# Patient Record
Sex: MALE | Race: WHITE | Employment: UNEMPLOYED | URBAN - METROPOLITAN AREA
[De-identification: names, ages, dates, MRNs, and addresses within clinical notes are randomized per-mention and may not be internally consistent; named-entity substitution may affect disease eponyms.]

---

## 2017-03-21 ENCOUNTER — HOSPITAL ENCOUNTER (EMERGENCY)
Age: 57
Discharge: HOME | End: 2017-03-21
Attending: EMERGENCY MEDICINE
Payer: SELF-PAY

## 2017-03-21 ENCOUNTER — APPOINTMENT (OUTPATIENT)
Dept: GENERAL RADIOLOGY | Age: 57
End: 2017-03-21
Attending: EMERGENCY MEDICINE
Payer: SELF-PAY

## 2017-03-21 VITALS
RESPIRATION RATE: 20 BRPM | HEART RATE: 96 BPM | WEIGHT: 220 LBS | HEIGHT: 69 IN | DIASTOLIC BLOOD PRESSURE: 80 MMHG | OXYGEN SATURATION: 95 % | BODY MASS INDEX: 32.58 KG/M2 | SYSTOLIC BLOOD PRESSURE: 118 MMHG | TEMPERATURE: 99.4 F

## 2017-03-21 DIAGNOSIS — B34.9 VIRAL SYNDROME: Primary | ICD-10-CM

## 2017-03-21 DIAGNOSIS — J06.9 ACUTE UPPER RESPIRATORY INFECTION: ICD-10-CM

## 2017-03-21 LAB
FLUAV AG NPH QL IA: NEGATIVE
FLUBV AG NOSE QL IA: NEGATIVE

## 2017-03-21 PROCEDURE — 71020 XR CHEST PA LAT: CPT

## 2017-03-21 PROCEDURE — 99282 EMERGENCY DEPT VISIT SF MDM: CPT

## 2017-03-21 PROCEDURE — 87804 INFLUENZA ASSAY W/OPTIC: CPT | Performed by: EMERGENCY MEDICINE

## 2017-03-21 RX ORDER — PROMETHAZINE HYDROCHLORIDE AND CODEINE PHOSPHATE 6.25; 1 MG/5ML; MG/5ML
5 SOLUTION ORAL
Qty: 120 ML | Refills: 0 | Status: SHIPPED | OUTPATIENT
Start: 2017-03-21 | End: 2021-03-05

## 2017-03-21 NOTE — DISCHARGE INSTRUCTIONS
Upper Respiratory Infection (Cold): Care Instructions  Your Care Instructions    An upper respiratory infection, or URI, is an infection of the nose, sinuses, or throat. URIs are spread by coughs, sneezes, and direct contact. The common cold is the most frequent kind of URI. The flu and sinus infections are other kinds of URIs. Almost all URIs are caused by viruses. Antibiotics won't cure them. But you can treat most infections with home care. This may include drinking lots of fluids and taking over-the-counter pain medicine. You will probably feel better in 4 to 10 days. The doctor has checked you carefully, but problems can develop later. If you notice any problems or new symptoms, get medical treatment right away. Follow-up care is a key part of your treatment and safety. Be sure to make and go to all appointments, and call your doctor if you are having problems. It's also a good idea to know your test results and keep a list of the medicines you take. How can you care for yourself at home? · To prevent dehydration, drink plenty of fluids, enough so that your urine is light yellow or clear like water. Choose water and other caffeine-free clear liquids until you feel better. If you have kidney, heart, or liver disease and have to limit fluids, talk with your doctor before you increase the amount of fluids you drink. · Take an over-the-counter pain medicine, such as acetaminophen (Tylenol), ibuprofen (Advil, Motrin), or naproxen (Aleve). Read and follow all instructions on the label. · Before you use cough and cold medicines, check the label. These medicines may not be safe for young children or for people with certain health problems. · Be careful when taking over-the-counter cold or flu medicines and Tylenol at the same time. Many of these medicines have acetaminophen, which is Tylenol. Read the labels to make sure that you are not taking more than the recommended dose.  Too much acetaminophen (Tylenol) can be harmful. · Get plenty of rest.  · Do not smoke or allow others to smoke around you. If you need help quitting, talk to your doctor about stop-smoking programs and medicines. These can increase your chances of quitting for good. When should you call for help? Call 911 anytime you think you may need emergency care. For example, call if:  · You have severe trouble breathing. Call your doctor now or seek immediate medical care if:  · You seem to be getting much sicker. · You have new or worse trouble breathing. · You have a new or higher fever. · You have a new rash. Watch closely for changes in your health, and be sure to contact your doctor if:  · You have a new symptom, such as a sore throat, an earache, or sinus pain. · You cough more deeply or more often, especially if you notice more mucus or a change in the color of your mucus. · You do not get better as expected. Where can you learn more? Go to http://florencia-bethel.info/. Enter B515 in the search box to learn more about \"Upper Respiratory Infection (Cold): Care Instructions. \"  Current as of: June 30, 2016  Content Version: 11.1  © 6623-7421 [employer]. Care instructions adapted under license by [employer] (which disclaims liability or warranty for this information). If you have questions about a medical condition or this instruction, always ask your healthcare professional. [place] [address] any warranty or liability for your use of this information. Viral Infections: Care Instructions  Your Care Instructions  You don't feel well, but it's not clear what's causing it. You may have a viral infection. Viruses cause many illnesses, such as the common cold, influenza, fever, rashes, and the diarrhea, nausea, and vomiting that are often called \"stomach flu. \" You may wonder if antibiotic medicines could make you feel better.  But antibiotics only treat infections caused by bacteria. They don't work on viruses. The good news is that viral infections usually aren't serious. Most will go away in a few days without medical treatment. In the meantime, there are a few things you can do to make yourself more comfortable. Follow-up care is a key part of your treatment and safety. Be sure to make and go to all appointments, and call your doctor if you are having problems. It's also a good idea to know your test results and keep a list of the medicines you take. How can you care for yourself at home? · Get plenty of rest if you feel tired. · Take an over-the-counter pain medicine if needed, such as acetaminophen (Tylenol), ibuprofen (Advil, Motrin), or naproxen (Aleve). Read and follow all instructions on the label. · Be careful when taking over-the-counter cold or flu medicines and Tylenol at the same time. Many of these medicines have acetaminophen, which is Tylenol. Read the labels to make sure that you are not taking more than the recommended dose. Too much acetaminophen (Tylenol) can be harmful. · Drink plenty of fluids, enough so that your urine is light yellow or clear like water. If you have kidney, heart, or liver disease and have to limit fluids, talk with your doctor before you increase the amount of fluids you drink. · Stay home from work, school, and other public places while you have a fever. When should you call for help? Call 911 anytime you think you may need emergency care. For example, call if:  · You have severe trouble breathing. · You passed out (lost consciousness). Call your doctor now or seek immediate medical care if:  · You seem to be getting much sicker. · You have a new or higher fever. · You have blood in your stools. · You have new belly pain, or your pain gets worse. · You have a new rash. Watch closely for changes in your health, and be sure to contact your doctor if:  · You start to get better and then get worse.   · You do not get better as expected. Where can you learn more? Go to http://florencia-bethel.info/. Enter C880 in the search box to learn more about \"Viral Infections: Care Instructions. \"  Current as of: May 24, 2016  Content Version: 11.1  © 1904-8332 [employer], [employer]. Care instructions adapted under license by [employer]Â® (which disclaims liability or warranty for this information). If you have questions about a medical condition or this instruction, always ask your healthcare professional. [address] any warranty or liability for your use of this information.

## 2017-03-21 NOTE — ED PROVIDER NOTES
HPI Comments: 55 y.o. male with no significant past medical history who presents to the ED with chief complaint of fatigue. Pt reports generalized fatigue onset approximately 3 days ago accompanied by a nonproductive cough onset today, generalized body aches, nausea, two episodes of vomiting yesterday, lightheadedness, decreased appetite, and diarrhea, says he has been doing \"nothing but sleeping. \" Pt denies taking any medications, but says he does not see a doctor regularly. Pt denies congestion, SOB, abdominal pain, or rash. There are no other acute medical complaints voiced at this time. Social Hx: . PCP: None    Note written by Nilam Ray, as dictated by Rodrigue Peters MD 4:06 PM     The history is provided by the patient and the spouse. History reviewed. No pertinent past medical history. Past Surgical History:   Procedure Laterality Date    HX HEENT      toncills         Family History:   Problem Relation Age of Onset    Diabetes Mother     Diabetes Father        Social History     Social History    Marital status:      Spouse name: N/A    Number of children: N/A    Years of education: N/A     Occupational History    Not on file. Social History Main Topics    Smoking status: Never Smoker    Smokeless tobacco: Not on file    Alcohol use No    Drug use: No    Sexual activity: Not on file     Other Topics Concern    Not on file     Social History Narrative         ALLERGIES: Review of patient's allergies indicates no known allergies. Review of Systems   Constitutional: Positive for appetite change (decreased) and fatigue. Negative for chills and fever. HENT: Negative for congestion, ear pain, postnasal drip, rhinorrhea and sore throat. Eyes: Negative for visual disturbance. Respiratory: Positive for cough. Negative for shortness of breath and wheezing. Cardiovascular: Negative for chest pain, palpitations and leg swelling. Gastrointestinal: Positive for diarrhea, nausea and vomiting. Negative for abdominal pain, anal bleeding and constipation. Genitourinary: Negative for dysuria and hematuria. Musculoskeletal: Positive for myalgias. Negative for arthralgias. Skin: Negative for rash. Allergic/Immunologic: Negative for immunocompromised state. Neurological: Positive for light-headedness. Negative for weakness and headaches. All other systems reviewed and are negative. Vitals:    03/21/17 1546   BP: 118/80   Pulse: 96   Resp: 20   Temp: 99.4 °F (37.4 °C)   SpO2: 95%   Weight: 99.8 kg (220 lb)   Height: 5' 9\" (1.753 m)            Physical Exam   Constitutional: He is oriented to person, place, and time. He appears well-developed and well-nourished. No distress. HENT:   Head: Normocephalic and atraumatic. Right Ear: External ear normal.   Left Ear: External ear normal.   Nose: Nose normal.   Mouth/Throat: Oropharynx is clear and moist.   Eyes: Conjunctivae and EOM are normal. Pupils are equal, round, and reactive to light. Neck: Normal range of motion. Neck supple. No JVD present. No thyromegaly present. Cardiovascular: Normal rate, regular rhythm, normal heart sounds and intact distal pulses. No murmur heard. Pulmonary/Chest: Effort normal and breath sounds normal. No respiratory distress. He has no wheezes. He has no rales. Abdominal: Soft. Bowel sounds are normal. He exhibits no distension. There is no tenderness. Musculoskeletal: Normal range of motion. He exhibits no edema. Neurological: He is alert and oriented to person, place, and time. No cranial nerve deficit. Skin: Skin is warm and dry. No rash noted. Psychiatric: He has a normal mood and affect. His behavior is normal. Thought content normal.   Nursing note and vitals reviewed.      Note written by Nilam Ram, as dictated by Gianfranco Pal MD 4:07 PM    [facility]  ED Course       Procedures    PROGRESS NOTE:  4:49 PM   Flu test negative. Chest x-ray negative. A/P: URI, viral syndrome. Will discharge home with supportive care, fever control, and prescription for promethazine with codeine elixir.

## 2021-02-13 ENCOUNTER — APPOINTMENT (OUTPATIENT)
Dept: CT IMAGING | Age: 61
DRG: 853 | End: 2021-02-13
Attending: EMERGENCY MEDICINE

## 2021-02-13 ENCOUNTER — APPOINTMENT (OUTPATIENT)
Dept: GENERAL RADIOLOGY | Age: 61
DRG: 853 | End: 2021-02-13
Attending: EMERGENCY MEDICINE

## 2021-02-13 ENCOUNTER — ANESTHESIA (OUTPATIENT)
Dept: SURGERY | Age: 61
DRG: 853 | End: 2021-02-13

## 2021-02-13 ENCOUNTER — HOSPITAL ENCOUNTER (INPATIENT)
Age: 61
LOS: 17 days | Discharge: HOME | DRG: 853 | End: 2021-03-05
Attending: EMERGENCY MEDICINE | Admitting: SURGERY

## 2021-02-13 ENCOUNTER — ANESTHESIA EVENT (OUTPATIENT)
Dept: SURGERY | Age: 61
DRG: 853 | End: 2021-02-13

## 2021-02-13 DIAGNOSIS — K35.30 ACUTE APPENDICITIS WITH LOCALIZED PERITONITIS, WITHOUT PERFORATION, ABSCESS, OR GANGRENE: Primary | ICD-10-CM

## 2021-02-13 DIAGNOSIS — K35.32 ACUTE APPENDICITIS WITH PERFORATION AND LOCALIZED PERITONITIS, UNSPECIFIED WHETHER ABSCESS PRESENT, UNSPECIFIED WHETHER GANGRENE PRESENT: ICD-10-CM

## 2021-02-13 DIAGNOSIS — I26.99 OTHER ACUTE PULMONARY EMBOLISM WITHOUT ACUTE COR PULMONALE (HCC): ICD-10-CM

## 2021-02-13 DIAGNOSIS — D72.829 LEUKOCYTOSIS, UNSPECIFIED TYPE: ICD-10-CM

## 2021-02-13 DIAGNOSIS — R55 SYNCOPE AND COLLAPSE: ICD-10-CM

## 2021-02-13 DIAGNOSIS — K35.32 APPENDICITIS WITH PERFORATION: ICD-10-CM

## 2021-02-13 DIAGNOSIS — M79.604 PAIN IN BOTH LOWER EXTREMITIES: ICD-10-CM

## 2021-02-13 DIAGNOSIS — M79.605 PAIN IN BOTH LOWER EXTREMITIES: ICD-10-CM

## 2021-02-13 PROBLEM — K37 APPENDICITIS: Status: ACTIVE | Noted: 2021-02-13

## 2021-02-13 LAB
ALBUMIN SERPL-MCNC: 3.6 G/DL (ref 3.5–5)
ALBUMIN/GLOB SERPL: 1 {RATIO} (ref 1.1–2.2)
ALP SERPL-CCNC: 70 U/L (ref 45–117)
ALT SERPL-CCNC: 32 U/L (ref 12–78)
AMYLASE SERPL-CCNC: 54 U/L (ref 25–115)
ANION GAP SERPL CALC-SCNC: 7 MMOL/L (ref 5–15)
APPEARANCE UR: CLEAR
AST SERPL-CCNC: 18 U/L (ref 15–37)
BACTERIA URNS QL MICRO: NEGATIVE /HPF
BASOPHILS # BLD: 0.1 K/UL (ref 0–0.1)
BASOPHILS NFR BLD: 0 % (ref 0–1)
BILIRUB SERPL-MCNC: 0.2 MG/DL (ref 0.2–1)
BILIRUB UR QL: NEGATIVE
BUN SERPL-MCNC: 17 MG/DL (ref 6–20)
BUN/CREAT SERPL: 13 (ref 12–20)
CALCIUM SERPL-MCNC: 8.4 MG/DL (ref 8.5–10.1)
CHLORIDE SERPL-SCNC: 104 MMOL/L (ref 97–108)
CO2 SERPL-SCNC: 28 MMOL/L (ref 21–32)
COLOR UR: NORMAL
COMMENT, HOLDF: NORMAL
COVID-19 RAPID TEST, COVR: NOT DETECTED
CREAT SERPL-MCNC: 1.35 MG/DL (ref 0.7–1.3)
DIFFERENTIAL METHOD BLD: ABNORMAL
EOSINOPHIL # BLD: 0.1 K/UL (ref 0–0.4)
EOSINOPHIL NFR BLD: 0 % (ref 0–7)
EPITH CASTS URNS QL MICRO: NORMAL /LPF
ERYTHROCYTE [DISTWIDTH] IN BLOOD BY AUTOMATED COUNT: 13.3 % (ref 11.5–14.5)
GLOBULIN SER CALC-MCNC: 3.7 G/DL (ref 2–4)
GLUCOSE SERPL-MCNC: 126 MG/DL (ref 65–100)
GLUCOSE UR STRIP.AUTO-MCNC: NEGATIVE MG/DL
HCT VFR BLD AUTO: 46.4 % (ref 36.6–50.3)
HGB BLD-MCNC: 15.1 G/DL (ref 12.1–17)
HGB UR QL STRIP: NEGATIVE
IMM GRANULOCYTES # BLD AUTO: 0.1 K/UL (ref 0–0.04)
IMM GRANULOCYTES NFR BLD AUTO: 1 % (ref 0–0.5)
KETONES UR QL STRIP.AUTO: NEGATIVE MG/DL
LEUKOCYTE ESTERASE UR QL STRIP.AUTO: NEGATIVE
LIPASE SERPL-CCNC: 79 U/L (ref 73–393)
LYMPHOCYTES # BLD: 1.8 K/UL (ref 0.8–3.5)
LYMPHOCYTES NFR BLD: 11 % (ref 12–49)
MCH RBC QN AUTO: 30.3 PG (ref 26–34)
MCHC RBC AUTO-ENTMCNC: 32.5 G/DL (ref 30–36.5)
MCV RBC AUTO: 93 FL (ref 80–99)
MONOCYTES # BLD: 0.6 K/UL (ref 0–1)
MONOCYTES NFR BLD: 3 % (ref 5–13)
NEUTS SEG # BLD: 14.4 K/UL (ref 1.8–8)
NEUTS SEG NFR BLD: 85 % (ref 32–75)
NITRITE UR QL STRIP.AUTO: NEGATIVE
NRBC # BLD: 0 K/UL (ref 0–0.01)
NRBC BLD-RTO: 0 PER 100 WBC
PH UR STRIP: 6 [PH] (ref 5–8)
PLATELET # BLD AUTO: 251 K/UL (ref 150–400)
PMV BLD AUTO: 9.7 FL (ref 8.9–12.9)
POTASSIUM SERPL-SCNC: 3.8 MMOL/L (ref 3.5–5.1)
PROT SERPL-MCNC: 7.3 G/DL (ref 6.4–8.2)
PROT UR STRIP-MCNC: NEGATIVE MG/DL
RBC # BLD AUTO: 4.99 M/UL (ref 4.1–5.7)
RBC #/AREA URNS HPF: NORMAL /HPF (ref 0–5)
SAMPLES BEING HELD,HOLD: NORMAL
SARS-COV-2, COV2: NORMAL
SODIUM SERPL-SCNC: 139 MMOL/L (ref 136–145)
SOURCE, COVRS: NORMAL
SP GR UR REFRACTOMETRY: 1.01 (ref 1–1.03)
UA: UC IF INDICATED,UAUC: NORMAL
UROBILINOGEN UR QL STRIP.AUTO: 1 EU/DL (ref 0.2–1)
WBC # BLD AUTO: 17 K/UL (ref 4.1–11.1)
WBC URNS QL MICRO: NORMAL /HPF (ref 0–4)

## 2021-02-13 PROCEDURE — 99218 HC RM OBSERVATION: CPT

## 2021-02-13 PROCEDURE — 36415 COLL VENOUS BLD VENIPUNCTURE: CPT

## 2021-02-13 PROCEDURE — 77030039147 HC PWDR HEMSTS SURGICEL JNJ -D: Performed by: SURGERY

## 2021-02-13 PROCEDURE — 96375 TX/PRO/DX INJ NEW DRUG ADDON: CPT

## 2021-02-13 PROCEDURE — 76210000006 HC OR PH I REC 0.5 TO 1 HR: Performed by: SURGERY

## 2021-02-13 PROCEDURE — 74011000258 HC RX REV CODE- 258: Performed by: EMERGENCY MEDICINE

## 2021-02-13 PROCEDURE — 74011250636 HC RX REV CODE- 250/636: Performed by: SURGERY

## 2021-02-13 PROCEDURE — 76010000149 HC OR TIME 1 TO 1.5 HR: Performed by: SURGERY

## 2021-02-13 PROCEDURE — 77030008756 HC TU IRR SUC STRY -B: Performed by: SURGERY

## 2021-02-13 PROCEDURE — 77030031139 HC SUT VCRL2 J&J -A: Performed by: SURGERY

## 2021-02-13 PROCEDURE — 88307 TISSUE EXAM BY PATHOLOGIST: CPT

## 2021-02-13 PROCEDURE — 0DBH4ZZ EXCISION OF CECUM, PERCUTANEOUS ENDOSCOPIC APPROACH: ICD-10-PCS | Performed by: SURGERY

## 2021-02-13 PROCEDURE — 77030002916 HC SUT ETHLN J&J -A: Performed by: SURGERY

## 2021-02-13 PROCEDURE — 74011250636 HC RX REV CODE- 250/636: Performed by: NURSE ANESTHETIST, CERTIFIED REGISTERED

## 2021-02-13 PROCEDURE — 74011000636 HC RX REV CODE- 636: Performed by: RADIOLOGY

## 2021-02-13 PROCEDURE — 74011250636 HC RX REV CODE- 250/636: Performed by: EMERGENCY MEDICINE

## 2021-02-13 PROCEDURE — 99285 EMERGENCY DEPT VISIT HI MDM: CPT

## 2021-02-13 PROCEDURE — 82150 ASSAY OF AMYLASE: CPT

## 2021-02-13 PROCEDURE — 2709999900 HC NON-CHARGEABLE SUPPLY: Performed by: SURGERY

## 2021-02-13 PROCEDURE — 77030012770 HC TRCR OPT FX AMR -B: Performed by: SURGERY

## 2021-02-13 PROCEDURE — 77030040506 HC DRN WND MDII -A: Performed by: SURGERY

## 2021-02-13 PROCEDURE — 77030008684 HC TU ET CUF COVD -B: Performed by: ANESTHESIOLOGY

## 2021-02-13 PROCEDURE — 80053 COMPREHEN METABOLIC PANEL: CPT

## 2021-02-13 PROCEDURE — 83690 ASSAY OF LIPASE: CPT

## 2021-02-13 PROCEDURE — 0DTJ4ZZ RESECTION OF APPENDIX, PERCUTANEOUS ENDOSCOPIC APPROACH: ICD-10-PCS | Performed by: SURGERY

## 2021-02-13 PROCEDURE — 74011250636 HC RX REV CODE- 250/636: Performed by: ANESTHESIOLOGY

## 2021-02-13 PROCEDURE — 77030034628 HC LIGASURE LAP SEAL DIV MD COVD -F: Performed by: SURGERY

## 2021-02-13 PROCEDURE — 87635 SARS-COV-2 COVID-19 AMP PRB: CPT

## 2021-02-13 PROCEDURE — 76060000033 HC ANESTHESIA 1 TO 1.5 HR: Performed by: SURGERY

## 2021-02-13 PROCEDURE — 74011250637 HC RX REV CODE- 250/637: Performed by: SURGERY

## 2021-02-13 PROCEDURE — 77030009851 HC PCH RTVR ENDOSC AMR -B: Performed by: SURGERY

## 2021-02-13 PROCEDURE — 93005 ELECTROCARDIOGRAM TRACING: CPT

## 2021-02-13 PROCEDURE — 74011000258 HC RX REV CODE- 258: Performed by: SURGERY

## 2021-02-13 PROCEDURE — 77030012405 HC DRN WND ADLR -A: Performed by: SURGERY

## 2021-02-13 PROCEDURE — 74177 CT ABD & PELVIS W/CONTRAST: CPT

## 2021-02-13 PROCEDURE — 77030026438 HC STYL ET INTUB CARD -A: Performed by: ANESTHESIOLOGY

## 2021-02-13 PROCEDURE — 77030020829: Performed by: SURGERY

## 2021-02-13 PROCEDURE — 77030002933 HC SUT MCRYL J&J -A: Performed by: SURGERY

## 2021-02-13 PROCEDURE — 71045 X-RAY EXAM CHEST 1 VIEW: CPT

## 2021-02-13 PROCEDURE — 77030027876 HC STPLR ENDOSC FLX PWR J&J -G1: Performed by: SURGERY

## 2021-02-13 PROCEDURE — 88304 TISSUE EXAM BY PATHOLOGIST: CPT

## 2021-02-13 PROCEDURE — 77030008608 HC TRCR ENDOSC SMTH AMR -B: Performed by: SURGERY

## 2021-02-13 PROCEDURE — 77030036731 HC STPLR ENDOSC J&J -F: Performed by: SURGERY

## 2021-02-13 PROCEDURE — 81001 URINALYSIS AUTO W/SCOPE: CPT

## 2021-02-13 PROCEDURE — 77030041236 HC APPL SURG ENDO JNJ -B: Performed by: SURGERY

## 2021-02-13 PROCEDURE — 96365 THER/PROPH/DIAG IV INF INIT: CPT

## 2021-02-13 PROCEDURE — 85025 COMPLETE CBC W/AUTO DIFF WBC: CPT

## 2021-02-13 PROCEDURE — 77030018836 HC SOL IRR NACL ICUM -A: Performed by: SURGERY

## 2021-02-13 PROCEDURE — 74011000250 HC RX REV CODE- 250: Performed by: NURSE ANESTHETIST, CERTIFIED REGISTERED

## 2021-02-13 RX ORDER — DIPHENHYDRAMINE HYDROCHLORIDE 50 MG/ML
12.5 INJECTION, SOLUTION INTRAMUSCULAR; INTRAVENOUS AS NEEDED
Status: DISCONTINUED | OUTPATIENT
Start: 2021-02-13 | End: 2021-02-13 | Stop reason: HOSPADM

## 2021-02-13 RX ORDER — SUCCINYLCHOLINE CHLORIDE 20 MG/ML
INJECTION INTRAMUSCULAR; INTRAVENOUS AS NEEDED
Status: DISCONTINUED | OUTPATIENT
Start: 2021-02-13 | End: 2021-02-13 | Stop reason: HOSPADM

## 2021-02-13 RX ORDER — PROPOFOL 10 MG/ML
INJECTION, EMULSION INTRAVENOUS AS NEEDED
Status: DISCONTINUED | OUTPATIENT
Start: 2021-02-13 | End: 2021-02-13 | Stop reason: HOSPADM

## 2021-02-13 RX ORDER — OXYCODONE HYDROCHLORIDE 5 MG/1
5 TABLET ORAL
Status: DISCONTINUED | OUTPATIENT
Start: 2021-02-13 | End: 2021-02-14

## 2021-02-13 RX ORDER — DIPHENHYDRAMINE HCL 25 MG
25 CAPSULE ORAL
Status: DISCONTINUED | OUTPATIENT
Start: 2021-02-13 | End: 2021-03-05 | Stop reason: HOSPADM

## 2021-02-13 RX ORDER — LEVOFLOXACIN 5 MG/ML
500 INJECTION, SOLUTION INTRAVENOUS EVERY 24 HOURS
Status: DISCONTINUED | OUTPATIENT
Start: 2021-02-13 | End: 2021-02-18

## 2021-02-13 RX ORDER — ACETAMINOPHEN 500 MG
1000 TABLET ORAL EVERY 8 HOURS
Status: DISCONTINUED | OUTPATIENT
Start: 2021-02-13 | End: 2021-03-05 | Stop reason: HOSPADM

## 2021-02-13 RX ORDER — METRONIDAZOLE 500 MG/100ML
500 INJECTION, SOLUTION INTRAVENOUS EVERY 12 HOURS
Status: DISCONTINUED | OUTPATIENT
Start: 2021-02-13 | End: 2021-02-18

## 2021-02-13 RX ORDER — ONDANSETRON 2 MG/ML
INJECTION INTRAMUSCULAR; INTRAVENOUS AS NEEDED
Status: DISCONTINUED | OUTPATIENT
Start: 2021-02-13 | End: 2021-02-13 | Stop reason: HOSPADM

## 2021-02-13 RX ORDER — ROCURONIUM BROMIDE 10 MG/ML
INJECTION, SOLUTION INTRAVENOUS AS NEEDED
Status: DISCONTINUED | OUTPATIENT
Start: 2021-02-13 | End: 2021-02-13 | Stop reason: HOSPADM

## 2021-02-13 RX ORDER — SODIUM CHLORIDE, SODIUM LACTATE, POTASSIUM CHLORIDE, CALCIUM CHLORIDE 600; 310; 30; 20 MG/100ML; MG/100ML; MG/100ML; MG/100ML
75 INJECTION, SOLUTION INTRAVENOUS CONTINUOUS
Status: DISCONTINUED | OUTPATIENT
Start: 2021-02-13 | End: 2021-02-14

## 2021-02-13 RX ORDER — SODIUM CHLORIDE, SODIUM LACTATE, POTASSIUM CHLORIDE, CALCIUM CHLORIDE 600; 310; 30; 20 MG/100ML; MG/100ML; MG/100ML; MG/100ML
125 INJECTION, SOLUTION INTRAVENOUS CONTINUOUS
Status: DISCONTINUED | OUTPATIENT
Start: 2021-02-13 | End: 2021-02-14

## 2021-02-13 RX ORDER — OXYCODONE HYDROCHLORIDE 5 MG/1
5 TABLET ORAL
Qty: 12 TAB | Refills: 0 | Status: SHIPPED | OUTPATIENT
Start: 2021-02-13 | End: 2021-02-16

## 2021-02-13 RX ORDER — KETOROLAC TROMETHAMINE 30 MG/ML
30 INJECTION, SOLUTION INTRAMUSCULAR; INTRAVENOUS
Status: DISCONTINUED | OUTPATIENT
Start: 2021-02-13 | End: 2021-02-13 | Stop reason: SDUPTHER

## 2021-02-13 RX ORDER — ONDANSETRON 2 MG/ML
8 INJECTION INTRAMUSCULAR; INTRAVENOUS
Status: COMPLETED | OUTPATIENT
Start: 2021-02-13 | End: 2021-02-13

## 2021-02-13 RX ORDER — MIDAZOLAM HYDROCHLORIDE 1 MG/ML
INJECTION, SOLUTION INTRAMUSCULAR; INTRAVENOUS AS NEEDED
Status: DISCONTINUED | OUTPATIENT
Start: 2021-02-13 | End: 2021-02-13 | Stop reason: HOSPADM

## 2021-02-13 RX ORDER — KETOROLAC TROMETHAMINE 30 MG/ML
30 INJECTION, SOLUTION INTRAMUSCULAR; INTRAVENOUS
Status: COMPLETED | OUTPATIENT
Start: 2021-02-13 | End: 2021-02-13

## 2021-02-13 RX ORDER — GLYCOPYRROLATE 0.2 MG/ML
INJECTION INTRAMUSCULAR; INTRAVENOUS AS NEEDED
Status: DISCONTINUED | OUTPATIENT
Start: 2021-02-13 | End: 2021-02-13 | Stop reason: HOSPADM

## 2021-02-13 RX ORDER — ONDANSETRON 2 MG/ML
4 INJECTION INTRAMUSCULAR; INTRAVENOUS
Status: DISCONTINUED | OUTPATIENT
Start: 2021-02-13 | End: 2021-03-05 | Stop reason: HOSPADM

## 2021-02-13 RX ORDER — LIDOCAINE HYDROCHLORIDE 20 MG/ML
INJECTION, SOLUTION EPIDURAL; INFILTRATION; INTRACAUDAL; PERINEURAL AS NEEDED
Status: DISCONTINUED | OUTPATIENT
Start: 2021-02-13 | End: 2021-02-13 | Stop reason: HOSPADM

## 2021-02-13 RX ORDER — DEXAMETHASONE SODIUM PHOSPHATE 4 MG/ML
INJECTION, SOLUTION INTRA-ARTICULAR; INTRALESIONAL; INTRAMUSCULAR; INTRAVENOUS; SOFT TISSUE AS NEEDED
Status: DISCONTINUED | OUTPATIENT
Start: 2021-02-13 | End: 2021-02-13 | Stop reason: HOSPADM

## 2021-02-13 RX ORDER — NEOSTIGMINE METHYLSULFATE 1 MG/ML
INJECTION, SOLUTION INTRAVENOUS AS NEEDED
Status: DISCONTINUED | OUTPATIENT
Start: 2021-02-13 | End: 2021-02-13 | Stop reason: HOSPADM

## 2021-02-13 RX ORDER — ONDANSETRON 4 MG/1
4 TABLET, ORALLY DISINTEGRATING ORAL
Qty: 12 TAB | Refills: 1 | Status: SHIPPED | OUTPATIENT
Start: 2021-02-13 | End: 2021-10-14

## 2021-02-13 RX ORDER — HYDROMORPHONE HYDROCHLORIDE 1 MG/ML
.25-1 INJECTION, SOLUTION INTRAMUSCULAR; INTRAVENOUS; SUBCUTANEOUS
Status: DISCONTINUED | OUTPATIENT
Start: 2021-02-13 | End: 2021-02-13 | Stop reason: HOSPADM

## 2021-02-13 RX ORDER — PHENYLEPHRINE HCL IN 0.9% NACL 0.4MG/10ML
SYRINGE (ML) INTRAVENOUS AS NEEDED
Status: DISCONTINUED | OUTPATIENT
Start: 2021-02-13 | End: 2021-02-13 | Stop reason: HOSPADM

## 2021-02-13 RX ORDER — KETOROLAC TROMETHAMINE 30 MG/ML
INJECTION, SOLUTION INTRAMUSCULAR; INTRAVENOUS AS NEEDED
Status: DISCONTINUED | OUTPATIENT
Start: 2021-02-13 | End: 2021-02-13 | Stop reason: HOSPADM

## 2021-02-13 RX ORDER — MORPHINE SULFATE 2 MG/ML
2 INJECTION, SOLUTION INTRAMUSCULAR; INTRAVENOUS
Status: DISCONTINUED | OUTPATIENT
Start: 2021-02-13 | End: 2021-02-18

## 2021-02-13 RX ORDER — HYDROMORPHONE HYDROCHLORIDE 2 MG/ML
INJECTION, SOLUTION INTRAMUSCULAR; INTRAVENOUS; SUBCUTANEOUS AS NEEDED
Status: DISCONTINUED | OUTPATIENT
Start: 2021-02-13 | End: 2021-02-13 | Stop reason: HOSPADM

## 2021-02-13 RX ADMIN — PROPOFOL 200 MG: 10 INJECTION, EMULSION INTRAVENOUS at 15:22

## 2021-02-13 RX ADMIN — PIPERACILLIN AND TAZOBACTAM 3.38 G: 3; .375 INJECTION, POWDER, LYOPHILIZED, FOR SOLUTION INTRAVENOUS at 05:06

## 2021-02-13 RX ADMIN — METRONIDAZOLE 500 MG: 500 INJECTION, SOLUTION INTRAVENOUS at 20:14

## 2021-02-13 RX ADMIN — HYDROMORPHONE HYDROCHLORIDE 2 MG: 2 INJECTION INTRAMUSCULAR; INTRAVENOUS; SUBCUTANEOUS at 15:13

## 2021-02-13 RX ADMIN — SODIUM CHLORIDE 1000 ML: 9 INJECTION, SOLUTION INTRAVENOUS at 04:21

## 2021-02-13 RX ADMIN — GLYCOPYRROLATE 0.4 MG: 0.2 INJECTION INTRAMUSCULAR; INTRAVENOUS at 16:04

## 2021-02-13 RX ADMIN — MIDAZOLAM HYDROCHLORIDE 2 MG: 2 INJECTION, SOLUTION INTRAMUSCULAR; INTRAVENOUS at 15:13

## 2021-02-13 RX ADMIN — SODIUM CHLORIDE 3.38 G: 900 INJECTION INTRAVENOUS at 16:13

## 2021-02-13 RX ADMIN — DEXAMETHASONE SODIUM PHOSPHATE 4 MG: 4 INJECTION, SOLUTION INTRAMUSCULAR; INTRAVENOUS at 15:27

## 2021-02-13 RX ADMIN — IOPAMIDOL 100 ML: 755 INJECTION, SOLUTION INTRAVENOUS at 04:05

## 2021-02-13 RX ADMIN — Medication 3 MG: at 16:04

## 2021-02-13 RX ADMIN — SODIUM CHLORIDE 1000 ML: 9 INJECTION, SOLUTION INTRAVENOUS at 02:45

## 2021-02-13 RX ADMIN — Medication 100 MCG: at 15:29

## 2021-02-13 RX ADMIN — ONDANSETRON HYDROCHLORIDE 4 MG: 2 SOLUTION INTRAMUSCULAR; INTRAVENOUS at 15:27

## 2021-02-13 RX ADMIN — SODIUM CHLORIDE, POTASSIUM CHLORIDE, SODIUM LACTATE AND CALCIUM CHLORIDE 75 ML/HR: 600; 310; 30; 20 INJECTION, SOLUTION INTRAVENOUS at 06:56

## 2021-02-13 RX ADMIN — KETOROLAC TROMETHAMINE 30 MG: 30 INJECTION INTRAMUSCULAR; INTRAVENOUS at 15:49

## 2021-02-13 RX ADMIN — LEVOFLOXACIN 500 MG: 5 INJECTION, SOLUTION INTRAVENOUS at 18:44

## 2021-02-13 RX ADMIN — ONDANSETRON 8 MG: 2 INJECTION INTRAMUSCULAR; INTRAVENOUS at 03:05

## 2021-02-13 RX ADMIN — Medication 1000 MG: at 21:36

## 2021-02-13 RX ADMIN — LIDOCAINE HYDROCHLORIDE 80 MG: 20 INJECTION, SOLUTION EPIDURAL; INFILTRATION; INTRACAUDAL; PERINEURAL at 15:22

## 2021-02-13 RX ADMIN — MORPHINE SULFATE 2 MG: 2 INJECTION, SOLUTION INTRAMUSCULAR; INTRAVENOUS at 23:03

## 2021-02-13 RX ADMIN — SODIUM CHLORIDE, POTASSIUM CHLORIDE, SODIUM LACTATE AND CALCIUM CHLORIDE 125 ML/HR: 600; 310; 30; 20 INJECTION, SOLUTION INTRAVENOUS at 15:00

## 2021-02-13 RX ADMIN — KETOROLAC TROMETHAMINE 30 MG: 30 INJECTION, SOLUTION INTRAMUSCULAR at 03:06

## 2021-02-13 RX ADMIN — ROCURONIUM BROMIDE 20 MG: 10 INJECTION INTRAVENOUS at 15:22

## 2021-02-13 RX ADMIN — SUCCINYLCHOLINE CHLORIDE 100 MG: 20 INJECTION, SOLUTION INTRAMUSCULAR; INTRAVENOUS at 15:22

## 2021-02-13 NOTE — PERIOP NOTES
TRANSFER - OUT REPORT:    Verbal report given to [employer]  being transferred to [facility](unit) for routine progression of care       Report consisted of patients Situation, Background, Assessment and   Recommendations(SBAR). Information from the following report(s) Kardex was reviewed with the receiving nurse. Lines:   Peripheral IV 02/13/21 Left Antecubital (Active)   Site Assessment Clean, dry, & intact 02/13/21 1635   Phlebitis Assessment 0 02/13/21 1635   Infiltration Assessment 0 02/13/21 1635   Dressing Status Clean, dry, & intact 02/13/21 1635   Dressing Type Transparent 02/13/21 1635   Hub Color/Line Status Patent;Pink 02/13/21 1635   Action Taken Open ports on tubing capped 02/13/21 1635   Alcohol Cap Used Yes 02/13/21 1440        Opportunity for questions and clarification was provided.       Patient transported with:   Registered Nurse

## 2021-02-13 NOTE — PROGRESS NOTES
Bedside and Verbal shift change report given to Zabrina Ashley RN (oncoming nurse) by Juanito Ngo RN (offgoing nurse). Report included the following information SBAR, Kardex, Intake/Output, MAR, Accordion and Recent Results.

## 2021-02-13 NOTE — ANESTHESIA PREPROCEDURE EVALUATION
Relevant Problems   No relevant active problems       Anesthetic History   No history of anesthetic complications            Review of Systems / Medical History  Patient summary reviewed, nursing notes reviewed and pertinent labs reviewed    Pulmonary  Within defined limits                 Neuro/Psych   Within defined limits           Cardiovascular  Within defined limits                Exercise tolerance: >4 METS  Comments:  Hx bradycardia   GI/Hepatic/Renal  Within defined limits              Endo/Other        Obesity     Other Findings              Physical Exam    Airway  Mallampati: II    Neck ROM: normal range of motion   Mouth opening: Normal     Cardiovascular  Regular rate and rhythm,  S1 and S2 normal,  no murmur, click, rub, or gallop  Rhythm: regular  Rate: normal         Dental  No notable dental hx       Pulmonary  Breath sounds clear to auscultation               Abdominal  GI exam deferred       Other Findings            Anesthetic Plan    ASA: 2  Anesthesia type: general          Induction: Intravenous  Anesthetic plan and risks discussed with: Patient

## 2021-02-13 NOTE — ED NOTES
TRANSFER - OUT REPORT:    Verbal report given to Jeffery (name) on Ignacio Cordova  being transferred to Medical/S[facility] Room 504(unit) for routine progression of care       Report consisted of patients Situation, Background, Assessment and   Recommendations(SBAR). Information from the following report(s) SBAR, Kardex, ED Summary, Intake/Output, MAR, Recent Results and Cardiac Rhythm SR was reviewed with the receiving nurse. Lines:   Peripheral IV 02/13/21 Left Antecubital (Active)   Site Assessment Clean, dry, & intact 02/13/21 0231   Phlebitis Assessment 0 02/13/21 0231   Infiltration Assessment 0 02/13/21 0231   Dressing Status Clean, dry, & intact 02/13/21 0231   Hub Color/Line Status Pink 02/13/21 0231   Action Taken Blood drawn 02/13/21 0231        Opportunity for questions and clarification was provided.       Patient transported with:   [employer]

## 2021-02-13 NOTE — BRIEF OP NOTE
Brief Postoperative Note    Patient: Jeannette Montemayor  YOB: 1960  MRN: [id number]    Date of Procedure: 2/13/2021     Pre-Op Diagnosis: appendicitis    Post-Op Diagnosis: Perforated appendicitis      Procedure(s):  Laparoscopic Partial Cecectomy    Surgeon(s):  Gita Castillo MD    Surgical Assistant: Surg Asst-1: Marina Olivera LPN    Anesthesia: General     Estimated Blood Loss (mL): Minimal    Complications: None    Specimens:   ID Type Source Tests Collected by Time Destination   1 : appendix and partial cecum Preservative Appendix  Gita Castillo MD 2/13/2021 1545 Pathology        Implants: * No implants in log *    Drains:   Eduar-Ellis Drain 02/13/21 Mid Abdomen (Active)     Findings: Perforated appendicitis, base of appendix not suitable to staple    Electronically Signed by Jo-Ann Pritchard MD on 2/13/2021 at 4:13 PM

## 2021-02-13 NOTE — ED PROVIDER NOTES
The patient is a 81-year-old male with a past medical history significant for hypertension who presents to the ED with a complaint of right flank and right lower quadrant tenderness that began approximately 2 to 3 hours ago described as dull and throbbing in nature, severity 8 out of 10, constant, without any aggravating or relieving factor and nonradiating. The patient does admit to nausea and diaphoresis. Patient denies any fever, chills, sore throat, cough or congestion, headache, neck or back pain, chest pain, shortness of breath, diarrhea, constipation, dysuria, vaginal discharge or bleeding, melena, hematochezia, hematemesis, sick contact, skin rash, recent travel, prior abdominal surgery or history of the same. No past medical history on file.     Past Surgical History:   Procedure Laterality Date    HX CHASE alves         Family History:   Problem Relation Age of Onset    Diabetes Mother     Diabetes Father        Social History     Socioeconomic History    Marital status:      Spouse name: Not on file    Number of children: Not on file    Years of education: Not on file    Highest education level: Not on file   Occupational History    Not on file   Social Needs    Financial resource strain: Not on file    Food insecurity     Worry: Not on file     Inability: Not on file    Transportation needs     Medical: Not on file     Non-medical: Not on file   Tobacco Use    Smoking status: Never Smoker   Substance and Sexual Activity    Alcohol use: No    Drug use: No    Sexual activity: Not on file   Lifestyle    Physical activity     Days per week: Not on file     Minutes per session: Not on file    Stress: Not on file   Relationships    Social connections     Talks on phone: Not on file     Gets together: Not on file     Attends Nondenominational service: Not on file     Active member of club or organization: Not on file     Attends meetings of clubs or organizations: Not on file Relationship status: Not on file    Intimate partner violence     Fear of current or ex partner: Not on file     Emotionally abused: Not on file     Physically abused: Not on file     Forced sexual activity: Not on file   Other Topics Concern    Not on file   Social History Narrative    Not on file         ALLERGIES: Patient has no known allergies. Review of Systems   All other systems reviewed and are negative. Vitals:    02/13/21 0222   BP: 106/66   Pulse: 82   Resp: 27   Temp: 98.3 °F (36.8 °C)   SpO2: 97%   Weight: 104.3 kg (230 lb)   Height: 5' 9\" (1.753 m)            Physical Exam  Vitals signs and nursing note reviewed. Exam conducted with a chaperone present. MDM  Number of Diagnoses or Management Options  Acute appendicitis with localized peritonitis, without perforation, abscess, or gangrene  Diagnosis management comments: Assessment: 52-year-old male who presents to the ED for evaluation for right lower quadrant pain. Differential diagnosis include appendicitis/diverticular disease/colitis/myofascial strain and obstipation    Plan: Lab/IV fluid/antiemetic and analgesia/CT scan of the abdomen and pelvis/p.o. challenge/serial exam/ Monitor and Reevaluate.          Amount and/or Complexity of Data Reviewed  Clinical lab tests: ordered and reviewed  Tests in the radiology section of CPT®: ordered and reviewed  Tests in the medicine section of CPT®: reviewed and ordered  Discussion of test results with the performing providers: yes  Decide to obtain previous medical records or to obtain history from someone other than the patient: yes  Obtain history from someone other than the patient: yes  Review and summarize past medical records: yes  Discuss the patient with other providers: yes  Independent visualization of images, tracings, or specimens: yes    Risk of Complications, Morbidity, and/or Mortality  Presenting problems: moderate  Diagnostic procedures: moderate  Management options: moderate  General comments: Total critical care time spent exclusive of procedures:  45 minutes    Patient Progress  Patient progress: stable         Procedures    PROGRESS NOTE:  Pt has been reexamined by Zoe Whiting MD all available results have been reviewed with pt and any available family. Pt understands sx, dx, and tx in ED. the CT scan is positive for acute appendicitis without any bowel perforation, abscess or gangrene. care plan has been outlined and questions have been answered. Pt and any available family understands and agrees to need for admission to hospital for further tx not available in ED. Pt is ready for admission. Will consult general surgery, perform preop EKG and chest x-ray  Written by Sasha Robins MD,  4:40 AM    ED EKG interpretation:  Rhythm: normal sinus rhythm; and regular . Rate (approx.): 87; Axis: left axis deviation; P wave: normal; QRS interval: normal ; ST/T wave: non-specific changes; in  Lead: Diffusely; Other findings: abnormal ekg. This EKG was interpreted by Sasha Robins MD,ED Provider. XRAY INTERPRETATION (ED MD)  Chest Xray  No acute process seen. Normal heart size. No bony abnormalities. No infiltrate. Zoe Whiting MD 4:57 AM    Perfect Serve Consult for Admission  4:58 AM    ED Room Number: WO85/36  Patient Name and age:  Sheryle Holland 62 y.o.  male  Working Diagnosis:   1. Acute appendicitis with localized peritonitis, without perforation, abscess, or gangrene        COVID-19 Suspicion:  no  Sepsis present:  yes  Reassessment needed: no  Code Status:  Full Code  Readmission: no  Isolation Requirements:  no  Recommended Level of Care:  med/surg  Department:[facility] ED - [phone]  Other: The patient is not on any anticoagulation has not had any previous surgery. He was given Zosyn and Toradol for pain control      CONSULT NOTE:  Zoe Whiting MD spoke with Dr. Petty Ye of general surgery.  Discussed patient's presentation, history, physical assessment, and available diagnostic results. She will evaluate, write orders and admit the patient to the hospital. 4:57 AM    .     . Ant Bell

## 2021-02-13 NOTE — ED NOTES
Patient arrives via EMS for complaints of RLQ abd pain and nausea x 1 hour     Patient [address] with right hearing aid in and left hearing aid at home

## 2021-02-14 LAB
ANION GAP SERPL CALC-SCNC: 6 MMOL/L (ref 5–15)
BASOPHILS # BLD: 0 K/UL (ref 0–0.1)
BASOPHILS NFR BLD: 0 % (ref 0–1)
BNP SERPL-MCNC: 62 PG/ML
BUN SERPL-MCNC: 28 MG/DL (ref 6–20)
BUN/CREAT SERPL: 18 (ref 12–20)
CALCIUM SERPL-MCNC: 7.3 MG/DL (ref 8.5–10.1)
CHLORIDE SERPL-SCNC: 108 MMOL/L (ref 97–108)
CO2 SERPL-SCNC: 21 MMOL/L (ref 21–32)
CREAT SERPL-MCNC: 1.53 MG/DL (ref 0.7–1.3)
D DIMER PPP FEU-MCNC: 0.96 MG/L FEU (ref 0–0.65)
DIFFERENTIAL METHOD BLD: ABNORMAL
EOSINOPHIL # BLD: 0 K/UL (ref 0–0.4)
EOSINOPHIL NFR BLD: 0 % (ref 0–7)
ERYTHROCYTE [DISTWIDTH] IN BLOOD BY AUTOMATED COUNT: 13.9 % (ref 11.5–14.5)
GLUCOSE SERPL-MCNC: 151 MG/DL (ref 65–100)
HCT VFR BLD AUTO: 36.8 % (ref 36.6–50.3)
HGB BLD-MCNC: 12 G/DL (ref 12.1–17)
IMM GRANULOCYTES # BLD AUTO: 0 K/UL
IMM GRANULOCYTES NFR BLD AUTO: 0 %
LYMPHOCYTES # BLD: 1.1 K/UL (ref 0.8–3.5)
LYMPHOCYTES NFR BLD: 8 % (ref 12–49)
MCH RBC QN AUTO: 30.5 PG (ref 26–34)
MCHC RBC AUTO-ENTMCNC: 32.6 G/DL (ref 30–36.5)
MCV RBC AUTO: 93.6 FL (ref 80–99)
MONOCYTES # BLD: 0.7 K/UL (ref 0–1)
MONOCYTES NFR BLD: 5 % (ref 5–13)
NEUTS BAND NFR BLD MANUAL: 12 % (ref 0–6)
NEUTS SEG # BLD: 11.6 K/UL (ref 1.8–8)
NEUTS SEG NFR BLD: 75 % (ref 32–75)
NRBC # BLD: 0 K/UL (ref 0–0.01)
NRBC BLD-RTO: 0 PER 100 WBC
PLATELET # BLD AUTO: 239 K/UL (ref 150–400)
PMV BLD AUTO: 9.7 FL (ref 8.9–12.9)
POTASSIUM SERPL-SCNC: 4.9 MMOL/L (ref 3.5–5.1)
RBC # BLD AUTO: 3.93 M/UL (ref 4.1–5.7)
RBC MORPH BLD: ABNORMAL
SODIUM SERPL-SCNC: 135 MMOL/L (ref 136–145)
TROPONIN I SERPL-MCNC: <0.05 NG/ML
WBC # BLD AUTO: 13.4 K/UL (ref 4.1–11.1)

## 2021-02-14 PROCEDURE — 85025 COMPLETE CBC W/AUTO DIFF WBC: CPT

## 2021-02-14 PROCEDURE — 84484 ASSAY OF TROPONIN QUANT: CPT

## 2021-02-14 PROCEDURE — 36415 COLL VENOUS BLD VENIPUNCTURE: CPT

## 2021-02-14 PROCEDURE — 80048 BASIC METABOLIC PNL TOTAL CA: CPT

## 2021-02-14 PROCEDURE — 96375 TX/PRO/DX INJ NEW DRUG ADDON: CPT

## 2021-02-14 PROCEDURE — 74011250636 HC RX REV CODE- 250/636: Performed by: SURGERY

## 2021-02-14 PROCEDURE — 77030027138 HC INCENT SPIROMETER -A

## 2021-02-14 PROCEDURE — 74011250637 HC RX REV CODE- 250/637: Performed by: SURGERY

## 2021-02-14 PROCEDURE — 77010033678 HC OXYGEN DAILY

## 2021-02-14 PROCEDURE — 94760 N-INVAS EAR/PLS OXIMETRY 1: CPT

## 2021-02-14 PROCEDURE — 99218 HC RM OBSERVATION: CPT

## 2021-02-14 PROCEDURE — 83880 ASSAY OF NATRIURETIC PEPTIDE: CPT

## 2021-02-14 PROCEDURE — 93005 ELECTROCARDIOGRAM TRACING: CPT

## 2021-02-14 PROCEDURE — 85379 FIBRIN DEGRADATION QUANT: CPT

## 2021-02-14 RX ORDER — SODIUM CHLORIDE 9 MG/ML
100 INJECTION, SOLUTION INTRAVENOUS CONTINUOUS
Status: DISCONTINUED | OUTPATIENT
Start: 2021-02-14 | End: 2021-02-22

## 2021-02-14 RX ORDER — IPRATROPIUM BROMIDE AND ALBUTEROL SULFATE 2.5; .5 MG/3ML; MG/3ML
3 SOLUTION RESPIRATORY (INHALATION)
Status: DISCONTINUED | OUTPATIENT
Start: 2021-02-14 | End: 2021-03-05 | Stop reason: HOSPADM

## 2021-02-14 RX ORDER — OXYCODONE HYDROCHLORIDE 5 MG/1
10 TABLET ORAL
Status: DISCONTINUED | OUTPATIENT
Start: 2021-02-14 | End: 2021-02-18

## 2021-02-14 RX ORDER — OXYCODONE HYDROCHLORIDE 5 MG/1
5 TABLET ORAL
Status: DISCONTINUED | OUTPATIENT
Start: 2021-02-14 | End: 2021-02-28 | Stop reason: SDUPTHER

## 2021-02-14 RX ADMIN — OXYCODONE 5 MG: 5 TABLET ORAL at 09:38

## 2021-02-14 RX ADMIN — METRONIDAZOLE 500 MG: 500 INJECTION, SOLUTION INTRAVENOUS at 08:05

## 2021-02-14 RX ADMIN — OXYCODONE 10 MG: 5 TABLET ORAL at 21:18

## 2021-02-14 RX ADMIN — MORPHINE SULFATE 2 MG: 2 INJECTION, SOLUTION INTRAMUSCULAR; INTRAVENOUS at 17:35

## 2021-02-14 RX ADMIN — LEVOFLOXACIN 500 MG: 5 INJECTION, SOLUTION INTRAVENOUS at 18:07

## 2021-02-14 RX ADMIN — MORPHINE SULFATE 2 MG: 2 INJECTION, SOLUTION INTRAMUSCULAR; INTRAVENOUS at 04:42

## 2021-02-14 RX ADMIN — MORPHINE SULFATE 2 MG: 2 INJECTION, SOLUTION INTRAMUSCULAR; INTRAVENOUS at 08:06

## 2021-02-14 RX ADMIN — Medication 1000 MG: at 21:18

## 2021-02-14 RX ADMIN — OXYCODONE 5 MG: 5 TABLET ORAL at 05:26

## 2021-02-14 RX ADMIN — MORPHINE SULFATE 2 MG: 2 INJECTION, SOLUTION INTRAMUSCULAR; INTRAVENOUS at 12:05

## 2021-02-14 RX ADMIN — SODIUM CHLORIDE 125 ML/HR: 9 INJECTION, SOLUTION INTRAVENOUS at 10:00

## 2021-02-14 RX ADMIN — Medication 1000 MG: at 14:32

## 2021-02-14 RX ADMIN — Medication 1000 MG: at 04:44

## 2021-02-14 RX ADMIN — MORPHINE SULFATE 2 MG: 2 INJECTION, SOLUTION INTRAMUSCULAR; INTRAVENOUS at 01:37

## 2021-02-14 RX ADMIN — METRONIDAZOLE 500 MG: 500 INJECTION, SOLUTION INTRAVENOUS at 21:18

## 2021-02-14 NOTE — PROGRESS NOTES
Bedside and Verbal shift change report given to Angela Greco (oncoming nurse) by Deloris López (offgoing nurse). Report included the following information SBAR, OR Summary, Intake/Output, MAR and Recent Results.

## 2021-02-14 NOTE — H&P
[facility] [place] [facility]  HISTORY AND PHYSICAL    Name:  Ridge Dela Cruz  MR#:  [id number]  :  1960  ACCOUNT #:  [de-identified]  ADMIT DATE:  2021        CHIEF COMPLAINT:  This is a history and physical for appendicitis. HISTORY OF PRESENT ILLNESS:  The patient is a pleasant, 64-year-old male with past medical history of hypertension and high frequency hearing loss, who presented to the Emergency Department with lower right quadrant tenesmus that started yesterday. Pain is 8/10, constant without any aggravating or relieving factor and does not radiate. The patient confirms nausea and diaphoresis. He denies shortness of breath, chills, sore throat, cough, congestion, sick contact, headache, neck, back pain, chest pain, shortness of breath, diarrhea, constipation, dysuria or bleeding, melena or hematochezia, hematemesis, sick contact, skin rash, recent travel, or any other surgeries. In the Emergency Department at [facility], white blood cell count 17, hemoglobin 16.1, neutrophil count 85. BUN and creatinine 17 and 1.35 respectively. Baseline appeared to be 0.9. CT scan of the abdomen and pelvis with IV contrast revealed acute appendicitis without gross evidence of perforation. The patient is admitted to the hospital for IV antibiotics and management. REVIEW OF SYSTEMS:  Positive for above complaints. Negative general, HEENT, respiratory, cardiovascular, genitourinary, musculoskeletal, neurologic, psychiatric, endocrine, hematology. PAST MEDICAL HISTORY:  Hypertension. PAST SURGICAL HISTORY:  None related to current evaluation. SOCIAL HISTORY:  He is , retired. FAMILY HISTORY:  Diabetes. ALLERGIES:  NO KNOWN DRUG ALLERGIES. HOME MEDICATIONS:  None. PHYSICAL EXAMINATION:  VITAL SIGNS:  Temperature 98.3, pulse of 82, blood pressure 106/66, the patient is 5 feet 9 inches tall, 230 pounds.   CONSTITUTIONAL:  He is alert, thin male in no acute distress. HEENT:  Normocephalic, atraumatic. NECK:  Supple. Trachea is midline. LUNGS:  Clear, nonlabored breathing. HEART:  Regular rate and rhythm. ABDOMEN:  Soft, non distended. There is pain in the right lower quadrant with focal rebound. MUSCULOSKELETAL:  No clubbing, cyanosis, or edema. SKIN:  Clear. No evidence of rashes or lesions. PSYCHIATRIC:  Appropriate to questioning and pleasant. NEUROLOGIC:  Grossly nonfocal.    RADIOLOGY:  See HPI. LABORATORY DATA:  See HPI. ASSESSMENT:  Acute appendicitis. PLAN:  1.  IV antibiotics. 2.  The patient will be considered for laparoscopic appendectomy. All questions were answered to his satisfaction. Was able to answer his wife's questions. Total time in consultation was 30 minutes.        Naty Antunez MD      [id number]  D:  02/13/2021 15:05  T:  02/13/2021 17:47  JOB #:  [id number]  CC:  Silvestre Miller MD

## 2021-02-14 NOTE — ANESTHESIA POSTPROCEDURE EVALUATION
Procedure(s):  APPENDECTOMY LAPAROSCOPIC. general    Anesthesia Post Evaluation      Multimodal analgesia: multimodal analgesia not used between 6 hours prior to anesthesia start to PACU discharge  Patient location during evaluation: PACU  Patient participation: complete - patient participated  Level of consciousness: awake  Pain management: adequate  Airway patency: patent  Anesthetic complications: no  Cardiovascular status: acceptable, blood pressure returned to baseline and hemodynamically stable  Respiratory status: acceptable  Hydration status: acceptable  Post anesthesia nausea and vomiting:  controlled      INITIAL Post-op Vital signs:   Vitals Value Taken Time   /61 02/13/21 1705   Temp 37.6 °C (99.7 °F) 02/13/21 1705   Pulse 112 02/13/21 1707   Resp 18 02/13/21 1707   SpO2 92 % 02/13/21 1707   Vitals shown include unvalidated device data.

## 2021-02-14 NOTE — CONSULTS
[address] Adult  Hospitalist Group    Hospitalist Consult    Primary Care Provider: None  Consult requested by: Dr. Paulina White    History:     Radha Key is a 62 y.o. male who presents with RLQ pain and found to have appendicitis. He underwent laparoscopic partial cecectomy. Overnight patient complained of shortness of breath and was placed on oxygen. He currently c/o pain and difficulty with deep breaths due to the pain. He reports pain with coughing but does not have a new or consistent cough. He had an unremarkable CXR and ddimer and BNP were in normal range. He denies any history of asthma,  COPD or heart disease. He reports mild shortness of breath currently. Review of Systems:    A comprehensive review of systems was negative except for that written in the History of Present Illness. History reviewed. No pertinent past medical history. Past Surgical History:   Procedure Laterality Date    HX HEENT      toncills     Prior to Admission medications    Medication Sig Start Date End Date Taking? Authorizing Provider   oxyCODONE IR (ROXICODONE) 5 mg immediate release tablet Take 1 Tab by mouth every four (4) hours as needed for Pain for up to 3 days. Max Daily Amount: 30 mg. Indications: pain, Acute post-operative pain 2/13/21 2/16/21 Yes Yarely Silva MD   ondansetron (ZOFRAN ODT) 4 mg disintegrating tablet Take 1 Tab by mouth every six (6) hours as needed for Nausea. Indications: prevent nausea and vomiting after surgery 2/13/21  Yes Yarely Silva MD   promethazine-codeine [facility] WITH CODEINE) 6.25-10 mg/5 mL syrup Take 5 mL by mouth every six (6) hours as needed for Cough.  Max Daily Amount: 20 mL. 3/21/17   Jane Bolaños MD     No Known Allergies   Family History   Problem Relation Age of Onset    Diabetes Mother     Diabetes Father         Social history:  Smoking history:   Social History     Tobacco Use   Smoking Status Never Smoker   Smokeless Tobacco Never Used     Alcohol history:   Social History     Substance and Sexual Activity   Alcohol Use No       Physical Exam:       Physical Exam:     General:          Alert, cooperative, no distress, appears stated age. HEENT:           Atraumatic, anicteric sclerae, pink conjunctivae                          No oral ulcers, mucosa moist, throat clear, dentition fair  Neck:               Supple, symmetrical  Lungs:             Clear to auscultation bilaterally. No Wheezing or Rhonchi. No rales. Heart:              Regular  rhythm,  No  murmur   No edema  Abdomen:        Soft, non-tender. Not distended. Bowel sounds normal  Extremities:     No cyanosis. No clubbing,                            Skin turgor normal, Capillary refill normal  Skin:                Not pale. Not Jaundiced  No rashes   Psych:             Not anxious or agitated. Neurologic:      Alert, moves all extremities, answers questions appropriately and responds to commands       Imaging and Labs:     Recent Results (from the past 24 hour(s))   CBC WITH AUTOMATED DIFF    Collection Time: 02/14/21  2:01 AM   Result Value Ref Range    WBC 13.4 (H) 4.1 - 11.1 K/uL    RBC 3.93 (L) 4.10 - 5.70 M/uL    HGB 12.0 (L) 12.1 - 17.0 g/dL    HCT 36.8 36.6 - 50.3 %    MCV 93.6 80.0 - 99.0 FL    MCH 30.5 26.0 - 34.0 PG    MCHC 32.6 30.0 - 36.5 g/dL    RDW 13.9 11.5 - 14.5 %    PLATELET 797 171 - 139 K/uL    MPV 9.7 8.9 - 12.9 FL    NRBC 0.0 0  WBC    ABSOLUTE NRBC 0.00 0.00 - 0.01 K/uL    NEUTROPHILS 75 32 - 75 %    BAND NEUTROPHILS 12 (H) 0 - 6 %    LYMPHOCYTES 8 (L) 12 - 49 %    MONOCYTES 5 5 - 13 %    EOSINOPHILS 0 0 - 7 %    BASOPHILS 0 0 - 1 %    IMMATURE GRANULOCYTES 0 %    ABS. NEUTROPHILS 11.6 (H) 1.8 - 8.0 K/UL    ABS. LYMPHOCYTES 1.1 0.8 - 3.5 K/UL    ABS. MONOCYTES 0.7 0.0 - 1.0 K/UL    ABS. EOSINOPHILS 0.0 0.0 - 0.4 K/UL    ABS. BASOPHILS 0.0 0.0 - 0.1 K/UL    ABS. IMM.  GRANS. 0.0 K/UL    DF MANUAL      RBC COMMENTS NORMOCYTIC, NORMOCHROMIC METABOLIC PANEL, BASIC    Collection Time: 02/14/21  2:01 AM   Result Value Ref Range    Sodium 135 (L) 136 - 145 mmol/L    Potassium 4.9 3.5 - 5.1 mmol/L    Chloride 108 97 - 108 mmol/L    CO2 21 21 - 32 mmol/L    Anion gap 6 5 - 15 mmol/L    Glucose 151 (H) 65 - 100 mg/dL    BUN 28 (H) 6 - 20 MG/DL    Creatinine 1.53 (H) 0.70 - 1.30 MG/DL    BUN/Creatinine ratio 18 12 - 20      GFR est AA 57 (L) >60 ml/min/1.73m2    GFR est non-AA 47 (L) >60 ml/min/1.73m2    Calcium 7.3 (L) 8.5 - 10.1 MG/DL   D DIMER    Collection Time: 02/14/21 10:53 AM   Result Value Ref Range    D-dimer 0.96 (H) 0.00 - 0.65 mg/L FEU   NT-PRO BNP    Collection Time: 02/14/21 10:53 AM   Result Value Ref Range    NT pro-BNP 62 <125 PG/ML   TROPONIN I    Collection Time: 02/14/21 10:53 AM   Result Value Ref Range    Troponin-I, Qt. <0.05 <0.05 ng/mL       No results found. Assessment and Plan:   Lola Bailey is a 62 y.o. male who presents with perforated appendicitis. We are asked to see the patient in consult to assist in managing hypoxia.       Perforated appendicitis   -management per surgery    Acute hypoxic respiratory failure  -XR, labs okay  -likely due to pain with breathing and shallow breaths  -recommend PT consult, and encourage IS  -will continue to follow      Signed By: Karen Chino MD     Date of Service:  2/14/2021

## 2021-02-14 NOTE — PROGRESS NOTES
Bedside shift change report given to [address] [place] [address] (oncoming nurse) by Sebastien Dwyer RN (offgoing nurse). Report included the following information SBAR, Kardex, MAR, Accordion and Recent Results.

## 2021-02-14 NOTE — PROGRESS NOTES
10:05 AM  CM reviewed EMR and met with pt in room to complete the initial evaluation. Observation notice provided in writing to patient and/or caregiver as well as verbal explanation of the policy. Patients who are in outpatient status also receive the Observation notice. Reason for Admission:   Appendix                   RUR Score:      NA/ Observation               Plan for utilizing home health:   Most likely will not need HH       PCP: First and Last name:  Does not have a PCP   Name of Practice:    Are you a current patient: Yes/No:    Approximate date of last visit:    Can you participate in a virtual visit with your PCP:                     Current Advanced Directive/Advance Care Plan: Not on file, spouse is emergency contact    Healthcare Decision Maker:   Click here to complete [address] including selection of the Healthcare Decision Maker Relationship (ie \"Primary\")                         Transition of Care Plan:                      1). Pt admitted for medical management  2). Outpatient follow up- needs a PCP- would benefit from a list  3). Spouse will transport at dc  4). CM will follow for dc needs    Care Management Interventions  PCP Verified by CM: Yes(Does not have a PCP)  Mode of Transport at Discharge:  Other (see comment)(Spouse will transport at dc)  Transition of Care Consult (CM Consult): Discharge Planning  Physical Therapy Consult: No  Occupational Therapy Consult: No  Current Support Network: Lives with Spouse  Discharge Location  Discharge Placement: Home with family assistance

## 2021-02-14 NOTE — PROGRESS NOTES
Patient having some R shoulder pain. Gave morphine. Did EKG     Morphine has relieved most of the pain. Will continue to monitor. ALMA ROSA drain put out 90ml sanguinous then within an hour 50ml.

## 2021-02-14 NOTE — OP NOTES
[facility]  OPERATIVE REPORT    Name:  Ridge Dela Cruz  MR#:  [id number]  :  1960  ACCOUNT #:  [de-identified]  DATE OF SERVICE:  2021    PRIMARY CARE PROVIDER:  No primary care provider. PREOPERATIVE DIAGNOSIS:  Appendicitis. POSTOPERATIVE DIAGNOSIS:  Perforated appendicitis. PROCEDURE PERFORMED:  Laparoscopic partial cecectomy. SURGEON:  David Callahan MD    ASSISTANT:  Patti Messina    ANESTHESIA:  General.    COMPLICATIONS:  None. SPECIMENS REMOVED:  Appendix and part of the cecum. IMPLANTS:  None. ESTIMATED BLOOD LOSS:  Minimal.    FINDINGS:  Perforated appendicitis with the base of the appendix unsuitable for stapling. DRAINS:  15 ALMA ROSA drain in the right lower quadrant. INDICATION:  The patient is a pleasant 64-year-old male who presented with sepsis and signs of appendicitis. CT scan did not reveal evidence of perforated appendicitis at that time. The patient was taken to the operating room for surgical management. PROCEDURE:  Consent was obtained. He was placed in the operating room table and comfortable in the supine position. SCDs were turned on and working. A Farrell catheter was not placed. The patient was intubated successfully. The patient was on therapeutic antibiotics. Preincision timeout was performed per protocol. The abdomen was entered in the supraumbilical abdomen under direct camera vision with a 5-mm tissue dissection clear trocar. Insufflation was established and maintained at 15 mmHg. On the initial inspection of the abdominal contents, there was no evidence of a traumatic injury. However, there was seropurulent drainage and fibrinous exudate above the right lower quadrant consistent with diverticular perforation. A suprapubic 5 mm port as well as a 12 mm left lower quadrant port was placed under direct camera vision. The patient was placed in Trendelenburg position with a left tilt.   The tip of the appendix was obscured by ileum rather and its partial retroperitoneal location. Using the white line of Toldt, the appendix was then taken medially to expose the base of the appendix which was quite friable and hard. An appendicolith was seen in the field consistent with likely free retroperitoneal rupture. The base of the appendix was circumferentially freed and a 45 mm blue staple load was then passed across the base of the appendix. The base of the appendix was quite friable. A stapler fire here was abandoned. I mobilized more of the cecum from its retroperitoneal location to secure more of a footprint across the cecum base for stapling. Cecum was staple transected using three fires of 45 mm blue load stapler transecting the cecum and allowing for a more appropriate staple line for the appendix. The remaining portion of the appendix mesentery was taken with a [employer] vessel sealing device. The specimen was bagged including the appendicolith. The specimen was then passed off to pathology for analysis. Sponge, instruments, and needle counts were correct. Hemostasis was satisfactory. Surgical hemostatic agent was placed in the dissection field. A 25 Latvian CWV drain was placed in the right lower quadrant with the tip extending just beyond the staple line of the cecum. Drain was placed through the suprapubic incision. 0.5% Marcaine plain and Exparel were injected into the wound. 12 mm fascial defect as closed with an 0 Vicryl transfascial stitch. The surgery was ended and pneumoperitoneum ended. The patient tolerated the procedure well. After closure of the port sites with 4-0 subcuticular stitches and surgical glue. I called the wife by phone to discuss the results.       Kristin Kowalski MD      [id number]  D:  02/13/2021 16:31  T:  02/14/2021 2:18  JOB #:  [id number]  CC:  Codie Hampton MD

## 2021-02-14 NOTE — PROGRESS NOTES
Going to [facility] to ask for a hospitalist/family practice on board. Patient is having difficulty breathing .     Paging again

## 2021-02-15 ENCOUNTER — APPOINTMENT (OUTPATIENT)
Dept: VASCULAR SURGERY | Age: 61
DRG: 853 | End: 2021-02-15
Attending: INTERNAL MEDICINE

## 2021-02-15 ENCOUNTER — APPOINTMENT (OUTPATIENT)
Dept: CT IMAGING | Age: 61
DRG: 853 | End: 2021-02-15
Attending: INTERNAL MEDICINE

## 2021-02-15 LAB
ANION GAP SERPL CALC-SCNC: 5 MMOL/L (ref 5–15)
APTT PPP: 26.5 SEC (ref 22.1–31)
ATRIAL RATE: 86 BPM
ATRIAL RATE: 87 BPM
ATRIAL RATE: 94 BPM
BNP SERPL-MCNC: 38 PG/ML
BUN SERPL-MCNC: 26 MG/DL (ref 6–20)
BUN/CREAT SERPL: 22 (ref 12–20)
CALCIUM SERPL-MCNC: 7.3 MG/DL (ref 8.5–10.1)
CALCULATED P AXIS, ECG09: 43 DEGREES
CALCULATED P AXIS, ECG09: 48 DEGREES
CALCULATED P AXIS, ECG09: 55 DEGREES
CALCULATED R AXIS, ECG10: -19 DEGREES
CALCULATED R AXIS, ECG10: -21 DEGREES
CALCULATED R AXIS, ECG10: -31 DEGREES
CALCULATED T AXIS, ECG11: -10 DEGREES
CALCULATED T AXIS, ECG11: 11 DEGREES
CALCULATED T AXIS, ECG11: 40 DEGREES
CHLORIDE SERPL-SCNC: 107 MMOL/L (ref 97–108)
CO2 SERPL-SCNC: 25 MMOL/L (ref 21–32)
CREAT SERPL-MCNC: 1.18 MG/DL (ref 0.7–1.3)
DIAGNOSIS, 93000: NORMAL
ERYTHROCYTE [DISTWIDTH] IN BLOOD BY AUTOMATED COUNT: 14 % (ref 11.5–14.5)
GLUCOSE SERPL-MCNC: 105 MG/DL (ref 65–100)
HCT VFR BLD AUTO: 26.5 % (ref 36.6–50.3)
HCT VFR BLD AUTO: 28.1 % (ref 36.6–50.3)
HCT VFR BLD AUTO: 28.7 % (ref 36.6–50.3)
HGB BLD-MCNC: 8.8 G/DL (ref 12.1–17)
HGB BLD-MCNC: 9.3 G/DL (ref 12.1–17)
HGB BLD-MCNC: 9.3 G/DL (ref 12.1–17)
IRON SATN MFR SERPL: 8 % (ref 20–50)
IRON SERPL-MCNC: 19 UG/DL (ref 35–150)
MAGNESIUM SERPL-MCNC: 1.8 MG/DL (ref 1.6–2.4)
MCH RBC QN AUTO: 30.4 PG (ref 26–34)
MCHC RBC AUTO-ENTMCNC: 32.4 G/DL (ref 30–36.5)
MCV RBC AUTO: 93.8 FL (ref 80–99)
NRBC # BLD: 0 K/UL (ref 0–0.01)
NRBC BLD-RTO: 0 PER 100 WBC
P-R INTERVAL, ECG05: 146 MS
P-R INTERVAL, ECG05: 148 MS
P-R INTERVAL, ECG05: 154 MS
PHOSPHATE SERPL-MCNC: 2.7 MG/DL (ref 2.6–4.7)
PLATELET # BLD AUTO: 174 K/UL (ref 150–400)
PMV BLD AUTO: 9.5 FL (ref 8.9–12.9)
POTASSIUM SERPL-SCNC: 3.9 MMOL/L (ref 3.5–5.1)
Q-T INTERVAL, ECG07: 328 MS
Q-T INTERVAL, ECG07: 332 MS
Q-T INTERVAL, ECG07: 334 MS
QRS DURATION, ECG06: 100 MS
QRS DURATION, ECG06: 86 MS
QRS DURATION, ECG06: 88 MS
QTC CALCULATION (BEZET), ECG08: 397 MS
QTC CALCULATION (BEZET), ECG08: 401 MS
QTC CALCULATION (BEZET), ECG08: 410 MS
RBC # BLD AUTO: 3.06 M/UL (ref 4.1–5.7)
SODIUM SERPL-SCNC: 137 MMOL/L (ref 136–145)
THERAPEUTIC RANGE,PTTT: NORMAL SECS (ref 58–77)
TIBC SERPL-MCNC: 225 UG/DL (ref 250–450)
TROPONIN I SERPL-MCNC: <0.05 NG/ML
VENTRICULAR RATE, ECG03: 86 BPM
VENTRICULAR RATE, ECG03: 87 BPM
VENTRICULAR RATE, ECG03: 94 BPM
WBC # BLD AUTO: 12.3 K/UL (ref 4.1–11.1)

## 2021-02-15 PROCEDURE — 83880 ASSAY OF NATRIURETIC PEPTIDE: CPT

## 2021-02-15 PROCEDURE — 74011250636 HC RX REV CODE- 250/636: Performed by: INTERNAL MEDICINE

## 2021-02-15 PROCEDURE — 85014 HEMATOCRIT: CPT

## 2021-02-15 PROCEDURE — 83550 IRON BINDING TEST: CPT

## 2021-02-15 PROCEDURE — 97116 GAIT TRAINING THERAPY: CPT

## 2021-02-15 PROCEDURE — 74011250637 HC RX REV CODE- 250/637: Performed by: SURGERY

## 2021-02-15 PROCEDURE — 85027 COMPLETE CBC AUTOMATED: CPT

## 2021-02-15 PROCEDURE — 83735 ASSAY OF MAGNESIUM: CPT

## 2021-02-15 PROCEDURE — 94760 N-INVAS EAR/PLS OXIMETRY 1: CPT

## 2021-02-15 PROCEDURE — 93970 EXTREMITY STUDY: CPT

## 2021-02-15 PROCEDURE — 74011000636 HC RX REV CODE- 636: Performed by: RADIOLOGY

## 2021-02-15 PROCEDURE — 86923 COMPATIBILITY TEST ELECTRIC: CPT

## 2021-02-15 PROCEDURE — 85730 THROMBOPLASTIN TIME PARTIAL: CPT

## 2021-02-15 PROCEDURE — 97161 PT EVAL LOW COMPLEX 20 MIN: CPT

## 2021-02-15 PROCEDURE — 80048 BASIC METABOLIC PNL TOTAL CA: CPT

## 2021-02-15 PROCEDURE — 96367 TX/PROPH/DG ADDL SEQ IV INF: CPT

## 2021-02-15 PROCEDURE — 74011250636 HC RX REV CODE- 250/636: Performed by: SURGERY

## 2021-02-15 PROCEDURE — 86900 BLOOD TYPING SEROLOGIC ABO: CPT

## 2021-02-15 PROCEDURE — 84484 ASSAY OF TROPONIN QUANT: CPT

## 2021-02-15 PROCEDURE — 71275 CT ANGIOGRAPHY CHEST: CPT

## 2021-02-15 PROCEDURE — 96366 THER/PROPH/DIAG IV INF ADDON: CPT

## 2021-02-15 PROCEDURE — 84100 ASSAY OF PHOSPHORUS: CPT

## 2021-02-15 PROCEDURE — 36415 COLL VENOUS BLD VENIPUNCTURE: CPT

## 2021-02-15 PROCEDURE — 96376 TX/PRO/DX INJ SAME DRUG ADON: CPT

## 2021-02-15 PROCEDURE — 99218 HC RM OBSERVATION: CPT

## 2021-02-15 RX ORDER — SODIUM CHLORIDE 9 MG/ML
250 INJECTION, SOLUTION INTRAVENOUS AS NEEDED
Status: DISPENSED | OUTPATIENT
Start: 2021-02-15 | End: 2021-02-16

## 2021-02-15 RX ORDER — ENOXAPARIN SODIUM 100 MG/ML
40 INJECTION SUBCUTANEOUS EVERY 24 HOURS
Status: DISCONTINUED | OUTPATIENT
Start: 2021-02-16 | End: 2021-02-15

## 2021-02-15 RX ORDER — HEPARIN SODIUM 5000 [USP'U]/ML
80 INJECTION, SOLUTION INTRAVENOUS; SUBCUTANEOUS ONCE
Status: COMPLETED | OUTPATIENT
Start: 2021-02-15 | End: 2021-02-15

## 2021-02-15 RX ORDER — HEPARIN SODIUM 10000 [USP'U]/100ML
14-36 INJECTION, SOLUTION INTRAVENOUS
Status: DISCONTINUED | OUTPATIENT
Start: 2021-02-15 | End: 2021-02-16

## 2021-02-15 RX ADMIN — Medication 1000 MG: at 06:22

## 2021-02-15 RX ADMIN — OXYCODONE 10 MG: 5 TABLET ORAL at 06:22

## 2021-02-15 RX ADMIN — OXYCODONE 10 MG: 5 TABLET ORAL at 02:44

## 2021-02-15 RX ADMIN — IOPAMIDOL 100 ML: 755 INJECTION, SOLUTION INTRAVENOUS at 13:50

## 2021-02-15 RX ADMIN — Medication 1000 MG: at 21:50

## 2021-02-15 RX ADMIN — Medication 1000 MG: at 14:07

## 2021-02-15 RX ADMIN — HEPARIN SODIUM 8350 UNITS: 5000 INJECTION INTRAVENOUS; SUBCUTANEOUS at 18:35

## 2021-02-15 RX ADMIN — METRONIDAZOLE 500 MG: 500 INJECTION, SOLUTION INTRAVENOUS at 21:50

## 2021-02-15 RX ADMIN — HEPARIN SODIUM 14 UNITS/KG/HR: 10000 INJECTION, SOLUTION INTRAVENOUS at 18:37

## 2021-02-15 RX ADMIN — LEVOFLOXACIN 500 MG: 5 INJECTION, SOLUTION INTRAVENOUS at 19:45

## 2021-02-15 RX ADMIN — METRONIDAZOLE 500 MG: 500 INJECTION, SOLUTION INTRAVENOUS at 08:40

## 2021-02-15 NOTE — PROGRESS NOTES
[facility]  [address]  [phone]      Medical Progress Note      NAME: Lidia Martinez   :  1960  MRM:  [id number]    Date/Time of service: 2/15/2021  12:20 PM       Subjective:     Chief Complaint:  Patient was personally seen and examined by me during this time period. Chart reviewed. Still very dyspneic with exertion. No chest pain, fevers, chills        Objective:       Vitals:       Last 24hrs VS reviewed since prior progress note. Most recent are:    Visit Vitals  /70 (BP 1 Location: Right upper arm, BP Patient Position: At rest)   Pulse 86   Temp 98 °F (36.7 °C)   Resp 18   Ht 5' 9\" (1.753 m)   Wt 104.3 kg (230 lb)   SpO2 92%   BMI 33.97 kg/m²     SpO2 Readings from Last 6 Encounters:   02/15/21 92%   17 95%   06/15/14 98%    O2 Flow Rate (L/min): 4 l/min       Intake/Output Summary (Last 24 hours) at 2/15/2021 1220  Last data filed at 2/15/2021 0843  Gross per 24 hour   Intake 2811 ml   Output 1400 ml   Net 1411 ml        Exam:     Physical Exam:    Gen:  Well-developed, well-nourished, morbidly obese, in no acute distress  HEENT:  Pink conjunctivae, PERRL, hearing intact to voice, moist mucous membranes  Neck:  Supple, without masses, thyroid non-tender  Resp:  No accessory muscle use, clear breath sounds without wheezes rales or rhonchi  Card:  No murmurs, normal S1, S2 without thrills, bruits or peripheral edema  Abd:  Soft, mild generalized pain, non-distended, normoactive bowel sounds are present.   Surgical dressing c/d/i  Musc:  No cyanosis or clubbing  Skin:  No rashes  Neuro:  Cranial nerves 3-12 are grossly intact, follows commands appropriately  Psych:  Fair insight, oriented to person, place and time, alert    Medications Reviewed: (see below)    Lab Data Reviewed: (see below)    ______________________________________________________________________    Medications:     Current Facility-Administered Medications   Medication Dose Route Frequency    0.9% sodium chloride infusion  75 mL/hr IntraVENous CONTINUOUS    oxyCODONE IR (ROXICODONE) tablet 5 mg  5 mg Oral Q4H PRN    Or    oxyCODONE IR (ROXICODONE) tablet 10 mg  10 mg Oral Q4H PRN    albuterol-ipratropium (DUO-NEB) 2.5 MG-0.5 MG/3 ML  3 mL Nebulization Q4H PRN    morphine injection 2 mg  2 mg IntraVENous Q3H PRN    ondansetron (ZOFRAN) injection 4 mg  4 mg IntraVENous Q6H PRN    levoFLOXacin (LEVAQUIN) 500 mg in D5W IVPB  500 mg IntraVENous Q24H    metroNIDAZOLE (FLAGYL) IVPB premix 500 mg  500 mg IntraVENous Q12H    acetaminophen (TYLENOL) tablet 1,000 mg  1,000 mg Oral Q8H    diphenhydrAMINE (BENADRYL) capsule 25 mg  25 mg Oral Q6H PRN          Lab Review:     Recent Labs     02/15/21  0032 02/14/21  0201 02/13/21  0234   WBC 12.3* 13.4* 17.0*   HGB 9.3* 12.0* 15.1   HCT 28.7* 36.8 46.4    239 251     Recent Labs     02/15/21  0032 02/14/21  0201 02/13/21  0234    135* 139   K 3.9 4.9 3.8    108 104   CO2 25 21 28   * 151* 126*   BUN 26* 28* 17   CREA 1.18 1.53* 1.35*   CA 7.3* 7.3* 8.4*   MG 1.8  --   --    PHOS 2.7  --   --    ALB  --   --  3.6   TBILI  --   --  0.2   ALT  --   --  32     Lab Results   Component Value Date/Time    Glucose (POC) 120 (H) 06/14/2014 09:32 AM          Assessment / Plan:     61 yo otherwise healthy, presented w/ a perforated appendicitis s/p cecectomy 02/13. Complicated by BERONICA, hypoxia. Medicine is following as a consultant    1) Hypoxia/dyspnea: CXR unremarkable. Not consistent with ACS, heart failure, or atelectasis. D-dimer slightly elevated. Will obtain a chest CT to r/o PE    2) BERONICA: likely pre-renal.  Improving with IVF. Cont to monitor     3) Perforated appendicitis: s/p cecectomy 02/13. Management per Gen surg    4) Acute blood loss anemia: due to surgery. Will check iron panel.   Cont to monitor Hgb    Total time spent with patient: 28 Minutes **I personally saw and examined the patient during this time period**                 Care Plan discussed with: Patient, nursing     Discussed:  Care Plan    Prophylaxis:  Lovenox    Disposition:  per primary team           ___________________________________________________    Attending Physician: Joss aLla MD

## 2021-02-15 NOTE — PROGRESS NOTES
Bedside and Verbal shift change report given to Jeremy Scott (oncoming nurse) by Anais Sigala (offgoing nurse). Report included the following information SBAR, Kardex and Recent Results.

## 2021-02-15 NOTE — PROGRESS NOTES
Bedside shift change report given to Dayne Spears (oncoming nurse) by Ken Hickey RN (offgoing nurse). Report included the following information SBAR, Kardex, MAR, Accordion and Recent Results.

## 2021-02-15 NOTE — CONSULTS
[facility]     Name: Maggie Phillips MRN: [id number]   : 1960 Hospital: [address]   Date: 2/15/2021        Impression Plan   1. Acute respiratory failure  2. PE  3. Hypoxia  4. LLL abnormality  5. Shortness of breath  6. Appendicitis s/p appendectomy                · Heparin gtt- PE likely secondary to acute appendicitis. Would treat with anticoagulation for 3-6 months  · Suspect that LLL infiltrate more due to atelectasis than pulm infarct or PNA  · LE dopplers  · Echo  · Can transition to eliquis tomorrow if OK by surgery  · IS  · Follow up in Pulmonary in 2-3 weeks           Radiology  ( personally reviewed) CT chest 2/15/21: multilobar PE, LLL atelectasis   ABG No results for input(s): PHI, PO2I, PCO2I in the last 72 hours. Subjective     Cc: shortness of breath    61 yo who presented on  with RLQ abdominal pain. Was found to have acute appendicitis and had appendectomy done on . Today was more short of breath and hypoxic. CT chest showed PEs. Pt denies chest pain. States his shortness of breath is only present when moving. He denies smoking/long travels/hx of cancer/family hx of PEs. He works in construction. Some LE edema since surgery. Review of Systems:  A comprehensive review of systems was negative except for that written in the HPI. History reviewed. No pertinent past medical history. Past Surgical History:   Procedure Laterality Date    HX HEENT      toncills      Prior to Admission medications    Medication Sig Start Date End Date Taking? Authorizing Provider   oxyCODONE IR (ROXICODONE) 5 mg immediate release tablet Take 1 Tab by mouth every four (4) hours as needed for Pain for up to 3 days. Max Daily Amount: 30 mg. Indications: pain, Acute post-operative pain 21 Yes Shelly Dunne MD   ondansetron (ZOFRAN ODT) 4 mg disintegrating tablet Take 1 Tab by mouth every six (6) hours as needed for Nausea.  Indications: prevent nausea and vomiting after surgery 2/13/21  Yes Reynaldo Klein MD   promethazine-codeine [facility] WITH CODEINE) 6.25-10 mg/5 mL syrup Take 5 mL by mouth every six (6) hours as needed for Cough. Max Daily Amount: 20 mL. 3/21/17   Rosendo Szymanski MD     Current Facility-Administered Medications   Medication Dose Route Frequency    heparin 25,000 units in D5W 250 ml infusion  14-36 Units/kg/hr IntraVENous TITRATE    heparin (porcine) injection 8,350 Units  80 Units/kg IntraVENous ONCE    0.9% sodium chloride infusion  75 mL/hr IntraVENous CONTINUOUS    levoFLOXacin (LEVAQUIN) 500 mg in D5W IVPB  500 mg IntraVENous Q24H    metroNIDAZOLE (FLAGYL) IVPB premix 500 mg  500 mg IntraVENous Q12H    acetaminophen (TYLENOL) tablet 1,000 mg  1,000 mg Oral Q8H     No Known Allergies   Social History     Tobacco Use    Smoking status: Never Smoker    Smokeless tobacco: Never Used   Substance Use Topics    Alcohol use: No      Family History   Problem Relation Age of Onset    Diabetes Mother     Diabetes Father           Laboratory: I have personally reviewed the critical care flowsheet and labs.      Recent Labs     02/15/21  1415 02/15/21  0032 02/14/21  0201 02/13/21  0234   WBC  --  12.3* 13.4* 17.0*   HGB 8.8* 9.3* 12.0* 15.1   HCT 26.5* 28.7* 36.8 46.4   PLT  --  174 239 251     Recent Labs     02/15/21  0032 02/14/21  0201 02/13/21  0234    135* 139   K 3.9 4.9 3.8    108 104   CO2 25 21 28   * 151* 126*   BUN 26* 28* 17   CREA 1.18 1.53* 1.35*   CA 7.3* 7.3* 8.4*   MG 1.8  --   --    PHOS 2.7  --   --    ALB  --   --  3.6   ALT  --   --  32       Objective:     Mode Rate Tidal Volume Pressure FiO2 PEEP                    Vital Signs:     TMAX(24)      Intake/Output:   Last shift:         Last 3 shifts: 02/15 0701 - 02/15 1900  In: 360 [P.O.:360]  Out: - RRIOLAST3    Intake/Output Summary (Last 24 hours) at 2/15/2021 1611  Last data filed at 2/15/2021 0843  Gross per 24 hour   Intake 2811 ml   Output 1400 ml   Net 1411 ml     EXAM:   GENERAL: well developed and in no distress, HEENT:  PERRL, EOMI, no alar flaring or epistaxis, oral mucosa moist without cyanosis, NECK:  no jugular vein distention, no retractions, no thyromegaly or masses, LUNGS: CTA,no w/r/r , HEART:  Regular rate and rhythm with no MGR;trace edema is present, ABDOMEN:  soft with no tenderness, bowel sounds present, EXTREMITIES:  warm with no cyanosis, SKIN:  no jaundice or ecchymosis and NEUROLOGIC:  alert and oriented, grossly non-focal    Mike Marinelli MD  [facility] [place]

## 2021-02-15 NOTE — PROGRESS NOTES
General Surgery Daily Progress Note    Patient: Erik Ramos MRN: [id number]  SSN: [id number]    YOB: 1960  Age: 62 y.o. Sex: male      Admit Date: 2/13/2021    POD 2 Days Post-Op    Procedure: Procedure(s):  APPENDECTOMY LAPAROSCOPIC    Subjective:   Pain in abdomen improved somewhat over last 24 hours.  over incision sites. No n/v. Tolerating clears. No flatus or BM. SOB. No chest pain, but pt states he is struggling with breathing when getting up, ambulating, moving. Current Facility-Administered Medications   Medication Dose Route Frequency    [START ON 2/16/2021] enoxaparin (LOVENOX) injection 40 mg  40 mg SubCUTAneous Q24H    0.9% sodium chloride infusion  75 mL/hr IntraVENous CONTINUOUS    oxyCODONE IR (ROXICODONE) tablet 5 mg  5 mg Oral Q4H PRN    Or    oxyCODONE IR (ROXICODONE) tablet 10 mg  10 mg Oral Q4H PRN    albuterol-ipratropium (DUO-NEB) 2.5 MG-0.5 MG/3 ML  3 mL Nebulization Q4H PRN    morphine injection 2 mg  2 mg IntraVENous Q3H PRN    ondansetron (ZOFRAN) injection 4 mg  4 mg IntraVENous Q6H PRN    levoFLOXacin (LEVAQUIN) 500 mg in D5W IVPB  500 mg IntraVENous Q24H    metroNIDAZOLE (FLAGYL) IVPB premix 500 mg  500 mg IntraVENous Q12H    acetaminophen (TYLENOL) tablet 1,000 mg  1,000 mg Oral Q8H    diphenhydrAMINE (BENADRYL) capsule 25 mg  25 mg Oral Q6H PRN        Objective:   02/15 0701 - 02/15 1900  In: 360 [P.O.:360]  Out: -   02/13 1901 - 02/15 0700  In: 4330.2 [P.O.:850; I.V.:3480.2]  Out: 2250 [Urine:1750; Drains:500]  Patient Vitals for the past 8 hrs:   BP Temp Pulse Resp SpO2   02/15/21 1141 109/70 98 °F (36.7 °C) 86 18 92 %   02/15/21 1000 112/66  (!) 104  91 %   02/15/21 0958 112/66  89  (!) 84 %   02/15/21 0828 123/62 97.9 °F (36.6 °C) 91 16 93 %       Physical Exam:  General: Alert, cooperative, NAD  Lungs:  Tachypneic, on 4L O2 at 93% with sats in upper 80s on movement  Heart:  HR upper limits of normal  Abdomen: Soft, min- tenderness as expected, distended. Incisions c/d/i.  ALMA ROSA drain with dark red appearing output  Urinary:           UOP dark yellow- light orange  Extremities: Warm, moves all, no edema  Skin:  Warm and dry, no rash    Labs:   Recent Labs     02/15/21  0032   WBC 12.3*   HGB 9.3*   HCT 28.7*        Recent Labs     02/15/21  0032 02/13/21  0234 02/13/21  0234      < > 139   K 3.9   < > 3.8      < > 104   CO2 25   < > 28   *   < > 126*   BUN 26*   < > 17   CREA 1.18   < > 1.35*   CA 7.3*   < > 8.4*   MG 1.8  --   --    PHOS 2.7  --   --    ALB  --   --  3.6   TBILI  --   --  0.2   ALT  --   --  32    < > = values in this interval not displayed. Assessment / Plan:     POD 2 Days Post-Op    Procedure: Procedure(s):  APPENDECTOMY LAPAROSCOPIC    SOB post-op  Hospitalists following- Order CT chest to r/o PE  ALMA ROSA appearing dark red output- dressing around ALMA ROSA saturated with light red output. Stat H/H  Change dressing  Clears- can advance if no concerns for bleeding  Will need to be d/c with PO antibx x 1 week  Up and OOB as tolerated  IS      Will discuss with Dr Tan Rios NP    Addendum:    1500:  CT Chest results:     Multilobar pulmonary emboli. Left lower lobe partial consolidation  Hepatic steatosis  Esophagitis    Spoke with Dr Remi Oconnor-  He is placing Heparin gtt, pulm consult, dopplers    H/H results:  8.8/26.5 - will need to trend obtain another in 4 hours  Obtain blood consent  Type/screen/sample  Allocate 2 units    Alerted Dr Tan Rios NP     Agree. Recommend immediate anticoagulation ASAP.

## 2021-02-15 NOTE — PROGRESS NOTES
Problem: Mobility Impaired (Adult and Pediatric)  Goal: *Acute Goals and Plan of Care (Insert Text)  Description: FUNCTIONAL STATUS PRIOR TO ADMISSION: Patient was independent and active without use of DME. Patient works as a     HOME SUPPORT [address]: The patient lived with his wife but did not require assist.    Physical Therapy Goals  Initiated 2/15/2021  1. Patient will move from supine to sit and sit to supine  in bed with modified independence within 7 day(s). 2.  Patient will transfer from bed to chair and chair to bed with modified independence using the least restrictive device within 7 day(s). 3.  Patient will perform sit to stand with modified independence within 7 day(s). 4.  Patient will ambulate with modified independence for 200 feet with the least restrictive device within 7 day(s). 5.  Patient will ascend/descend 3 stairs with 1 handrail(s) with modified independence within 7 day(s). Outcome: Progressing Towards Goal    PHYSICAL THERAPY EVALUATION  Patient: Sheryle Holland (65 y.o. male)  Date: 2/15/2021  Primary Diagnosis: Appendicitis [K37]  Procedure(s) (LRB):  APPENDECTOMY LAPAROSCOPIC (N/A) 2 Days Post-Op   Precautions:        ASSESSMENT  Based on the objective data described below, the patient presents with significant O2 desaturation with exertion and required increased supplemental O2 support following admission for a lap appendectomy. Patient was independent and active at baseline. He was received resting in bed on 3L O2 and SpO2 87%. Education provided regarding pursed lip breathing but challenging for patient to maintain. Gait training x120' total without DME but intermittent use of hyman railing and IV pole. Initiated gait on 3L O2 but noted tachycardia to 114 BPM and SpO2 84%. Titrated to 4L O2 and noted improved recovery and higher SpO2 levels with exertion but still below 89%.  The patient presented limited feedback regarding activity tolerance but noted increased work of breathing, patient c/o dizziness, and extended recovery so had a 2nd staff member bring a chair  for a seated rest break. BP stable both in sitting and standing but rolled the patient closer to his room to lessen ambulation required to return to room. Anticipate good progress towards goals  but activity grossly limited by hypoxia with exertion. Observed patient using the incentive spirometer correctly and encouraged use. Current Level of Function Impacting Discharge (mobility/balance): CGA-SBA for mobility      Other factors to consider for discharge: desaturation and tachycardia with exertion     Patient will benefit from skilled therapy intervention to address the above noted impairments. PLAN :  Recommendations and Planned Interventions: bed mobility training, transfer training, gait training, therapeutic exercises, and neuromuscular re-education      Frequency/Duration: Patient will be followed by physical therapy:  5 times a week to address goals. Recommendation for discharge: (in order for the patient to meet his/her long term goals)  No skilled physical therapy/ follow up rehabilitation needs identified at this time. IF patient discharges home will need the following DME: portable oxygen         SUBJECTIVE:   Patient stated I'm getting dizzy.     OBJECTIVE DATA SUMMARY:   HISTORY:    History reviewed. No pertinent past medical history.   Past Surgical History:   Procedure Laterality Date    HX HEENT      toncills       Personal factors and/or comorbidities impacting plan of care:     Home Situation  Home Environment: (P) Private residence  # Steps to Enter: (P) 3  Rails to Enter: (P) Yes  One/Two Story Residence: (P) One story  Patient Expects to be Discharged to[de-identified] (P) Private residence  Current DME Used/Available at Home: (P) Shower chair  Tub or Shower Type: (P) Shower    EXAMINATION/PRESENTATION/DECISION MAKING:   Critical Behavior: Hearing:     Skin:    Edema:   Range Of Motion:  AROM: Within functional limits                       Strength:    Strength:  Within functional limits                    Tone & Sensation:   Tone: Normal              Sensation: Intact               Coordination:  Coordination: Within functional limits  Vision:      Functional Mobility:  Bed Mobility:  Rolling: Supervision  Supine to Sit: Supervision        Transfers:  Sit to Stand: Stand-by assistance  Stand to Sit: Stand-by assistance        Bed to Chair: Contact guard assistance              Balance:   Sitting: Intact  Standing: Impaired  Standing - Static: Good  Standing - Dynamic : Fair  Ambulation/Gait Training:  Distance (ft): 120 Feet (ft)(with seated rest d/t THURSTON)  Assistive Device: Gait belt(hand rail on right)  Ambulation - Level of Assistance: Contact guard assistance     Gait Description (WDL): Exceptions to WDL  Gait Abnormalities: Decreased step clearance;Trunk sway increased        Base of Support: Widened     Speed/Ludmila: Fluctuations                        Stairs:                Documentation for home O2:     ROOM AIR    AT REST   O2 SATS  87 HR  98   ROOM AIR WITH ACTIVITY 02 SATS  84 HR  112   (4    ) LITERS OF O2 WITH ACTIVITY O2 SATS  88 HR  104   (4    )LITERS OF 02 PATIENT LEFT COMFORTABLY  SITTING/SUPINE 02 SATS  93 HR  95            Physical Therapy Evaluation Charge Determination   History Examination Presentation Decision-Making   LOW Complexity : Zero comorbidities / personal factors that will impact the outcome / POC LOW Complexity : 1-2 Standardized tests and measures addressing body structure, function, activity limitation and / or participation in recreation  LOW Complexity : Stable, uncomplicated  LOW Complexity : FOTO score of       Based on the above components, the patient evaluation is determined to be of the following complexity level: LOW     Pain Ratin/10 surgical site    Activity Tolerance:   Fair, desaturates with exertion and requires oxygen, and requires rest breaks    After treatment patient left in no apparent distress:   Sitting in chair and Call bell within reach    COMMUNICATION/EDUCATION:   The patients plan of care was discussed with: Registered nurse. Fall prevention education was provided and the patient/caregiver indicated understanding., Patient/family have participated as able in goal setting and plan of care. , and Patient/family agree to work toward stated goals and plan of care.     Thank you for this referral.  Carmelita Patel, PT, DPT   Time Calculation: 25 mins

## 2021-02-15 NOTE — PROGRESS NOTES
Doing well, some shoulder pain. Vitals appropriate. Abdomen soft. POD1 lap appy  Diet as tolerated. IV antibiotics.      Fernando Wagner MD

## 2021-02-16 ENCOUNTER — APPOINTMENT (OUTPATIENT)
Dept: NON INVASIVE DIAGNOSTICS | Age: 61
DRG: 853 | End: 2021-02-16
Attending: INTERNAL MEDICINE

## 2021-02-16 PROBLEM — K35.32 APPENDICITIS WITH PERFORATION: Status: ACTIVE | Noted: 2021-02-16

## 2021-02-16 LAB
ANION GAP SERPL CALC-SCNC: 4 MMOL/L (ref 5–15)
APTT PPP: 40.8 SEC (ref 22.1–31)
APTT PPP: 98 SEC (ref 22.1–31)
BUN SERPL-MCNC: 15 MG/DL (ref 6–20)
BUN/CREAT SERPL: 16 (ref 12–20)
CALCIUM SERPL-MCNC: 7.5 MG/DL (ref 8.5–10.1)
CHLORIDE SERPL-SCNC: 107 MMOL/L (ref 97–108)
CO2 SERPL-SCNC: 26 MMOL/L (ref 21–32)
CREAT SERPL-MCNC: 0.94 MG/DL (ref 0.7–1.3)
ECHO AO ASC DIAM: 3.3 CM
ECHO AO ROOT DIAM: 3.5 CM
ECHO AR MAX VEL PISA: 322.61 CENTIMETER/SECOND
ECHO AV PEAK GRADIENT: 13.47 MMHG
ECHO AV PEAK VELOCITY: 183.52 CM/S
ECHO AV REGURGITANT PHT: 555.16 MS
ECHO EST RA PRESSURE: 8 MMHG
ECHO IVC PROX: 2.49 CM
ECHO LA AREA 4C: 16.22 CM2
ECHO LA MAJOR AXIS: 3.47 CM
ECHO LA MINOR AXIS: 1.57 CM
ECHO LA VOL 2C: 42.05 ML (ref 18–58)
ECHO LA VOL 4C: 40.72 ML (ref 18–58)
ECHO LA VOL BP: 46.36 ML (ref 18–58)
ECHO LA VOL/BSA BIPLANE: 20.92 ML/M2 (ref 16–28)
ECHO LA VOLUME INDEX A2C: 18.98 ML/M2 (ref 16–28)
ECHO LA VOLUME INDEX A4C: 18.38 ML/M2 (ref 16–28)
ECHO LV E' LATERAL VELOCITY: 13.07 CENTIMETER/SECOND
ECHO LV E' SEPTAL VELOCITY: 9.94 CENTIMETER/SECOND
ECHO LV INTERNAL DIMENSION DIASTOLIC: 4.94 CM (ref 4.2–5.9)
ECHO LV INTERNAL DIMENSION SYSTOLIC: 3.32 CM
ECHO LV IVSD: 0.67 CM (ref 0.6–1)
ECHO LV MASS 2D: 114.3 G (ref 88–224)
ECHO LV MASS INDEX 2D: 51.6 G/M2 (ref 49–115)
ECHO LV POSTERIOR WALL DIASTOLIC: 0.75 CM (ref 0.6–1)
ECHO LVOT DIAM: 1.97 CM
ECHO MV A VELOCITY: 67.12 CENTIMETER/SECOND
ECHO MV AREA PHT: 4.06 CM2
ECHO MV E DECELERATION TIME (DT): 186.64 MS
ECHO MV E VELOCITY: 86.54 CENTIMETER/SECOND
ECHO MV PRESSURE HALF TIME (PHT): 54.12 MS
ECHO PV MAX VELOCITY: 117.01 CM/S
ECHO PV PEAK INSTANTANEOUS GRADIENT SYSTOLIC: 5.48 MMHG
ECHO RIGHT VENTRICULAR SYSTOLIC PRESSURE (RVSP): 36.1 MMHG
ECHO RV INTERNAL DIMENSION: 3.59 CM
ECHO RV TAPSE: 2.27 CM (ref 1.5–2)
ECHO TV REGURGITANT MAX VELOCITY: 265.04 CM/S
ECHO TV REGURGITANT PEAK GRADIENT: 28.1 MMHG
ERYTHROCYTE [DISTWIDTH] IN BLOOD BY AUTOMATED COUNT: 13.7 % (ref 11.5–14.5)
FERRITIN SERPL-MCNC: 295 NG/ML (ref 26–388)
FOLATE SERPL-MCNC: 10.9 NG/ML (ref 5–21)
GLUCOSE SERPL-MCNC: 97 MG/DL (ref 65–100)
HCT VFR BLD AUTO: 25.5 % (ref 36.6–50.3)
HGB BLD-MCNC: 8.5 G/DL (ref 12.1–17)
HISTORY CHECKED?,CKHIST: NORMAL
INR PPP: 1.1 (ref 0.9–1.1)
LA VOL DISK BP: 41.6 ML (ref 18–58)
MAGNESIUM SERPL-MCNC: 2.1 MG/DL (ref 1.6–2.4)
MCH RBC QN AUTO: 30.6 PG (ref 26–34)
MCHC RBC AUTO-ENTMCNC: 33.3 G/DL (ref 30–36.5)
MCV RBC AUTO: 91.7 FL (ref 80–99)
NRBC # BLD: 0 K/UL (ref 0–0.01)
NRBC BLD-RTO: 0 PER 100 WBC
PHOSPHATE SERPL-MCNC: 2.3 MG/DL (ref 2.6–4.7)
PLATELET # BLD AUTO: 174 K/UL (ref 150–400)
PMV BLD AUTO: 9.6 FL (ref 8.9–12.9)
POTASSIUM SERPL-SCNC: 3.7 MMOL/L (ref 3.5–5.1)
PROTHROMBIN TIME: 11.3 SEC (ref 9–11.1)
RBC # BLD AUTO: 2.78 M/UL (ref 4.1–5.7)
SODIUM SERPL-SCNC: 137 MMOL/L (ref 136–145)
THERAPEUTIC RANGE,PTTT: ABNORMAL SECS (ref 58–77)
THERAPEUTIC RANGE,PTTT: ABNORMAL SECS (ref 58–77)
VIT B12 SERPL-MCNC: 141 PG/ML (ref 193–986)
WBC # BLD AUTO: 11.9 K/UL (ref 4.1–11.1)

## 2021-02-16 PROCEDURE — 93306 TTE W/DOPPLER COMPLETE: CPT | Performed by: INTERNAL MEDICINE

## 2021-02-16 PROCEDURE — 65270000029 HC RM PRIVATE

## 2021-02-16 PROCEDURE — 77010033678 HC OXYGEN DAILY

## 2021-02-16 PROCEDURE — 97530 THERAPEUTIC ACTIVITIES: CPT

## 2021-02-16 PROCEDURE — 96375 TX/PRO/DX INJ NEW DRUG ADDON: CPT

## 2021-02-16 PROCEDURE — 85610 PROTHROMBIN TIME: CPT

## 2021-02-16 PROCEDURE — 82607 VITAMIN B-12: CPT

## 2021-02-16 PROCEDURE — 80048 BASIC METABOLIC PNL TOTAL CA: CPT

## 2021-02-16 PROCEDURE — 85730 THROMBOPLASTIN TIME PARTIAL: CPT

## 2021-02-16 PROCEDURE — 97116 GAIT TRAINING THERAPY: CPT

## 2021-02-16 PROCEDURE — 74011250636 HC RX REV CODE- 250/636: Performed by: SURGERY

## 2021-02-16 PROCEDURE — 84100 ASSAY OF PHOSPHORUS: CPT

## 2021-02-16 PROCEDURE — 83735 ASSAY OF MAGNESIUM: CPT

## 2021-02-16 PROCEDURE — 74011250636 HC RX REV CODE- 250/636: Performed by: INTERNAL MEDICINE

## 2021-02-16 PROCEDURE — 94760 N-INVAS EAR/PLS OXIMETRY 1: CPT

## 2021-02-16 PROCEDURE — 85027 COMPLETE CBC AUTOMATED: CPT

## 2021-02-16 PROCEDURE — 96366 THER/PROPH/DIAG IV INF ADDON: CPT

## 2021-02-16 PROCEDURE — 96376 TX/PRO/DX INJ SAME DRUG ADON: CPT

## 2021-02-16 PROCEDURE — 74011250636 HC RX REV CODE- 250/636: Performed by: NURSE PRACTITIONER

## 2021-02-16 PROCEDURE — 82746 ASSAY OF FOLIC ACID SERUM: CPT

## 2021-02-16 PROCEDURE — 82728 ASSAY OF FERRITIN: CPT

## 2021-02-16 PROCEDURE — 93306 TTE W/DOPPLER COMPLETE: CPT

## 2021-02-16 PROCEDURE — 74011250637 HC RX REV CODE- 250/637: Performed by: SURGERY

## 2021-02-16 PROCEDURE — 36415 COLL VENOUS BLD VENIPUNCTURE: CPT

## 2021-02-16 PROCEDURE — 99218 HC RM OBSERVATION: CPT

## 2021-02-16 RX ORDER — ENOXAPARIN SODIUM 150 MG/ML
105 INJECTION SUBCUTANEOUS EVERY 12 HOURS
Status: COMPLETED | OUTPATIENT
Start: 2021-02-16 | End: 2021-02-18

## 2021-02-16 RX ORDER — HEPARIN SODIUM 5000 [USP'U]/ML
80 INJECTION, SOLUTION INTRAVENOUS; SUBCUTANEOUS ONCE
Status: COMPLETED | OUTPATIENT
Start: 2021-02-16 | End: 2021-02-16

## 2021-02-16 RX ADMIN — SODIUM CHLORIDE 75 ML/HR: 9 INJECTION, SOLUTION INTRAVENOUS at 02:24

## 2021-02-16 RX ADMIN — Medication 1000 MG: at 08:42

## 2021-02-16 RX ADMIN — ENOXAPARIN SODIUM 105 MG: 120 INJECTION SUBCUTANEOUS at 17:40

## 2021-02-16 RX ADMIN — HEPARIN SODIUM 8550 UNITS: 5000 INJECTION INTRAVENOUS; SUBCUTANEOUS at 02:17

## 2021-02-16 RX ADMIN — IRON SUCROSE 100 MG: 20 INJECTION, SOLUTION INTRAVENOUS at 08:42

## 2021-02-16 RX ADMIN — Medication 1000 MG: at 13:17

## 2021-02-16 RX ADMIN — LEVOFLOXACIN 500 MG: 5 INJECTION, SOLUTION INTRAVENOUS at 18:17

## 2021-02-16 RX ADMIN — METRONIDAZOLE 500 MG: 500 INJECTION, SOLUTION INTRAVENOUS at 20:13

## 2021-02-16 RX ADMIN — Medication 1000 MG: at 22:49

## 2021-02-16 RX ADMIN — SODIUM CHLORIDE 50 ML/HR: 9 INJECTION, SOLUTION INTRAVENOUS at 17:44

## 2021-02-16 RX ADMIN — METRONIDAZOLE 500 MG: 500 INJECTION, SOLUTION INTRAVENOUS at 08:42

## 2021-02-16 RX ADMIN — HEPARIN SODIUM 18 UNITS/KG/HR: 10000 INJECTION, SOLUTION INTRAVENOUS at 09:52

## 2021-02-16 NOTE — PROGRESS NOTES
Carlos Ochoa jyoti [facility] [address]  [address]  [phone]      Medical Progress Note      NAME: Anupama Carrero   :  1960  MRM:  [id number]    Date/Time of service: 2021  11:52 AM       Subjective:     Chief Complaint:  Patient was personally seen and examined by me during this time period. Chart reviewed. Dyspnea slightly better. On heparin gtt for acute PE       Objective:       Vitals:       Last 24hrs VS reviewed since prior progress note. Most recent are:    Visit Vitals  /63 (BP 1 Location: Right upper arm, BP Patient Position: Supine)   Pulse 88   Temp 98.6 °F (37 °C)   Resp 18   Ht 5' 9\" (1.753 m)   Wt 107 kg (235 lb 14.3 oz)   SpO2 94%   BMI 34.84 kg/m²     SpO2 Readings from Last 6 Encounters:   21 94%   17 95%   06/15/14 98%    O2 Flow Rate (L/min): 4 l/min       Intake/Output Summary (Last 24 hours) at 2021 1152  Last data filed at 2021 1147  Gross per 24 hour   Intake 1648.04 ml   Output 2630 ml   Net -981.96 ml        Exam:     Physical Exam:    Gen:  Well-developed, well-nourished, morbidly obese, in no acute distress  HEENT:  Pink conjunctivae, PERRL, hearing intact to voice, moist mucous membranes  Neck:  Supple, without masses, thyroid non-tender  Resp:  No accessory muscle use, clear breath sounds without wheezes rales or rhonchi  Card:  No murmurs, normal S1, S2 without thrills, bruits or peripheral edema  Abd:  Soft, mild generalized pain, non-distended, normoactive bowel sounds are present.   Surgical dressing c/d/i  Musc:  No cyanosis or clubbing  Skin:  No rashes  Neuro:  Cranial nerves 3-12 are grossly intact, follows commands appropriately  Psych:  poor insight, oriented to person, place and time, alert    Medications Reviewed: (see below)    Lab Data Reviewed: (see below)    ______________________________________________________________________    Medications:     Current Facility-Administered Medications   Medication Dose Route Frequency    iron sucrose (VENOFER) injection 100 mg  100 mg IntraVENous DAILY    heparin 25,000 units in D5W 250 ml infusion  14-36 Units/kg/hr IntraVENous TITRATE    0.9% sodium chloride infusion  75 mL/hr IntraVENous CONTINUOUS    oxyCODONE IR (ROXICODONE) tablet 5 mg  5 mg Oral Q4H PRN    Or    oxyCODONE IR (ROXICODONE) tablet 10 mg  10 mg Oral Q4H PRN    albuterol-ipratropium (DUO-NEB) 2.5 MG-0.5 MG/3 ML  3 mL Nebulization Q4H PRN    morphine injection 2 mg  2 mg IntraVENous Q3H PRN    ondansetron (ZOFRAN) injection 4 mg  4 mg IntraVENous Q6H PRN    levoFLOXacin (LEVAQUIN) 500 mg in D5W IVPB  500 mg IntraVENous Q24H    metroNIDAZOLE (FLAGYL) IVPB premix 500 mg  500 mg IntraVENous Q12H    acetaminophen (TYLENOL) tablet 1,000 mg  1,000 mg Oral Q8H    diphenhydrAMINE (BENADRYL) capsule 25 mg  25 mg Oral Q6H PRN          Lab Review:     Recent Labs     02/16/21  0007 02/15/21  1951 02/15/21  1415 02/15/21  0032 02/14/21  0201   WBC 11.9*  --   --  12.3* 13.4*   HGB 8.5* 9.3* 8.8* 9.3* 12.0*   HCT 25.5* 28.1* 26.5* 28.7* 36.8     --   --  174 239     Recent Labs     02/16/21  0030 02/16/21  0007 02/15/21  0032 02/14/21  0201   NA  --  137 137 135*   K  --  3.7 3.9 4.9   CL  --  107 107 108   CO2  --  26 25 21   GLU  --  97 105* 151*   BUN  --  15 26* 28*   CREA  --  0.94 1.18 1.53*   CA  --  7.5* 7.3* 7.3*   MG  --  2.1 1.8  --    PHOS  --  2.3* 2.7  --    INR 1.1  --   --   --      Lab Results   Component Value Date/Time    Glucose (POC) 120 (H) 06/14/2014 09:32 AM          Assessment / Plan:     63 yo otherwise healthy, presented w/ a perforated appendicitis s/p cecectomy 02/13. Complicated by BERONICA, hypoxia, acute bilateral PEs. Medicine is following as a consultant    1) Acute bilateral PEs/Hypoxia/dyspnea: Chest CT on 02/15 confirmed bilateral PEs, likely due to prior hospitalization. LE dopplers neg. Will cont heparin gtt.   If cleared by surgery, can transition to Lovenox or Eliquis. Treat for 3-6 months. Pulm following    2) BERONICA: likely pre-renal.  Resolved with IVF    3) Perforated appendicitis: s/p cecectomy 02/13. Management per Gen surg    4) Acute blood loss anemia/Fe def anemia: due to surgery. Will start IV iron.   Cont to monitor Hgb, transfuse prn     Total time spent with patient: 36 Minutes **I personally saw and examined the patient during this time period**                 Care Plan discussed with: Patient, nursing, gen surg     Discussed:  Care Plan    Prophylaxis:  Heparin gtt    Disposition:  per primary team           ___________________________________________________    Attending Physician: Alexis Rodríguez MD

## 2021-02-16 NOTE — PROGRESS NOTES
General Surgery Daily Progress Note    Patient: Mary Gerber MRN: [id number]  SSN: [id number]    YOB: 1960  Age: 62 y.o. Sex: male      Admit Date: 2/13/2021    POD 3 Days Post-Op    Procedure: Procedure(s):  APPENDECTOMY LAPAROSCOPIC    Subjective:   Pain in abdomen improved somewhat over last 24 hours. Had large BM just prior to entering room. Pt and RN reported it was mixed- watery and soft. No blood noted in stool. No n/v. Tolerating clears. SOB still, pt denies feeling any change in breathing since yesterday. Most apparent to pt when moving. While at rest not as bothersome. Current Facility-Administered Medications   Medication Dose Route Frequency    iron sucrose (VENOFER) injection 100 mg  100 mg IntraVENous DAILY    heparin 25,000 units in D5W 250 ml infusion  14-36 Units/kg/hr IntraVENous TITRATE    0.9% sodium chloride infusion  75 mL/hr IntraVENous CONTINUOUS    oxyCODONE IR (ROXICODONE) tablet 5 mg  5 mg Oral Q4H PRN    Or    oxyCODONE IR (ROXICODONE) tablet 10 mg  10 mg Oral Q4H PRN    albuterol-ipratropium (DUO-NEB) 2.5 MG-0.5 MG/3 ML  3 mL Nebulization Q4H PRN    morphine injection 2 mg  2 mg IntraVENous Q3H PRN    ondansetron (ZOFRAN) injection 4 mg  4 mg IntraVENous Q6H PRN    levoFLOXacin (LEVAQUIN) 500 mg in D5W IVPB  500 mg IntraVENous Q24H    metroNIDAZOLE (FLAGYL) IVPB premix 500 mg  500 mg IntraVENous Q12H    acetaminophen (TYLENOL) tablet 1,000 mg  1,000 mg Oral Q8H    diphenhydrAMINE (BENADRYL) capsule 25 mg  25 mg Oral Q6H PRN        Objective:   02/16 0701 - 02/16 1900  In: -   Out: 810 [Urine:800; Drains:10]  02/14 1901 - 02/16 0700  In: 2749 [P.O.:1110;  I.V.:3349]  Out: 9080 [Urine:3350; Drains:220]  Patient Vitals for the past 8 hrs:   BP Temp Pulse Resp SpO2 Height Weight   02/16/21 1146 102/63 98.6 °F (37 °C) 88 18 94 %     02/16/21 1101 113/68   18 95 %     02/16/21 1049 111/67     5' 9\" (1.753 m) 107 kg (235 lb 14.3 oz) 02/16/21 0758 111/67 98.7 °F (37.1 °C) 90 20 94 %         Physical Exam:  General: Alert, cooperative, NAD  Lungs: RR stable Max 20, on 3L O2 at 94% with sats in upper 80s on movement  Heart:  RRR   Abdomen: Soft, min- tenderness as expected, distended. Incisions c/d/i.  ALMA ROSA drain with red appearing output  Extremities: Warm, moves all  Skin:  Abd slightly red in areas of chloraprep. No hives.  will monitor    Labs:   Recent Labs     02/16/21  0007   WBC 11.9*   HGB 8.5*   HCT 25.5*        Recent Labs     02/16/21  0007      K 3.7      CO2 26   GLU 97   BUN 15   CREA 0.94   CA 7.5*   MG 2.1   PHOS 2.3*       Assessment / Plan:     POD 3 Days Post-Op    Procedure: Procedure(s):  APPENDECTOMY LAPAROSCOPIC      Pain improving  Cont IVF   Cont IV antibx  Keep ALMA ROSA- H/H remaining stable over past 24 hours- monitor  Advance Diet to Fulls  Up OOB as tolerated  PT/OT as tolerated  IS daily    Bilat PE post-op- heparin gtt  Consider converting to lovenox (1mg/kg q 12) while inpt and eliquis in outpt setting    Will need to be d/c with PO antibx x 1 week and eliquis      Will discuss with Dr Roseline Walker, NP

## 2021-02-16 NOTE — PROGRESS NOTES
0700: Bedside and Verbal shift change report given to Joi Tellez RN (oncoming nurse) by Clora Nissen RN (offgoing nurse). Report included the following information SBAR, Kardex, Procedure Summary, Intake/Output, MAR, Accordion, Recent Results and Med Rec Status.

## 2021-02-16 NOTE — PROGRESS NOTES
[facility]     Name: Kathy Guy MRN: [id number]   : 1960 Hospital: [address]   Date: 2021        Impression Plan   1. Acute respiratory failure  2. PE  3. Hypoxia  4. LLL abnormality  5. Shortness of breath  6. Appendicitis s/p appendectomy                · Heparin gtt- PE likely secondary to acute appendicitis. Would treat with anticoagulation for 3-6 months  · supp o2 prn for goal sats>90%. Exercise oximetry closer to discharge. · Suspect that LLL infiltrate more due to atelectasis than pulm infarct or PNA  · F/u Echo  · Can transition to eliquis if OK w/ surgery  · Encouraged IS  · PT/OT  · Follow up in Pulmonary in 2-3 weeks           Radiology  ( personally reviewed) CT chest 2/15/21: multilobar PE, LLL atelectasis   ABG No results for input(s): PHI, PO2I, PCO2I in the last 72 hours. Subjective     Cc: shortness of breath    61 yo who presented on  with RLQ abdominal pain. Was found to have acute appendicitis and had appendectomy done on . Today was more short of breath and hypoxic. CT chest showed PEs. Pt denies chest pain. States his shortness of breath is only present when moving. He denies smoking/long travels/hx of cancer/family hx of PEs. He works in construction. Some LE edema since surgery. Review of Systems:  A comprehensive review of systems was negative except for that written in the HPI. History reviewed. No pertinent past medical history. Interval hx:  Feeling tired today  Very short of breath with exertion    3L O2    Past Surgical History:   Procedure Laterality Date    HX HEENT      toncills      Prior to Admission medications    Medication Sig Start Date End Date Taking? Authorizing Provider   oxyCODONE IR (ROXICODONE) 5 mg immediate release tablet Take 1 Tab by mouth every four (4) hours as needed for Pain for up to 3 days. Max Daily Amount: 30 mg.  Indications: pain, Acute post-operative pain 21 2/16/21 Yes Shelly Dunne MD   ondansetron (ZOFRAN ODT) 4 mg disintegrating tablet Take 1 Tab by mouth every six (6) hours as needed for Nausea. Indications: prevent nausea and vomiting after surgery 2/13/21  Yes Shelly Dunne MD   promethazine-codeine [facility] WITH CODEINE) 6.25-10 mg/5 mL syrup Take 5 mL by mouth every six (6) hours as needed for Cough. Max Daily Amount: 20 mL. 3/21/17   Gianna Sanchez MD     Current Facility-Administered Medications   Medication Dose Route Frequency    iron sucrose (VENOFER) injection 100 mg  100 mg IntraVENous DAILY    heparin 25,000 units in D5W 250 ml infusion  14-36 Units/kg/hr IntraVENous TITRATE    0.9% sodium chloride infusion  75 mL/hr IntraVENous CONTINUOUS    levoFLOXacin (LEVAQUIN) 500 mg in D5W IVPB  500 mg IntraVENous Q24H    metroNIDAZOLE (FLAGYL) IVPB premix 500 mg  500 mg IntraVENous Q12H    acetaminophen (TYLENOL) tablet 1,000 mg  1,000 mg Oral Q8H     No Known Allergies   Social History     Tobacco Use    Smoking status: Never Smoker    Smokeless tobacco: Never Used   Substance Use Topics    Alcohol use: No      Family History   Problem Relation Age of Onset    Diabetes Mother     Diabetes Father           Laboratory: I have personally reviewed the critical care flowsheet and labs.      Recent Labs     02/16/21  0007 02/15/21  1951 02/15/21  1415 02/15/21  0032 02/14/21  0201   WBC 11.9*  --   --  12.3* 13.4*   HGB 8.5* 9.3* 8.8* 9.3* 12.0*   HCT 25.5* 28.1* 26.5* 28.7* 36.8     --   --  174 239     Recent Labs     02/16/21  0030 02/16/21  0007 02/15/21  0032 02/14/21  0201   NA  --  137 137 135*   K  --  3.7 3.9 4.9   CL  --  107 107 108   CO2  --  26 25 21   GLU  --  97 105* 151*   BUN  --  15 26* 28*   CREA  --  0.94 1.18 1.53*   CA  --  7.5* 7.3* 7.3*   MG  --  2.1 1.8  --    PHOS  --  2.3* 2.7  --    INR 1.1  --   --   --        Objective:     Mode Rate Tidal Volume Pressure FiO2 PEEP                    Vital Signs:     TMAX(24) Intake/Output:   Last shift:         Last 3 shifts: No intake/output data recorded. [id number]    Intake/Output Summary (Last 24 hours) at 2/16/2021 0850  Last data filed at 2/16/2021 0630  Gross per 24 hour   Intake 1648.04 ml   Output 2270 ml   Net -621.96 ml     EXAM:   GENERAL: well developed and in no distress, HEENT: hearing loss  PERRL, EOMI, no alar flaring or epistaxis, oral mucosa moist without cyanosis, NECK:  no jugular vein distention, no retractions, no thyromegaly or masses, LUNGS: CTA,no w/r/r , HEART:  Regular rate and rhythm with no MGR;trace edema is present, ABDOMEN:  soft with no tenderness, bowel sounds present, EXTREMITIES:  warm with no cyanosis, SKIN:  no jaundice or ecchymosis and NEUROLOGIC:  alert and oriented, grossly non-focal    Meagher Ryan Davila [facility]

## 2021-02-16 NOTE — PROGRESS NOTES
Transition of Care Plan:   RUR-N/A                  1). PT consult--may need home health-agency list given to pt  2). Outpatient follow up- needs a PCP-has a list  3). Spouse will transport at dc  4). Will need O2 eval for home oxygen  5). CM following  JAMARCUS Otero

## 2021-02-16 NOTE — PROGRESS NOTES
Bedside and Verbal shift change report given to [facility] rn  (oncoming nurse) by Claudette Roman  (offgoing nurse). Report included the following information SBAR and Kardex.

## 2021-02-16 NOTE — PROGRESS NOTES
0140: Results completed for aPTT at this time. APTT is 40.8. Pharmacy called at this time to verify rate for bolus and infusion. RN obtains new weight, as original weight was pt stated. Pharmacy verifies that either original or new weight can be used for bolus and old rate can still be used for rate.

## 2021-02-16 NOTE — PROGRESS NOTES
Problem: Mobility Impaired (Adult and Pediatric)  Goal: *Acute Goals and Plan of Care (Insert Text)  Description: FUNCTIONAL STATUS PRIOR TO ADMISSION: Patient was independent and active without use of DME. Patient works as a     HOME SUPPORT [address]: The patient lived with his wife but did not require assist.    Physical Therapy Goals  Initiated 2/15/2021  1. Patient will move from supine to sit and sit to supine  in bed with modified independence within 7 day(s). 2.  Patient will transfer from bed to chair and chair to bed with modified independence using the least restrictive device within 7 day(s). 3.  Patient will perform sit to stand with modified independence within 7 day(s). 4.  Patient will ambulate with modified independence for 200 feet with the least restrictive device within 7 day(s). 5.  Patient will ascend/descend 3 stairs with 1 handrail(s) with modified independence within 7 day(s). Note:   PHYSICAL THERAPY TREATMENT  Patient: Kathy Guy (65 y.o. male)  Date: 2/16/2021  Diagnosis: Appendicitis [K37]  Appendicitis with perforation [K35.32] <principal problem not specified>  Procedure(s) (LRB):  APPENDECTOMY LAPAROSCOPIC (N/A) 3 Days Post-Op  Precautions:    Chart, physical therapy assessment, plan of care and goals were reviewed. ASSESSMENT  Patient continues with skilled PT services. Pt 86% on RA sitting up in bed on RA. Pt returned to 4L of O2 withSPO2 increased to 94%. Pt supine to sit with CGA. Pt sit to stand with CGA. Pt stood with CGA to min assist.Pt reported feeling to weak to ambulate. Pt took 4 steps to chair with hand held min assist of 1. Pt left sitting. Pt SPO2 was 94% on 4L sitting up in chair. PT will continue to follow.     Current Level of Function Impacting Discharge (mobility/balance): Pt weaker today but performing tranfers with CGA to min assist.             PLAN :  Patient continues to benefit from skilled intervention to address the above impairments. Continue treatment per established plan of care. to address goals.     Recommendation for discharge: (in order for the patient to meet his/her long term goals)  Physical therapy at least 2 days/week in the home AND ensure assist and/or supervision for safety    This discharge recommendation:  Has been made in collaboration with the attending provider and/or case management    IF patient discharges home will need the following DME: to be determined (TBD)       SUBJECTIVE:       OBJECTIVE DATA SUMMARY:   Critical Behavior:              Functional Mobility Training:  Bed Mobility:     Supine to Sit: Contact guard assistance              Transfers:  Sit to Stand: Contact guard assistance  Stand to Sit: Contact guard assistance        Bed to Chair: Minimum assistance;Contact guard assistance                    Balance:  Sitting: Intact  Standing: With support  Standing - Static: Fair  Ambulation/Gait Training:  Distance (ft): 2 Feet (ft)     Ambulation - Level of Assistance: Contact guard assistance;Minimal assistance        Gait Abnormalities: Decreased step clearance        Base of Support: Narrowed     Speed/Ludmila: Pace decreased (<100 feet/min)   Documentation for home O2:     ROOM AIR    AT REST   O2 SATS  86%    ROOM AIR WITH ACTIVITY 02 SATS  N/A    (4  ) LITERS OF O2 WITH ACTIVITY O2 SATS  94%    (4)LITERS OF 02 PATIENT LEFT COMFORTABLY  SITTING/SUPINE 02 SATS  94$               Activity Tolerance:   Fair    After treatment patient left in no apparent distress:   Sitting in chair    COMMUNICATION/COLLABORATION:   The patients plan of care was discussed with: Physical therapist.     Cyndia Najjar, PTA   Time Calculation: 23 mins

## 2021-02-17 LAB
ANION GAP SERPL CALC-SCNC: 4 MMOL/L (ref 5–15)
BASOPHILS # BLD: 0 K/UL (ref 0–0.1)
BASOPHILS NFR BLD: 0 % (ref 0–1)
BUN SERPL-MCNC: 16 MG/DL (ref 6–20)
BUN/CREAT SERPL: 18 (ref 12–20)
CALCIUM SERPL-MCNC: 7.8 MG/DL (ref 8.5–10.1)
CHLORIDE SERPL-SCNC: 108 MMOL/L (ref 97–108)
CO2 SERPL-SCNC: 29 MMOL/L (ref 21–32)
CREAT SERPL-MCNC: 0.88 MG/DL (ref 0.7–1.3)
DIFFERENTIAL METHOD BLD: ABNORMAL
EOSINOPHIL # BLD: 0.1 K/UL (ref 0–0.4)
EOSINOPHIL NFR BLD: 1 % (ref 0–7)
ERYTHROCYTE [DISTWIDTH] IN BLOOD BY AUTOMATED COUNT: 13.7 % (ref 11.5–14.5)
GLUCOSE SERPL-MCNC: 100 MG/DL (ref 65–100)
HCT VFR BLD AUTO: 26.8 % (ref 36.6–50.3)
HGB BLD-MCNC: 8.7 G/DL (ref 12.1–17)
IMM GRANULOCYTES # BLD AUTO: 0.1 K/UL (ref 0–0.04)
IMM GRANULOCYTES NFR BLD AUTO: 1 % (ref 0–0.5)
LYMPHOCYTES # BLD: 1.8 K/UL (ref 0.8–3.5)
LYMPHOCYTES NFR BLD: 15 % (ref 12–49)
MAGNESIUM SERPL-MCNC: 2.5 MG/DL (ref 1.6–2.4)
MCH RBC QN AUTO: 30.5 PG (ref 26–34)
MCHC RBC AUTO-ENTMCNC: 32.5 G/DL (ref 30–36.5)
MCV RBC AUTO: 94 FL (ref 80–99)
MONOCYTES # BLD: 0.9 K/UL (ref 0–1)
MONOCYTES NFR BLD: 8 % (ref 5–13)
NEUTS SEG # BLD: 8.6 K/UL (ref 1.8–8)
NEUTS SEG NFR BLD: 75 % (ref 32–75)
NRBC # BLD: 0 K/UL (ref 0–0.01)
NRBC BLD-RTO: 0 PER 100 WBC
PHOSPHATE SERPL-MCNC: 3.4 MG/DL (ref 2.6–4.7)
PLATELET # BLD AUTO: 211 K/UL (ref 150–400)
PMV BLD AUTO: 9.3 FL (ref 8.9–12.9)
POTASSIUM SERPL-SCNC: 3.7 MMOL/L (ref 3.5–5.1)
RBC # BLD AUTO: 2.85 M/UL (ref 4.1–5.7)
SODIUM SERPL-SCNC: 141 MMOL/L (ref 136–145)
WBC # BLD AUTO: 11.5 K/UL (ref 4.1–11.1)

## 2021-02-17 PROCEDURE — 80048 BASIC METABOLIC PNL TOTAL CA: CPT

## 2021-02-17 PROCEDURE — 84100 ASSAY OF PHOSPHORUS: CPT

## 2021-02-17 PROCEDURE — 74011250637 HC RX REV CODE- 250/637: Performed by: SURGERY

## 2021-02-17 PROCEDURE — 94760 N-INVAS EAR/PLS OXIMETRY 1: CPT

## 2021-02-17 PROCEDURE — 97530 THERAPEUTIC ACTIVITIES: CPT

## 2021-02-17 PROCEDURE — 36415 COLL VENOUS BLD VENIPUNCTURE: CPT

## 2021-02-17 PROCEDURE — 83735 ASSAY OF MAGNESIUM: CPT

## 2021-02-17 PROCEDURE — 74011250636 HC RX REV CODE- 250/636: Performed by: SURGERY

## 2021-02-17 PROCEDURE — 85025 COMPLETE CBC W/AUTO DIFF WBC: CPT

## 2021-02-17 PROCEDURE — 74011250636 HC RX REV CODE- 250/636: Performed by: INTERNAL MEDICINE

## 2021-02-17 PROCEDURE — 65270000029 HC RM PRIVATE

## 2021-02-17 PROCEDURE — 74011250636 HC RX REV CODE- 250/636: Performed by: NURSE PRACTITIONER

## 2021-02-17 PROCEDURE — 97116 GAIT TRAINING THERAPY: CPT

## 2021-02-17 RX ADMIN — ONDANSETRON 4 MG: 2 INJECTION INTRAMUSCULAR; INTRAVENOUS at 18:01

## 2021-02-17 RX ADMIN — Medication 1000 MG: at 06:26

## 2021-02-17 RX ADMIN — METRONIDAZOLE 500 MG: 500 INJECTION, SOLUTION INTRAVENOUS at 08:35

## 2021-02-17 RX ADMIN — Medication 1000 MG: at 14:10

## 2021-02-17 RX ADMIN — ENOXAPARIN SODIUM 105 MG: 120 INJECTION SUBCUTANEOUS at 02:16

## 2021-02-17 RX ADMIN — IRON SUCROSE 100 MG: 20 INJECTION, SOLUTION INTRAVENOUS at 08:35

## 2021-02-17 RX ADMIN — ENOXAPARIN SODIUM 105 MG: 120 INJECTION SUBCUTANEOUS at 14:11

## 2021-02-17 RX ADMIN — METRONIDAZOLE 500 MG: 500 INJECTION, SOLUTION INTRAVENOUS at 20:00

## 2021-02-17 RX ADMIN — Medication 1000 MG: at 21:23

## 2021-02-17 RX ADMIN — LEVOFLOXACIN 500 MG: 5 INJECTION, SOLUTION INTRAVENOUS at 18:22

## 2021-02-17 NOTE — PROGRESS NOTES
Bedside and Verbal shift change report given to [facility] rn  (oncoming nurse) by Magnolia Balderas  (offgoing nurse). Report included the following information SBAR and Kardex.

## 2021-02-17 NOTE — PROGRESS NOTES
[facility] [place] [facility] [address]  [address] [facility], [address] [place] [address]  [phone]      Medical Progress Note      NAME: Kathy Guy   :  1960  MRM:  [id number]    Date/Time of service: 2021  11:06 AM       Subjective:     Chief Complaint:  Patient was personally seen and examined by me during this time period. Chart reviewed. Still with dyspnea on exertion. No bleeding       Objective:       Vitals:       Last 24hrs VS reviewed since prior progress note. Most recent are:    Visit Vitals  /67 (BP 1 Location: Right upper arm, BP Patient Position: At rest)   Pulse 90   Temp 97.9 °F (36.6 °C)   Resp 16   Ht 5' 9\" (1.753 m)   Wt 107 kg (235 lb 14.3 oz)   SpO2 94%   BMI 34.84 kg/m²     SpO2 Readings from Last 6 Encounters:   21 94%   17 95%   06/15/14 98%    O2 Flow Rate (L/min): 4 l/min       Intake/Output Summary (Last 24 hours) at 2021 1107  Last data filed at 2021 0827  Gross per 24 hour   Intake 2191 ml   Output 1395 ml   Net 796 ml        Exam:     Physical Exam:    Gen:  Well-developed, well-nourished, morbidly obese, in no acute distress  HEENT:  Pink conjunctivae, PERRL, hearing intact to voice, moist mucous membranes  Neck:  Supple, without masses, thyroid non-tender  Resp:  No accessory muscle use, scant crackles at bases  Card:  No murmurs, normal S1, S2 without thrills, bruits or peripheral edema  Abd:  Soft, mild generalized pain, non-distended, normoactive bowel sounds are present.   Surgical dressing c/d/i  Musc:  No cyanosis or clubbing  Skin:  No rashes  Neuro:  Cranial nerves 3-12 are grossly intact, follows commands appropriately  Psych:  poor insight, oriented to person, place and time, alert    Medications Reviewed: (see below)    Lab Data Reviewed: (see below)    ______________________________________________________________________    Medications:     Current Facility-Administered Medications   Medication Dose Route Frequency    enoxaparin (LOVENOX) injection 105 mg  105 mg SubCUTAneous Q12H    0.9% sodium chloride infusion  50 mL/hr IntraVENous CONTINUOUS    oxyCODONE IR (ROXICODONE) tablet 5 mg  5 mg Oral Q4H PRN    Or    oxyCODONE IR (ROXICODONE) tablet 10 mg  10 mg Oral Q4H PRN    albuterol-ipratropium (DUO-NEB) 2.5 MG-0.5 MG/3 ML  3 mL Nebulization Q4H PRN    morphine injection 2 mg  2 mg IntraVENous Q3H PRN    ondansetron (ZOFRAN) injection 4 mg  4 mg IntraVENous Q6H PRN    levoFLOXacin (LEVAQUIN) 500 mg in D5W IVPB  500 mg IntraVENous Q24H    metroNIDAZOLE (FLAGYL) IVPB premix 500 mg  500 mg IntraVENous Q12H    acetaminophen (TYLENOL) tablet 1,000 mg  1,000 mg Oral Q8H    diphenhydrAMINE (BENADRYL) capsule 25 mg  25 mg Oral Q6H PRN          Lab Review:     Recent Labs     02/17/21  0600 02/16/21 0007 02/15/21  1951 02/15/21  0032 02/15/21  0032   WBC 11.5* 11.9*  --   --  12.3*   HGB 8.7* 8.5* 9.3*   < > 9.3*   HCT 26.8* 25.5* 28.1*   < > 28.7*    174  --   --  174    < > = values in this interval not displayed. Recent Labs     02/17/21  0600 02/16/21  0030 02/16/21  0007 02/15/21  0032     --  137 137   K 3.7  --  3.7 3.9     --  107 107   CO2 29  --  26 25     --  97 105*   BUN 16  --  15 26*   CREA 0.88  --  0.94 1.18   CA 7.8*  --  7.5* 7.3*   MG 2.5*  --  2.1 1.8   PHOS 3.4  --  2.3* 2.7   INR  --  1.1  --   --      Lab Results   Component Value Date/Time    Glucose (POC) 120 (H) 06/14/2014 09:32 AM          Assessment / Plan:     61 yo otherwise healthy, presented w/ a perforated appendicitis s/p cecectomy 02/13. Complicated by BERONICA, hypoxia, acute bilateral PEs. Medicine is following as a consultant    1) Acute bilateral PEs/Hypoxia/dyspnea: Chest CT on 02/15 confirmed bilateral PEs, likely due to prior hospitalization. LE dopplers neg. Cont Lovenox, transition to Eliquis on discharge. Treat for 3-6 months. Pulm was following.   Might need home O2    2) BERONICA: likely pre-renal. Resolved with IVF    3) Perforated appendicitis: s/p cecectomy 02/13. Management per Gen surg    4) Acute blood loss anemia/Fe def anemia: due to surgery. S/p IV iron.   Cont to monitor Hgb, transfuse prn     Total time spent with patient: 28 Minutes **I personally saw and examined the patient during this time period**                 Care Plan discussed with: Patient, nursing, gen surg     Discussed:  Care Plan    Prophylaxis:  Lovenox    Disposition:  per primary team           ___________________________________________________    Attending Physician: Sabina De Leon MD

## 2021-02-17 NOTE — PROGRESS NOTES
Problem: Mobility Impaired (Adult and Pediatric)  Goal: *Acute Goals and Plan of Care (Insert Text)  Description: FUNCTIONAL STATUS PRIOR TO ADMISSION: Patient was independent and active without use of DME. Patient works as a     HOME SUPPORT [address]: The patient lived with his wife but did not require assist.    Physical Therapy Goals  Initiated 2/15/2021  1. Patient will move from supine to sit and sit to supine  in bed with modified independence within 7 day(s). 2.  Patient will transfer from bed to chair and chair to bed with modified independence using the least restrictive device within 7 day(s). 3.  Patient will perform sit to stand with modified independence within 7 day(s). 4.  Patient will ambulate with modified independence for 200 feet with the least restrictive device within 7 day(s). 5.  Patient will ascend/descend 3 stairs with 1 handrail(s) with modified independence within 7 day(s). Outcome: Progressing Towards Goal   PHYSICAL THERAPY TREATMENT  Patient: Barry Perez (67 y.o. male)  Date: 2/17/2021  Diagnosis: Appendicitis [K37]  Appendicitis with perforation [K35.32] <principal problem not specified>  Procedure(s) (LRB):  APPENDECTOMY LAPAROSCOPIC (N/A) 4 Days Post-Op  Precautions:    Chart, physical therapy assessment, plan of care and goals were reviewed. ASSESSMENT  Patient continues with skilled PT services and is progressing towards goals. Communicated with nurse who was in the room passing meds cleared for therapy. Sit on the edge of bed SBA, sit to stand SBA, ambulate with occasional holding on the rail on the hallway CGA. Assisted back to bed supine reposition patient up on the bed. Educate and  Instructed patient to continue active range of motion exercise on both legs while up on chair or on bed. Communicated with nurse who agreed to monitor patient. Current Level of Function Impacting Discharge (mobility/balance):  CGA with ambulation on the hallway     Other factors to consider for discharge: fall         PLAN :  Patient continues to benefit from skilled intervention to address the above impairments. Continue treatment per established plan of care. to address goals. Recommendation for discharge: (in order for the patient to meet his/her long term goals)  Physical therapy at least 2 days/week in the home AND ensure assist and/or supervision for safety  This discharge recommendation:  Has been made in collaboration with the attending provider and/or case management    IF patient discharges home will need the following DME: patient owns DME required for discharge       SUBJECTIVE:   Patient stated [address].     OBJECTIVE DATA SUMMARY:   Critical Behavior:              Functional Mobility Training:  Bed Mobility:  Rolling: Contact guard assistance  Supine to Sit: Contact guard assistance  Sit to Supine: Contact guard assistance  Scooting: Contact guard assistance        Transfers:  Sit to Stand: Contact guard assistance  Stand to Sit: Contact guard assistance  Stand Pivot Transfers: Contact guard assistance                          Balance:  Sitting: Intact  Standing: Impaired; With support  Standing - Static: Fair  Standing - Dynamic : Fair  Ambulation/Gait Training:  Distance (ft): 80 Feet (ft)  Assistive Device: Gait belt; Other (comment)(rails on the hallway)  Ambulation - Level of Assistance: Contact guard assistance     Gait Description (WDL): Exceptions to WDL  Gait Abnormalities: Path deviations; Step to gait        Base of Support: Widened     Speed/Ludmila: Pace decreased (<100 feet/min)  Step Length: Right shortened;Left shortened             Therapeutic Exercises:    Instructed patient to continue active range of motion exercise on both legs while up on chair or on bed.     Pain Ratin/10    Activity Tolerance:   Good    After treatment patient left in no apparent distress:   Supine in bed, Heels elevated for pressure relief, and Call bell within reach    COMMUNICATION/COLLABORATION:   The patients plan of care was discussed with: Registered nurse. Corina Catherine PT,WCC.    Time Calculation: 24 mins

## 2021-02-17 NOTE — PROGRESS NOTES
[facility]     Name: Barry Perez MRN: [id number]   : 1960 Hospital: [address]   Date: 2021        Impression Plan   1. Acute respiratory failure  2. PE  3. Hypoxia  4. LLL abnormality  5. Shortness of breath  6. Appendicitis s/p appendectomy                · PE likely secondary to acute appendicitis. Would treat with anticoagulation for 3-6 months  · supp o2 prn for goal sats>90%. Exercise oximetry closer to discharge. · Suspect that LLL infiltrate more due to atelectasis than pulm infarct or PNA  · On Lovenox. Can transition to eliquis when OK w/ surgery  · Encouraged IS  · PT/OT/IS             Radiology  (personally reviewed) CT chest 2/15/21: multilobar PE, LLL atelectasis       Subjective     Cc: shortness of breath    61 yo who presented on  with RLQ abdominal pain. Was found to have acute appendicitis and had appendectomy done on . Today was more short of breath and hypoxic. CT chest showed PEs. Pt denies chest pain. States his shortness of breath is only present when moving. He denies smoking/long travels/hx of cancer/family hx of PEs. He works in construction. Some LE edema since surgery. Review of Systems:  A comprehensive review of systems was negative except for that written in the HPI. History reviewed. No pertinent past medical history. Interval hx:  Afebrile  VSS  Sats 94% on 4L  WBC 11.5  Hgb 8.7    BLE VD neg for DVT  ECHO: EF 55-60%; mild AR; mild TR    ROS: Reports doing okay today. Has THURSTON and fatigues with activity. Denies fever or chills. Denies CP. Has appropriate post op abd pain. Having BMs and tolerating diet. Reports occasional cough. Past Surgical History:   Procedure Laterality Date    HX HEENT      toncills      Prior to Admission medications    Medication Sig Start Date End Date Taking?  Authorizing Provider   oxyCODONE IR (ROXICODONE) 5 mg immediate release tablet Take 1 Tab by mouth every four (4) hours as needed for Pain for up to 3 days. Max Daily Amount: 30 mg. Indications: pain, Acute post-operative pain 2/13/21 2/16/21 Yes Lizeth Zazueta MD   ondansetron (ZOFRAN ODT) 4 mg disintegrating tablet Take 1 Tab by mouth every six (6) hours as needed for Nausea. Indications: prevent nausea and vomiting after surgery 2/13/21  Yes Lizeth Zazueta MD   promethazine-codeine [facility] WITH CODEINE) 6.25-10 mg/5 mL syrup Take 5 mL by mouth every six (6) hours as needed for Cough. Max Daily Amount: 20 mL. 3/21/17   Alexis Zabala MD     Current Facility-Administered Medications   Medication Dose Route Frequency    enoxaparin (LOVENOX) injection 105 mg  105 mg SubCUTAneous Q12H    0.9% sodium chloride infusion  50 mL/hr IntraVENous CONTINUOUS    levoFLOXacin (LEVAQUIN) 500 mg in D5W IVPB  500 mg IntraVENous Q24H    metroNIDAZOLE (FLAGYL) IVPB premix 500 mg  500 mg IntraVENous Q12H    acetaminophen (TYLENOL) tablet 1,000 mg  1,000 mg Oral Q8H     No Known Allergies   Social History     Tobacco Use    Smoking status: Never Smoker    Smokeless tobacco: Never Used   Substance Use Topics    Alcohol use: No      Family History   Problem Relation Age of Onset    Diabetes Mother     Diabetes Father           Laboratory: I have personally reviewed the flowsheet and labs. Recent Labs     02/17/21  0600 02/16/21  0007 02/15/21  1951 02/15/21  0032 02/15/21  0032   WBC 11.5* 11.9*  --   --  12.3*   HGB 8.7* 8.5* 9.3*   < > 9.3*   HCT 26.8* 25.5* 28.1*   < > 28.7*    174  --   --  174    < > = values in this interval not displayed.      Recent Labs     02/17/21  0600 02/16/21  0030 02/16/21  0007 02/15/21  0032     --  137 137   K 3.7  --  3.7 3.9     --  107 107   CO2 29  --  26 25     --  97 105*   BUN 16  --  15 26*   CREA 0.88  --  0.94 1.18   CA 7.8*  --  7.5* 7.3*   MG 2.5*  --  2.1 1.8   PHOS 3.4  --  2.3* 2.7   INR  --  1.1  --   --        Objective:     Visit Vitals  BP 106/67 (BP 1 Location: Right upper arm, BP Patient Position: At rest)   Pulse 90   Temp 97.9 °F (36.6 °C)   Resp 16   Ht 5' 9\" (1.753 m)   Wt 107 kg (235 lb 14.3 oz)   SpO2 94%   BMI 34.84 kg/m²         Intake/Output Summary (Last 24 hours) at 2/17/2021 1133  Last data filed at 2/17/2021 0827  Gross per 24 hour   Intake 2191 ml   Output 1395 ml   Net 796 ml     EXAM:   GENERAL: well developed and in no distress, HEENT: hearing loss  PERRL, EOMI, no alar flaring or epistaxis, oral mucosa moist without cyanosis, NECK:  no jugular vein distention, no retractions, no thyromegaly or masses, LUNGS: CTA,no w/r/r , HEART:  Regular rate and rhythm with no MGR;trace edema is present, ABDOMEN:  soft with no tenderness, bowel sounds present, EXTREMITIES:  warm with no cyanosis, SKIN:  no jaundice or ecchymosis and NEUROLOGIC:  alert and oriented, grossly non-focal    WALLY Lombardo  [facility]

## 2021-02-17 NOTE — PROGRESS NOTES
0700: Bedside and Verbal shift change report given to Quan Daley RN (oncoming nurse) by Tamia Callahan RN (offgoing nurse). Report included the following information SBAR, Kardex, Procedure Summary, Intake/Output, MAR, Accordion, Recent Results and Med Rec Status.

## 2021-02-17 NOTE — PROGRESS NOTES
5:00 PM  Transition of Care Plan:   RUR-N/A                  1). PT consult--may need home health-agency list given to pt-- CM spoke to pt, he feels he could do his own exercises at home- he does not have insurance and does not want to go to outpatient PT- feels he will not need it. Will need to discuss further with pt  2). Outpatient follow up- needs a PCP-has a list  3). Spouse will transport at dc  4). Weaning O2  5).  May need O2 eval for home oxygen- priced private pay home O2- $120/month through [facility].  6). CM following  Jacqueline Saba

## 2021-02-17 NOTE — PROGRESS NOTES
Physician Progress Note      Isabella Glass  CSN #:                  [id number]  :                       1960  ADMIT DATE:       2021 2:14 AM  DISCH DATE:  RESPONDING  PROVIDER #:        Cristina Jaramillo MD          QUERY TEXT:    Dear Surgical Team and/or Consulting Hospitalist Team,  Pt admitted with Acute perforated appendicitis. Pt noted to have SIRS on admission with WBC:17.0 Temperature: 101 HR: 104 RR: 27.    If possible, please document in the progress notes and discharge summary if you are evaluating and /or treating any of the following: The medical record reflects the following:    Risk Factors: 62 yr M admitted with acute perforated appendix    Clinical Indicators: Patient arrived to the ED with c/o RUQ pain with nausea. Work up in the ED revealed SIRS of WBC:17.0 Temperature: 101 HR: 104 RR: 27. CT abd/pelvis revealed Acute appendicitis. No evidence of periappendiceal abscess or rupture. Patient taken urgently to OR. Patient received a 2,000 ML NS IVF bolus on  along with Zoysn 3.375 MG IV once on  in the ED. Patient now on NS IVF at 50 ML/HR, Levaquin 500 mg IV Q 24 hrs and Flagyl 500 mg IV Q 12 hrs. Treatment: Daily CBC/BMP, CT abd/pelvis, appendectomy, frequent monitoring/vitals signs, ,000 ML NS IVF bolus on  along with Zoysn 3.375 MG IV once on  in the ED and now on NS IVF at 50 ML/HR, Levaquin 500 mg IV Q 24 hrs and Flagyl 500 mg IV Q 12 hrs. Thank you,  Jo Martin RN, [facility]  [phone]  Options provided:  -- Sepsis, present on admission  -- No Sepsis, acute perforated appendix only  -- Sepsis was ruled out  -- Other - I will add my own diagnosis  -- Disagree - Not applicable / Not valid  -- Disagree - Clinically unable to determine / Unknown  -- Refer to Clinical Documentation Reviewer    PROVIDER RESPONSE TEXT:    This patient has sepsis which was present on admission. Query created by:  Roge Hutton on 2021 2:31 PM      Electronically signed by:  Rubi Miles MD 2/17/2021 6:08 PM

## 2021-02-17 NOTE — PROGRESS NOTES
Had BM today, tolerating diet, walking. Vitals stable, no tachycardia. Abdomen soft. Minimal serosang drain output. POD 4 Appendectomy, treating for PE. Pathology c/w perforation. Continue Lovenox, will transition to oral hopefully tomorrow. Continue IV antibiotics. Diet as tolerated. Work with PT. Still dyspneic. Agree he may need home O2. Will see how he does tomorrow.      Calvin Bolaños MD

## 2021-02-17 NOTE — PROGRESS NOTES
Physician Progress Note      Avis Dobbins  CSN #:                  [id number]  :                       1960  ADMIT DATE:       2021 2:14 AM  DISCH DATE:  RESPONDING  PROVIDER #:        Ed Belle DO, MD          QUERY TEXT:    Dear Surgical Team and/or Consulting Hospitalist Team,  Pt admitted with Acute perforated appendicitis. Pt noted to have SIRS on admission with WBC:17.0 Temperature: 101 HR: 104 RR: 27.    If possible, please document in the progress notes and discharge summary if you are evaluating and /or treating any of the following: The medical record reflects the following:    Risk Factors: 62 yr M admitted with acute perforated appendix    Clinical Indicators: Patient arrived to the ED with c/o RUQ pain with nausea. Work up in the ED revealed SIRS of WBC:17.0 Temperature: 101 HR: 104 RR: 27. CT abd/pelvis revealed Acute appendicitis. No evidence of periappendiceal abscess or rupture. Patient taken urgently to OR. Patient received a 2,000 ML NS IVF bolus on  along with Zoysn 3.375 MG IV once on  in the ED. Patient now on NS IVF at 50 ML/HR, Levaquin 500 mg IV Q 24 hrs and Flagyl 500 mg IV Q 12 hrs. Treatment: Daily CBC/BMP, CT abd/pelvis, appendectomy, frequent monitoring/vitals signs, ,000 ML NS IVF bolus on  along with Zoysn 3.375 MG IV once on  in the ED and now on NS IVF at 50 ML/HR, Levaquin 500 mg IV Q 24 hrs and Flagyl 500 mg IV Q 12 hrs. Thank you,  Janes Malik RN, [facility]  [phone]  Options provided:  -- Sepsis, present on admission  -- No Sepsis, acute perforated appendix only  -- Sepsis was ruled out  -- Other - I will add my own diagnosis  -- Disagree - Not applicable / Not valid  -- Disagree - Clinically unable to determine / Unknown  -- Refer to Clinical Documentation Reviewer    PROVIDER RESPONSE TEXT:    Not primary team    Query created by:  Arron Marmolejo on 2021 10:40 AM      QUERY TEXT:    Dear Surgical Team and/or consulting Hospitalist Team,  Patient admitted with acute perforated appendicitis. It's noted documentation of Acute Hypoxic respiratory failure within progress note on 2/14 and by pulmonary on 2/15 and hypoxia within progress note on 2/15 and subsequently. If possible, please document in progress notes and discharge summary if you are evaluating and /or treating any of the following: The medical record reflects the following:    Risk Factors: 62 Yr M admitted with Acute perforated appendicitis; Acute bilateral PE on admission    Clinical Indicators: Patient arrived to the ED with c/o RUQ abdominal pain and nausea. Patient noted to have acute appendicitis and was taken directly to OR for appendectomy. Post operatively patient noted to be SOB in need of 2L O2. CTA chest was performed on 2/15 revealing multilobar pulmonary emboli. Per documented vital signs patient with RR 16-18, however, noted desaturation to 84% on 2L O2 via NC on 2/15. Patient's oxygen titrated to 3L O2 with noted 91% on 3L so titrated up to 4L O2 via NC. Dr. Denise Garcia notes on 2/14, Acute hypoxic respiratory failure likely due to pain with breathing and shallow breaths. Pulmonary progress note on 2/15 states, Acute respiratory failure, SOB and hypoxia. supp o2 prn for goal sats>90%. Exercise oximetry closer to discharge. Per progress note on 2/15 states, Acute bilateral PEs/Hypoxia/dyspnea: Chest CT on 02/15 confirmed bilateral PEs, likely due to prior hospitalization. LE dopplers neg. Will cont heparin gtt. If cleared by surgery, can transition to Lovenox or Eliquis. Treat for 3-6 months. Pulm following. Treatment: CTA Chest, frequent monitoring/vital signs and supplemental oxygen as needed.     Thank you,  Librado Lentz RN, [facility]  [phone]  Options provided:  -- Acute Hypoxic Respiratory Failure confirmed and Hypoxia ruled out  -- Hypoxia confirmed and Acute Hypoxic Respiratory Failure ruled out  -- Other - I will add my own diagnosis  -- Disagree - Not applicable / Not valid  -- Disagree - Clinically unable to determine / Unknown  -- Refer to Clinical Documentation Reviewer    PROVIDER RESPONSE TEXT:    After study, Hypoxia confirmed and Acute Hypoxic Respiratory Failure ruled out. Query created by:  Franklin Howe on 2/17/2021 10:50 AM      Electronically signed by:  Jamal Dangelo MD 2/17/2021 11:06 AM

## 2021-02-18 LAB
ERYTHROCYTE [DISTWIDTH] IN BLOOD BY AUTOMATED COUNT: 13.9 % (ref 11.5–14.5)
HCT VFR BLD AUTO: 27.2 % (ref 36.6–50.3)
HGB BLD-MCNC: 8.9 G/DL (ref 12.1–17)
MCH RBC QN AUTO: 30.4 PG (ref 26–34)
MCHC RBC AUTO-ENTMCNC: 32.7 G/DL (ref 30–36.5)
MCV RBC AUTO: 92.8 FL (ref 80–99)
NRBC # BLD: 0 K/UL (ref 0–0.01)
NRBC BLD-RTO: 0 PER 100 WBC
PLATELET # BLD AUTO: 229 K/UL (ref 150–400)
PMV BLD AUTO: 9.5 FL (ref 8.9–12.9)
RBC # BLD AUTO: 2.93 M/UL (ref 4.1–5.7)
WBC # BLD AUTO: 15.5 K/UL (ref 4.1–11.1)

## 2021-02-18 PROCEDURE — 85027 COMPLETE CBC AUTOMATED: CPT

## 2021-02-18 PROCEDURE — 65270000029 HC RM PRIVATE

## 2021-02-18 PROCEDURE — 74011250636 HC RX REV CODE- 250/636: Performed by: SURGERY

## 2021-02-18 PROCEDURE — 74011250637 HC RX REV CODE- 250/637: Performed by: SURGERY

## 2021-02-18 PROCEDURE — 74011250637 HC RX REV CODE- 250/637: Performed by: INTERNAL MEDICINE

## 2021-02-18 PROCEDURE — 94618 PULMONARY STRESS TESTING: CPT

## 2021-02-18 PROCEDURE — 74011250636 HC RX REV CODE- 250/636: Performed by: INTERNAL MEDICINE

## 2021-02-18 PROCEDURE — 36415 COLL VENOUS BLD VENIPUNCTURE: CPT

## 2021-02-18 PROCEDURE — 74011250636 HC RX REV CODE- 250/636: Performed by: NURSE PRACTITIONER

## 2021-02-18 PROCEDURE — 94760 N-INVAS EAR/PLS OXIMETRY 1: CPT

## 2021-02-18 PROCEDURE — 74011000258 HC RX REV CODE- 258: Performed by: NURSE PRACTITIONER

## 2021-02-18 RX ORDER — FAMOTIDINE 20 MG/1
20 TABLET, FILM COATED ORAL
Status: DISCONTINUED | OUTPATIENT
Start: 2021-02-18 | End: 2021-03-05 | Stop reason: HOSPADM

## 2021-02-18 RX ORDER — MAG HYDROX/ALUMINUM HYD/SIMETH 200-200-20
30 SUSPENSION, ORAL (FINAL DOSE FORM) ORAL
Status: DISCONTINUED | OUTPATIENT
Start: 2021-02-18 | End: 2021-03-05 | Stop reason: HOSPADM

## 2021-02-18 RX ORDER — AMOXICILLIN 250 MG
1 CAPSULE ORAL 2 TIMES DAILY
Status: DISCONTINUED | OUTPATIENT
Start: 2021-02-18 | End: 2021-03-05 | Stop reason: HOSPADM

## 2021-02-18 RX ORDER — ENOXAPARIN SODIUM 150 MG/ML
105 INJECTION SUBCUTANEOUS EVERY 12 HOURS
Status: DISCONTINUED | OUTPATIENT
Start: 2021-02-19 | End: 2021-02-19

## 2021-02-18 RX ORDER — FACIAL-BODY WIPES
10 EACH TOPICAL DAILY PRN
Status: DISCONTINUED | OUTPATIENT
Start: 2021-02-18 | End: 2021-03-05 | Stop reason: HOSPADM

## 2021-02-18 RX ORDER — HYDROMORPHONE HYDROCHLORIDE 1 MG/ML
0.5 INJECTION, SOLUTION INTRAMUSCULAR; INTRAVENOUS; SUBCUTANEOUS
Status: DISCONTINUED | OUTPATIENT
Start: 2021-02-18 | End: 2021-02-20

## 2021-02-18 RX ADMIN — ENOXAPARIN SODIUM 105 MG: 120 INJECTION SUBCUTANEOUS at 01:42

## 2021-02-18 RX ADMIN — PIPERACILLIN AND TAZOBACTAM 3.38 G: 3; .375 INJECTION, POWDER, LYOPHILIZED, FOR SOLUTION INTRAVENOUS at 23:21

## 2021-02-18 RX ADMIN — DOCUSATE SODIUM 50 MG AND SENNOSIDES 8.6 MG 1 TABLET: 8.6; 5 TABLET, FILM COATED ORAL at 18:06

## 2021-02-18 RX ADMIN — ALUMINUM HYDROXIDE, MAGNESIUM HYDROXIDE, AND SIMETHICONE 30 ML: 200; 200; 20 SUSPENSION ORAL at 14:11

## 2021-02-18 RX ADMIN — OXYCODONE 5 MG: 5 TABLET ORAL at 01:05

## 2021-02-18 RX ADMIN — METRONIDAZOLE 500 MG: 500 INJECTION, SOLUTION INTRAVENOUS at 08:05

## 2021-02-18 RX ADMIN — FAMOTIDINE 20 MG: 20 TABLET ORAL at 15:55

## 2021-02-18 RX ADMIN — DOCUSATE SODIUM 50 MG AND SENNOSIDES 8.6 MG 1 TABLET: 8.6; 5 TABLET, FILM COATED ORAL at 12:01

## 2021-02-18 RX ADMIN — PIPERACILLIN AND TAZOBACTAM 3.38 G: 3; .375 INJECTION, POWDER, LYOPHILIZED, FOR SOLUTION INTRAVENOUS at 14:13

## 2021-02-18 RX ADMIN — Medication 1000 MG: at 06:05

## 2021-02-18 RX ADMIN — ENOXAPARIN SODIUM 105 MG: 120 INJECTION SUBCUTANEOUS at 23:21

## 2021-02-18 RX ADMIN — Medication 1000 MG: at 23:21

## 2021-02-18 RX ADMIN — ONDANSETRON 4 MG: 2 INJECTION INTRAMUSCULAR; INTRAVENOUS at 00:44

## 2021-02-18 RX ADMIN — SODIUM CHLORIDE 50 ML/HR: 9 INJECTION, SOLUTION INTRAVENOUS at 08:05

## 2021-02-18 RX ADMIN — Medication 1000 MG: at 14:11

## 2021-02-18 NOTE — PROGRESS NOTES
[facility]     Name: Anupama Carrero MRN: [id number]   : 1960 Hospital: [address]   Date: 2021        Impression Plan   1. Acute respiratory failure  2. PE  3. Hypoxia  4. LLL abnormality  5. Shortness of breath  6. Appendicitis s/p appendectomy                · PE likely secondary to acute appendicitis. Would treat with anticoagulation for 3-6 months  · On RA and no O2 required during RT eval  · Suspect that LLL infiltrate more due to atelectasis than pulm infarct or PNA  · On Lovenox. Can transition to eliquis when OK w/ surgery  · Encouraged IS  · PT/OT/IS    Patient is stable from a pulmonary standpoint. We will sign off and be available as needed. Please call with questions. Radiology  (personally reviewed) CT chest 2/15/21: multilobar PE, LLL atelectasis       Subjective     Cc: shortness of breath    61 yo who presented on  with RLQ abdominal pain. Was found to have acute appendicitis and had appendectomy done on . Today was more short of breath and hypoxic. CT chest showed PEs. Pt denies chest pain. States his shortness of breath is only present when moving. He denies smoking/long travels/hx of cancer/family hx of PEs. He works in construction. Some LE edema since surgery. Review of Systems:  A comprehensive review of systems was negative except for that written in the HPI. History reviewed. No pertinent past medical history. Interval hx:  Afebrile  VSS  Sats 90% on 1L; RT eval with sats 94% on RA at rest and 92% with ambulation  WBC 15.5 - worse  Hgb 8.9 - stable    BLE VD neg for DVT  ECHO: EF 55-60%; mild AR; mild TR    ROS: Reports doing fine today, just tired. Denies fever or chills. Denies CP. Has appropriate post op abd pain. Having BMs and tolerating diet. Reports occasional cough.   Not significantly SOB with RT eval.    Past Surgical History:   Procedure Laterality Date    HX HEENT      joselyn Prior to Admission medications    Medication Sig Start Date End Date Taking? Authorizing Provider   ondansetron (ZOFRAN ODT) 4 mg disintegrating tablet Take 1 Tab by mouth every six (6) hours as needed for Nausea. Indications: prevent nausea and vomiting after surgery 2/13/21  Yes Fareed Gayle MD   promethazine-codeine [facility] WITH CODEINE) 6.25-10 mg/5 mL syrup Take 5 mL by mouth every six (6) hours as needed for Cough. Max Daily Amount: 20 mL. 3/21/17   Josue Knapp MD     Current Facility-Administered Medications   Medication Dose Route Frequency    senna-docusate (PERICOLACE) 8.6-50 mg per tablet 1 Tab  1 Tab Oral BID    famotidine (PEPCID) tablet 20 mg  20 mg Oral ACB&D    enoxaparin (LOVENOX) injection 105 mg  105 mg SubCUTAneous Q12H    0.9% sodium chloride infusion  50 mL/hr IntraVENous CONTINUOUS    levoFLOXacin (LEVAQUIN) 500 mg in D5W IVPB  500 mg IntraVENous Q24H    metroNIDAZOLE (FLAGYL) IVPB premix 500 mg  500 mg IntraVENous Q12H    acetaminophen (TYLENOL) tablet 1,000 mg  1,000 mg Oral Q8H     No Known Allergies   Social History     Tobacco Use    Smoking status: Never Smoker    Smokeless tobacco: Never Used   Substance Use Topics    Alcohol use: No      Family History   Problem Relation Age of Onset    Diabetes Mother     Diabetes Father           Laboratory: I have personally reviewed the flowsheet and labs.      Recent Labs     02/18/21  0148 02/17/21  0600 02/16/21  0007   WBC 15.5* 11.5* 11.9*   HGB 8.9* 8.7* 8.5*   HCT 27.2* 26.8* 25.5*    211 174     Recent Labs     02/17/21  0600 02/16/21  0030 02/16/21  0007     --  137   K 3.7  --  3.7     --  107   CO2 29  --  26     --  97   BUN 16  --  15   CREA 0.88  --  0.94   CA 7.8*  --  7.5*   MG 2.5*  --  2.1   PHOS 3.4  --  2.3*   INR  --  1.1  --        Objective:     Visit Vitals  /64 (BP 1 Location: Left upper arm, BP Patient Position: At rest)   Pulse 82   Temp 98.4 °F (36.9 °C)   Resp 18 Ht 5' 9\" (1.753 m)   Wt 107 kg (235 lb 14.3 oz)   SpO2 90%   BMI 34.84 kg/m²         Intake/Output Summary (Last 24 hours) at 2/18/2021 1127  Last data filed at 2/18/2021 1052  Gross per 24 hour   Intake 1458.33 ml   Output 650 ml   Net 808.33 ml     EXAM:   GENERAL: well developed and in no distress, HEENT: hearing loss  PERRL, EOMI, no alar flaring or epistaxis, oral mucosa moist without cyanosis, NECK:  no jugular vein distention, no retractions, no thyromegaly or masses, LUNGS: CTA,no w/r/r , HEART:  Regular rate and rhythm with no MGR;trace edema is present, ABDOMEN:  soft with no tenderness, bowel sounds present, EXTREMITIES:  warm with no cyanosis, SKIN:  no jaundice or ecchymosis and NEUROLOGIC:  alert and oriented, grossly non-focal    Sandy WALLY Robins  [facility]

## 2021-02-18 NOTE — ADVANCED PRACTICE NURSE
Discussed with Dr Devon Vasquez      Due to leukocytosis-  Change IV levo/flagyl to IV zosyn  NPO at midnight for potential CT in am (pending inc leukocytosis in am)  Held AM dose of lovenox for CT and results- will need to restart immediately      Rechrafael Cherry NP

## 2021-02-18 NOTE — PROGRESS NOTES
Some reflux, otherwise nothing new. Vitals stable, no tachycardia. Abdomen soft. Minimal serosang drain output. POD 6 Appendectomy, treating for PE. Pathology c/w perforation. Continue Lovenox, hold on transition until WBC is like in AM.  Continue IV antibiotics. Will see what WBC looks like. May get CT in AM if WBC are high. Diet as tolerated. Work with PT. Off oxygen now.      Irving Alford MD

## 2021-02-18 NOTE — PROGRESS NOTES
[facility] [address]  [address] [facility], [address] [place] [address]  [phone]      Medical Progress Note      NAME: Pola Edmonds   :  1960  MRM:  [id number]    Date/Time of service: 2021  10:57 AM       Subjective:     Chief Complaint:  Patient was personally seen and examined by me during this time period. Chart reviewed. C/o weakness, constipation. Had heart burn symptoms overnight        Objective:       Vitals:       Last 24hrs VS reviewed since prior progress note. Most recent are:    Visit Vitals  /64 (BP 1 Location: Left upper arm, BP Patient Position: At rest)   Pulse 82   Temp 98.4 °F (36.9 °C)   Resp 18   Ht 5' 9\" (1.753 m)   Wt 107 kg (235 lb 14.3 oz)   SpO2 90%   BMI 34.84 kg/m²     SpO2 Readings from Last 6 Encounters:   21 90%   17 95%   06/15/14 98%    O2 Flow Rate (L/min): 1 l/min       Intake/Output Summary (Last 24 hours) at 2021 1057  Last data filed at 2021 1052  Gross per 24 hour   Intake 1458.33 ml   Output 650 ml   Net 808.33 ml        Exam:     Physical Exam:    Gen:  Well-developed, well-nourished, morbidly obese, in no acute distress  HEENT:  Pink conjunctivae, PERRL, hearing intact to voice, moist mucous membranes  Neck:  Supple, without masses, thyroid non-tender  Resp:  No accessory muscle use, scant crackles at bases  Card:  No murmurs, normal S1, S2 without thrills, bruits or peripheral edema  Abd:  Soft, mild generalized pain, non-distended, normoactive bowel sounds are present.   Surgical dressing c/d/i  Musc:  No cyanosis or clubbing  Skin:  No rashes  Neuro:  Cranial nerves 3-12 are grossly intact, follows commands appropriately  Psych:  poor insight, oriented to person, place and time, alert    Medications Reviewed: (see below)    Lab Data Reviewed: (see below)    ______________________________________________________________________    Medications:     Current Facility-Administered Medications   Medication Dose Route Frequency    senna-docusate (PERICOLACE) 8.6-50 mg per tablet 1 Tab  1 Tab Oral BID    HYDROmorphone (DILAUDID) injection 0.5 mg  0.5 mg IntraVENous Q4H PRN    famotidine (PEPCID) tablet 20 mg  20 mg Oral ACB&D    bisacodyL (DULCOLAX) suppository 10 mg  10 mg Rectal DAILY PRN    alum-mag hydroxide-simeth (MYLANTA) oral suspension 30 mL  30 mL Oral Q4H PRN    enoxaparin (LOVENOX) injection 105 mg  105 mg SubCUTAneous Q12H    0.9% sodium chloride infusion  50 mL/hr IntraVENous CONTINUOUS    oxyCODONE IR (ROXICODONE) tablet 5 mg  5 mg Oral Q4H PRN    albuterol-ipratropium (DUO-NEB) 2.5 MG-0.5 MG/3 ML  3 mL Nebulization Q4H PRN    ondansetron (ZOFRAN) injection 4 mg  4 mg IntraVENous Q6H PRN    levoFLOXacin (LEVAQUIN) 500 mg in D5W IVPB  500 mg IntraVENous Q24H    metroNIDAZOLE (FLAGYL) IVPB premix 500 mg  500 mg IntraVENous Q12H    acetaminophen (TYLENOL) tablet 1,000 mg  1,000 mg Oral Q8H    diphenhydrAMINE (BENADRYL) capsule 25 mg  25 mg Oral Q6H PRN          Lab Review:     Recent Labs     02/18/21  0148 02/17/21  0600 02/16/21  0007   WBC 15.5* 11.5* 11.9*   HGB 8.9* 8.7* 8.5*   HCT 27.2* 26.8* 25.5*    211 174     Recent Labs     02/17/21  0600 02/16/21  0030 02/16/21  0007     --  137   K 3.7  --  3.7     --  107   CO2 29  --  26     --  97   BUN 16  --  15   CREA 0.88  --  0.94   CA 7.8*  --  7.5*   MG 2.5*  --  2.1   PHOS 3.4  --  2.3*   INR  --  1.1  --      Lab Results   Component Value Date/Time    Glucose (POC) 120 (H) 06/14/2014 09:32 AM          Assessment / Plan:     61 yo otherwise healthy, presented w/ a perforated appendicitis s/p cecectomy 02/13. Complicated by BERONICA, hypoxia, acute bilateral PEs. Medicine is following as a consultant    1) Acute bilateral PEs/Hypoxia/dyspnea: Chest CT on 02/15 confirmed bilateral PEs, likely due to prior hospitalization. LE dopplers neg.   Cont Lovenox, transition to Eliquis on discharge (Give 10mg bid x7 days, then 5mg bid).  Treat for 3-6 months. Pulm was following. No need for home O2    2) BERONICA: likely pre-renal.  Resolved with IVF    3) Perforated appendicitis: s/p cecectomy 02/13. Management per Gen surg    4) Acute blood loss anemia/Fe def anemia: due to surgery. S/p IV iron. Cont to monitor Hgb, transfuse prn     5) GERD: start famotidine, mylanta prn    6) Constipation: start senna/docusate    **Patient is medically stable. Will sign off.   Thank you for consult**    Total time spent with patient: 22 Minutes **I personally saw and examined the patient during this time period**                 Care Plan discussed with: Patient, nursing    Discussed:  Care Plan    Prophylaxis:  Lovenox    Disposition:  per primary team           ___________________________________________________    Attending Physician: Ermias Cantor MD

## 2021-02-18 NOTE — PROGRESS NOTES
Bedside shift change report given to [facility] [place] (oncoming nurse) by Berenice Moscoso RN and Blanca Sheets RN (offgoing nurse). Report included the following information SBAR, Kardex, Intake/Output and Recent Results.

## 2021-02-18 NOTE — PROGRESS NOTES
RT evaluation for home oxygen need was completed. His resting room air O2 saturation was 94% with heart rate of 77 bpm.  While ambulating on room air his lowest O2 saturation was 92%. No supplemental oxygen was indicated.

## 2021-02-18 NOTE — PROGRESS NOTES
Problem: Falls - Risk of  Goal: *Absence of Falls  Description: Document Green Salvia Fall Risk and appropriate interventions in the flowsheet.   Outcome: Progressing Towards Goal  Note: Fall Risk Interventions:            Medication Interventions: Assess postural VS orthostatic hypotension, Bed/chair exit alarm, Patient to call before getting OOB, Evaluate medications/consider consulting pharmacy, Teach patient to arise slowly, Utilize gait belt for transfers/ambulation    Elimination Interventions: Bed/chair exit alarm, Call light in reach, Patient to call for help with toileting needs, Toilet paper/wipes in reach, Toileting schedule/hourly rounds, Urinal in reach              Problem: Patient Education: Go to Patient Education Activity  Goal: Patient/Family Education  Outcome: Progressing Towards Goal     Problem: Patient Education: Go to Patient Education Activity  Goal: Patient/Family Education  Outcome: Progressing Towards Goal

## 2021-02-18 NOTE — PROGRESS NOTES
Bedside and Verbal shift change report given to KAREN Nicolas (oncoming nurse) by Dory Britt (offgoing nurse). Report included the following information SBAR, Kardex, Intake/Output, MAR, Recent Results and Med Rec Status.

## 2021-02-19 ENCOUNTER — ANESTHESIA (OUTPATIENT)
Dept: SURGERY | Age: 61
DRG: 853 | End: 2021-02-19

## 2021-02-19 ENCOUNTER — APPOINTMENT (OUTPATIENT)
Dept: CT IMAGING | Age: 61
DRG: 853 | End: 2021-02-19
Attending: SURGERY

## 2021-02-19 ENCOUNTER — ANESTHESIA EVENT (OUTPATIENT)
Dept: SURGERY | Age: 61
DRG: 853 | End: 2021-02-19

## 2021-02-19 LAB
ABO + RH BLD: NORMAL
ALBUMIN SERPL-MCNC: 2.1 G/DL (ref 3.5–5)
ALBUMIN/GLOB SERPL: 0.6 {RATIO} (ref 1.1–2.2)
ALP SERPL-CCNC: 68 U/L (ref 45–117)
ALT SERPL-CCNC: 34 U/L (ref 12–78)
ANION GAP SERPL CALC-SCNC: 4 MMOL/L (ref 5–15)
AST SERPL-CCNC: 31 U/L (ref 15–37)
BASOPHILS # BLD: 0.1 K/UL (ref 0–0.1)
BASOPHILS NFR BLD: 0 % (ref 0–1)
BILIRUB SERPL-MCNC: 0.7 MG/DL (ref 0.2–1)
BLD PROD TYP BPU: NORMAL
BLD PROD TYP BPU: NORMAL
BLOOD GROUP ANTIBODIES SERPL: NORMAL
BPU ID: NORMAL
BPU ID: NORMAL
BUN SERPL-MCNC: 20 MG/DL (ref 6–20)
BUN/CREAT SERPL: 22 (ref 12–20)
CALCIUM SERPL-MCNC: 7.7 MG/DL (ref 8.5–10.1)
CHLORIDE SERPL-SCNC: 107 MMOL/L (ref 97–108)
CO2 SERPL-SCNC: 28 MMOL/L (ref 21–32)
CREAT SERPL-MCNC: 0.92 MG/DL (ref 0.7–1.3)
CROSSMATCH RESULT,%XM: NORMAL
CROSSMATCH RESULT,%XM: NORMAL
DIFFERENTIAL METHOD BLD: ABNORMAL
EOSINOPHIL # BLD: 0.2 K/UL (ref 0–0.4)
EOSINOPHIL NFR BLD: 1 % (ref 0–7)
ERYTHROCYTE [DISTWIDTH] IN BLOOD BY AUTOMATED COUNT: 13.8 % (ref 11.5–14.5)
GLOBULIN SER CALC-MCNC: 3.5 G/DL (ref 2–4)
GLUCOSE SERPL-MCNC: 96 MG/DL (ref 65–100)
HCT VFR BLD AUTO: 27.7 % (ref 36.6–50.3)
HGB BLD-MCNC: 9 G/DL (ref 12.1–17)
IMM GRANULOCYTES # BLD AUTO: 0.3 K/UL (ref 0–0.04)
IMM GRANULOCYTES NFR BLD AUTO: 2 % (ref 0–0.5)
LYMPHOCYTES # BLD: 2.9 K/UL (ref 0.8–3.5)
LYMPHOCYTES NFR BLD: 17 % (ref 12–49)
MCH RBC QN AUTO: 30.3 PG (ref 26–34)
MCHC RBC AUTO-ENTMCNC: 32.5 G/DL (ref 30–36.5)
MCV RBC AUTO: 93.3 FL (ref 80–99)
MONOCYTES # BLD: 1.2 K/UL (ref 0–1)
MONOCYTES NFR BLD: 7 % (ref 5–13)
NEUTS SEG # BLD: 12.2 K/UL (ref 1.8–8)
NEUTS SEG NFR BLD: 73 % (ref 32–75)
NRBC # BLD: 0 K/UL (ref 0–0.01)
NRBC BLD-RTO: 0 PER 100 WBC
PLATELET # BLD AUTO: 247 K/UL (ref 150–400)
PMV BLD AUTO: 9.3 FL (ref 8.9–12.9)
POTASSIUM SERPL-SCNC: 3.5 MMOL/L (ref 3.5–5.1)
PROT SERPL-MCNC: 5.6 G/DL (ref 6.4–8.2)
RBC # BLD AUTO: 2.97 M/UL (ref 4.1–5.7)
SODIUM SERPL-SCNC: 139 MMOL/L (ref 136–145)
SPECIMEN EXP DATE BLD: NORMAL
STATUS OF UNIT,%ST: NORMAL
STATUS OF UNIT,%ST: NORMAL
UNIT DIVISION, %UDIV: 0
UNIT DIVISION, %UDIV: 0
WBC # BLD AUTO: 16.9 K/UL (ref 4.1–11.1)

## 2021-02-19 PROCEDURE — 74011000250 HC RX REV CODE- 250: Performed by: SURGERY

## 2021-02-19 PROCEDURE — 77010033678 HC OXYGEN DAILY

## 2021-02-19 PROCEDURE — 77030008684 HC TU ET CUF COVD -B: Performed by: NURSE ANESTHETIST, CERTIFIED REGISTERED

## 2021-02-19 PROCEDURE — 36415 COLL VENOUS BLD VENIPUNCTURE: CPT

## 2021-02-19 PROCEDURE — 77030012770 HC TRCR OPT FX AMR -B: Performed by: SURGERY

## 2021-02-19 PROCEDURE — 74011250636 HC RX REV CODE- 250/636: Performed by: NURSE ANESTHETIST, CERTIFIED REGISTERED

## 2021-02-19 PROCEDURE — 65270000029 HC RM PRIVATE

## 2021-02-19 PROCEDURE — 77030005513 HC CATH URETH FOL11 MDII -B: Performed by: SURGERY

## 2021-02-19 PROCEDURE — 2709999900 HC NON-CHARGEABLE SUPPLY: Performed by: SURGERY

## 2021-02-19 PROCEDURE — 80053 COMPREHEN METABOLIC PANEL: CPT

## 2021-02-19 PROCEDURE — 77030013079 HC BLNKT BAIR HGGR 3M -A: Performed by: NURSE ANESTHETIST, CERTIFIED REGISTERED

## 2021-02-19 PROCEDURE — 77030002916 HC SUT ETHLN J&J -A: Performed by: SURGERY

## 2021-02-19 PROCEDURE — 74011250636 HC RX REV CODE- 250/636: Performed by: ANESTHESIOLOGY

## 2021-02-19 PROCEDURE — 74011250636 HC RX REV CODE- 250/636: Performed by: INTERNAL MEDICINE

## 2021-02-19 PROCEDURE — 77030037032 HC INSRT SCIS CLICKLLINE DISP STOR -B: Performed by: SURGERY

## 2021-02-19 PROCEDURE — 77030008608 HC TRCR ENDOSC SMTH AMR -B: Performed by: SURGERY

## 2021-02-19 PROCEDURE — 77030018875 HC APPL CLP LIG4 J&J -B: Performed by: SURGERY

## 2021-02-19 PROCEDURE — 77030020829: Performed by: SURGERY

## 2021-02-19 PROCEDURE — C9290 INJ, BUPIVACAINE LIPOSOME: HCPCS | Performed by: SURGERY

## 2021-02-19 PROCEDURE — 77030028271 HC SRGFL HEMSTAT MTRX KT J&J -C: Performed by: SURGERY

## 2021-02-19 PROCEDURE — 94760 N-INVAS EAR/PLS OXIMETRY 1: CPT

## 2021-02-19 PROCEDURE — 74011000636 HC RX REV CODE- 636: Performed by: RADIOLOGY

## 2021-02-19 PROCEDURE — 77030020263 HC SOL INJ SOD CL0.9% LFCR 1000ML: Performed by: SURGERY

## 2021-02-19 PROCEDURE — 77030018836 HC SOL IRR NACL ICUM -A: Performed by: SURGERY

## 2021-02-19 PROCEDURE — 74011000258 HC RX REV CODE- 258: Performed by: SURGERY

## 2021-02-19 PROCEDURE — 76010000153 HC OR TIME 1.5 TO 2 HR: Performed by: SURGERY

## 2021-02-19 PROCEDURE — 76210000016 HC OR PH I REC 1 TO 1.5 HR: Performed by: SURGERY

## 2021-02-19 PROCEDURE — 97116 GAIT TRAINING THERAPY: CPT

## 2021-02-19 PROCEDURE — 77030031139 HC SUT VCRL2 J&J -A: Performed by: SURGERY

## 2021-02-19 PROCEDURE — 77030002933 HC SUT MCRYL J&J -A: Performed by: SURGERY

## 2021-02-19 PROCEDURE — 77030040504 HC DRN WND MDII -B: Performed by: SURGERY

## 2021-02-19 PROCEDURE — 74011000250 HC RX REV CODE- 250: Performed by: NURSE ANESTHETIST, CERTIFIED REGISTERED

## 2021-02-19 PROCEDURE — 74177 CT ABD & PELVIS W/CONTRAST: CPT

## 2021-02-19 PROCEDURE — 74011250636 HC RX REV CODE- 250/636: Performed by: SURGERY

## 2021-02-19 PROCEDURE — 71275 CT ANGIOGRAPHY CHEST: CPT

## 2021-02-19 PROCEDURE — 74011000258 HC RX REV CODE- 258: Performed by: NURSE PRACTITIONER

## 2021-02-19 PROCEDURE — 74011250637 HC RX REV CODE- 250/637: Performed by: SURGERY

## 2021-02-19 PROCEDURE — 74011250636 HC RX REV CODE- 250/636: Performed by: NURSE PRACTITIONER

## 2021-02-19 PROCEDURE — 77030026438 HC STYL ET INTUB CARD -A: Performed by: NURSE ANESTHETIST, CERTIFIED REGISTERED

## 2021-02-19 PROCEDURE — 97530 THERAPEUTIC ACTIVITIES: CPT

## 2021-02-19 PROCEDURE — 77030008756 HC TU IRR SUC STRY -B: Performed by: SURGERY

## 2021-02-19 PROCEDURE — 76060000034 HC ANESTHESIA 1.5 TO 2 HR: Performed by: SURGERY

## 2021-02-19 PROCEDURE — 77030040506 HC DRN WND MDII -A: Performed by: SURGERY

## 2021-02-19 PROCEDURE — 85025 COMPLETE CBC W/AUTO DIFF WBC: CPT

## 2021-02-19 PROCEDURE — 74011250637 HC RX REV CODE- 250/637: Performed by: INTERNAL MEDICINE

## 2021-02-19 RX ORDER — FENTANYL CITRATE 50 UG/ML
INJECTION, SOLUTION INTRAMUSCULAR; INTRAVENOUS AS NEEDED
Status: DISCONTINUED | OUTPATIENT
Start: 2021-02-19 | End: 2021-02-19 | Stop reason: HOSPADM

## 2021-02-19 RX ORDER — SODIUM CHLORIDE, SODIUM LACTATE, POTASSIUM CHLORIDE, CALCIUM CHLORIDE 600; 310; 30; 20 MG/100ML; MG/100ML; MG/100ML; MG/100ML
INJECTION, SOLUTION INTRAVENOUS
Status: DISCONTINUED | OUTPATIENT
Start: 2021-02-19 | End: 2021-02-19 | Stop reason: HOSPADM

## 2021-02-19 RX ORDER — FLUMAZENIL 0.1 MG/ML
0.2 INJECTION INTRAVENOUS
Status: DISCONTINUED | OUTPATIENT
Start: 2021-02-19 | End: 2021-02-19 | Stop reason: HOSPADM

## 2021-02-19 RX ORDER — PROPOFOL 10 MG/ML
INJECTION, EMULSION INTRAVENOUS AS NEEDED
Status: DISCONTINUED | OUTPATIENT
Start: 2021-02-19 | End: 2021-02-19 | Stop reason: HOSPADM

## 2021-02-19 RX ORDER — HYDROMORPHONE HYDROCHLORIDE 1 MG/ML
.25-1 INJECTION, SOLUTION INTRAMUSCULAR; INTRAVENOUS; SUBCUTANEOUS
Status: DISCONTINUED | OUTPATIENT
Start: 2021-02-19 | End: 2021-02-19 | Stop reason: HOSPADM

## 2021-02-19 RX ORDER — MIDAZOLAM HYDROCHLORIDE 1 MG/ML
INJECTION, SOLUTION INTRAMUSCULAR; INTRAVENOUS AS NEEDED
Status: DISCONTINUED | OUTPATIENT
Start: 2021-02-19 | End: 2021-02-19 | Stop reason: HOSPADM

## 2021-02-19 RX ORDER — SODIUM CHLORIDE, SODIUM LACTATE, POTASSIUM CHLORIDE, CALCIUM CHLORIDE 600; 310; 30; 20 MG/100ML; MG/100ML; MG/100ML; MG/100ML
125 INJECTION, SOLUTION INTRAVENOUS CONTINUOUS
Status: DISCONTINUED | OUTPATIENT
Start: 2021-02-19 | End: 2021-02-19 | Stop reason: HOSPADM

## 2021-02-19 RX ORDER — DIPHENHYDRAMINE HYDROCHLORIDE 50 MG/ML
12.5 INJECTION, SOLUTION INTRAMUSCULAR; INTRAVENOUS AS NEEDED
Status: DISCONTINUED | OUTPATIENT
Start: 2021-02-19 | End: 2021-02-19 | Stop reason: HOSPADM

## 2021-02-19 RX ORDER — SUCCINYLCHOLINE CHLORIDE 20 MG/ML
INJECTION INTRAMUSCULAR; INTRAVENOUS AS NEEDED
Status: DISCONTINUED | OUTPATIENT
Start: 2021-02-19 | End: 2021-02-19 | Stop reason: HOSPADM

## 2021-02-19 RX ORDER — LIDOCAINE HYDROCHLORIDE 10 MG/ML
0.1 INJECTION, SOLUTION EPIDURAL; INFILTRATION; INTRACAUDAL; PERINEURAL AS NEEDED
Status: DISCONTINUED | OUTPATIENT
Start: 2021-02-19 | End: 2021-02-19 | Stop reason: HOSPADM

## 2021-02-19 RX ORDER — NALOXONE HYDROCHLORIDE 0.4 MG/ML
0.2 INJECTION, SOLUTION INTRAMUSCULAR; INTRAVENOUS; SUBCUTANEOUS
Status: DISCONTINUED | OUTPATIENT
Start: 2021-02-19 | End: 2021-02-19 | Stop reason: HOSPADM

## 2021-02-19 RX ORDER — PHENYLEPHRINE HCL IN 0.9% NACL 0.4MG/10ML
SYRINGE (ML) INTRAVENOUS AS NEEDED
Status: DISCONTINUED | OUTPATIENT
Start: 2021-02-19 | End: 2021-02-19 | Stop reason: HOSPADM

## 2021-02-19 RX ORDER — ENOXAPARIN SODIUM 150 MG/ML
105 INJECTION SUBCUTANEOUS EVERY 12 HOURS
Status: DISCONTINUED | OUTPATIENT
Start: 2021-02-19 | End: 2021-02-20

## 2021-02-19 RX ORDER — ONDANSETRON 2 MG/ML
INJECTION INTRAMUSCULAR; INTRAVENOUS AS NEEDED
Status: DISCONTINUED | OUTPATIENT
Start: 2021-02-19 | End: 2021-02-19 | Stop reason: HOSPADM

## 2021-02-19 RX ORDER — ROCURONIUM BROMIDE 10 MG/ML
INJECTION, SOLUTION INTRAVENOUS AS NEEDED
Status: DISCONTINUED | OUTPATIENT
Start: 2021-02-19 | End: 2021-02-19 | Stop reason: HOSPADM

## 2021-02-19 RX ADMIN — Medication 80 MCG: at 18:29

## 2021-02-19 RX ADMIN — FAMOTIDINE 20 MG: 20 TABLET ORAL at 07:01

## 2021-02-19 RX ADMIN — ENOXAPARIN SODIUM 105 MG: 120 INJECTION SUBCUTANEOUS at 22:23

## 2021-02-19 RX ADMIN — MIDAZOLAM HYDROCHLORIDE 2 MG: 2 INJECTION, SOLUTION INTRAMUSCULAR; INTRAVENOUS at 17:14

## 2021-02-19 RX ADMIN — ROCURONIUM BROMIDE 10 MG: 10 INJECTION INTRAVENOUS at 18:14

## 2021-02-19 RX ADMIN — PROPOFOL 200 MG: 10 INJECTION, EMULSION INTRAVENOUS at 17:20

## 2021-02-19 RX ADMIN — IOHEXOL 50 ML: 240 INJECTION, SOLUTION INTRATHECAL; INTRAVASCULAR; INTRAVENOUS; ORAL at 10:07

## 2021-02-19 RX ADMIN — ROCURONIUM BROMIDE 10 MG: 10 INJECTION INTRAVENOUS at 17:20

## 2021-02-19 RX ADMIN — FENTANYL CITRATE 50 MCG: 0.05 INJECTION, SOLUTION INTRAMUSCULAR; INTRAVENOUS at 19:14

## 2021-02-19 RX ADMIN — ROCURONIUM BROMIDE 10 MG: 10 INJECTION INTRAVENOUS at 18:43

## 2021-02-19 RX ADMIN — HYDROMORPHONE HYDROCHLORIDE 0.5 MG: 1 INJECTION, SOLUTION INTRAMUSCULAR; INTRAVENOUS; SUBCUTANEOUS at 19:48

## 2021-02-19 RX ADMIN — PIPERACILLIN AND TAZOBACTAM 3.38 G: 3; .375 INJECTION, POWDER, LYOPHILIZED, FOR SOLUTION INTRAVENOUS at 22:11

## 2021-02-19 RX ADMIN — PIPERACILLIN AND TAZOBACTAM 3.38 G: 3; .375 INJECTION, POWDER, LYOPHILIZED, FOR SOLUTION INTRAVENOUS at 15:42

## 2021-02-19 RX ADMIN — SODIUM CHLORIDE, POTASSIUM CHLORIDE, SODIUM LACTATE AND CALCIUM CHLORIDE: 600; 310; 30; 20 INJECTION, SOLUTION INTRAVENOUS at 17:30

## 2021-02-19 RX ADMIN — IOPAMIDOL 100 ML: 755 INJECTION, SOLUTION INTRAVENOUS at 12:20

## 2021-02-19 RX ADMIN — FENTANYL CITRATE 50 MCG: 0.05 INJECTION, SOLUTION INTRAMUSCULAR; INTRAVENOUS at 17:57

## 2021-02-19 RX ADMIN — SODIUM CHLORIDE 100 ML/HR: 9 INJECTION, SOLUTION INTRAVENOUS at 09:54

## 2021-02-19 RX ADMIN — ROCURONIUM BROMIDE 40 MG: 10 INJECTION INTRAVENOUS at 17:29

## 2021-02-19 RX ADMIN — OXYCODONE 5 MG: 5 TABLET ORAL at 03:43

## 2021-02-19 RX ADMIN — SODIUM CHLORIDE, POTASSIUM CHLORIDE, SODIUM LACTATE AND CALCIUM CHLORIDE: 600; 310; 30; 20 INJECTION, SOLUTION INTRAVENOUS at 17:00

## 2021-02-19 RX ADMIN — SODIUM CHLORIDE, POTASSIUM CHLORIDE, SODIUM LACTATE AND CALCIUM CHLORIDE: 600; 310; 30; 20 INJECTION, SOLUTION INTRAVENOUS at 18:54

## 2021-02-19 RX ADMIN — FENTANYL CITRATE 100 MCG: 0.05 INJECTION, SOLUTION INTRAMUSCULAR; INTRAVENOUS at 17:20

## 2021-02-19 RX ADMIN — DOCUSATE SODIUM 50 MG AND SENNOSIDES 8.6 MG 1 TABLET: 8.6; 5 TABLET, FILM COATED ORAL at 11:49

## 2021-02-19 RX ADMIN — PIPERACILLIN AND TAZOBACTAM 3.38 G: 3; .375 INJECTION, POWDER, LYOPHILIZED, FOR SOLUTION INTRAVENOUS at 07:01

## 2021-02-19 RX ADMIN — SUCCINYLCHOLINE CHLORIDE 100 MG: 20 INJECTION, SOLUTION INTRAMUSCULAR; INTRAVENOUS at 17:20

## 2021-02-19 RX ADMIN — FENTANYL CITRATE 50 MCG: 0.05 INJECTION, SOLUTION INTRAMUSCULAR; INTRAVENOUS at 18:06

## 2021-02-19 RX ADMIN — HYDROMORPHONE HYDROCHLORIDE 0.5 MG: 1 INJECTION, SOLUTION INTRAMUSCULAR; INTRAVENOUS; SUBCUTANEOUS at 22:08

## 2021-02-19 RX ADMIN — ONDANSETRON HYDROCHLORIDE 4 MG: 2 SOLUTION INTRAMUSCULAR; INTRAVENOUS at 18:54

## 2021-02-19 RX ADMIN — SUGAMMADEX 200 MG: 100 INJECTION, SOLUTION INTRAVENOUS at 18:58

## 2021-02-19 RX ADMIN — Medication 1000 MG: at 22:07

## 2021-02-19 RX ADMIN — FENTANYL CITRATE 50 MCG: 0.05 INJECTION, SOLUTION INTRAMUSCULAR; INTRAVENOUS at 17:39

## 2021-02-19 RX ADMIN — DOCUSATE SODIUM 50 MG AND SENNOSIDES 8.6 MG 1 TABLET: 8.6; 5 TABLET, FILM COATED ORAL at 22:07

## 2021-02-19 RX ADMIN — Medication 1000 MG: at 14:00

## 2021-02-19 RX ADMIN — Medication 1000 MG: at 07:01

## 2021-02-19 NOTE — BRIEF OP NOTE
Brief Postoperative Note    Patient: Suri You  YOB: 1960  MRN: [id number]    Date of Procedure: 2/19/2021     Pre-Op Diagnosis: Intra-Abdominal Hematoma    Post-Op Diagnosis: Same as preoperative diagnosis. Procedure(s):  LAPAROSCOPY GENERAL DIAGNOSTIC with evacuation of hematoma    Surgeon(s):  Bibi Springer MD    Surgical Assistant: Surg Asst-1: Rosemarie Caldera LPN    Anesthesia: General     Estimated Blood Loss (mL): less than 50     Complications: None    Specimens: * No specimens in log *     Implants: * No implants in log *    Drains:   Rudene [place] #1 02/19/21 Anterior; Left Abdomen (Active)   Site Assessment Clean; Intact 02/19/21 1846   Dressing Status Clean; Intact 02/19/21 1846   Marshall Drain Airleak No 02/19/21 1846   Status Patent 02/19/21 1846       Marshall Drain #2 02/19/21 Anterior;Mid Abdomen (Active)   Site Assessment Clean; Intact 02/19/21 1845   Dressing Status Clean; Intact 02/19/21 1845   Status Patent 02/19/21 1845       [REMOVED] Eduar-Ellis Drain 02/13/21 Mid Abdomen (Removed)   Site Assessment Clean, dry, & intact 02/19/21 1500   Dressing Status Clean, dry, & intact 02/19/21 1500   Status Patent; Charged;Draining 02/19/21 1500   Drainage Color Sanguinous 02/19/21 1500   Output (ml) 5 ml 02/19/21 0954       Findings: 1 Liter evacuated clot  LLQ drain left at RLQ base, tip left in hematoma cavity  Suprapubic drain goes across the hematoma cavity, tip abuts liver  Electronically Signed by David Callahan MD on 2/19/2021 at 6:54 PM

## 2021-02-19 NOTE — PROGRESS NOTES
Comprehensive Nutrition Assessment    Type and Reason for Visit: Initial, RD nutrition re-screen/LOS    Nutrition Recommendations/Plan:   1. Advance diet back to GI lite diet after testing.    2. Add apple Ensure Clear BID once diet advanced to promote intake.    Nutrition Assessment:      2/19: 61 y/o male admitted with appendicitis w/ perforation. S/p appendectomy 2/13. Pt NPO today for CT. Was on GI lite diet. Says he was eating well PTA and first few days of admission (on liquids), but his appetite has been decreased the past couple days d/t some abd discomfort. No c/o N/V. He denies any recent weight changes. Says he was drinking sips of the Ensure but not much. He would like to try Ensure Clear when diet advanced. Will add and monitor intakes. Labs- mg 2.5, Ca 7.7, B12 141, Hgb 9.0. Meds- zosyn, pericolace.    Intakes:  Patient Vitals for the past 168 hrs:   % Diet Eaten   02/18/21 1237 25 %   02/18/21 0308 0 %   02/17/21 2002 0 %   02/17/21 1410 90 %   02/17/21 0827 85 %   02/15/21 0843 90 %     Weight hx:  Wt Readings from Last 10 Encounters:   02/16/21 107 kg (235 lb 14.3 oz)   03/21/17 99.8 kg (220 lb)   06/14/14 95.3 kg (210 lb)     Malnutrition Assessment:  Malnutrition Status: none identified    Estimated Daily Nutrient Needs:  Energy (kcal): 2437(1875 x 1.3 AF); Weight Used for Energy Requirements: Current  Protein (g): 107(0.8-1 g/kg); Weight Used for Protein Requirements: Current  Fluid (ml/day): 2437; Method Used for Fluid Requirements: 1 ml/kcal      Nutrition Related Findings:  last BM 2/17      Wounds:    Surgical incision       Current Nutrition Therapies:  DIET NPO  DIET NUTRITIONAL SUPPLEMENTS Dinner, Breakfast; Ensure Clear    Anthropometric Measures:  · Height:  5' 9\" (175.3 cm)  · Current Body Wt:  107 kg (235 lb 14.3 oz)   · Admission Body Wt:       · Usual Body Wt:        · Ideal Body Wt:  160 lbs:  147.4 %   · Adjusted Body Weight:   ; Weight Adjustment for: No adjustment   · Adjusted  BMI:       · BMI Category:  Obese class 1 (BMI 30.0-34. 9)       Nutrition Diagnosis:   · Inadequate oral intake related to inadequate protein-energy intake as evidenced by (pt reports poor po intake x2 days, currently NPO for CT)      Nutrition Interventions:   Food and/or Nutrient Delivery: Start oral diet, Start oral nutrition supplement  Nutrition Education and Counseling: No recommendations at this time  Coordination of Nutrition Care: Continue to monitor while inpatient    Goals:  Diet advancement with po intake >50% meals + ONS next 1-3 days       Nutrition Monitoring and Evaluation:   Behavioral-Environmental Outcomes: None identified  Food/Nutrient Intake Outcomes: Food and nutrient intake, Diet advancement/tolerance, Supplement intake  Physical Signs/Symptoms Outcomes: Biochemical data, GI status, Weight    Discharge Planning:    Continue oral nutrition supplement     Electronically signed by Deanna Kuo RDN on 2/19/2021 at 11:31 AM    Contact: [phone]

## 2021-02-19 NOTE — PROGRESS NOTES
Bedside shift change report given to Thaliargénesis Sutherland (oncoming nurse) by Ganesh Torres RN (offgoing nurse). Report included the following information SBAR, MAR and Recent Results.

## 2021-02-19 NOTE — PROGRESS NOTES
Problem: Mobility Impaired (Adult and Pediatric)  Goal: *Acute Goals and Plan of Care (Insert Text)  Description: FUNCTIONAL STATUS PRIOR TO ADMISSION: Patient was independent and active without use of DME. Patient works as a     HOME SUPPORT [address]: The patient lived with his wife but did not require assist.    Physical Therapy Goals  Initiated 2/15/2021  1. Patient will move from supine to sit and sit to supine  in bed with modified independence within 7 day(s). 2.  Patient will transfer from bed to chair and chair to bed with modified independence using the least restrictive device within 7 day(s). 3.  Patient will perform sit to stand with modified independence within 7 day(s). 4.  Patient will ambulate with modified independence for 200 feet with the least restrictive device within 7 day(s). 5.  Patient will ascend/descend 3 stairs with 1 handrail(s) with modified independence within 7 day(s). Note:   PHYSICAL THERAPY TREATMENT  Patient: Lola Bailey (65 y.o. male)  Date: 2/19/2021  Diagnosis: Appendicitis [K37]  Appendicitis with perforation [K35.32] <principal problem not specified>  Procedure(s) (LRB):  APPENDECTOMY LAPAROSCOPIC (N/A) 6 Days Post-Op  Precautions:    Chart, physical therapy assessment, plan of care and goals were reviewed. ASSESSMENT  Patient continues with skilled PT services. Pt seen this AM.Pt supine to sit with CGA to min assist.Pt ambulated 30ft pushing IV pole CGA to min assist.Pt was assisted in restroom before back to bed with CGA. Note that pts SPO2 was 93% on RA with mobility. Pt progressing slowly. Continue goals    Current Level of Function Impacting Discharge (mobility/balance): Pt is CGA to min assist for mobility. PLAN :  Patient continues to benefit from skilled intervention to address the above impairments. Continue treatment per established plan of care. to address goals.     Recommendation for discharge: (in order for the patient to meet his/her long term goals)  Physical therapy at least 2 days/week in the home     This discharge recommendation:  Has been made in collaboration with the attending provider and/or case management    IF patient discharges home will need the following DME: to be determined (TBD)       SUBJECTIVE:       OBJECTIVE DATA SUMMARY:   Critical Behavior:              Functional Mobility Training:  Bed Mobility:     Supine to Sit: Minimum assistance  Sit to Supine: Contact guard assistance           Transfers:  Sit to Stand: Contact guard assistance  Stand to Sit: Contact guard assistance                             Balance:  Sitting: Intact  Standing: With support  Standing - Static: Fair  Ambulation/Gait Training:  Distance (ft): 30 Feet (ft)  Assistive Device: Gait belt;Walker, rolling  Ambulation - Level of Assistance: Contact guard assistance;Minimal assistance        Gait Abnormalities: Decreased step clearance        Base of Support: Narrowed     Speed/Ludmila: Pace decreased (<100 feet/min)  Step Length: Right shortened;Left shortened                 Activity Tolerance:   Fair    After treatment patient left in no apparent distress:   Supine in bed    COMMUNICATION/COLLABORATION:   The patients plan of care was discussed with: Physical therapist.     Sanchez Livingston PTA   Time Calculation: 23 mins

## 2021-02-19 NOTE — PROGRESS NOTES
WBC 16.9 (from 15.5 < -- 1.5), despite escalation of antibiotics. Planned CT scan ordered. CT done today reveals interval abscess, likely sequelae of perforated appendicitis. Pulmonary embolus burden improved. Drain tube left from first surgery is not evacuating the abscess/fluid collection. I reviewed the images with on call radiologist. Appears complex and unlikely to effectively evacuate area with a percutaneous drain. All questions answered.      Maya Nyhan, MD

## 2021-02-19 NOTE — PROGRESS NOTES
Transition of Care Plan:   RUR-12%                  1). PT consult--may need home health-agency list given to pt-- CM spoke to pt, he feels he could do his own exercises at home- he does not have insurance and does not want to go to outpatient PT- feels he will not need it. Will need to discuss further with pt  2). Outpatient follow up- needs a PCP-has a list  3). Spouse will transport at dc  4). May need to go back to surgery per nursing  5).  May need O2 eval for home oxygen- priced private pay home O2- $120/month through [facility].  6). CM following  JAMARCUS Davis

## 2021-02-19 NOTE — PROGRESS NOTES
Bedside and Verbal shift change report given to Maritza Heart RN (oncoming nurse) by Jaimie Alonzo RN (offgoing nurse). Report included the following information SBAR, Kardex, Intake/Output, MAR, Accordion, Recent Results and Med Rec Status.

## 2021-02-19 NOTE — ANESTHESIA PREPROCEDURE EVALUATION
Relevant Problems   No relevant active problems       Anesthetic History   No history of anesthetic complications            Review of Systems / Medical History  Patient summary reviewed, nursing notes reviewed and pertinent labs reviewed    Pulmonary  Within defined limits                 Neuro/Psych   Within defined limits           Cardiovascular  Within defined limits                Exercise tolerance: >4 METS  Comments: Hx bradycardia   GI/Hepatic/Renal  Within defined limits              Endo/Other        Obesity     Other Findings   Comments: Ruptured appy 2/13, diagnosed with multiple small PE's after. On lovenox now but still with oxygen requirement.  COVID exposure in December but no symptoms/positive test           Physical Exam    Airway  Mallampati: II    Neck ROM: normal range of motion   Mouth opening: Normal     Cardiovascular  Regular rate and rhythm,  S1 and S2 normal,  no murmur, click, rub, or gallop  Rhythm: regular  Rate: normal         Dental  No notable dental hx       Pulmonary  Breath sounds clear to auscultation               Abdominal  GI exam deferred       Other Findings            Anesthetic Plan    ASA: 2  Anesthesia type: general          Induction: Intravenous  Anesthetic plan and risks discussed with: Patient

## 2021-02-20 LAB
ALBUMIN SERPL-MCNC: 2 G/DL (ref 3.5–5)
ALBUMIN/GLOB SERPL: 0.5 {RATIO} (ref 1.1–2.2)
ALP SERPL-CCNC: 69 U/L (ref 45–117)
ALT SERPL-CCNC: 33 U/L (ref 12–78)
ANION GAP SERPL CALC-SCNC: 7 MMOL/L (ref 5–15)
APTT PPP: 27.3 SEC (ref 22.1–31)
AST SERPL-CCNC: 26 U/L (ref 15–37)
BASOPHILS # BLD: 0 K/UL (ref 0–0.1)
BASOPHILS # BLD: 0 K/UL (ref 0–0.1)
BASOPHILS NFR BLD: 0 % (ref 0–1)
BASOPHILS NFR BLD: 0 % (ref 0–1)
BILIRUB SERPL-MCNC: 0.8 MG/DL (ref 0.2–1)
BUN SERPL-MCNC: 19 MG/DL (ref 6–20)
BUN/CREAT SERPL: 17 (ref 12–20)
CALCIUM SERPL-MCNC: 7.4 MG/DL (ref 8.5–10.1)
CHLORIDE SERPL-SCNC: 106 MMOL/L (ref 97–108)
CO2 SERPL-SCNC: 27 MMOL/L (ref 21–32)
CREAT SERPL-MCNC: 1.09 MG/DL (ref 0.7–1.3)
DIFFERENTIAL METHOD BLD: ABNORMAL
DIFFERENTIAL METHOD BLD: ABNORMAL
EOSINOPHIL # BLD: 0 K/UL (ref 0–0.4)
EOSINOPHIL # BLD: 0 K/UL (ref 0–0.4)
EOSINOPHIL NFR BLD: 0 % (ref 0–7)
EOSINOPHIL NFR BLD: 0 % (ref 0–7)
ERYTHROCYTE [DISTWIDTH] IN BLOOD BY AUTOMATED COUNT: 13.9 % (ref 11.5–14.5)
ERYTHROCYTE [DISTWIDTH] IN BLOOD BY AUTOMATED COUNT: 14.1 % (ref 11.5–14.5)
GLOBULIN SER CALC-MCNC: 3.7 G/DL (ref 2–4)
GLUCOSE SERPL-MCNC: 113 MG/DL (ref 65–100)
HCT VFR BLD AUTO: 28.8 % (ref 36.6–50.3)
HCT VFR BLD AUTO: 30.2 % (ref 36.6–50.3)
HGB BLD-MCNC: 9.2 G/DL (ref 12.1–17)
HGB BLD-MCNC: 9.8 G/DL (ref 12.1–17)
IMM GRANULOCYTES # BLD AUTO: 0 K/UL
IMM GRANULOCYTES # BLD AUTO: 0 K/UL
IMM GRANULOCYTES NFR BLD AUTO: 0 %
IMM GRANULOCYTES NFR BLD AUTO: 0 %
LYMPHOCYTES # BLD: 1.7 K/UL (ref 0.8–3.5)
LYMPHOCYTES # BLD: 2 K/UL (ref 0.8–3.5)
LYMPHOCYTES NFR BLD: 10 % (ref 12–49)
LYMPHOCYTES NFR BLD: 11 % (ref 12–49)
MCH RBC QN AUTO: 30.5 PG (ref 26–34)
MCH RBC QN AUTO: 30.5 PG (ref 26–34)
MCHC RBC AUTO-ENTMCNC: 31.9 G/DL (ref 30–36.5)
MCHC RBC AUTO-ENTMCNC: 32.5 G/DL (ref 30–36.5)
MCV RBC AUTO: 94.1 FL (ref 80–99)
MCV RBC AUTO: 95.4 FL (ref 80–99)
METAMYELOCYTES NFR BLD MANUAL: 1 %
MONOCYTES # BLD: 1 K/UL (ref 0–1)
MONOCYTES # BLD: 1.5 K/UL (ref 0–1)
MONOCYTES NFR BLD: 6 % (ref 5–13)
MONOCYTES NFR BLD: 8 % (ref 5–13)
NEUTS BAND NFR BLD MANUAL: 6 % (ref 0–6)
NEUTS SEG # BLD: 14.4 K/UL (ref 1.8–8)
NEUTS SEG # BLD: 14.9 K/UL (ref 1.8–8)
NEUTS SEG NFR BLD: 75 % (ref 32–75)
NEUTS SEG NFR BLD: 83 % (ref 32–75)
NRBC # BLD: 0 K/UL (ref 0–0.01)
NRBC # BLD: 0 K/UL (ref 0–0.01)
NRBC BLD-RTO: 0 PER 100 WBC
NRBC BLD-RTO: 0 PER 100 WBC
PLATELET # BLD AUTO: 305 K/UL (ref 150–400)
PLATELET # BLD AUTO: 323 K/UL (ref 150–400)
PMV BLD AUTO: 9.4 FL (ref 8.9–12.9)
PMV BLD AUTO: 9.4 FL (ref 8.9–12.9)
POTASSIUM SERPL-SCNC: 3.8 MMOL/L (ref 3.5–5.1)
PROT SERPL-MCNC: 5.7 G/DL (ref 6.4–8.2)
RBC # BLD AUTO: 3.02 M/UL (ref 4.1–5.7)
RBC # BLD AUTO: 3.21 M/UL (ref 4.1–5.7)
RBC MORPH BLD: ABNORMAL
RBC MORPH BLD: ABNORMAL
SODIUM SERPL-SCNC: 140 MMOL/L (ref 136–145)
THERAPEUTIC RANGE,PTTT: NORMAL SECS (ref 58–77)
WBC # BLD AUTO: 17.3 K/UL (ref 4.1–11.1)
WBC # BLD AUTO: 18.4 K/UL (ref 4.1–11.1)

## 2021-02-20 PROCEDURE — 77010033678 HC OXYGEN DAILY

## 2021-02-20 PROCEDURE — 74011250637 HC RX REV CODE- 250/637: Performed by: SURGERY

## 2021-02-20 PROCEDURE — 74011250636 HC RX REV CODE- 250/636: Performed by: SURGERY

## 2021-02-20 PROCEDURE — 85730 THROMBOPLASTIN TIME PARTIAL: CPT

## 2021-02-20 PROCEDURE — 74011000258 HC RX REV CODE- 258: Performed by: SURGERY

## 2021-02-20 PROCEDURE — 80053 COMPREHEN METABOLIC PANEL: CPT

## 2021-02-20 PROCEDURE — 94760 N-INVAS EAR/PLS OXIMETRY 1: CPT

## 2021-02-20 PROCEDURE — 85025 COMPLETE CBC W/AUTO DIFF WBC: CPT

## 2021-02-20 PROCEDURE — 65270000029 HC RM PRIVATE

## 2021-02-20 PROCEDURE — 0W9H40Z DRAINAGE OF RETROPERITONEUM WITH DRAINAGE DEVICE, PERCUTANEOUS ENDOSCOPIC APPROACH: ICD-10-PCS | Performed by: SURGERY

## 2021-02-20 PROCEDURE — 36415 COLL VENOUS BLD VENIPUNCTURE: CPT

## 2021-02-20 PROCEDURE — 74011250636 HC RX REV CODE- 250/636: Performed by: INTERNAL MEDICINE

## 2021-02-20 RX ORDER — OXYCODONE HYDROCHLORIDE 5 MG/1
15 TABLET ORAL
Status: DISCONTINUED | OUTPATIENT
Start: 2021-02-20 | End: 2021-03-05 | Stop reason: HOSPADM

## 2021-02-20 RX ORDER — HYDROMORPHONE HYDROCHLORIDE 1 MG/ML
1 INJECTION, SOLUTION INTRAMUSCULAR; INTRAVENOUS; SUBCUTANEOUS
Status: DISCONTINUED | OUTPATIENT
Start: 2021-02-20 | End: 2021-03-05 | Stop reason: HOSPADM

## 2021-02-20 RX ORDER — OXYCODONE HYDROCHLORIDE 5 MG/1
10 TABLET ORAL
Status: DISCONTINUED | OUTPATIENT
Start: 2021-02-20 | End: 2021-03-05 | Stop reason: HOSPADM

## 2021-02-20 RX ORDER — HEPARIN SODIUM 10000 [USP'U]/100ML
14-36 INJECTION, SOLUTION INTRAVENOUS
Status: DISCONTINUED | OUTPATIENT
Start: 2021-02-20 | End: 2021-02-21

## 2021-02-20 RX ADMIN — OXYCODONE 5 MG: 5 TABLET ORAL at 11:11

## 2021-02-20 RX ADMIN — SODIUM CHLORIDE 100 ML/HR: 9 INJECTION, SOLUTION INTRAVENOUS at 11:11

## 2021-02-20 RX ADMIN — PIPERACILLIN AND TAZOBACTAM 3.38 G: 3; .375 INJECTION, POWDER, LYOPHILIZED, FOR SOLUTION INTRAVENOUS at 10:02

## 2021-02-20 RX ADMIN — FAMOTIDINE 20 MG: 20 TABLET ORAL at 06:38

## 2021-02-20 RX ADMIN — OXYCODONE 5 MG: 5 TABLET ORAL at 06:38

## 2021-02-20 RX ADMIN — OXYCODONE 5 MG: 5 TABLET ORAL at 16:08

## 2021-02-20 RX ADMIN — FAMOTIDINE 20 MG: 20 TABLET ORAL at 18:17

## 2021-02-20 RX ADMIN — DOCUSATE SODIUM 50 MG AND SENNOSIDES 8.6 MG 1 TABLET: 8.6; 5 TABLET, FILM COATED ORAL at 18:17

## 2021-02-20 RX ADMIN — HYDROMORPHONE HYDROCHLORIDE 0.5 MG: 1 INJECTION, SOLUTION INTRAMUSCULAR; INTRAVENOUS; SUBCUTANEOUS at 02:57

## 2021-02-20 RX ADMIN — DOCUSATE SODIUM 50 MG AND SENNOSIDES 8.6 MG 1 TABLET: 8.6; 5 TABLET, FILM COATED ORAL at 10:02

## 2021-02-20 RX ADMIN — Medication 1000 MG: at 20:03

## 2021-02-20 RX ADMIN — Medication 1000 MG: at 14:51

## 2021-02-20 RX ADMIN — Medication 1000 MG: at 06:38

## 2021-02-20 RX ADMIN — OXYCODONE 5 MG: 5 TABLET ORAL at 00:59

## 2021-02-20 RX ADMIN — HYDROMORPHONE HYDROCHLORIDE 1 MG: 1 INJECTION, SOLUTION INTRAMUSCULAR; INTRAVENOUS; SUBCUTANEOUS at 20:03

## 2021-02-20 RX ADMIN — HEPARIN SODIUM 15 UNITS/KG/HR: 10000 INJECTION, SOLUTION INTRAVENOUS at 19:17

## 2021-02-20 RX ADMIN — PIPERACILLIN AND TAZOBACTAM 3.38 G: 3; .375 INJECTION, POWDER, LYOPHILIZED, FOR SOLUTION INTRAVENOUS at 17:12

## 2021-02-20 RX ADMIN — SODIUM CHLORIDE 100 ML/HR: 9 INJECTION, SOLUTION INTRAVENOUS at 22:29

## 2021-02-20 NOTE — PROGRESS NOTES
PHYSICAL THERAPY:Pt had a surgical procedure yesterday evening and will need a re-assessment by PT. PT will continue to follow.

## 2021-02-20 NOTE — PROGRESS NOTES
General Surgery Daily Progress Note    Patient: Radha Key MRN: [id number]  SSN: [id number]    YOB: 1960  Age: 62 y.o. Sex: male      Admit Date: 2/13/2021    POD 1 Day Post-Op    Procedure: Procedure(s):  LAPAROSCOPY GENERAL DIAGNOSTIC with evacuation of hematoma    Subjective: Shakira sips of water, not much appetite  Denies flatus  Pain controlled    Current Facility-Administered Medications   Medication Dose Route Frequency    HYDROmorphone (PF) (DILAUDID) injection 1 mg  1 mg IntraVENous Q4H PRN    oxyCODONE IR (ROXICODONE) tablet 10 mg  10 mg Oral Q4H PRN    Or    oxyCODONE IR (ROXICODONE) tablet 15 mg  15 mg Oral Q4H PRN    senna-docusate (PERICOLACE) 8.6-50 mg per tablet 1 Tab  1 Tab Oral BID    famotidine (PEPCID) tablet 20 mg  20 mg Oral ACB&D    bisacodyL (DULCOLAX) suppository 10 mg  10 mg Rectal DAILY PRN    alum-mag hydroxide-simeth (MYLANTA) oral suspension 30 mL  30 mL Oral Q4H PRN    piperacillin-tazobactam (ZOSYN) 3.375 g in 0.9% sodium chloride (MBP/ADV) 100 mL MBP  3.375 g IntraVENous Q8H    0.9% sodium chloride infusion  100 mL/hr IntraVENous CONTINUOUS    oxyCODONE IR (ROXICODONE) tablet 5 mg  5 mg Oral Q4H PRN    albuterol-ipratropium (DUO-NEB) 2.5 MG-0.5 MG/3 ML  3 mL Nebulization Q4H PRN    ondansetron (ZOFRAN) injection 4 mg  4 mg IntraVENous Q6H PRN    acetaminophen (TYLENOL) tablet 1,000 mg  1,000 mg Oral Q8H    diphenhydrAMINE (BENADRYL) capsule 25 mg  25 mg Oral Q6H PRN        Objective:   No intake/output data recorded. 02/18 1901 - 02/20 0700  In: 2926.7 [P.O.:115; I.V.:2811.7]  Out: 2568 [XRHKU:9455; Drains:430]  Patient Vitals for the past 8 hrs:   BP Temp Pulse Resp SpO2   02/20/21 1149 98/64 99 °F (37.2 °C) 91 20 95 %   02/20/21 0812 111/75 97.9 °F (36.6 °C) 89 18 96 %       Physical Exam:  General: Alert, cooperative, NAD  Lungs: Unlabored  Abdomen: Soft, appropriately tender RLQ, distended. Incisions C/D/I.   ALMA ROSA with serosanguinous fluid  Extremities: Warm, moves all, no edema. VENODYNES  WERE  NOT ON PATIENT.  (HE HAS  PE!!!)  Skin:  Warm and dry, no rash    Labs:   Recent Labs     02/20/21  0248   WBC 18.4*   HGB 9.8*   HCT 30.2*        Recent Labs     02/20/21  0248      K 3.8      CO2 27   *   BUN 19   CREA 1.09   CA 7.4*   ALB 2.0*   TBILI 0.8   ALT 33       Assessment / Plan:     POD 1 Day Post-Op    Procedure: Procedure(s):  LAPAROSCOPY GENERAL DIAGNOSTIC with evacuation of hematoma  Continue current diet  Explained he is risk for an ileus  I placed venodynes on the patient as he did not have them on. Wife expressed concern over the fact that nursing had not come in to put them on.   He has PEs bilaterally  WIll get CBC soon, ordered for pm  If hgb stable then heparin gtt to start at [address], MD

## 2021-02-20 NOTE — PERIOP NOTES
Patient voided 450 mls of dorita colored urine, receiving nurse informed. No need for catheterization at this time.

## 2021-02-20 NOTE — PERIOP NOTES
TRANSFER - OUT REPORT:    Verbal report given to RN Lupillo Barnard on Lyly Soto  being transferred to [address] [place] [address] for routine post - op       Report consisted of patients Situation, Background, Assessment and   Recommendations(SBAR). Information from the following report(s) SBAR, OR Summary, Intake/Output, MAR and Cardiac Rhythm nsr was reviewed with the receiving nurse. Lines:   Peripheral IV 02/13/21 Left Antecubital (Active)   Site Assessment Clean, dry, & intact 02/19/21 1926   Phlebitis Assessment 0 02/19/21 1926   Infiltration Assessment 0 02/19/21 1926   Dressing Status Clean, dry, & intact 02/19/21 1926   Dressing Type Tape;Transparent 02/19/21 1926   Hub Color/Line Status Pink; Infusing 02/19/21 1926   Action Taken Open ports on tubing capped 02/19/21 1500   Alcohol Cap Used Yes 02/19/21 1500       Peripheral IV Right Forearm (Active)   Site Assessment Clean, dry, & intact 02/19/21 1500   Phlebitis Assessment 0 02/19/21 1500   Infiltration Assessment 0 02/19/21 1500   Dressing Status Clean, dry, & intact 02/19/21 1500   Dressing Type Tape;Transparent 02/19/21 1500   Hub Color/Line Status Pink; Infusing 02/19/21 1500   Action Taken Open ports on tubing capped 02/19/21 1500   Alcohol Cap Used Yes 02/19/21 1500       Peripheral IV Right Hand (Active)   Site Assessment Clean, dry, & intact 02/19/21 1926   Phlebitis Assessment 0 02/19/21 1926   Infiltration Assessment 0 02/19/21 1926   Dressing Status Clean, dry, & intact; Occlusive 02/19/21 1926   Dressing Type Tape;Transparent 02/19/21 1926   Hub Color/Line Status Pink;Capped 02/19/21 1926        Opportunity for questions and clarification was provided.       Patient transported with:   O2 @ 2 liters  Registered Nurse

## 2021-02-20 NOTE — PERIOP NOTES
PATIENT EXPRESSED DESIRE TO VOID, OFFERED URINAL X2 BUT WITHOUT SUCCESS , ON PALPATIONN OF LOWER ABDOMEN, SAME IS TENSED AND TENDER, URO SCAN DONE  MLS OF URINE IDENTIFIED, WILL WITH HOLD CATHETERRIZATION NOW AND WILL PROCEED LATER UNLESS PATIENT VOIDS OR REPEAT UROSCAN IS GREATER THAN 400MLS, DR. AG HAS BEEN  INFORMED, RECEIVING NURSE KAREN Lu informed.

## 2021-02-20 NOTE — ANESTHESIA POSTPROCEDURE EVALUATION
Procedure(s):  LAPAROSCOPY GENERAL DIAGNOSTIC with evacuation of hematoma. general    Anesthesia Post Evaluation      Multimodal analgesia: multimodal analgesia not used between 6 hours prior to anesthesia start to PACU discharge  Patient location during evaluation: PACU  Patient participation: complete - patient participated  Level of consciousness: sleepy but conscious and responsive to verbal stimuli  Pain score: 0  Pain management: adequate  Airway patency: patent  Anesthetic complications: no  Cardiovascular status: hemodynamically stable and acceptable  Respiratory status: acceptable  Hydration status: acceptable  Comments: Patient seen and evaluated; no complaints from patient. Post anesthesia nausea and vomiting:  none      INITIAL Post-op Vital signs:   Vitals Value Taken Time   /64 02/19/21 2010   Temp 36.9 °C (98.4 °F) 02/19/21 1926   Pulse 100 02/19/21 2012   Resp 24 02/19/21 2012   SpO2 99 % 02/19/21 2012   Vitals shown include unvalidated device data.

## 2021-02-20 NOTE — OP NOTES
[facility]  OPERATIVE REPORT    Name:  Taya Dodge  MR#:  [id number]  :  1960  ACCOUNT #:  [de-identified]  DATE OF SERVICE:  2021    PRIMARY CARE PROVIDER:  None. Surgery for drainage of intra-abdominal hematoma. PREOPERATIVE DIAGNOSIS:  Intra-abdominal hematoma. POSTOPERATIVE DIAGNOSIS:  Intra-abdominal hematoma. PROCEDURE PERFORMED:  Diagnostic laparoscopy with washout and evacuation of hematoma. SURGEON:  Abiel Jauregui MD    ASSISTANT:  Lillian Chappell. ANESTHESIA:  General.    COMPLICATIONS:  None. SPECIMENS REMOVED:  None. IMPLANTS:  None. ESTIMATED BLOOD LOSS:  Minimal.    FINDINGS:  1 L of clot was evacuated from just behind the staple line of the cecum. Left lower quadrant drain was left at the base of the cecum with the tip left in the hematoma cavity. The suprapubic drain was exchanged for a tunnel going across the hematoma cavity with the tip adjacent to right lobe of  liver. INDICATION:  The patient is a pleasant 58-year-old male who presented to [facility] with sepsis and perforated appendicitis. The patient had partial cecectomy performed because of the perforation at the base of the appendix. Within 48 hours of the surgery, he was tachypneic and tachycardic. He was found to have multiple blood clots and pulmonary infarct which was highly unusual.  Given the burden of clot, the risk of postop hematoma was outweighed by the need to institute therapeutic anticoagulation. He had a three gram hemoglobin drop initially, which stabilized. Over the last three days, his white blood cell count was slowly increasing ranging approximately 16.9 today. Knowing that he had a drop of his hemoglobin after surgery with the start of the anticoagulation, suspicion was for a hematoma, which was found on repeat CT scan today.   The CT was reviewed with the interventional radiologist.  I agree that this appeared that the fluid is too complicated for percutaneous drainage to be adequate. The patient is scheduled for return to the operating room for operative evacuation. PROCEDURE:  Consent was obtained. He was taken to the operating room and placed in the supine position. SCDS were turned on and working. He did not require a Farrell catheter. The abdomen was prepped in the usual sterile fashion. Preprocedure time-out was performed per protocol. The patient was on therapeutic antibiotics. The supraumbilical incision was opened and a tissue dissection trocar was placed through the abdomen under direct visualization. We entered into the cavity and immediately saw dark blood. Insufflation was established and maintained at 15 mmHg. Left lower quadrant and suprapubic ports were placed. The patient was placed in Trendelenburg with a left tilt. We were able to evacuate a liter of organized clot just at the base of the cecum. He also had watery clot above the liver. I did not see stool or bile. The suprapubic drain was exchanged for a 19-Ukrainian fluted drain which was then placed traversing the hematoma cavity and exiting at the side edge of the liver. The left lower quadrant port site was released. We placed a loop 19-Ukrainian drain to further evacuate the base of the cecum. A transfascial stitch was placed across the abdominal wall to narrow the left lower quadrant 12 mm site. The drains were secured to the skin with 2-0 nylon stitches. Skin of the suprapubic drain site was closed with 4-0 interrupted Monocryl stitch. Exparel expanded with 0.5% Marcaine plain was injected to the abdominal wall at the port sites. He was extubated successfully. Surgicel was placed at the base of the cecum where the rind of the clot was most prominent. He tolerated the procedure well.         MD CASPER [id number]  D:  02/19/2021 19:21  T:  02/20/2021 3:57  JOB #:  [id number]

## 2021-02-21 LAB
ALBUMIN SERPL-MCNC: 1.9 G/DL (ref 3.5–5)
ALBUMIN/GLOB SERPL: 0.5 {RATIO} (ref 1.1–2.2)
ALP SERPL-CCNC: 76 U/L (ref 45–117)
ALT SERPL-CCNC: 25 U/L (ref 12–78)
ANION GAP SERPL CALC-SCNC: 6 MMOL/L (ref 5–15)
APTT PPP: 35.6 SEC (ref 22.1–31)
APTT PPP: 43.9 SEC (ref 22.1–31)
AST SERPL-CCNC: 21 U/L (ref 15–37)
BASOPHILS # BLD: 0 K/UL (ref 0–0.1)
BASOPHILS # BLD: 0 K/UL (ref 0–0.1)
BASOPHILS # BLD: 0.4 K/UL (ref 0–0.1)
BASOPHILS NFR BLD: 0 % (ref 0–1)
BASOPHILS NFR BLD: 0 % (ref 0–1)
BASOPHILS NFR BLD: 2 % (ref 0–1)
BILIRUB SERPL-MCNC: 0.6 MG/DL (ref 0.2–1)
BUN SERPL-MCNC: 16 MG/DL (ref 6–20)
BUN/CREAT SERPL: 18 (ref 12–20)
CALCIUM SERPL-MCNC: 7.5 MG/DL (ref 8.5–10.1)
CHLORIDE SERPL-SCNC: 105 MMOL/L (ref 97–108)
CO2 SERPL-SCNC: 25 MMOL/L (ref 21–32)
CREAT SERPL-MCNC: 0.87 MG/DL (ref 0.7–1.3)
DIFFERENTIAL METHOD BLD: ABNORMAL
EOSINOPHIL # BLD: 0 K/UL (ref 0–0.4)
EOSINOPHIL # BLD: 0 K/UL (ref 0–0.4)
EOSINOPHIL # BLD: 0.2 K/UL (ref 0–0.4)
EOSINOPHIL NFR BLD: 0 % (ref 0–7)
EOSINOPHIL NFR BLD: 0 % (ref 0–7)
EOSINOPHIL NFR BLD: 1 % (ref 0–7)
ERYTHROCYTE [DISTWIDTH] IN BLOOD BY AUTOMATED COUNT: 13.9 % (ref 11.5–14.5)
ERYTHROCYTE [DISTWIDTH] IN BLOOD BY AUTOMATED COUNT: 14 % (ref 11.5–14.5)
ERYTHROCYTE [DISTWIDTH] IN BLOOD BY AUTOMATED COUNT: 14 % (ref 11.5–14.5)
GLOBULIN SER CALC-MCNC: 3.7 G/DL (ref 2–4)
GLUCOSE SERPL-MCNC: 102 MG/DL (ref 65–100)
HCT VFR BLD AUTO: 25.6 % (ref 36.6–50.3)
HCT VFR BLD AUTO: 26.7 % (ref 36.6–50.3)
HCT VFR BLD AUTO: 27.1 % (ref 36.6–50.3)
HGB BLD-MCNC: 8.2 G/DL (ref 12.1–17)
HGB BLD-MCNC: 8.5 G/DL (ref 12.1–17)
HGB BLD-MCNC: 8.6 G/DL (ref 12.1–17)
IMM GRANULOCYTES # BLD AUTO: 0 K/UL
IMM GRANULOCYTES NFR BLD AUTO: 0 %
LYMPHOCYTES # BLD: 1.7 K/UL (ref 0.8–3.5)
LYMPHOCYTES # BLD: 2.2 K/UL (ref 0.8–3.5)
LYMPHOCYTES # BLD: 2.4 K/UL (ref 0.8–3.5)
LYMPHOCYTES NFR BLD: 11 % (ref 12–49)
LYMPHOCYTES NFR BLD: 13 % (ref 12–49)
LYMPHOCYTES NFR BLD: 9 % (ref 12–49)
MCH RBC QN AUTO: 30.2 PG (ref 26–34)
MCH RBC QN AUTO: 30.3 PG (ref 26–34)
MCH RBC QN AUTO: 30.4 PG (ref 26–34)
MCHC RBC AUTO-ENTMCNC: 31.7 G/DL (ref 30–36.5)
MCHC RBC AUTO-ENTMCNC: 31.8 G/DL (ref 30–36.5)
MCHC RBC AUTO-ENTMCNC: 32 G/DL (ref 30–36.5)
MCV RBC AUTO: 94.8 FL (ref 80–99)
MCV RBC AUTO: 95 FL (ref 80–99)
MCV RBC AUTO: 95.4 FL (ref 80–99)
METAMYELOCYTES NFR BLD MANUAL: 1 %
METAMYELOCYTES NFR BLD MANUAL: 2 %
METAMYELOCYTES NFR BLD MANUAL: 3 %
MONOCYTES # BLD: 0.6 K/UL (ref 0–1)
MONOCYTES # BLD: 0.7 K/UL (ref 0–1)
MONOCYTES # BLD: 0.8 K/UL (ref 0–1)
MONOCYTES NFR BLD: 3 % (ref 5–13)
MONOCYTES NFR BLD: 4 % (ref 5–13)
MONOCYTES NFR BLD: 4 % (ref 5–13)
MYELOCYTES NFR BLD MANUAL: 1 %
NEUTS BAND NFR BLD MANUAL: 1 % (ref 0–6)
NEUTS BAND NFR BLD MANUAL: 3 % (ref 0–6)
NEUTS BAND NFR BLD MANUAL: 4 % (ref 0–6)
NEUTS SEG # BLD: 14.5 K/UL (ref 1.8–8)
NEUTS SEG # BLD: 15.7 K/UL (ref 1.8–8)
NEUTS SEG # BLD: 16.3 K/UL (ref 1.8–8)
NEUTS SEG NFR BLD: 78 % (ref 32–75)
NEUTS SEG NFR BLD: 80 % (ref 32–75)
NEUTS SEG NFR BLD: 80 % (ref 32–75)
NRBC # BLD: 0 K/UL (ref 0–0.01)
NRBC BLD-RTO: 0 PER 100 WBC
PLATELET # BLD AUTO: 308 K/UL (ref 150–400)
PLATELET # BLD AUTO: 316 K/UL (ref 150–400)
PLATELET # BLD AUTO: 335 K/UL (ref 150–400)
PMV BLD AUTO: 9.4 FL (ref 8.9–12.9)
PMV BLD AUTO: 9.5 FL (ref 8.9–12.9)
PMV BLD AUTO: 9.9 FL (ref 8.9–12.9)
POTASSIUM SERPL-SCNC: 3.5 MMOL/L (ref 3.5–5.1)
PROT SERPL-MCNC: 5.6 G/DL (ref 6.4–8.2)
RBC # BLD AUTO: 2.7 M/UL (ref 4.1–5.7)
RBC # BLD AUTO: 2.81 M/UL (ref 4.1–5.7)
RBC # BLD AUTO: 2.84 M/UL (ref 4.1–5.7)
RBC MORPH BLD: ABNORMAL
SODIUM SERPL-SCNC: 136 MMOL/L (ref 136–145)
THERAPEUTIC RANGE,PTTT: ABNORMAL SECS (ref 58–77)
THERAPEUTIC RANGE,PTTT: ABNORMAL SECS (ref 58–77)
WBC # BLD AUTO: 18.4 K/UL (ref 4.1–11.1)
WBC # BLD AUTO: 18.7 K/UL (ref 4.1–11.1)
WBC # BLD AUTO: 19.6 K/UL (ref 4.1–11.1)

## 2021-02-21 PROCEDURE — 94760 N-INVAS EAR/PLS OXIMETRY 1: CPT

## 2021-02-21 PROCEDURE — 74011000258 HC RX REV CODE- 258: Performed by: SURGERY

## 2021-02-21 PROCEDURE — 74011250636 HC RX REV CODE- 250/636: Performed by: SURGERY

## 2021-02-21 PROCEDURE — 36415 COLL VENOUS BLD VENIPUNCTURE: CPT

## 2021-02-21 PROCEDURE — 80053 COMPREHEN METABOLIC PANEL: CPT

## 2021-02-21 PROCEDURE — 85025 COMPLETE CBC W/AUTO DIFF WBC: CPT

## 2021-02-21 PROCEDURE — 65270000029 HC RM PRIVATE

## 2021-02-21 PROCEDURE — 74011250637 HC RX REV CODE- 250/637: Performed by: SURGERY

## 2021-02-21 PROCEDURE — 97116 GAIT TRAINING THERAPY: CPT | Performed by: PHYSICAL THERAPIST

## 2021-02-21 PROCEDURE — 85730 THROMBOPLASTIN TIME PARTIAL: CPT

## 2021-02-21 PROCEDURE — 77010033678 HC OXYGEN DAILY

## 2021-02-21 PROCEDURE — 97164 PT RE-EVAL EST PLAN CARE: CPT | Performed by: PHYSICAL THERAPIST

## 2021-02-21 RX ORDER — ENOXAPARIN SODIUM 150 MG/ML
105 INJECTION SUBCUTANEOUS EVERY 12 HOURS
Status: DISCONTINUED | OUTPATIENT
Start: 2021-02-22 | End: 2021-02-25

## 2021-02-21 RX ORDER — HEPARIN SODIUM 5000 [USP'U]/ML
80 INJECTION, SOLUTION INTRAVENOUS; SUBCUTANEOUS ONCE
Status: COMPLETED | OUTPATIENT
Start: 2021-02-21 | End: 2021-02-21

## 2021-02-21 RX ORDER — HEPARIN SODIUM 5000 [USP'U]/ML
40 INJECTION, SOLUTION INTRAVENOUS; SUBCUTANEOUS ONCE
Status: DISCONTINUED | OUTPATIENT
Start: 2021-02-21 | End: 2021-02-21

## 2021-02-21 RX ORDER — ENOXAPARIN SODIUM 150 MG/ML
105 INJECTION SUBCUTANEOUS ONCE
Status: COMPLETED | OUTPATIENT
Start: 2021-02-21 | End: 2021-02-21

## 2021-02-21 RX ADMIN — OXYCODONE 5 MG: 5 TABLET ORAL at 05:55

## 2021-02-21 RX ADMIN — PIPERACILLIN AND TAZOBACTAM 3.38 G: 3; .375 INJECTION, POWDER, LYOPHILIZED, FOR SOLUTION INTRAVENOUS at 08:46

## 2021-02-21 RX ADMIN — OXYCODONE 10 MG: 5 TABLET ORAL at 11:03

## 2021-02-21 RX ADMIN — DOCUSATE SODIUM 50 MG AND SENNOSIDES 8.6 MG 1 TABLET: 8.6; 5 TABLET, FILM COATED ORAL at 08:46

## 2021-02-21 RX ADMIN — Medication 1000 MG: at 21:44

## 2021-02-21 RX ADMIN — SODIUM CHLORIDE 100 ML/HR: 9 INJECTION, SOLUTION INTRAVENOUS at 19:10

## 2021-02-21 RX ADMIN — DOCUSATE SODIUM 50 MG AND SENNOSIDES 8.6 MG 1 TABLET: 8.6; 5 TABLET, FILM COATED ORAL at 18:02

## 2021-02-21 RX ADMIN — OXYCODONE 10 MG: 5 TABLET ORAL at 21:44

## 2021-02-21 RX ADMIN — FAMOTIDINE 20 MG: 20 TABLET ORAL at 16:55

## 2021-02-21 RX ADMIN — HEPARIN SODIUM 8550 UNITS: 5000 INJECTION INTRAVENOUS; SUBCUTANEOUS at 07:18

## 2021-02-21 RX ADMIN — Medication 1000 MG: at 13:29

## 2021-02-21 RX ADMIN — ENOXAPARIN SODIUM 105 MG: 120 INJECTION SUBCUTANEOUS at 16:56

## 2021-02-21 RX ADMIN — OXYCODONE 10 MG: 5 TABLET ORAL at 16:55

## 2021-02-21 RX ADMIN — OXYCODONE 5 MG: 5 TABLET ORAL at 02:08

## 2021-02-21 RX ADMIN — SODIUM CHLORIDE 100 ML/HR: 9 INJECTION, SOLUTION INTRAVENOUS at 08:46

## 2021-02-21 RX ADMIN — HEPARIN SODIUM 19 UNITS/KG/HR: 10000 INJECTION, SOLUTION INTRAVENOUS at 11:03

## 2021-02-21 RX ADMIN — FAMOTIDINE 20 MG: 20 TABLET ORAL at 05:56

## 2021-02-21 RX ADMIN — PIPERACILLIN AND TAZOBACTAM 3.38 G: 3; .375 INJECTION, POWDER, LYOPHILIZED, FOR SOLUTION INTRAVENOUS at 00:07

## 2021-02-21 RX ADMIN — PIPERACILLIN AND TAZOBACTAM 3.38 G: 3; .375 INJECTION, POWDER, LYOPHILIZED, FOR SOLUTION INTRAVENOUS at 16:55

## 2021-02-21 RX ADMIN — Medication 1000 MG: at 05:54

## 2021-02-21 NOTE — PROGRESS NOTES
Notified MD that patient is complaining of \"rash\" that is \"bleeding\" on his back. Appears red and raised. Applied pressure and bleeding stopped. Awaiting orders.

## 2021-02-21 NOTE — PROGRESS NOTES
General Surgery Daily Progress Note    Patient: Ignacio Cordova MRN: [id number]  SSN: [id number]    YOB: 1960  Age: 62 y.o. Sex: male      Admit Date: 2/13/2021    POD 2 Days Post-Op    Procedure: Procedure(s):  LAPAROSCOPY GENERAL DIAGNOSTIC with evacuation of hematoma    Subjective:   Patient reports some abdominal soarness  Still sipping on water  Denies N/V  Venodynes not on his legs. He reports he got hot and took them off    Current Facility-Administered Medications   Medication Dose Route Frequency    HYDROmorphone (PF) (DILAUDID) injection 1 mg  1 mg IntraVENous Q4H PRN    oxyCODONE IR (ROXICODONE) tablet 10 mg  10 mg Oral Q4H PRN    Or    oxyCODONE IR (ROXICODONE) tablet 15 mg  15 mg Oral Q4H PRN    heparin 25,000 units in D5W 250 ml infusion  14-36 Units/kg/hr IntraVENous TITRATE    senna-docusate (PERICOLACE) 8.6-50 mg per tablet 1 Tab  1 Tab Oral BID    famotidine (PEPCID) tablet 20 mg  20 mg Oral ACB&D    bisacodyL (DULCOLAX) suppository 10 mg  10 mg Rectal DAILY PRN    alum-mag hydroxide-simeth (MYLANTA) oral suspension 30 mL  30 mL Oral Q4H PRN    piperacillin-tazobactam (ZOSYN) 3.375 g in 0.9% sodium chloride (MBP/ADV) 100 mL MBP  3.375 g IntraVENous Q8H    0.9% sodium chloride infusion  100 mL/hr IntraVENous CONTINUOUS    oxyCODONE IR (ROXICODONE) tablet 5 mg  5 mg Oral Q4H PRN    albuterol-ipratropium (DUO-NEB) 2.5 MG-0.5 MG/3 ML  3 mL Nebulization Q4H PRN    ondansetron (ZOFRAN) injection 4 mg  4 mg IntraVENous Q6H PRN    acetaminophen (TYLENOL) tablet 1,000 mg  1,000 mg Oral Q8H    diphenhydrAMINE (BENADRYL) capsule 25 mg  25 mg Oral Q6H PRN        Objective:   No intake/output data recorded.   02/19 1901 - 02/21 0700  In: 2690.3 [P.O.:680; I.V.:2010.3]  Out: 3050 [Urine:1150; Drains:650]  Patient Vitals for the past 8 hrs:   BP Temp Pulse Resp SpO2   02/21/21 0914 110/68 98.1 °F (36.7 °C) 91 18 95 %   02/21/21 0310 107/65 97.9 °F (36.6 °C) 89 21 90 % Physical Exam:  General: Alert, cooperative, NAD  Lungs: Unlabored  Abdomen: Soft, appropriate tenderness, tympanitic and distended. Incisions C/D/I. ALMA ROSA look more serous than yesterday but still serosanginous  Extremities: Warm, moves all, some edema  Skin:  Warm and dry, no rash    Labs:   Recent Labs     02/21/21  0444   WBC 18.7*   HGB 8.2*   HCT 25.6*        Recent Labs     02/21/21  0141      K 3.5      CO2 25   *   BUN 16   CREA 0.87   CA 7.5*   ALB 1.9*   TBILI 0.6   ALT 25       Assessment / Plan:     POD 2 Days Post-Op    Procedure: Procedure(s):  LAPAROSCOPY GENERAL DIAGNOSTIC with evacuation of hematoma  Hgb 8.2 from 8.6 from 9.2  Heart rate unchanged, JPs more serous than yesterday, so do not think he is bleeding  I had ordered a cbc to be done at 10am to check Hgb  Not done yet at this time  Spoke to nurse about drawing CBC  Patient reports that he ha sonly gotten up to go to bathroom. Otherwise has been on the bed and not even to the chair  PT note states that he needs new PT assessment because of surgical procedure. No PT assessment has been done. So a patient with PEs was found to be in bed all day yesterday with no venodynes on again  I explained to patient the importance of mobilizing and keeping the venodynes on while in bed  I spoke to nurse and explained that he needs to be mobilized.   There is no surgical contraindication for mobilizing this patient  WIll follow up Winnie Tuttle MD

## 2021-02-21 NOTE — PROGRESS NOTES
Problem: Falls - Risk of  Goal: *Absence of Falls  Description: Document Anaya Wang Fall Risk and appropriate interventions in the flowsheet.   Outcome: Progressing Towards Goal  Note: Fall Risk Interventions:            Medication Interventions: Bed/chair exit alarm, Patient to call before getting OOB    Elimination Interventions: Bed/chair exit alarm, Call light in reach, Patient to call for help with toileting needs

## 2021-02-21 NOTE — PROGRESS NOTES
Problem: Mobility Impaired (Adult and Pediatric)  Goal: *Acute Goals and Plan of Care (Insert Text)  Description: FUNCTIONAL STATUS PRIOR TO ADMISSION: Patient was independent and active without use of DME. Patient works as a     HOME SUPPORT [address]: The patient lived with his wife but did not require assist.    Physical Therapy Goals  Initiated 2/15/2021  1. Patient will move from supine to sit and sit to supine  in bed with modified independence within 7 day(s). 2.  Patient will transfer from bed to chair and chair to bed with modified independence using the least restrictive device within 7 day(s). 3.  Patient will perform sit to stand with modified independence within 7 day(s). 4.  Patient will ambulate with modified independence for 200 feet with the least restrictive device within 7 day(s). 5.  Patient will ascend/descend 3 stairs with 1 handrail(s) with modified independence within 7 day(s). Outcome: Progressing Towards Goal    Physical Therapy Goals  Revised 2/21/2021  1. Patient will move from supine to sit and sit to supine  in bed with modified independence within 7 day(s). 2.  Patient will transfer from bed to chair and chair to bed with modified independence using the least restrictive device within 7 day(s). 3.  Patient will perform sit to stand with modified independence within 7 day(s). 4.  Patient will ambulate with modified independence for 200 feet with the least restrictive device within 7 day(s). 5.  Patient will ascend/descend 3 stairs with one handrail(s) with modified independence within 7 day(s).      PHYSICAL THERAPY REEVALUATION  Patient: [facility] (65 y.o. male)  Date: 2/21/2021  Primary Diagnosis: Appendicitis [K37]  Appendicitis with perforation [K35.32]  Procedure(s) (LRB):  LAPAROSCOPY GENERAL DIAGNOSTIC with evacuation of hematoma (N/A) 2 Days Post-Op   Precautions:          ASSESSMENT  Based on the objective data described below, the patient presents with moderate pain with associated drowsiness due to pain medication administration ~ 1.5 hr prior to session. Pt requesting additional medication, but confirmed not indicated at this time. Also reviewed the depressive state of pulmonary system, and drowsiness in the setting of need for increased mobility at this time. Pt understood the effects of immobility but reminded by wife, and PT staff. He agreed to proceed, denoting that increased activity will assist with movement of air thus reduced bloating/bowel movement when indicated. Pt insisted upon [address] elevated to 90 degrees to simulate home bed, which he was able to complete bed mobility mod I using bed rails. Required constant cues for proper breathing technique, which facilitated improvement in SPO2 with expansion and upright posturing. SPO2 89%-97% peak during walking. HR [id number] bpm. Required frequent standing rest pd ( 4) during short distance ambulation. He was able to reset postural alignment, slow breathing with return to mid 94-95% consistently. Required min A x 1 using RW completing total distance of 50 feet. Positioned patient to comfort in recliner, wife assisted with set up of lunch meal. Encouraged increased OOB activity, recommendations for OOB for all meals. Continuing to make gradual progress toward goals. Current Level of Function Impacting Discharge (mobility/balance): increased ambulation distance to 50 ft ( as compared to 30 ft prior to Lap Exploratory for hematoma evacuation ( POD#2)    Functional Outcome Measure: The patient scored 24/28 on the Tinetti Test outcome measure which is indicative of mod fall risk. Other factors to consider for discharge: slow mobilization, at risk for clots, encourage sequential compressions when in bed, OOB movement frequency. Watch Hgb trends and associated THURSTON with dynamic activities.      Patient will benefit from skilled therapy intervention to address the above noted impairments. PLAN :  Recommendations and Planned Interventions: bed mobility training, transfer training, gait training, therapeutic exercises, patient and family training/education, and therapeutic activities      Frequency/Duration: Patient will be followed by physical therapy:  5 times a week to address goals. Recommendation for discharge: (in order for the patient to meet his/her long term goals)  Physical therapy at least 2 days/week in the home     This discharge recommendation:  Has been made in collaboration with the attending provider and/or case management    Equipment recommendations for successful discharge (if) home: TBD         SUBJECTIVE:   Patient stated Mary Lou Robbins can I have pain medicine? Leonor Ellington    OBJECTIVE DATA SUMMARY:   HISTORY:    History reviewed. No pertinent past medical history. Past Surgical History:   Procedure Laterality Date    [facility] course since last seen and reason for reevaluation: POD # 8 appendectomy, POD # 2 Exploratory Lap for evac of hematoma. Personal factors and/or comorbidities impacting plan of care: THURSTON, generalized poor tolerance for activity, watch Hgb and activity tolerance. Home Situation  Home Environment: (P) Private residence  # Steps to Enter: (P) 3  Rails to Enter: (P) Yes  One/Two Story Residence: (P) One story  Patient Expects to be Discharged to[de-identified] (P) Private residence  Current DME Used/Available at Home: (P) Shower chair  Tub or Shower Type: (P) Shower    EXAMINATION/PRESENTATION/DECISION MAKING:   Critical Behavior:  Neurologic State: Alert, Drowsy  Orientation Level: Oriented X4  Cognition: Follows commands     Hearing: Auditory  Auditory Impairment: Hard of hearing, bilateral  Hearing Aids/Status: Bilateral  Skin:  intact  Edema: abdominal bloating  Range Of Motion:   Generally Functionally within limits. Strength:       Generally Functionally within limits.     Functional Mobility:  Bed Mobility:  Rolling: Modified independent; Additional time(Patient insisted on HOB elevated 90 degrees ( has bed at home))  Supine to Sit: Other (comment); Additional time;Contact guard assistance;Minimum assistance(Patient insisted on HOB elevated 90 degrees ( has bed at home))     Scooting: Modified independent;Supervision; Additional time  Transfers:  Sit to Stand: Minimum assistance;Assist x1;Additional time  Stand to Sit: Contact guard assistance;Assist x1  Bed to Chair: Contact guard assistance  Balance:   Sitting: Intact  Standing: Intact; With support(impulsive to stand)  Standing - Static: Good  Standing - Dynamic : Fair  Ambulation/Gait Training:  Distance (ft): 50 Feet (ft)  Assistive Device: Walker, rolling;Gait belt  Ambulation - Level of Assistance: Minimal assistance; Additional time; Other (comment)(frequent rest pds due to dyspnea, and postural alignment)  Gait Abnormalities: Antalgic; Step to gait  Base of Support: Narrowed  Speed/Ludmila: Pace decreased (<100 feet/min)  Step Length: Left shortened;Right shortened      Stairs:   TBD             Functional Measure:  Tinetti test:    Sitting Balance: 1  Arises: 2  Attempts to Rise: 2  Immediate Standing Balance: 2  Standing Balance: 2  Nudged: 2  Eyes Closed: 1  Turn 360 Degrees - Continuous/Discontinuous: 0  Turn 360 Degrees - Steady/Unsteady: 0  Sitting Down: 1  Balance Score: 13 Balance total score  Indication of Gait: 1  R Step Length/Height: 1  L Step Length/Height: 1  R Foot Clearance: 1  L Foot Clearance: 1  Step Symmetry: 1  Step Continuity: 1  Path: 1  Trunk: 2  Walking Time: 1  Gait Score: 11 Gait total score  Total Score: 24/28 Overall total score         Tinetti Tool Score Risk of Falls  <19 = High Fall Risk  19-24 = Moderate Fall Risk  25-28 = Low Fall Risk  Tinetti ME. Performance-Oriented Assessment of Mobility Problems in Elderly Patients. Rios 67; [id number].  (Scoring Description: PT Bulletin Feb. 10, 1993)    Older adults: Kassi Griffin et al, 2009; n = [address] [employer] elderly evaluated with ABC, ALICIA, ADL, and IADL)  · Mean ALICIA score for males aged 69-68 years = 26.21(3.40)  · Mean ALICIA score for females age 69-68 years = 25.16(4.30)  · Mean ALICIA score for males over 80 years = 23.29(6.02)  · Mean ALICIA score for females over 80 years = 17.20(8.32)          Pain Rating:  3-4/10 at rest, 5-6/10 with ambulation    Activity Tolerance:   Fair, SpO2 stable on RA, requires frequent rest breaks, and observed SOB with activity    After treatment patient left in no apparent distress:   Sitting in chair, Call bell within reach, and Caregiver / family present    COMMUNICATION/EDUCATION:   The patients plan of care was discussed with: Registered nurse. Fall prevention education was provided and the patient/caregiver indicated understanding., Patient/family have participated as able in goal setting and plan of care. , and Patient/family agree to work toward stated goals and plan of care.     Thank you for this referral.  Amanda Calvin, PT   Time Calculation: 35 mins

## 2021-02-21 NOTE — PROGRESS NOTES
4660-contacted MD to report HgB trending down at 8.6. HgB was 9.8 yesterday 2/20. Holding to give bolus of heparin and increase dosage of heparin gtt. Stat orders were given to obtain CBC by Dr. Sierra Rocha  0600-repeat CBC results back. Hgb 8.2 Dr. Sierra Rocha ordered a repeat CBC in 4 hours and to continue heparin gtt.

## 2021-02-21 NOTE — PROGRESS NOTES
Bedside and Verbal shift change report given to Ganesh Torres RN (oncoming nurse) by Samaria Jauregui RN (offgoing nurse). Report included the following information SBAR, Kardex, Intake/Output, MAR, Accordion and Recent Results.

## 2021-02-22 LAB
ALBUMIN SERPL-MCNC: 1.8 G/DL (ref 3.5–5)
ALBUMIN/GLOB SERPL: 0.5 {RATIO} (ref 1.1–2.2)
ALP SERPL-CCNC: 80 U/L (ref 45–117)
ALT SERPL-CCNC: 22 U/L (ref 12–78)
ANION GAP SERPL CALC-SCNC: 3 MMOL/L (ref 5–15)
AST SERPL-CCNC: 19 U/L (ref 15–37)
BASOPHILS # BLD: 0 K/UL (ref 0–0.1)
BASOPHILS NFR BLD: 0 % (ref 0–1)
BILIRUB SERPL-MCNC: 0.5 MG/DL (ref 0.2–1)
BUN SERPL-MCNC: 14 MG/DL (ref 6–20)
BUN/CREAT SERPL: 18 (ref 12–20)
CALCIUM SERPL-MCNC: 7.5 MG/DL (ref 8.5–10.1)
CHLORIDE SERPL-SCNC: 104 MMOL/L (ref 97–108)
CO2 SERPL-SCNC: 29 MMOL/L (ref 21–32)
CREAT SERPL-MCNC: 0.8 MG/DL (ref 0.7–1.3)
DIFFERENTIAL METHOD BLD: ABNORMAL
EOSINOPHIL # BLD: 0.2 K/UL (ref 0–0.4)
EOSINOPHIL NFR BLD: 1 % (ref 0–7)
ERYTHROCYTE [DISTWIDTH] IN BLOOD BY AUTOMATED COUNT: 13.9 % (ref 11.5–14.5)
GLOBULIN SER CALC-MCNC: 3.9 G/DL (ref 2–4)
GLUCOSE SERPL-MCNC: 117 MG/DL (ref 65–100)
HCT VFR BLD AUTO: 25.8 % (ref 36.6–50.3)
HGB BLD-MCNC: 8.3 G/DL (ref 12.1–17)
IMM GRANULOCYTES # BLD AUTO: 0.5 K/UL (ref 0–0.04)
IMM GRANULOCYTES NFR BLD AUTO: 3 % (ref 0–0.5)
LYMPHOCYTES # BLD: 2 K/UL (ref 0.8–3.5)
LYMPHOCYTES NFR BLD: 12 % (ref 12–49)
MCH RBC QN AUTO: 30.3 PG (ref 26–34)
MCHC RBC AUTO-ENTMCNC: 32.2 G/DL (ref 30–36.5)
MCV RBC AUTO: 94.2 FL (ref 80–99)
MONOCYTES # BLD: 0.8 K/UL (ref 0–1)
MONOCYTES NFR BLD: 5 % (ref 5–13)
NEUTS SEG # BLD: 13.4 K/UL (ref 1.8–8)
NEUTS SEG NFR BLD: 79 % (ref 32–75)
NRBC # BLD: 0 K/UL (ref 0–0.01)
NRBC BLD-RTO: 0 PER 100 WBC
PLATELET # BLD AUTO: 327 K/UL (ref 150–400)
PMV BLD AUTO: 9.4 FL (ref 8.9–12.9)
POTASSIUM SERPL-SCNC: 3.3 MMOL/L (ref 3.5–5.1)
PROT SERPL-MCNC: 5.7 G/DL (ref 6.4–8.2)
RBC # BLD AUTO: 2.74 M/UL (ref 4.1–5.7)
RBC MORPH BLD: ABNORMAL
SODIUM SERPL-SCNC: 136 MMOL/L (ref 136–145)
WBC # BLD AUTO: 16.9 K/UL (ref 4.1–11.1)

## 2021-02-22 PROCEDURE — 97530 THERAPEUTIC ACTIVITIES: CPT

## 2021-02-22 PROCEDURE — 74011250636 HC RX REV CODE- 250/636: Performed by: SURGERY

## 2021-02-22 PROCEDURE — 36415 COLL VENOUS BLD VENIPUNCTURE: CPT

## 2021-02-22 PROCEDURE — 74011250637 HC RX REV CODE- 250/637: Performed by: NURSE PRACTITIONER

## 2021-02-22 PROCEDURE — 94760 N-INVAS EAR/PLS OXIMETRY 1: CPT

## 2021-02-22 PROCEDURE — 97116 GAIT TRAINING THERAPY: CPT

## 2021-02-22 PROCEDURE — 74011250637 HC RX REV CODE- 250/637: Performed by: SURGERY

## 2021-02-22 PROCEDURE — 85025 COMPLETE CBC W/AUTO DIFF WBC: CPT

## 2021-02-22 PROCEDURE — 80053 COMPREHEN METABOLIC PANEL: CPT

## 2021-02-22 PROCEDURE — 65270000029 HC RM PRIVATE

## 2021-02-22 PROCEDURE — 77010033678 HC OXYGEN DAILY

## 2021-02-22 PROCEDURE — 74011000258 HC RX REV CODE- 258: Performed by: SURGERY

## 2021-02-22 RX ORDER — POTASSIUM CHLORIDE 750 MG/1
40 TABLET, FILM COATED, EXTENDED RELEASE ORAL
Status: COMPLETED | OUTPATIENT
Start: 2021-02-22 | End: 2021-02-22

## 2021-02-22 RX ADMIN — DOCUSATE SODIUM 50 MG AND SENNOSIDES 8.6 MG 1 TABLET: 8.6; 5 TABLET, FILM COATED ORAL at 09:09

## 2021-02-22 RX ADMIN — PIPERACILLIN AND TAZOBACTAM 3.38 G: 3; .375 INJECTION, POWDER, LYOPHILIZED, FOR SOLUTION INTRAVENOUS at 01:51

## 2021-02-22 RX ADMIN — Medication 1000 MG: at 13:58

## 2021-02-22 RX ADMIN — OXYCODONE 10 MG: 5 TABLET ORAL at 11:49

## 2021-02-22 RX ADMIN — DOCUSATE SODIUM 50 MG AND SENNOSIDES 8.6 MG 1 TABLET: 8.6; 5 TABLET, FILM COATED ORAL at 18:06

## 2021-02-22 RX ADMIN — POTASSIUM CHLORIDE 40 MEQ: 750 TABLET, FILM COATED, EXTENDED RELEASE ORAL at 13:58

## 2021-02-22 RX ADMIN — Medication 1000 MG: at 22:00

## 2021-02-22 RX ADMIN — OXYCODONE 10 MG: 5 TABLET ORAL at 02:38

## 2021-02-22 RX ADMIN — OXYCODONE 10 MG: 5 TABLET ORAL at 16:26

## 2021-02-22 RX ADMIN — ENOXAPARIN SODIUM 105 MG: 120 INJECTION SUBCUTANEOUS at 18:08

## 2021-02-22 RX ADMIN — PIPERACILLIN AND TAZOBACTAM 3.38 G: 3; .375 INJECTION, POWDER, LYOPHILIZED, FOR SOLUTION INTRAVENOUS at 16:26

## 2021-02-22 RX ADMIN — PIPERACILLIN AND TAZOBACTAM 3.38 G: 3; .375 INJECTION, POWDER, LYOPHILIZED, FOR SOLUTION INTRAVENOUS at 09:09

## 2021-02-22 RX ADMIN — ENOXAPARIN SODIUM 105 MG: 120 INJECTION SUBCUTANEOUS at 05:14

## 2021-02-22 RX ADMIN — Medication 1000 MG: at 05:14

## 2021-02-22 RX ADMIN — OXYCODONE 10 MG: 5 TABLET ORAL at 06:40

## 2021-02-22 RX ADMIN — SODIUM CHLORIDE 100 ML/HR: 9 INJECTION, SOLUTION INTRAVENOUS at 05:14

## 2021-02-22 RX ADMIN — FAMOTIDINE 20 MG: 20 TABLET ORAL at 16:26

## 2021-02-22 RX ADMIN — FAMOTIDINE 20 MG: 20 TABLET ORAL at 06:40

## 2021-02-22 NOTE — PROGRESS NOTES
General Surgery Daily Progress Note    Patient: Sebastian Welch MRN: [id number]  SSN: [id number]    YOB: 1960  Age: 62 y.o.   Sex: male      Admit Date: 2/13/2021    POD 3 Days Post-Op    Procedure: Procedure(s):  LAPAROSCOPY GENERAL DIAGNOSTIC with evacuation of hematoma    Subjective:   Feeling better today than yesterday  Ab less tender  Still sipping on water and tolerating lite diet  Denies N/V  Attempting to get up and OOB  Pt reports scrotal edema  Wife states weeping wounds on back    Current Facility-Administered Medications   Medication Dose Route Frequency    enoxaparin (LOVENOX) injection 105 mg  105 mg SubCUTAneous Q12H    HYDROmorphone (PF) (DILAUDID) injection 1 mg  1 mg IntraVENous Q4H PRN    oxyCODONE IR (ROXICODONE) tablet 10 mg  10 mg Oral Q4H PRN    Or    oxyCODONE IR (ROXICODONE) tablet 15 mg  15 mg Oral Q4H PRN    senna-docusate (PERICOLACE) 8.6-50 mg per tablet 1 Tab  1 Tab Oral BID    famotidine (PEPCID) tablet 20 mg  20 mg Oral ACB&D    bisacodyL (DULCOLAX) suppository 10 mg  10 mg Rectal DAILY PRN    alum-mag hydroxide-simeth (MYLANTA) oral suspension 30 mL  30 mL Oral Q4H PRN    piperacillin-tazobactam (ZOSYN) 3.375 g in 0.9% sodium chloride (MBP/ADV) 100 mL MBP  3.375 g IntraVENous Q8H    oxyCODONE IR (ROXICODONE) tablet 5 mg  5 mg Oral Q4H PRN    albuterol-ipratropium (DUO-NEB) 2.5 MG-0.5 MG/3 ML  3 mL Nebulization Q4H PRN    ondansetron (ZOFRAN) injection 4 mg  4 mg IntraVENous Q6H PRN    acetaminophen (TYLENOL) tablet 1,000 mg  1,000 mg Oral Q8H    diphenhydrAMINE (BENADRYL) capsule 25 mg  25 mg Oral Q6H PRN        Objective:   02/22 0701 - 02/22 1900  In: 1653.3 [P.O.:480; I.V.:1173.3]  Out: -   02/20 1901 - 02/22 0700  In: 3820.5 [P.O.:680; I.V.:3140.5]  Out: 1330 [Urine:1150; Drains:180]  Patient Vitals for the past 8 hrs:   BP Temp Pulse Resp SpO2   02/22/21 0802 96/65 98.3 °F (36.8 °C) 82 18 93 %   02/22/21 0248 120/72 97.7 °F (36.5 °C) 91 18 97 % Physical Exam:  General: Alert, cooperative, NAD  Lungs: Unlabored  Abdomen: Soft, appropriate tenderness, cont distended. Incisions C/D/I. Left ALMA ROSA serosang with darker appearance. RJP serosang with lighter appearance than left.    Extremities: Warm, moves all, some edema  Urinary:          Scrotal edema  Skin:  Weeping wounds on back    Labs:   Recent Labs     02/21/21  1100   WBC 19.6*   HGB 8.5*   HCT 26.7*        Recent Labs     02/21/21  0141      K 3.5      CO2 25   *   BUN 16   CREA 0.87   CA 7.5*   ALB 1.9*   TBILI 0.6   ALT 25       Assessment / Plan:     POD 2 Days Post-Op    Procedure: Procedure(s):  LAPAROSCOPY GENERAL DIAGNOSTIC with evacuation of hematoma  Awaiting labs- monitor hgb  Spoke to Nurse about obtaining labs to eval  VSS remain stable  Cont current diet  DC IVF  Keep lovenox- will need to transition to oral anticoag at or before d/c  Cont Aggressive PT/OT- will most likley need home PT/OT      Pt seen and discussed with Dr Amari Hanson, NP    Agree

## 2021-02-22 NOTE — PROGRESS NOTES
Comprehensive Nutrition Assessment    Type and Reason for Visit: Reassess    Nutrition Recommendations/Plan:   1. Continue regular diet. 2. Add fredy Ensure Pudding BID and Gelatein 1x/day to promote adequate intake. D/c apple Ensure Clear BID per pt preference. Nutrition Assessment:      2/22: F/u. Pt s/p evacuation of hematoma 2/19. Reports appetite is improving. Recorded intake of 85% breakfast this AM. Says he ate the eggs, potatoes, and oranges. Reports he is eating at least 1/3 of all meals. No c/o N/V. Doesn't like Ensure clear very much, but says he likes pudding and jello. Will try Ensure Pudding and Gelatein. Labs- Hgb 8.5, Ca 7.5. Meds- zosyn, pericolace. 2/19: 63 y/o male admitted with appendicitis w/ perforation. S/p appendectomy 2/13. Pt NPO today for CT. Was on GI lite diet. Says he was eating well PTA and first few days of admission (on liquids), but his appetite has been decreased the past couple days d/t some abd discomfort. No c/o N/V. He denies any recent weight changes. Says he was drinking sips of the Ensure but not much. He would like to try Ensure Clear when diet advanced. Will add and monitor intakes. Labs- mg 2.5, Ca 7.7, B12 141, Hgb 9.0. Meds- zosyn, pericolace. Intakes:  Patient Vitals for the past 168 hrs:   % Diet Eaten   02/22/21 0807 85 %   02/18/21 1237 25 %   02/18/21 0308 0 %   02/17/21 2002 0 %   02/17/21 1410 90 %   02/17/21 0827 85 %     Weight hx: Wt Readings from Last 10 Encounters:   02/16/21 107 kg (235 lb 14.3 oz)   03/21/17 99.8 kg (220 lb)   06/14/14 95.3 kg (210 lb)     Malnutrition Assessment:  Malnutrition Status: none identified    Estimated Daily Nutrient Needs:  Energy (kcal): 0757(3455 x 1.3 AF); Weight Used for Energy Requirements: Current  Protein (g): 107(0.8-1 g/kg);  Weight Used for Protein Requirements: Current  Fluid (ml/day): 2437; Method Used for Fluid Requirements: 1 ml/kcal      Nutrition Related Findings:  last BM 2/21      Wounds: Surgical incision       Current Nutrition Therapies:  DIET REGULAR  DIET NUTRITIONAL SUPPLEMENTS Dinner, Lunch; Pudding, Fortified  DIET NUTRITIONAL SUPPLEMENTS Breakfast; Gelatein 20    Anthropometric Measures:  · Height:  5' 9\" (175.3 cm)  · Current Body Wt:  107 kg (235 lb 14.3 oz)   · Admission Body Wt:       · Usual Body Wt:        · Ideal Body Wt:  160 lbs:  147.4 %   · Adjusted Body Weight:   ; Weight Adjustment for: No adjustment   · Adjusted BMI:       · BMI Category:  Obese class 1 (BMI 30.0-34. 9)       Nutrition Diagnosis:   · Inadequate oral intake related to inadequate protein-energy intake as evidenced by (pt reports poor po intake x2 days, currently NPO for CT)    2/22: Nutrition dx improving.     Nutrition Interventions:   Food and/or Nutrient Delivery: Continue current diet, Modify oral nutrition supplement  Nutrition Education and Counseling: No recommendations at this time  Coordination of Nutrition Care: Continue to monitor while inpatient    Goals:  PO intake >50% meals next 1-3 days       Nutrition Monitoring and Evaluation:   Behavioral-Environmental Outcomes: None identified  Food/Nutrient Intake Outcomes: Food and nutrient intake, Diet advancement/tolerance, Supplement intake  Physical Signs/Symptoms Outcomes: Biochemical data, GI status, Weight    Discharge Planning:    Continue oral nutrition supplement     Electronically signed by Brandon Nieto RDN on 2/22/2021     Contact: [phone]

## 2021-02-22 NOTE — PROGRESS NOTES
Bedside and Verbal shift change report given to Hernandez Phoenix RN (oncoming nurse) by Kalee Hernandez RN (offgoing nurse). Report included the following information SBAR, Kardex, Intake/Output, MAR and Recent Results.

## 2021-02-22 NOTE — PROGRESS NOTES
Problem: Mobility Impaired (Adult and Pediatric)  Goal: *Acute Goals and Plan of Care (Insert Text)  Description: FUNCTIONAL STATUS PRIOR TO ADMISSION: Patient was independent and active without use of DME. Patient works as a     HOME SUPPORT [address]: The patient lived with his wife but did not require assist.    Physical Therapy Goals  Initiated 2/15/2021  1. Patient will move from supine to sit and sit to supine  in bed with modified independence within 7 day(s). 2.  Patient will transfer from bed to chair and chair to bed with modified independence using the least restrictive device within 7 day(s). 3.  Patient will perform sit to stand with modified independence within 7 day(s). 4.  Patient will ambulate with modified independence for 200 feet with the least restrictive device within 7 day(s). 5.  Patient will ascend/descend 3 stairs with 1 handrail(s) with modified independence within 7 day(s). Note:   PHYSICAL THERAPY TREATMENT  Patient: Kathy Guy (65 y.o. male)  Date: 2/22/2021  Diagnosis: Appendicitis [K37]  Appendicitis with perforation [K35.32] <principal problem not specified>  Procedure(s) (LRB):  LAPAROSCOPY GENERAL DIAGNOSTIC with evacuation of hematoma (N/A) 3 Days Post-Op  Precautions:    Chart, physical therapy assessment, plan of care and goals were reviewed. ASSESSMENT  Patient continues with skilled PT services. Pt reported being out of bed this AM.Pt reports being sleepy but agreed to mobilize out of bed to chair. Pt supine to sit with CGA to min assist.Pt to stand with CGA. Pt took 4 steps to chair with RW CGA to min assist.Pt performed LE AROM exercise with cues. Pt progressing slowly. Continue goals. Current Level of Function Impacting Discharge (mobility/balance): Pt is min assist for mobility. PLAN :  Patient continues to benefit from skilled intervention to address the above impairments.   Continue treatment per established plan of care. to address goals.     Recommendation for discharge: (in order for the patient to meet his/her long term goals)  Therapy up to 5 days/week in SNF setting or intensive home health therapy program    This discharge recommendation:  Has been made in collaboration with the attending provider and/or case management    IF patient discharges home will need the following DME: rolling walker       SUBJECTIVE:       OBJECTIVE DATA SUMMARY:   Critical Behavior:  Neurologic State: Alert  Orientation Level: Oriented X4  Cognition: Follows commands     Functional Mobility Training:  Bed Mobility:     Supine to Sit: CGA to Minimum assistance              Transfers:  Sit to Stand: Contact guard assistance  Stand to Sit: Contact guard assistance;Minimum assistance                             Balance:  Sitting: Intact  Standing: With support  Standing - Static: Fair  Ambulation/Gait Training:  Distance (ft): 3 Feet (ft)  Assistive Device: Gait belt;Walker, rolling  Ambulation - Level of Assistance: Contact guard assistance;Minimal assistance        Gait Abnormalities: Decreased step clearance        Base of Support: Narrowed     Speed/Ludmila: Pace decreased (<100 feet/min)  Step Length: Right shortened;Left shortened               Activity Tolerance:   Fair    After treatment patient left in no apparent distress:   Sitting in chair    COMMUNICATION/COLLABORATION:   The patients plan of care was discussed with: Physical therapist.     Chanda Vyas PTA   Time Calculation: 23 mins

## 2021-02-22 NOTE — PROGRESS NOTES
Bedside shift change report given to Ervin Bernal (oncoming nurse) by Lambert Rea RN (offgoing nurse). Report included the following information SBAR, Kardex, Intake/Output and MAR.

## 2021-02-23 LAB
ALBUMIN SERPL-MCNC: 1.7 G/DL (ref 3.5–5)
ALBUMIN/GLOB SERPL: 0.4 {RATIO} (ref 1.1–2.2)
ALP SERPL-CCNC: 93 U/L (ref 45–117)
ALT SERPL-CCNC: 26 U/L (ref 12–78)
ANION GAP SERPL CALC-SCNC: 4 MMOL/L (ref 5–15)
AST SERPL-CCNC: 21 U/L (ref 15–37)
BASOPHILS # BLD: 0 K/UL (ref 0–0.1)
BASOPHILS NFR BLD: 0 % (ref 0–1)
BILIRUB SERPL-MCNC: 0.5 MG/DL (ref 0.2–1)
BUN SERPL-MCNC: 13 MG/DL (ref 6–20)
BUN/CREAT SERPL: 16 (ref 12–20)
CALCIUM SERPL-MCNC: 7.4 MG/DL (ref 8.5–10.1)
CHLORIDE SERPL-SCNC: 105 MMOL/L (ref 97–108)
CO2 SERPL-SCNC: 28 MMOL/L (ref 21–32)
CREAT SERPL-MCNC: 0.79 MG/DL (ref 0.7–1.3)
DIFFERENTIAL METHOD BLD: ABNORMAL
EOSINOPHIL # BLD: 0.2 K/UL (ref 0–0.4)
EOSINOPHIL NFR BLD: 1 % (ref 0–7)
ERYTHROCYTE [DISTWIDTH] IN BLOOD BY AUTOMATED COUNT: 13.8 % (ref 11.5–14.5)
GLOBULIN SER CALC-MCNC: 3.8 G/DL (ref 2–4)
GLUCOSE SERPL-MCNC: 109 MG/DL (ref 65–100)
HCT VFR BLD AUTO: 25.7 % (ref 36.6–50.3)
HGB BLD-MCNC: 8.2 G/DL (ref 12.1–17)
IMM GRANULOCYTES # BLD AUTO: 0 K/UL
IMM GRANULOCYTES NFR BLD AUTO: 0 %
LYMPHOCYTES # BLD: 2.3 K/UL (ref 0.8–3.5)
LYMPHOCYTES NFR BLD: 13 % (ref 12–49)
MCH RBC QN AUTO: 30.1 PG (ref 26–34)
MCHC RBC AUTO-ENTMCNC: 31.9 G/DL (ref 30–36.5)
MCV RBC AUTO: 94.5 FL (ref 80–99)
MONOCYTES # BLD: 0.5 K/UL (ref 0–1)
MONOCYTES NFR BLD: 3 % (ref 5–13)
MYELOCYTES NFR BLD MANUAL: 1 %
NEUTS SEG # BLD: 14.3 K/UL (ref 1.8–8)
NEUTS SEG NFR BLD: 82 % (ref 32–75)
NRBC # BLD: 0 K/UL (ref 0–0.01)
NRBC BLD-RTO: 0 PER 100 WBC
PLATELET # BLD AUTO: 388 K/UL (ref 150–400)
PMV BLD AUTO: 9.4 FL (ref 8.9–12.9)
POTASSIUM SERPL-SCNC: 3.4 MMOL/L (ref 3.5–5.1)
PROT SERPL-MCNC: 5.5 G/DL (ref 6.4–8.2)
RBC # BLD AUTO: 2.72 M/UL (ref 4.1–5.7)
RBC MORPH BLD: ABNORMAL
SODIUM SERPL-SCNC: 137 MMOL/L (ref 136–145)
WBC # BLD AUTO: 17.4 K/UL (ref 4.1–11.1)

## 2021-02-23 PROCEDURE — 36415 COLL VENOUS BLD VENIPUNCTURE: CPT

## 2021-02-23 PROCEDURE — 77010033678 HC OXYGEN DAILY

## 2021-02-23 PROCEDURE — 80053 COMPREHEN METABOLIC PANEL: CPT

## 2021-02-23 PROCEDURE — 74011250637 HC RX REV CODE- 250/637: Performed by: SURGERY

## 2021-02-23 PROCEDURE — 74011250636 HC RX REV CODE- 250/636: Performed by: SURGERY

## 2021-02-23 PROCEDURE — 74011000258 HC RX REV CODE- 258: Performed by: SURGERY

## 2021-02-23 PROCEDURE — 65270000029 HC RM PRIVATE

## 2021-02-23 PROCEDURE — 94760 N-INVAS EAR/PLS OXIMETRY 1: CPT

## 2021-02-23 PROCEDURE — 97116 GAIT TRAINING THERAPY: CPT

## 2021-02-23 PROCEDURE — 74011250637 HC RX REV CODE- 250/637: Performed by: NURSE PRACTITIONER

## 2021-02-23 PROCEDURE — 97530 THERAPEUTIC ACTIVITIES: CPT

## 2021-02-23 PROCEDURE — 85025 COMPLETE CBC W/AUTO DIFF WBC: CPT

## 2021-02-23 RX ORDER — POTASSIUM CHLORIDE 750 MG/1
20 TABLET, FILM COATED, EXTENDED RELEASE ORAL 2 TIMES DAILY
Status: DISCONTINUED | OUTPATIENT
Start: 2021-02-23 | End: 2021-03-05 | Stop reason: HOSPADM

## 2021-02-23 RX ADMIN — OXYCODONE 10 MG: 5 TABLET ORAL at 01:57

## 2021-02-23 RX ADMIN — Medication 1000 MG: at 21:03

## 2021-02-23 RX ADMIN — Medication 1000 MG: at 06:33

## 2021-02-23 RX ADMIN — OXYCODONE 10 MG: 5 TABLET ORAL at 09:12

## 2021-02-23 RX ADMIN — FAMOTIDINE 20 MG: 20 TABLET ORAL at 17:32

## 2021-02-23 RX ADMIN — OXYCODONE 10 MG: 5 TABLET ORAL at 15:25

## 2021-02-23 RX ADMIN — DOCUSATE SODIUM 50 MG AND SENNOSIDES 8.6 MG 1 TABLET: 8.6; 5 TABLET, FILM COATED ORAL at 17:31

## 2021-02-23 RX ADMIN — FAMOTIDINE 20 MG: 20 TABLET ORAL at 06:33

## 2021-02-23 RX ADMIN — PIPERACILLIN AND TAZOBACTAM 3.38 G: 3; .375 INJECTION, POWDER, LYOPHILIZED, FOR SOLUTION INTRAVENOUS at 09:17

## 2021-02-23 RX ADMIN — PIPERACILLIN AND TAZOBACTAM 3.38 G: 3; .375 INJECTION, POWDER, LYOPHILIZED, FOR SOLUTION INTRAVENOUS at 17:31

## 2021-02-23 RX ADMIN — PIPERACILLIN AND TAZOBACTAM 3.38 G: 3; .375 INJECTION, POWDER, LYOPHILIZED, FOR SOLUTION INTRAVENOUS at 01:57

## 2021-02-23 RX ADMIN — DOCUSATE SODIUM 50 MG AND SENNOSIDES 8.6 MG 1 TABLET: 8.6; 5 TABLET, FILM COATED ORAL at 09:14

## 2021-02-23 RX ADMIN — POTASSIUM CHLORIDE 20 MEQ: 750 TABLET, FILM COATED, EXTENDED RELEASE ORAL at 17:32

## 2021-02-23 RX ADMIN — ENOXAPARIN SODIUM 105 MG: 120 INJECTION SUBCUTANEOUS at 06:42

## 2021-02-23 RX ADMIN — ENOXAPARIN SODIUM 105 MG: 120 INJECTION SUBCUTANEOUS at 17:36

## 2021-02-23 RX ADMIN — POTASSIUM CHLORIDE 20 MEQ: 750 TABLET, FILM COATED, EXTENDED RELEASE ORAL at 09:15

## 2021-02-23 RX ADMIN — Medication 1000 MG: at 15:25

## 2021-02-23 NOTE — WOUND CARE
Wound Consult:  New Patient Visit. Chart reviewed. Consulted for rash on back. In with nursing student to assess. Patient is resting on a [employer] bed with accumax mattress. Patient is alert and O x 4; requires assist of one to sit/stand to chair. Assessment: 
Back - dry, scattered rough rash entire back; no active bleeding, no wounds. Small pink resurfaced areas noted; appears most as contact rash or heat rash. Possibly with anticoagulation, superficial abraded areas bled easily. Sacrum/buttocks no redness. Skin Care Recommendations: 
Assist patient to off load pressure in bed; can use moisturizer to back. Plan: 
Please re-consult is any issues arise for WOC. Andreas Fish RN,CWCN [facility], Wound / Ostomy Department Wound Healing Office [phone]

## 2021-02-23 NOTE — PROGRESS NOTES
Problem: Mobility Impaired (Adult and Pediatric)  Goal: *Acute Goals and Plan of Care (Insert Text)  Description: FUNCTIONAL STATUS PRIOR TO ADMISSION: Patient was independent and active without use of DME. Patient works as a     HOME SUPPORT [address]: The patient lived with his wife but did not require assist.    Physical Therapy Goals  Initiated 2/15/2021  1. Patient will move from supine to sit and sit to supine  in bed with modified independence within 7 day(s). 2.  Patient will transfer from bed to chair and chair to bed with modified independence using the least restrictive device within 7 day(s). 3.  Patient will perform sit to stand with modified independence within 7 day(s). 4.  Patient will ambulate with modified independence for 200 feet with the least restrictive device within 7 day(s). 5.  Patient will ascend/descend 3 stairs with 1 handrail(s) with modified independence within 7 day(s). Note:   PHYSICAL THERAPY TREATMENT  Patient: William Tidwell (65 y.o. male)  Date: 2/23/2021  Diagnosis: Appendicitis [K37]  Appendicitis with perforation [K35.32] <principal problem not specified>  Procedure(s) (LRB):  LAPAROSCOPY GENERAL DIAGNOSTIC with evacuation of hematoma (N/A) 4 Days Post-Op  Precautions:    Chart, physical therapy assessment, plan of care and goals were reviewed. ASSESSMENT  Patient continues with skilled PT services. Pt comes to stand with CGA. Pt ambulated 40ft with RW CGA. Pts SPO2 at 93% on RA with mobility. Pt left sitting. Pt progressing with mobility. Continue goals. Current Level of Function Impacting Discharge (mobility/balance): Pt is CGA sit to stand and ambulating on level surface. PLAN :  Patient continues to benefit from skilled intervention to address the above impairments. Continue treatment per established plan of care. to address goals.     Recommendation for discharge: (in order for the patient to meet his/her long term goals)  Physical therapy at least 2 days/week in the home vs  SNF    This discharge recommendation:  Has been made in collaboration with the attending provider and/or case management    IF patient discharges home will need the following DME: rolling walker       SUBJECTIVE:       OBJECTIVE DATA SUMMARY:   Critical Behavior:  Neurologic State: Alert  Orientation Level: Oriented X4  Cognition: Follows commands     Functional Mobility Training:    Transfers:  Sit to Stand: Contact guard assistance  Stand to Sit: Contact guard assistance                             Balance:  Sitting: Intact  Standing: With support  Standing - Static: Fair;Good  Ambulation/Gait Training:  Distance (ft): 40 Feet (ft)     Ambulation - Level of Assistance: Contact guard assistance        Gait Abnormalities: Decreased step clearance        Base of Support: Narrowed     Speed/Ludmila: Pace decreased (<100 feet/min)  Step Length: Right shortened;Left shortened                  Activity Tolerance:   Fair    After treatment patient left in no apparent distress:   Sitting in chair    COMMUNICATION/COLLABORATION:   The patients plan of care was discussed with: Physical therapist.     Jia Daniel PTA   Time Calculation: 23 mins

## 2021-02-23 NOTE — PROGRESS NOTES
General Surgery Daily Progress Note    Patient: Sheryle Holland MRN: [id number]  SSN: [id number]    YOB: 1960  Age: 62 y.o. Sex: male      Admit Date: 2/13/2021    POD 4 Days Post-Op    Procedure: Procedure(s):  LAPAROSCOPY GENERAL DIAGNOSTIC with evacuation of hematoma    Subjective:   Feeling well  Some sharp RUQ/under R ribcage pain reported  No abd pain, n/v reported  +flatus, -BM  Tolerating current diet  Scrotal edema reported and makes it difficult to urinate- hesitency, but still productive stream  Still SOB with ambulating, but off O2 since this am while in chair/bed    Current Facility-Administered Medications   Medication Dose Route Frequency    potassium chloride SR (KLOR-CON 10) tablet 20 mEq  20 mEq Oral BID    enoxaparin (LOVENOX) injection 105 mg  105 mg SubCUTAneous Q12H    HYDROmorphone (PF) (DILAUDID) injection 1 mg  1 mg IntraVENous Q4H PRN    oxyCODONE IR (ROXICODONE) tablet 10 mg  10 mg Oral Q4H PRN    Or    oxyCODONE IR (ROXICODONE) tablet 15 mg  15 mg Oral Q4H PRN    senna-docusate (PERICOLACE) 8.6-50 mg per tablet 1 Tab  1 Tab Oral BID    famotidine (PEPCID) tablet 20 mg  20 mg Oral ACB&D    bisacodyL (DULCOLAX) suppository 10 mg  10 mg Rectal DAILY PRN    alum-mag hydroxide-simeth (MYLANTA) oral suspension 30 mL  30 mL Oral Q4H PRN    piperacillin-tazobactam (ZOSYN) 3.375 g in 0.9% sodium chloride (MBP/ADV) 100 mL MBP  3.375 g IntraVENous Q8H    oxyCODONE IR (ROXICODONE) tablet 5 mg  5 mg Oral Q4H PRN    albuterol-ipratropium (DUO-NEB) 2.5 MG-0.5 MG/3 ML  3 mL Nebulization Q4H PRN    ondansetron (ZOFRAN) injection 4 mg  4 mg IntraVENous Q6H PRN    acetaminophen (TYLENOL) tablet 1,000 mg  1,000 mg Oral Q8H    diphenhydrAMINE (BENADRYL) capsule 25 mg  25 mg Oral Q6H PRN        Objective:   02/23 0701 - 02/23 1900  In: 400 [I.V.:400]  Out: 350 [Urine:350]  02/21 1901 - 02/23 0700  In: 2603.3 [P.O.:730;  I.V.:1873.3]  Out: 1630 [NPFEH:8156; Drains:90]  Patient Vitals for the past 8 hrs:   BP Temp Pulse Resp SpO2   02/23/21 0800 105/63 98.2 °F (36.8 °C) 88 18 98 %       Physical Exam:  General: Alert, cooperative, NAD  Lungs: CTA bilat  Abdomen: Soft, distended, tender RUQ, incision sites c/d but tender with raised bullas from fluid retention and adhesive irritation. Incisions C/D/I. Bilat JPs serosang output- RJP lighter in color than LJP  Extremities: Warm, moves all, lower extrem pitting edema 2+  Urinary:          Scrotal edema  Skin:  Weeping wounds on back, abdomen near incision sites    Labs:   Recent Labs     02/23/21  0204   WBC 17.4*   HGB 8.2*   HCT 25.7*        Recent Labs     02/23/21  0204      K 3.4*      CO2 28   *   BUN 13   CREA 0.79   CA 7.4*   ALB 1.7*   TBILI 0.5   ALT 26       Assessment / Plan:     POD 4 Days Post-Op    Procedure: Procedure(s):  LAPAROSCOPY GENERAL DIAGNOSTIC with evacuation of hematoma  Labs- cont to monitor        -hgb stable        - leukocytosis   Add Oral K 20meq BID daily for continued hypokalemia  VSS, but BP on lower end- if cont edema and third spacing consider diuretic tomorrow  Cont current diet  Keep lovenox- will need to transition to oral anticoag at or before d/c  Cont Aggressive PT/OT- will most likley need home PT/OT vs SNF  Will need rolling walker for D/C      Pt seen and discussed with Dr Estephania Adorno, NP    Still has some pain, but controlled with meds. Third spacinng, bulla around waist.  Vitals appropriate. Hb stable. Activity as tolerated. Will see what weight is/UOP tomorrow and decide on diuresis.

## 2021-02-23 NOTE — PROGRESS NOTES
Bedside and Verbal shift change report given to [address] KAREN Parada (oncoming nurse) by Rylie Grider RN (offgoing nurse). Report included the following information SBAR, Kardex, Intake/Output, MAR and Recent Results.

## 2021-02-24 ENCOUNTER — APPOINTMENT (OUTPATIENT)
Dept: CT IMAGING | Age: 61
DRG: 853 | End: 2021-02-24
Attending: INTERNAL MEDICINE

## 2021-02-24 ENCOUNTER — APPOINTMENT (OUTPATIENT)
Dept: GENERAL RADIOLOGY | Age: 61
DRG: 853 | End: 2021-02-24
Attending: NURSE PRACTITIONER

## 2021-02-24 LAB
ALBUMIN SERPL-MCNC: 1.7 G/DL (ref 3.5–5)
ALBUMIN/GLOB SERPL: 0.4 {RATIO} (ref 1.1–2.2)
ALP SERPL-CCNC: 98 U/L (ref 45–117)
ALT SERPL-CCNC: 30 U/L (ref 12–78)
ANION GAP SERPL CALC-SCNC: 4 MMOL/L (ref 5–15)
APPEARANCE UR: CLEAR
AST SERPL-CCNC: 25 U/L (ref 15–37)
BACTERIA URNS QL MICRO: NEGATIVE /HPF
BASOPHILS # BLD: 0 K/UL (ref 0–0.1)
BASOPHILS NFR BLD: 0 % (ref 0–1)
BILIRUB SERPL-MCNC: 0.5 MG/DL (ref 0.2–1)
BILIRUB UR QL CFM: NEGATIVE
BNP SERPL-MCNC: 349 PG/ML
BUN SERPL-MCNC: 14 MG/DL (ref 6–20)
BUN/CREAT SERPL: 18 (ref 12–20)
CALCIUM SERPL-MCNC: 7.4 MG/DL (ref 8.5–10.1)
CHLORIDE SERPL-SCNC: 105 MMOL/L (ref 97–108)
CO2 SERPL-SCNC: 28 MMOL/L (ref 21–32)
COLOR UR: ABNORMAL
CREAT SERPL-MCNC: 0.79 MG/DL (ref 0.7–1.3)
DIFFERENTIAL METHOD BLD: ABNORMAL
EOSINOPHIL # BLD: 0.4 K/UL (ref 0–0.4)
EOSINOPHIL NFR BLD: 2 % (ref 0–7)
EPITH CASTS URNS QL MICRO: ABNORMAL /LPF
ERYTHROCYTE [DISTWIDTH] IN BLOOD BY AUTOMATED COUNT: 13.8 % (ref 11.5–14.5)
GLOBULIN SER CALC-MCNC: 3.8 G/DL (ref 2–4)
GLUCOSE SERPL-MCNC: 100 MG/DL (ref 65–100)
GLUCOSE UR STRIP.AUTO-MCNC: NEGATIVE MG/DL
HCT VFR BLD AUTO: 24.6 % (ref 36.6–50.3)
HGB BLD-MCNC: 7.8 G/DL (ref 12.1–17)
HGB UR QL STRIP: NEGATIVE
HYALINE CASTS URNS QL MICRO: ABNORMAL /LPF (ref 0–5)
IMM GRANULOCYTES # BLD AUTO: 0 K/UL
IMM GRANULOCYTES NFR BLD AUTO: 0 %
KETONES UR QL STRIP.AUTO: ABNORMAL MG/DL
LEUKOCYTE ESTERASE UR QL STRIP.AUTO: NEGATIVE
LYMPHOCYTES # BLD: 2.6 K/UL (ref 0.8–3.5)
LYMPHOCYTES NFR BLD: 14 % (ref 12–49)
MCH RBC QN AUTO: 29.9 PG (ref 26–34)
MCHC RBC AUTO-ENTMCNC: 31.7 G/DL (ref 30–36.5)
MCV RBC AUTO: 94.3 FL (ref 80–99)
METAMYELOCYTES NFR BLD MANUAL: 1 %
MONOCYTES # BLD: 1.1 K/UL (ref 0–1)
MONOCYTES NFR BLD: 6 % (ref 5–13)
NEUTS SEG # BLD: 14.4 K/UL (ref 1.8–8)
NEUTS SEG NFR BLD: 77 % (ref 32–75)
NITRITE UR QL STRIP.AUTO: NEGATIVE
NRBC # BLD: 0 K/UL (ref 0–0.01)
NRBC BLD-RTO: 0 PER 100 WBC
PH UR STRIP: 6 [PH] (ref 5–8)
PLATELET # BLD AUTO: 454 K/UL (ref 150–400)
PMV BLD AUTO: 9.4 FL (ref 8.9–12.9)
POTASSIUM SERPL-SCNC: 3.7 MMOL/L (ref 3.5–5.1)
PROCALCITONIN SERPL-MCNC: 0.62 NG/ML
PROT SERPL-MCNC: 5.5 G/DL (ref 6.4–8.2)
PROT UR STRIP-MCNC: 30 MG/DL
RBC # BLD AUTO: 2.61 M/UL (ref 4.1–5.7)
RBC #/AREA URNS HPF: ABNORMAL /HPF (ref 0–5)
RBC MORPH BLD: ABNORMAL
SODIUM SERPL-SCNC: 137 MMOL/L (ref 136–145)
SP GR UR REFRACTOMETRY: 1.03 (ref 1–1.03)
UROBILINOGEN UR QL STRIP.AUTO: 1 EU/DL (ref 0.2–1)
WBC # BLD AUTO: 18.7 K/UL (ref 4.1–11.1)
WBC MORPH BLD: ABNORMAL
WBC URNS QL MICRO: ABNORMAL /HPF (ref 0–4)

## 2021-02-24 PROCEDURE — 74011250636 HC RX REV CODE- 250/636: Performed by: SURGERY

## 2021-02-24 PROCEDURE — 83880 ASSAY OF NATRIURETIC PEPTIDE: CPT

## 2021-02-24 PROCEDURE — 71250 CT THORAX DX C-: CPT

## 2021-02-24 PROCEDURE — 65270000029 HC RM PRIVATE

## 2021-02-24 PROCEDURE — 87205 SMEAR GRAM STAIN: CPT

## 2021-02-24 PROCEDURE — 74011250637 HC RX REV CODE- 250/637: Performed by: NURSE PRACTITIONER

## 2021-02-24 PROCEDURE — 87186 SC STD MICRODIL/AGAR DIL: CPT

## 2021-02-24 PROCEDURE — 94760 N-INVAS EAR/PLS OXIMETRY 1: CPT

## 2021-02-24 PROCEDURE — 85025 COMPLETE CBC W/AUTO DIFF WBC: CPT

## 2021-02-24 PROCEDURE — 87040 BLOOD CULTURE FOR BACTERIA: CPT

## 2021-02-24 PROCEDURE — 36415 COLL VENOUS BLD VENIPUNCTURE: CPT

## 2021-02-24 PROCEDURE — 74011000258 HC RX REV CODE- 258: Performed by: SURGERY

## 2021-02-24 PROCEDURE — 80053 COMPREHEN METABOLIC PANEL: CPT

## 2021-02-24 PROCEDURE — 84145 PROCALCITONIN (PCT): CPT

## 2021-02-24 PROCEDURE — 81001 URINALYSIS AUTO W/SCOPE: CPT

## 2021-02-24 PROCEDURE — 74011250636 HC RX REV CODE- 250/636: Performed by: INTERNAL MEDICINE

## 2021-02-24 PROCEDURE — 74011250637 HC RX REV CODE- 250/637: Performed by: SURGERY

## 2021-02-24 PROCEDURE — 87077 CULTURE AEROBIC IDENTIFY: CPT

## 2021-02-24 PROCEDURE — P9047 ALBUMIN (HUMAN), 25%, 50ML: HCPCS | Performed by: INTERNAL MEDICINE

## 2021-02-24 PROCEDURE — 87086 URINE CULTURE/COLONY COUNT: CPT

## 2021-02-24 PROCEDURE — 97110 THERAPEUTIC EXERCISES: CPT

## 2021-02-24 PROCEDURE — 71045 X-RAY EXAM CHEST 1 VIEW: CPT

## 2021-02-24 RX ORDER — FUROSEMIDE 10 MG/ML
20 INJECTION INTRAMUSCULAR; INTRAVENOUS 2 TIMES DAILY
Status: DISCONTINUED | OUTPATIENT
Start: 2021-02-24 | End: 2021-02-28

## 2021-02-24 RX ORDER — ALBUMIN HUMAN 250 G/1000ML
25 SOLUTION INTRAVENOUS EVERY 6 HOURS
Status: DISCONTINUED | OUTPATIENT
Start: 2021-02-24 | End: 2021-02-25

## 2021-02-24 RX ADMIN — FAMOTIDINE 20 MG: 20 TABLET ORAL at 06:30

## 2021-02-24 RX ADMIN — OXYCODONE 5 MG: 5 TABLET ORAL at 11:47

## 2021-02-24 RX ADMIN — ONDANSETRON 4 MG: 2 INJECTION INTRAMUSCULAR; INTRAVENOUS at 20:28

## 2021-02-24 RX ADMIN — ENOXAPARIN SODIUM 105 MG: 120 INJECTION SUBCUTANEOUS at 18:00

## 2021-02-24 RX ADMIN — OXYCODONE 10 MG: 5 TABLET ORAL at 01:54

## 2021-02-24 RX ADMIN — Medication 1000 MG: at 14:00

## 2021-02-24 RX ADMIN — PIPERACILLIN AND TAZOBACTAM 3.38 G: 3; .375 INJECTION, POWDER, LYOPHILIZED, FOR SOLUTION INTRAVENOUS at 01:38

## 2021-02-24 RX ADMIN — PIPERACILLIN AND TAZOBACTAM 3.38 G: 3; .375 INJECTION, POWDER, LYOPHILIZED, FOR SOLUTION INTRAVENOUS at 09:46

## 2021-02-24 RX ADMIN — Medication 1000 MG: at 06:30

## 2021-02-24 RX ADMIN — FUROSEMIDE 20 MG: 10 INJECTION, SOLUTION INTRAMUSCULAR; INTRAVENOUS at 18:06

## 2021-02-24 RX ADMIN — OXYCODONE 10 MG: 5 TABLET ORAL at 06:30

## 2021-02-24 RX ADMIN — POTASSIUM CHLORIDE 20 MEQ: 750 TABLET, FILM COATED, EXTENDED RELEASE ORAL at 09:57

## 2021-02-24 RX ADMIN — FAMOTIDINE 20 MG: 20 TABLET ORAL at 16:30

## 2021-02-24 RX ADMIN — POTASSIUM CHLORIDE 20 MEQ: 750 TABLET, FILM COATED, EXTENDED RELEASE ORAL at 18:06

## 2021-02-24 RX ADMIN — DOCUSATE SODIUM 50 MG AND SENNOSIDES 8.6 MG 1 TABLET: 8.6; 5 TABLET, FILM COATED ORAL at 18:06

## 2021-02-24 RX ADMIN — ENOXAPARIN SODIUM 105 MG: 120 INJECTION SUBCUTANEOUS at 06:32

## 2021-02-24 RX ADMIN — PIPERACILLIN AND TAZOBACTAM 3.38 G: 3; .375 INJECTION, POWDER, LYOPHILIZED, FOR SOLUTION INTRAVENOUS at 17:00

## 2021-02-24 RX ADMIN — DOCUSATE SODIUM 50 MG AND SENNOSIDES 8.6 MG 1 TABLET: 8.6; 5 TABLET, FILM COATED ORAL at 09:56

## 2021-02-24 RX ADMIN — ALBUMIN (HUMAN) 25 G: 0.25 INJECTION, SOLUTION INTRAVENOUS at 22:20

## 2021-02-24 RX ADMIN — Medication 1000 MG: at 21:59

## 2021-02-24 RX ADMIN — OXYCODONE 5 MG: 5 TABLET ORAL at 17:32

## 2021-02-24 RX ADMIN — OXYCODONE 5 MG: 5 TABLET ORAL at 21:59

## 2021-02-24 NOTE — CONSULTS
Consult History and Physical Exam    NAME:  Simi Palomo   :   1960   MRN:  [id number]     PCP:  None   Referring: Mercedes Lock MD     Date/Time:  2021         Subjective:   REASON FOR CONSULT: Dyspnea, anasarca    CHIEF COMPLAINT: \"I'm just so swollen\"      HISTORY OF PRESENT ILLNESS:     Mr. Zhane Matthews is a 62 y.o. presented w/ a perforated appendicitis s/p cecectomy . Complicated by BERONICA, hypoxia, acute bilateral PEs. Medicine reconsulted for persistent dyspnea, anasarca and medical management. Per pt, does feel SOB particularly with exertion. Having a difficult time urinating and ambulating 2/2 marked scrotal and LE edema. Denies CP. Has been on Lovenox for PE.     PMH   Pulmonary embolism   Appendicitis     Past Surgical History:   Procedure Laterality Date    HX HEENT      toncills       Social History     Tobacco Use    Smoking status: Never Smoker    Smokeless tobacco: Never Used   Substance Use Topics    Alcohol use: No        Family History   Problem Relation Age of Onset    Diabetes Mother     Diabetes Father         No Known Allergies     Prior to Admission medications    Medication Sig Start Date End Date Taking? Authorizing Provider   ondansetron (ZOFRAN ODT) 4 mg disintegrating tablet Take 1 Tab by mouth every six (6) hours as needed for Nausea. Indications: prevent nausea and vomiting after surgery 21  Yes Mercedes Lock MD   [facility] WITH [facility]INE) 6.25-10 mg/5 mL syrup Take 5 mL by mouth every six (6) hours as needed for Cough. Max Daily Amount: 20 mL. 3/21/17   Adri Milan MD         Review of Systems:  (bold if positive, if negative)    Gen:  Eyes:  ENT:  CVS:  Pulm:  GI:    :    MS:  Skin:  Psych:  Endo:    Hem:  Renal:    Neuro:      Ab pain, constipation, dyspnea, edema        Objective:      VITALS:    Vital signs reviewed; most recent are:    Visit Vitals  BP 98/60 (BP 1 Location: Left arm, BP Patient Position: At rest) Pulse 92   Temp 98.2 °F (36.8 °C)   Resp 18   Ht 5' 9\" (1.753 m)   Wt 116.6 kg (257 lb)   SpO2 96%   BMI 37.95 kg/m²     SpO2 Readings from Last 6 Encounters:   02/24/21 96%   03/21/17 95%   06/15/14 98%    O2 Flow Rate (L/min): 2 l/min       Intake/Output Summary (Last 24 hours) at 2/24/2021 1824  Last data filed at 2/24/2021 1559  Gross per 24 hour   Intake 540 ml   Output 380 ml   Net 160 ml            Exam:     Physical Exam:    Gen:  Well-developed, well-nourished, in no acute distress  HEENT:  Pink conjunctivae, PERRL, hearing intact to voice, moist mucous membranes  Neck:  Supple, without masses, thyroid non-tender  Resp: Bibasilar crackles   Card:  No murmurs, normal S1, S2 without thrills, bruits. Diffusely anasarcic. Scrotal edema and 3+ bilateral pitting edema of LE's noted   Abd: tense, firm, distended. Minimally tender. Normoactive BS. ALMA ROSA drain in place   Lymph:  No cervical adenopathy  Musc:  No cyanosis or clubbing  Skin:  No rashes or ulcers, skin turgor is good  Neuro:  Cranial nerves 3-12 are grossly intact,  strength is 5/5 bilaterally, dorsi / plantarflexion strength is 5/5 bilaterally, follows commands appropriately  Psych:  Alert with good insight. Oriented to person, place, and time       Labs:    Recent Labs     02/24/21  0627   WBC 18.7*   HGB 7.8*   HCT 24.6*   *     Recent Labs     02/24/21  0627      K 3.7      CO2 28      BUN 14   CREA 0.79   CA 7.4*   ALB 1.7*   ALT 30     No components found for: GLPOC    No results for input(s): INR, INREXT in the last 72 hours. Assessment/Plan:     61 yo otherwise healthy, presented w/ a perforated appendicitis s/p cecectomy 02/13. Complicated by BERONICA, hypoxia, acute bilateral PEs. Medicine is following as a consultant    1) Anasarca - likely 2/2 hypoalbuminemia from poor nutritional status + IVF's/IV antibiotics over the course of his hospitalization. Recent TTE with preserved EF.  With marginal BP's, diuresis will gently diurese  -start albumin   -IV lasix   -strict I's and O's   -daily weights   -encourage optimization of nutritional status   -continue Lovenox for PE as likely some of his dyspnea is from this   -will need eval for hypoxia prior to d/c      2) Bilateral PEs: Chest CT on 02/15 confirmed bilateral PEs, likely due to prior hospitalization. LE dopplers neg. Cont Lovenox, transition to Eliquis on discharge. Provoked therefore would anticoagulate for 3-6 months   -as above on Lovenox   -eval for hypoxia prior to d/c      3) Perforated appendicitis: s/p cecectomy 02/13. Management per Gen surg     4) Acute blood loss anemia/Fe def anemia: due to surgery. S/p IV iron.      5) GERD: [id number]     6) Constipation: check CT ab/pelvis to ensure no acute process; discussed with surgery     7) Leukocytosis: on zosyn; UA not c/w infection. CT as follows:   \"1. Small right pleural effusion. 2. Right greater than left posterior bibasilar consolidation versus atelectasis,  with air bronchograms. 3. Mild ascites. \"   -check procalcitonin  -trend WBC count.  No fevers but if WBC worsens may need to broaden antibiotic coverage considering hospitalization  -start incentive spirometry   -mobilize     Total time spent with patient: [address] [place] discussed with: Patient, Family, Nursing Staff, Consultant/Specialist and >50% of time spent in counseling and coordination of care    Discussed:  Code Status, Care Plan and D/C Planning    Prophylaxis:  Lovenox           ___________________________________________________    Attending Physician: Neida Singh MD

## 2021-02-24 NOTE — PROGRESS NOTES
General Surgery Daily Progress Note    Patient: Larry Ritter MRN: [id number]  SSN: [id number]    YOB: 1960  Age: 62 y.o.   Sex: male      Admit Date: 2/13/2021    POD 5 Days Post-Op    Procedure: Procedure(s):  LAPAROSCOPY GENERAL DIAGNOSTIC with evacuation of hematoma    Subjective:   Feeling \"weird\" today  Continued sharp pains in RQ of ab both upper and lower, some left lower ab/pelvic pain  Today with complaints of back pain most lower in band-like distribution    No n/v, no flatus in approx 24 hours, no BMs  Tolerating current diet    Scrotal edema reported and makes it difficult to urinate- hesitency, but still productive stream  Still SOB while moving especially, no chest pain or palpitations reported    Current Facility-Administered Medications   Medication Dose Route Frequency    potassium chloride SR (KLOR-CON 10) tablet 20 mEq  20 mEq Oral BID    enoxaparin (LOVENOX) injection 105 mg  105 mg SubCUTAneous Q12H    HYDROmorphone (PF) (DILAUDID) injection 1 mg  1 mg IntraVENous Q4H PRN    oxyCODONE IR (ROXICODONE) tablet 10 mg  10 mg Oral Q4H PRN    Or    oxyCODONE IR (ROXICODONE) tablet 15 mg  15 mg Oral Q4H PRN    senna-docusate (PERICOLACE) 8.6-50 mg per tablet 1 Tab  1 Tab Oral BID    famotidine (PEPCID) tablet 20 mg  20 mg Oral ACB&D    bisacodyL (DULCOLAX) suppository 10 mg  10 mg Rectal DAILY PRN    alum-mag hydroxide-simeth (MYLANTA) oral suspension 30 mL  30 mL Oral Q4H PRN    piperacillin-tazobactam (ZOSYN) 3.375 g in 0.9% sodium chloride (MBP/ADV) 100 mL MBP  3.375 g IntraVENous Q8H    oxyCODONE IR (ROXICODONE) tablet 5 mg  5 mg Oral Q4H PRN    albuterol-ipratropium (DUO-NEB) 2.5 MG-0.5 MG/3 ML  3 mL Nebulization Q4H PRN    ondansetron (ZOFRAN) injection 4 mg  4 mg IntraVENous Q6H PRN    acetaminophen (TYLENOL) tablet 1,000 mg  1,000 mg Oral Q8H    diphenhydrAMINE (BENADRYL) capsule 25 mg  25 mg Oral Q6H PRN        Objective:   02/24 0701 - 02/24 1900  In: 300 [I.V.:300]  Out: 300 [Urine:300]  02/22 1901 - 02/24 0700  In: 900 [P.O.:400; I.V.:500]  Out: 840 [Urine:750; Drains:90]  Patient Vitals for the past 8 hrs:   BP Temp Pulse Resp SpO2   02/24/21 0705 (!) 100/58 97.9 °F (36.6 °C) 78 18 94 %   02/24/21 0417 102/66 98.9 °F (37.2 °C) 86 18 96 %       Physical Exam:  General: Alert, cooperative, NAD  Lungs: CTA bilat, inc work of breathing while on back or moving  Abdomen: Distended, TTP mostly RLQ but some in RUQ, NO TTP in left abd quads, bilat ALMA ROSA drains sutured in place with serosang output unchanged appearance from yesterday,   incision and drain sites with serous drainage around tubes, erythema immediately surrounding incisions and tube sites,  raised bullas from fluid retention and adhesive irritation over ant lower abd. Extremities: Warm, moves all, lower extrem pitting edema 2-3+  Urinary:           Mod Scrotal edema  Skin:  Weeping incision sites with serous fluid, bullas on ant lower abd, left lower hip/pelvic groin fold with petechiae and purpura noted, red non-confluent min raised follicular appearing rash on back- no drainage noted    Labs:   Recent Labs     02/24/21  0627   WBC 18.7*   HGB 7.8*   HCT 24.6*   *     Recent Labs     02/24/21  0627      K 3.7      CO2 28      BUN 14   CREA 0.79   CA 7.4*   ALB 1.7*   TBILI 0.5   ALT 30       Assessment / Plan:     POD 4 Days Post-Op    Procedure: Procedure(s):  LAPAROSCOPY GENERAL DIAGNOSTIC with evacuation of hematoma  Labs- cont to monitor        -hgb slight drop        -inc WBC today        - K stable on BID oral repletion- watch carefully   Consult Dr Valeri Bland ID for inc leukocytosis  Portable CXR  Urinalysis/Cx  Blood cultures x 2  ALMA ROSA drain-body fluid cultures x2    Consider diuretics today- inc third spacing with bulla and extrem edema  PO pain meds- consider relistor for bowel function  On lovenox for known PE, pulm infarcts- will need to transition to eliquis at or before DC      Cont Aggressive PT/OT- will most likley need home PT/OT vs SNF  Will need rolling walker and most likely O2 for D/C      Pt seen and discussed with Dr Rosas Gaytan, NP     Agree. Will ask Hospitalist and ID to weigh in.  Clinically stable.

## 2021-02-24 NOTE — PROGRESS NOTES
Problem: Mobility Impaired (Adult and Pediatric)  Goal: *Acute Goals and Plan of Care (Insert Text)  Description: FUNCTIONAL STATUS PRIOR TO ADMISSION: Patient was independent and active without use of DME. Patient works as a     HOME SUPPORT [address]: The patient lived with his wife but did not require assist.    Physical Therapy Goals  Initiated 2/15/2021  1. Patient will move from supine to sit and sit to supine  in bed with modified independence within 7 day(s). 2.  Patient will transfer from bed to chair and chair to bed with modified independence using the least restrictive device within 7 day(s). 3.  Patient will perform sit to stand with modified independence within 7 day(s). 4.  Patient will ambulate with modified independence for 200 feet with the least restrictive device within 7 day(s). 5.  Patient will ascend/descend 3 stairs with 1 handrail(s) with modified independence within 7 day(s). Note:   PHYSICAL THERAPY TREATMENT  Patient: Camila Contreras (65 y.o. male)  Date: 2/24/2021  Diagnosis: Appendicitis [K37]  Appendicitis with perforation [K35.32] <principal problem not specified>  Procedure(s) (LRB):  LAPAROSCOPY GENERAL DIAGNOSTIC with evacuation of hematoma (N/A) 5 Days Post-Op  Precautions:    Chart, physical therapy assessment, plan of care and goals were reviewed. ASSESSMENT  Patient continues with skilled PT services. Pt not up to PT earlier today and requested PT return later. Pt back to bed on second visit  and now not up to getting up and ambulating with PT. Pt agreed to and performed LE heel slides ankle pumps and hip abd/add with min assist and cues. Pt tolerated treatment fairly well. PT will follow in AM.       PLAN :  Patient continues to benefit from skilled intervention to address the above impairments. Continue treatment per established plan of care. to address goals.     Recommendation for discharge: (in order for the patient to meet his/her long term goals)  Physical therapy at least 2 days/week in the home     This discharge recommendation:  Has been made in collaboration with the attending provider and/or case management    IF patient discharges home will need the following DME: rolling walker       SUBJECTIVE:       OBJECTIVE DATA SUMMARY:   Critical Behavior:  Neurologic State: Alert  Orientation Level: Oriented X4  Cognition: Appropriate decision making, Appropriate safety awareness, Appropriate for age attention/concentration, Follows commands                Therapeutic Exercises:   LE heel slides ankle pumps and hip abd/add with cues.     Activity Tolerance:   Fair    After treatment patient left in no apparent distress:   Supine in bed    COMMUNICATION/COLLABORATION:   The patients plan of care was discussed with: Physical therapist.     Alexi Stevenson PTA   Time Calculation: 12 mins

## 2021-02-24 NOTE — PROGRESS NOTES
Bedside and Verbal shift change report given to Susi Alejandre RN (oncoming nurse) by Enriqueta Aleman RN (offgoing nurse). Report included the following information SBAR, Kardex, Intake/Output, MAR and Recent Results.

## 2021-02-24 NOTE — PROGRESS NOTES
Problem: Falls - Risk of  Goal: *Absence of Falls  Description: Document Jared Canales Fall Risk and appropriate interventions in the flowsheet. Outcome: Progressing Towards Goal  Note: Fall Risk Interventions:            Medication Interventions: Teach patient to arise slowly, Patient to call before getting OOB, Evaluate medications/consider consulting pharmacy    Elimination Interventions:  Toileting schedule/hourly rounds, Urinal in reach, Call light in reach, Bed/chair exit alarm    History of Falls Interventions: Consult care management for discharge planning, Vital signs minimum Q4HRs X 24 hrs (comment for end date)         Problem: Patient Education: Go to Patient Education Activity  Goal: Patient/Family Education  Outcome: Progressing Towards Goal     Problem: Patient Education: Go to Patient Education Activity  Goal: Patient/Family Education  Outcome: Progressing Towards Goal     Problem: Patient Education: Go to Patient Education Activity  Goal: Patient/Family Education  Outcome: Progressing Towards Goal

## 2021-02-24 NOTE — PROGRESS NOTES
Bedside and Verbal shift change report given to Monica [address]ca 57. (oncoming nurse) by Gary Bourgeois RN (offgoing nurse). Report included the following information SBAR, Kardex, ED Summary, OR Summary, Procedure Summary, Intake/Output, MAR, Recent Results, Alarm Parameters  and Quality Measures.

## 2021-02-25 ENCOUNTER — HOSPITAL ENCOUNTER (OUTPATIENT)
Dept: CT IMAGING | Age: 61
Discharge: HOME | DRG: 853 | End: 2021-02-25
Attending: INTERNAL MEDICINE | Admitting: SURGERY

## 2021-02-25 LAB
ALBUMIN SERPL-MCNC: 2.4 G/DL (ref 3.5–5)
ALBUMIN/GLOB SERPL: 0.6 {RATIO} (ref 1.1–2.2)
ALP SERPL-CCNC: 103 U/L (ref 45–117)
ALT SERPL-CCNC: 37 U/L (ref 12–78)
ANION GAP SERPL CALC-SCNC: 6 MMOL/L (ref 5–15)
AST SERPL-CCNC: 28 U/L (ref 15–37)
BACTERIA SPEC CULT: NORMAL
BASOPHILS # BLD: 0 K/UL (ref 0–0.1)
BASOPHILS NFR BLD: 0 % (ref 0–1)
BILIRUB SERPL-MCNC: 0.6 MG/DL (ref 0.2–1)
BUN SERPL-MCNC: 14 MG/DL (ref 6–20)
BUN/CREAT SERPL: 17 (ref 12–20)
CALCIUM SERPL-MCNC: 7.6 MG/DL (ref 8.5–10.1)
CHLORIDE SERPL-SCNC: 104 MMOL/L (ref 97–108)
CO2 SERPL-SCNC: 29 MMOL/L (ref 21–32)
COMMENT, HOLDF: NORMAL
CREAT SERPL-MCNC: 0.84 MG/DL (ref 0.7–1.3)
DIFFERENTIAL METHOD BLD: ABNORMAL
EOSINOPHIL # BLD: 0.1 K/UL (ref 0–0.4)
EOSINOPHIL NFR BLD: 1 % (ref 0–7)
ERYTHROCYTE [DISTWIDTH] IN BLOOD BY AUTOMATED COUNT: 13.9 % (ref 11.5–14.5)
GLOBULIN SER CALC-MCNC: 3.8 G/DL (ref 2–4)
GLUCOSE SERPL-MCNC: 101 MG/DL (ref 65–100)
HCT VFR BLD AUTO: 23.1 % (ref 36.6–50.3)
HGB BLD-MCNC: 7.4 G/DL (ref 12.1–17)
IMM GRANULOCYTES # BLD AUTO: 0.2 K/UL (ref 0–0.04)
IMM GRANULOCYTES NFR BLD AUTO: 1 % (ref 0–0.5)
LYMPHOCYTES # BLD: 1.6 K/UL (ref 0.8–3.5)
LYMPHOCYTES NFR BLD: 10 % (ref 12–49)
MAGNESIUM SERPL-MCNC: 2.4 MG/DL (ref 1.6–2.4)
MCH RBC QN AUTO: 30 PG (ref 26–34)
MCHC RBC AUTO-ENTMCNC: 32 G/DL (ref 30–36.5)
MCV RBC AUTO: 93.5 FL (ref 80–99)
MONOCYTES # BLD: 0.9 K/UL (ref 0–1)
MONOCYTES NFR BLD: 6 % (ref 5–13)
NEUTS SEG # BLD: 13 K/UL (ref 1.8–8)
NEUTS SEG NFR BLD: 82 % (ref 32–75)
NRBC # BLD: 0 K/UL (ref 0–0.01)
NRBC BLD-RTO: 0 PER 100 WBC
PHOSPHATE SERPL-MCNC: 3.2 MG/DL (ref 2.6–4.7)
PLATELET # BLD AUTO: 475 K/UL (ref 150–400)
PMV BLD AUTO: 9.9 FL (ref 8.9–12.9)
POTASSIUM SERPL-SCNC: 3.5 MMOL/L (ref 3.5–5.1)
PROT SERPL-MCNC: 6.2 G/DL (ref 6.4–8.2)
RBC # BLD AUTO: 2.47 M/UL (ref 4.1–5.7)
SAMPLES BEING HELD,HOLD: NORMAL
SERVICE CMNT-IMP: NORMAL
SODIUM SERPL-SCNC: 139 MMOL/L (ref 136–145)
WBC # BLD AUTO: 15.8 K/UL (ref 4.1–11.1)

## 2021-02-25 PROCEDURE — 99223 1ST HOSP IP/OBS HIGH 75: CPT | Performed by: INTERNAL MEDICINE

## 2021-02-25 PROCEDURE — 74011250636 HC RX REV CODE- 250/636: Performed by: INTERNAL MEDICINE

## 2021-02-25 PROCEDURE — 74011250636 HC RX REV CODE- 250/636: Performed by: SURGERY

## 2021-02-25 PROCEDURE — 74011000258 HC RX REV CODE- 258: Performed by: SURGERY

## 2021-02-25 PROCEDURE — 85025 COMPLETE CBC W/AUTO DIFF WBC: CPT

## 2021-02-25 PROCEDURE — 74011000636 HC RX REV CODE- 636: Performed by: RADIOLOGY

## 2021-02-25 PROCEDURE — 97116 GAIT TRAINING THERAPY: CPT

## 2021-02-25 PROCEDURE — 74011000250 HC RX REV CODE- 250: Performed by: INTERNAL MEDICINE

## 2021-02-25 PROCEDURE — 74011250636 HC RX REV CODE- 250/636: Performed by: NURSE PRACTITIONER

## 2021-02-25 PROCEDURE — 94760 N-INVAS EAR/PLS OXIMETRY 1: CPT

## 2021-02-25 PROCEDURE — 97530 THERAPEUTIC ACTIVITIES: CPT

## 2021-02-25 PROCEDURE — 36415 COLL VENOUS BLD VENIPUNCTURE: CPT

## 2021-02-25 PROCEDURE — 65270000029 HC RM PRIVATE

## 2021-02-25 PROCEDURE — 74177 CT ABD & PELVIS W/CONTRAST: CPT

## 2021-02-25 PROCEDURE — 74011250637 HC RX REV CODE- 250/637: Performed by: NURSE PRACTITIONER

## 2021-02-25 PROCEDURE — 84100 ASSAY OF PHOSPHORUS: CPT

## 2021-02-25 PROCEDURE — P9047 ALBUMIN (HUMAN), 25%, 50ML: HCPCS | Performed by: INTERNAL MEDICINE

## 2021-02-25 PROCEDURE — 74011250637 HC RX REV CODE- 250/637: Performed by: SURGERY

## 2021-02-25 PROCEDURE — 83735 ASSAY OF MAGNESIUM: CPT

## 2021-02-25 PROCEDURE — 80053 COMPREHEN METABOLIC PANEL: CPT

## 2021-02-25 RX ORDER — ALBUMIN HUMAN 250 G/1000ML
25 SOLUTION INTRAVENOUS EVERY 6 HOURS
Status: COMPLETED | OUTPATIENT
Start: 2021-02-25 | End: 2021-02-26

## 2021-02-25 RX ORDER — METRONIDAZOLE 500 MG/100ML
500 INJECTION, SOLUTION INTRAVENOUS EVERY 12 HOURS
Status: DISCONTINUED | OUTPATIENT
Start: 2021-02-25 | End: 2021-03-05 | Stop reason: HOSPADM

## 2021-02-25 RX ORDER — ENOXAPARIN SODIUM 150 MG/ML
105 INJECTION SUBCUTANEOUS EVERY 12 HOURS
Status: COMPLETED | OUTPATIENT
Start: 2021-02-25 | End: 2021-02-25

## 2021-02-25 RX ORDER — ENOXAPARIN SODIUM 150 MG/ML
105 INJECTION SUBCUTANEOUS EVERY 12 HOURS
Status: DISCONTINUED | OUTPATIENT
Start: 2021-02-26 | End: 2021-02-28

## 2021-02-25 RX ADMIN — DOCUSATE SODIUM 50 MG AND SENNOSIDES 8.6 MG 1 TABLET: 8.6; 5 TABLET, FILM COATED ORAL at 17:28

## 2021-02-25 RX ADMIN — IOHEXOL 50 ML: 240 INJECTION, SOLUTION INTRATHECAL; INTRAVASCULAR; INTRAVENOUS; ORAL at 08:26

## 2021-02-25 RX ADMIN — CEFEPIME HYDROCHLORIDE 2 G: 2 INJECTION, POWDER, FOR SOLUTION INTRAVENOUS at 22:00

## 2021-02-25 RX ADMIN — FUROSEMIDE 20 MG: 10 INJECTION, SOLUTION INTRAMUSCULAR; INTRAVENOUS at 17:28

## 2021-02-25 RX ADMIN — POTASSIUM CHLORIDE 20 MEQ: 750 TABLET, FILM COATED, EXTENDED RELEASE ORAL at 08:27

## 2021-02-25 RX ADMIN — FUROSEMIDE 20 MG: 10 INJECTION, SOLUTION INTRAMUSCULAR; INTRAVENOUS at 08:27

## 2021-02-25 RX ADMIN — CEFEPIME HYDROCHLORIDE 2 G: 2 INJECTION, POWDER, FOR SOLUTION INTRAVENOUS at 14:56

## 2021-02-25 RX ADMIN — FAMOTIDINE 20 MG: 20 TABLET ORAL at 06:42

## 2021-02-25 RX ADMIN — METRONIDAZOLE 500 MG: 500 INJECTION, SOLUTION INTRAVENOUS at 14:56

## 2021-02-25 RX ADMIN — ONDANSETRON 4 MG: 2 INJECTION INTRAMUSCULAR; INTRAVENOUS at 05:43

## 2021-02-25 RX ADMIN — OXYCODONE 10 MG: 5 TABLET ORAL at 17:36

## 2021-02-25 RX ADMIN — OXYCODONE 15 MG: 5 TABLET ORAL at 23:52

## 2021-02-25 RX ADMIN — ENOXAPARIN SODIUM 105 MG: 120 INJECTION SUBCUTANEOUS at 05:44

## 2021-02-25 RX ADMIN — DOCUSATE SODIUM 50 MG AND SENNOSIDES 8.6 MG 1 TABLET: 8.6; 5 TABLET, FILM COATED ORAL at 08:27

## 2021-02-25 RX ADMIN — PIPERACILLIN AND TAZOBACTAM 3.38 G: 3; .375 INJECTION, POWDER, LYOPHILIZED, FOR SOLUTION INTRAVENOUS at 00:40

## 2021-02-25 RX ADMIN — IOPAMIDOL 100 ML: 755 INJECTION, SOLUTION INTRAVENOUS at 10:28

## 2021-02-25 RX ADMIN — ALBUMIN (HUMAN) 25 G: 0.25 INJECTION, SOLUTION INTRAVENOUS at 14:46

## 2021-02-25 RX ADMIN — POTASSIUM CHLORIDE 20 MEQ: 750 TABLET, FILM COATED, EXTENDED RELEASE ORAL at 17:28

## 2021-02-25 RX ADMIN — ALBUMIN (HUMAN) 25 G: 0.25 INJECTION, SOLUTION INTRAVENOUS at 05:44

## 2021-02-25 RX ADMIN — Medication 1000 MG: at 22:00

## 2021-02-25 RX ADMIN — FAMOTIDINE 20 MG: 20 TABLET ORAL at 17:28

## 2021-02-25 RX ADMIN — Medication 1000 MG: at 14:46

## 2021-02-25 RX ADMIN — OXYCODONE 10 MG: 5 TABLET ORAL at 08:27

## 2021-02-25 RX ADMIN — PIPERACILLIN AND TAZOBACTAM 3.38 G: 3; .375 INJECTION, POWDER, LYOPHILIZED, FOR SOLUTION INTRAVENOUS at 08:28

## 2021-02-25 RX ADMIN — Medication 1000 MG: at 05:04

## 2021-02-25 RX ADMIN — ENOXAPARIN SODIUM 105 MG: 120 INJECTION SUBCUTANEOUS at 17:37

## 2021-02-25 RX ADMIN — ALBUMIN (HUMAN) 25 G: 0.25 INJECTION, SOLUTION INTRAVENOUS at 20:57

## 2021-02-25 NOTE — PROGRESS NOTES
CM met with patient and wife for d/c planning. Patient is uninsured and does not qualify for Medicaid. CM provided patient with a [employer] care card application and a PCP list.  Patient has a rolling walker. [facility]; CM contacted [facility], and they have a financial assistance program.  Konrad Tejeda LCSW      Transition of Care Plan - RUR 13%:        1. CM is following for d/c needs  2. PT following  3. ID following  4. Complete care card application  5.  Establish with PCP - patient resides nearest to [address] [place] [address]

## 2021-02-25 NOTE — PROGRESS NOTES
Bedside and Verbal shift change report given to Alli Underwood RN (oncoming nurse) by Anuradha Ivey RN (offgoing nurse). Report included the following information SBAR, Kardex, Intake/Output, MAR and Recent Results.

## 2021-02-25 NOTE — PROGRESS NOTES
[facility]                                                                                          Hospitalist Progress Note  Jodi Seo MD        Date of Service:  2021  NAME:  Sebastián Hickey  :  1960  MRN:  [id number]      Admission Summary:    59 yo otherwise healthy, presented w/ a perforated appendicitis s/p cecectomy .  Complicated by BERONICA, hypoxia, acute bilateral PEs.  Medicine is following as a consultant  Interval history / Subjective:     No complaints today, was SOB this am, but feels much better now     Assessment & Plan:     Anasarca - likely 2/2 hypoalbuminemia from poor nutritional status + IVF's/IV antibiotics over the course of his hospitalization. Recent TTE with preserved EF. With marginal BP's, diuresis will gently diurese  -cont albumin again today, nutrition consult   -IV lasix   -strict I's and O's   -daily weights   -encourage optimization of nutritional status   -continue Lovenox for PE as likely some of his dyspnea is from this   -will need eval for hypoxia prior to d/c      Bilateral PEs: Chest CT on 02/15 confirmed bilateral PEs, likely due to prior hospitalization.  LE dopplers neg.  Cont Lovenox, transition to Eliquis on discharge. Provoked therefore would anticoagulate for 3-6 months   -as above on Lovenox   -eval for hypoxia prior to d/c      Perforated appendicitis: s/p cecectomy .  Management per Gen surg     Acute blood loss anemia/Fe def anemia: due to surgery.  S/p IV iron.      GERD: [id number]     Constipation: surgery following. Repeat CT  did not show any obstruction     Leukocytosis: on zosyn; UA not c/w infection. CT as follows:   \"1. Small right pleural effusion.  2. Right greater than left posterior bibasilar consolidation versus atelectasis,  with air bronchograms.  3. Mild ascites.\"   -procalcitonin mildly elevated  -antibiotics changed per ID  -start incentive spirometry   -mobilize       Code  status: full  DVT prophylaxis: [facility] Problems  Date Reviewed: 6/14/2014          Codes Class Noted POA    Appendicitis with perforation ICD-10-CM: K35.32  ICD-9-CM: 540.0  2/16/2021 Unknown        Appendicitis ICD-10-CM: K37  ICD-9-CM: 010  2/13/2021 Unknown                Review of Systems:   A comprehensive review of systems was negative except for that written in the HPI. Vital Signs:    Last 24hrs VS reviewed since prior progress note. Most recent are:  Visit Vitals  /61 (BP 1 Location: Right arm, BP Patient Position: At rest)   Pulse 85   Temp 99.3 °F (37.4 °C)   Resp 18   Ht 5' 9\" (1.753 m)   Wt 116.6 kg (257 lb)   SpO2 91%   BMI 37.95 kg/m²         Intake/Output Summary (Last 24 hours) at 2/25/2021 1421  Last data filed at 2/25/2021 0800  Gross per 24 hour   Intake 120 ml   Output 769 ml   Net -649 ml        Physical Examination:      Gen:  Well-developed, well-nourished, in no acute distress  HEENT:  Pink conjunctivae, PERRL, hearing intact to voice, moist mucous membranes  Neck:  Supple, without masses, thyroid non-tender  Resp: Bibasilar crackles   Card:  No murmurs, normal S1, S2 without thrills, bruits. Diffusely anasarcic. Scrotal edema and 3+ bilateral pitting edema of LE's noted   Abd: tense, firm, distended. Minimally tender. Normoactive BS. ALMA ROSA drain in place   Lymph:  No cervical adenopathy  Musc:  No cyanosis or clubbing  Skin:  No rashes or ulcers, skin turgor is good  Neuro:  Cranial nerves 3-12 are grossly intact,  strength is 5/5 bilaterally, dorsi / plantarflexion strength is 5/5 bilaterally, follows commands appropriately  Psych:  Alert with good insight.   Oriented to person, place, and time       Data Review:    Review and/or order of clinical lab test      Labs:     Recent Labs     02/25/21 0722 02/24/21 0627   WBC 15.8* 18.7*   HGB 7.4* 7.8*   HCT 23.1* 24.6*   * 454*     Recent Labs     02/25/21 0722 02/24/21 0627 02/23/21  0204    137 137 K 3.5 3.7 3.4*    105 105   CO2 29 28 28   BUN 14 14 13   CREA 0.84 0.79 0.79   * 100 109*   CA 7.6* 7.4* 7.4*   MG 2.4  --   --    PHOS 3.2  --   --      Recent Labs     02/25/21  0722 02/24/21  0627 02/23/21  0204   ALT 37 30 26    98 93   TBILI 0.6 0.5 0.5   TP 6.2* 5.5* 5.5*   ALB 2.4* 1.7* 1.7*   GLOB 3.8 3.8 3.8     No results for input(s): INR, PTP, APTT, INREXT in the last 72 hours. No results for input(s): FE, TIBC, PSAT, FERR in the last 72 hours. Lab Results   Component Value Date/Time    Folate 10.9 02/16/2021 12:07 AM      No results for input(s): PH, PCO2, PO2 in the last 72 hours. No results for input(s): CPK, CKNDX, TROIQ in the last 72 hours.     No lab exists for component: CPKMB  No results found for: CHOL, CHOLX, CHLST, CHOLV, HDL, HDLP, LDL, LDLC, DLDLP, TGLX, TRIGL, TRIGP, CHHD, CHHDX  Lab Results   Component Value Date/Time    Glucose (POC) 120 (H) 06/14/2014 09:32 AM     Lab Results   Component Value Date/Time    Color DARK YELLOW 02/24/2021 01:04 PM    Appearance CLEAR 02/24/2021 01:04 PM    Specific gravity 1.029 02/24/2021 01:04 PM    Specific gravity 1.010 02/13/2021 06:33 AM    pH (UA) 6.0 02/24/2021 01:04 PM    Protein 30 (A) 02/24/2021 01:04 PM    Glucose Negative 02/24/2021 01:04 PM    Ketone TRACE (A) 02/24/2021 01:04 PM    Bilirubin Negative 02/13/2021 06:33 AM    Urobilinogen 1.0 02/24/2021 01:04 PM    Nitrites Negative 02/24/2021 01:04 PM    Leukocyte Esterase Negative 02/24/2021 01:04 PM    Epithelial cells FEW 02/24/2021 01:04 PM    Bacteria Negative 02/24/2021 01:04 PM    WBC 0-4 02/24/2021 01:04 PM    RBC 0-5 02/24/2021 01:04 PM         Medications Reviewed:     Current Facility-Administered Medications   Medication Dose Route Frequency    albumin human 25% (BUMINATE) solution 25 g  25 g IntraVENous Q6H    enoxaparin (LOVENOX) injection 105 mg  105 mg SubCUTAneous Q12H    [START ON 2/26/2021] enoxaparin (LOVENOX) injection 105 mg  105 mg SubCUTAneous Q12H    cefepime (MAXIPIME) 2 g in sterile water (preservative free) 10 mL IV syringe  2 g IntraVENous Q8H    metroNIDAZOLE (FLAGYL) IVPB premix 500 mg  500 mg IntraVENous Q12H    furosemide (LASIX) injection 20 mg  20 mg IntraVENous BID    potassium chloride SR (KLOR-CON 10) tablet 20 mEq  20 mEq Oral BID    HYDROmorphone (PF) (DILAUDID) injection 1 mg  1 mg IntraVENous Q4H PRN    oxyCODONE IR (ROXICODONE) tablet 10 mg  10 mg Oral Q4H PRN    Or    oxyCODONE IR (ROXICODONE) tablet 15 mg  15 mg Oral Q4H PRN    senna-docusate (PERICOLACE) 8.6-50 mg per tablet 1 Tab  1 Tab Oral BID    famotidine (PEPCID) tablet 20 mg  20 mg Oral ACB&D    bisacodyL (DULCOLAX) suppository 10 mg  10 mg Rectal DAILY PRN    alum-mag hydroxide-simeth (MYLANTA) oral suspension 30 mL  30 mL Oral Q4H PRN    oxyCODONE IR (ROXICODONE) tablet 5 mg  5 mg Oral Q4H PRN    albuterol-ipratropium (DUO-NEB) 2.5 MG-0.5 MG/3 ML  3 mL Nebulization Q4H PRN    ondansetron (ZOFRAN) injection 4 mg  4 mg IntraVENous Q6H PRN    acetaminophen (TYLENOL) tablet 1,000 mg  1,000 mg Oral Q8H    diphenhydrAMINE (BENADRYL) capsule 25 mg  25 mg Oral Q6H PRN     ______________________________________________________________________  EXPECTED LENGTH OF STAY: 9d 19h  ACTUAL LENGTH OF STAY:          Kori Deshpande MD

## 2021-02-25 NOTE — CONSULTS
Infectious Disease Consult    Impression/Plan   · Leukocytosis. Multifactorial.  Remains afebrile. Borderline procalcitonin. Suspect reactive/intra-abdominal infection/+/- HCAP. Consolidations have progressed despite 8 days of piperacillintazobactam.  Gram-negative rods growing from abdominal drains. (unclear significance as this may represent drain colonization)  Blood cultures NGSF. UA bland. Check MRSA screen. Change antibiotics to cefepime/metronidazole. Check CRP. Follow WBC trend  · Perforated appendicitis. Status post cecectomy 2/13/2021  · Abnormal chest CT with hypoxia. No cough or pleuritic chest pain. CT from 2/24 reveals posterior bibasilar consolidation versus atelectasis with air bronchograms. Likely combination of PE and volume overload. HCAP in differential.  Check MRSA screen and adjust antibiotics as above        Anti-infectives:   1. Piperacillintazobactam 2/18 - present    Subjective:   Date of Consultation:  February 25, 2021  Date of Admission: 2/13/2021   Referring Physician:     Patient is a 62 y.o. male with a past medical history significant for pulmonary embolism and appendicitis who is being seen for leukocytosis. The patient was admitted to [facility] on 2/13/2021 with perforated appendicitis. He is status post laparoscopic partial cecectomy 7/88/9331 complicated by intra-abdominal hematoma likely due to anticoagulation for acute PE. Patient was taken back to the OR for washout of hematoma on 2/19/2021. Hospital course complicated by bilateral pulmonary emboli, volume overload with scrotal and lower extremity edema and BERONICA.   Patient is currently on piperacillintazobactam.  The infectious disease service has been asked to assist with antibiotic management    Patient Active Problem List   Diagnosis Code    Syncope and collapse R55    Bradycardia R00.1    Nausea and vomiting R11.2    Leg pain M79.606    Hyperglycemia R73.9    Appendicitis K37    Appendicitis with perforation K35.32     History reviewed. No pertinent past medical history. Family History   Problem Relation Age of Onset    Diabetes Mother     Diabetes Father       Social History     Tobacco Use    Smoking status: Never Smoker    Smokeless tobacco: Never Used   Substance Use Topics    Alcohol use: No     Past Surgical History:   Procedure Laterality Date    HX HEENT      toncills      Prior to Admission medications    Medication Sig Start Date End Date Taking? Authorizing Provider   ondansetron (ZOFRAN ODT) 4 mg disintegrating tablet Take 1 Tab by mouth every six (6) hours as needed for Nausea. Indications: prevent nausea and vomiting after surgery 21  Yes Mireya Devlin MD   [facility] WITH CODEINE) 6.25-10 mg/5 mL syrup Take 5 mL by mouth every six (6) hours as needed for Cough. Max Daily Amount: 20 mL. 3/21/17   Si Sandifer, MD     No Known Allergies     Review of Systems:  A comprehensive review of systems was negative except for that written in the History of Present Illness. Objective:   Blood pressure (!) 102/58, pulse 76, temperature 97.9 °F (36.6 °C), resp. rate 18, height 5' 9\" (1.753 m), weight 257 lb (116.6 kg), SpO2 94 %. Temp (24hrs), Av.6 °F (37 °C), Min:97.9 °F (36.6 °C), Max:99.6 °F (37.6 °C)       Exam:    General:  Alert, cooperative, well noursished, well developed, appears stated age   Eyes:  Sclera anicteric. Mouth/Throat: Mucous membranes moist   Neck: Supple   Lungs:    No distress. Decreased breath sounds at bases   CV:  Regular rate and rhythm   Abdomen:    Soft, mildly distended and tender to palpation.   Bilateral ALMA ROSA drains with serosanguineous drainage   Extremities: No edema   Skin:  No rash   Lymph nodes:    Musculoskeletal:  Moves all   Lines/Devices:  Intact, no erythema, drainage or tenderness   Psych: Alert and oriented, normal mood affect given the setting       Data Review:   Recent Results (from the past 24 hour(s))   URINALYSIS W/MICROSCOPIC    Collection Time: 02/24/21  1:04 PM   Result Value Ref Range    Color DARK YELLOW      Appearance CLEAR CLEAR      Specific gravity 1.029 1.003 - 1.030      pH (UA) 6.0 5.0 - 8.0      Protein 30 (A) NEG mg/dL    Glucose Negative NEG mg/dL    Ketone TRACE (A) NEG mg/dL    Blood Negative NEG      Urobilinogen 1.0 0.2 - 1.0 EU/dL    Nitrites Negative NEG      Leukocyte Esterase Negative NEG      WBC 0-4 0 - 4 /hpf    RBC 0-5 0 - 5 /hpf    Epithelial cells FEW FEW /lpf    Bacteria Negative NEG /hpf    Hyaline cast 0-2 0 - 5 /lpf   BILIRUBIN, CONFIRM    Collection Time: 02/24/21  1:04 PM   Result Value Ref Range    Bilirubin UA, confirm Negative NEG     PROCALCITONIN    Collection Time: 02/24/21  7:50 PM   Result Value Ref Range    Procalcitonin 0.30 ng/mL   METABOLIC PANEL, COMPREHENSIVE    Collection Time: 02/25/21  7:22 AM   Result Value Ref Range    Sodium 139 136 - 145 mmol/L    Potassium 3.5 3.5 - 5.1 mmol/L    Chloride 104 97 - 108 mmol/L    CO2 29 21 - 32 mmol/L    Anion gap 6 5 - 15 mmol/L    Glucose 101 (H) 65 - 100 mg/dL    BUN 14 6 - 20 MG/DL    Creatinine 0.84 0.70 - 1.30 MG/DL    BUN/Creatinine ratio 17 12 - 20      GFR est AA >60 >60 ml/min/1.73m2    GFR est non-AA >60 >60 ml/min/1.73m2    Calcium 7.6 (L) 8.5 - 10.1 MG/DL    Bilirubin, total 0.6 0.2 - 1.0 MG/DL    ALT (SGPT) 37 12 - 78 U/L    AST (SGOT) 28 15 - 37 U/L    Alk.  phosphatase 103 45 - 117 U/L    Protein, total 6.2 (L) 6.4 - 8.2 g/dL    Albumin 2.4 (L) 3.5 - 5.0 g/dL    Globulin 3.8 2.0 - 4.0 g/dL    A-G Ratio 0.6 (L) 1.1 - 2.2     CBC WITH AUTOMATED DIFF    Collection Time: 02/25/21  7:22 AM   Result Value Ref Range    WBC 15.8 (H) 4.1 - 11.1 K/uL    RBC 2.47 (L) 4.10 - 5.70 M/uL    HGB 7.4 (L) 12.1 - 17.0 g/dL    HCT 23.1 (L) 36.6 - 50.3 %    MCV 93.5 80.0 - 99.0 FL    MCH 30.0 26.0 - 34.0 PG    MCHC 32.0 30.0 - 36.5 g/dL    RDW 13.9 11.5 - 14.5 %    PLATELET 429 (H) 163 - 400 K/uL    MPV 9.9 8.9 - 12.9 FL NRBC 0.0 0  WBC    ABSOLUTE NRBC 0.00 0.00 - 0.01 K/uL    NEUTROPHILS 82 (H) 32 - 75 %    LYMPHOCYTES 10 (L) 12 - 49 %    MONOCYTES 6 5 - 13 %    EOSINOPHILS 1 0 - 7 %    BASOPHILS 0 0 - 1 %    IMMATURE GRANULOCYTES 1 (H) 0.0 - 0.5 %    ABS. NEUTROPHILS 13.0 (H) 1.8 - 8.0 K/UL    ABS. LYMPHOCYTES 1.6 0.8 - 3.5 K/UL    ABS. MONOCYTES 0.9 0.0 - 1.0 K/UL    ABS. EOSINOPHILS 0.1 0.0 - 0.4 K/UL    ABS. BASOPHILS 0.0 0.0 - 0.1 K/UL    ABS. IMM. GRANS. 0.2 (H) 0.00 - 0.04 K/UL    DF AUTOMATED     MAGNESIUM    Collection Time: 02/25/21  7:22 AM   Result Value Ref Range    Magnesium 2.4 1.6 - 2.4 mg/dL   PHOSPHORUS    Collection Time: 02/25/21  7:22 AM   Result Value Ref Range    Phosphorus 3.2 2.6 - 4.7 MG/DL   SAMPLES BEING HELD    Collection Time: 02/25/21  7:22 AM   Result Value Ref Range    SAMPLES BEING HELD 1SST     COMMENT        Add-on orders for these samples will be processed based on acceptable specimen integrity and analyte stability, which may vary by analyte.         Microbiology:      Studies:      Signed By: Susi Gonzalez DO     February 25, 2021

## 2021-02-25 NOTE — PROGRESS NOTES
Problem: Mobility Impaired (Adult and Pediatric)  Goal: *Acute Goals and Plan of Care (Insert Text)  Description: FUNCTIONAL STATUS PRIOR TO ADMISSION: Patient was independent and active without use of DME. Patient works as a     HOME SUPPORT [address]: The patient lived with his wife but did not require assist.    Physical Therapy Goals  Initiated 2/15/2021  1. Patient will move from supine to sit and sit to supine  in bed with modified independence within 7 day(s). 2.  Patient will transfer from bed to chair and chair to bed with modified independence using the least restrictive device within 7 day(s). 3.  Patient will perform sit to stand with modified independence within 7 day(s). 4.  Patient will ambulate with modified independence for 200 feet with the least restrictive device within 7 day(s). 5.  Patient will ascend/descend 3 stairs with 1 handrail(s) with modified independence within 7 day(s). Note:   PHYSICAL THERAPY TREATMENT  Patient: Diallo Gracia (65 y.o. male)  Date: 2/25/2021  Diagnosis: Appendicitis [K37]  Appendicitis with perforation [K35.32] <principal problem not specified>  Procedure(s) (LRB):  LAPAROSCOPY GENERAL DIAGNOSTIC with evacuation of hematoma (N/A) 6 Days Post-Op  Precautions:    Chart, physical therapy assessment, plan of care and goals were reviewed. ASSESSMENT  Patient continues with skilled PT services. Pt with mild SOB on arrival of PT. Pts SPO2 was 97% on O2 supine in bed. Transport arrived to take pt for testing. Pt moves slowly supine to sit with CGA. Pt sit to stand with CGA. Pt took 4 steps to stretcher with RW  CGA. Pt sit to supine CGA to min assist.Pt was then taken for test.Pt has been  weaker the past 2 days but is still at CGA to min assist level for mobility. PT will continue to follow. Current Level of Function Impacting Discharge (mobility/balance): CGA to min assist for mobility.              PLAN :  Patient continues to benefit from skilled intervention to address the above impairments. Continue treatment per established plan of care. to address goals. Recommendation for discharge: (in order for the patient to meet his/her long term goals)  Physical therapy at least 2 days/week in the home AND ensure assist and/or supervision for safety     This discharge recommendation:  Has been made in collaboration with the attending provider and/or case management    IF patient discharges home will need the following DME: rolling walker       SUBJECTIVE:       OBJECTIVE DATA SUMMARY:   Critical Behavior:  Neurologic State: Alert  Orientation Level: Oriented X4  Cognition: Follows commands     Functional Mobility Training:  Bed Mobility:     Supine to Sit: Minimum assistance  Sit to Supine: Minimum assistance; Moderate assistance           Transfers:  Sit to Stand: Contact guard assistance  Stand to Sit: Contact guard assistance        Bed to Chair: Contact guard assistance                    Balance:  Sitting: Intact  Standing: Without support  Standing - Static: Good;Fair  Ambulation/Gait Training:  Distance (ft): 3 Feet (ft)  Assistive Device: Gait belt  Ambulation - Level of Assistance: Minimal assistance;Contact guard assistance        Gait Abnormalities: Decreased step clearance        Base of Support: Widened     Speed/Ludmila: Pace decreased (<100 feet/min)  Step Length: Right shortened;Left shortened                  Activity Tolerance:   Fair    After treatment patient left in no apparent distress:   Supine in bed    COMMUNICATION/COLLABORATION:   The patients plan of care was discussed with: Physical therapist.     Dominique Capone PTA   Time Calculation: 23 mins

## 2021-02-26 ENCOUNTER — APPOINTMENT (OUTPATIENT)
Dept: CT IMAGING | Age: 61
DRG: 853 | End: 2021-02-26
Attending: SURGERY

## 2021-02-26 LAB
ALBUMIN SERPL-MCNC: 2.6 G/DL (ref 3.5–5)
ALBUMIN/GLOB SERPL: 0.7 {RATIO} (ref 1.1–2.2)
ALP SERPL-CCNC: 109 U/L (ref 45–117)
ALT SERPL-CCNC: 48 U/L (ref 12–78)
ANION GAP SERPL CALC-SCNC: 5 MMOL/L (ref 5–15)
AST SERPL-CCNC: 35 U/L (ref 15–37)
BASOPHILS # BLD: 0 K/UL (ref 0–0.1)
BASOPHILS NFR BLD: 0 % (ref 0–1)
BILIRUB SERPL-MCNC: 0.7 MG/DL (ref 0.2–1)
BUN SERPL-MCNC: 14 MG/DL (ref 6–20)
BUN/CREAT SERPL: 16 (ref 12–20)
CALCIUM SERPL-MCNC: 7.7 MG/DL (ref 8.5–10.1)
CHLORIDE SERPL-SCNC: 103 MMOL/L (ref 97–108)
CO2 SERPL-SCNC: 29 MMOL/L (ref 21–32)
CREAT SERPL-MCNC: 0.87 MG/DL (ref 0.7–1.3)
DIFFERENTIAL METHOD BLD: ABNORMAL
EOSINOPHIL # BLD: 0.1 K/UL (ref 0–0.4)
EOSINOPHIL NFR BLD: 1 % (ref 0–7)
ERYTHROCYTE [DISTWIDTH] IN BLOOD BY AUTOMATED COUNT: 14 % (ref 11.5–14.5)
GLOBULIN SER CALC-MCNC: 3.9 G/DL (ref 2–4)
GLUCOSE SERPL-MCNC: 99 MG/DL (ref 65–100)
HCT VFR BLD AUTO: 22.5 % (ref 36.6–50.3)
HGB BLD-MCNC: 7.2 G/DL (ref 12.1–17)
IMM GRANULOCYTES # BLD AUTO: 0.2 K/UL (ref 0–0.04)
IMM GRANULOCYTES NFR BLD AUTO: 1 % (ref 0–0.5)
LYMPHOCYTES # BLD: 2 K/UL (ref 0.8–3.5)
LYMPHOCYTES NFR BLD: 12 % (ref 12–49)
MCH RBC QN AUTO: 29.8 PG (ref 26–34)
MCHC RBC AUTO-ENTMCNC: 32 G/DL (ref 30–36.5)
MCV RBC AUTO: 93 FL (ref 80–99)
MONOCYTES # BLD: 1 K/UL (ref 0–1)
MONOCYTES NFR BLD: 6 % (ref 5–13)
NEUTS SEG # BLD: 13.1 K/UL (ref 1.8–8)
NEUTS SEG NFR BLD: 80 % (ref 32–75)
NRBC # BLD: 0 K/UL (ref 0–0.01)
NRBC BLD-RTO: 0 PER 100 WBC
PLATELET # BLD AUTO: 513 K/UL (ref 150–400)
PMV BLD AUTO: 9.7 FL (ref 8.9–12.9)
POTASSIUM SERPL-SCNC: 3.6 MMOL/L (ref 3.5–5.1)
PROT SERPL-MCNC: 6.5 G/DL (ref 6.4–8.2)
RBC # BLD AUTO: 2.42 M/UL (ref 4.1–5.7)
SODIUM SERPL-SCNC: 137 MMOL/L (ref 136–145)
WBC # BLD AUTO: 16.4 K/UL (ref 4.1–11.1)

## 2021-02-26 PROCEDURE — 74011000250 HC RX REV CODE- 250: Performed by: INTERNAL MEDICINE

## 2021-02-26 PROCEDURE — 74011250636 HC RX REV CODE- 250/636: Performed by: RADIOLOGY

## 2021-02-26 PROCEDURE — 85025 COMPLETE CBC W/AUTO DIFF WBC: CPT

## 2021-02-26 PROCEDURE — 36415 COLL VENOUS BLD VENIPUNCTURE: CPT

## 2021-02-26 PROCEDURE — 99232 SBSQ HOSP IP/OBS MODERATE 35: CPT | Performed by: INTERNAL MEDICINE

## 2021-02-26 PROCEDURE — 87077 CULTURE AEROBIC IDENTIFY: CPT

## 2021-02-26 PROCEDURE — 77030014115

## 2021-02-26 PROCEDURE — C1729 CATH, DRAINAGE: HCPCS

## 2021-02-26 PROCEDURE — 99152 MOD SED SAME PHYS/QHP 5/>YRS: CPT

## 2021-02-26 PROCEDURE — 65270000029 HC RM PRIVATE

## 2021-02-26 PROCEDURE — 87186 SC STD MICRODIL/AGAR DIL: CPT

## 2021-02-26 PROCEDURE — 77030003526 HC NDL BLNT PERC MDT -B

## 2021-02-26 PROCEDURE — 74011250637 HC RX REV CODE- 250/637: Performed by: SURGERY

## 2021-02-26 PROCEDURE — 87205 SMEAR GRAM STAIN: CPT

## 2021-02-26 PROCEDURE — 80053 COMPREHEN METABOLIC PANEL: CPT

## 2021-02-26 PROCEDURE — 77030003462 HC NDL BIOP BRST MDT -B

## 2021-02-26 PROCEDURE — 87075 CULTR BACTERIA EXCEPT BLOOD: CPT

## 2021-02-26 PROCEDURE — 77030002996 HC SUT SLK J&J -A

## 2021-02-26 PROCEDURE — 49405 IMAGE CATH FLUID COLXN VISC: CPT

## 2021-02-26 PROCEDURE — 77030003666 HC NDL SPINAL BD -A

## 2021-02-26 PROCEDURE — 2709999900 HC NON-CHARGEABLE SUPPLY

## 2021-02-26 PROCEDURE — 99153 MOD SED SAME PHYS/QHP EA: CPT

## 2021-02-26 PROCEDURE — 74011250636 HC RX REV CODE- 250/636: Performed by: INTERNAL MEDICINE

## 2021-02-26 PROCEDURE — 94760 N-INVAS EAR/PLS OXIMETRY 1: CPT

## 2021-02-26 PROCEDURE — 97530 THERAPEUTIC ACTIVITIES: CPT

## 2021-02-26 PROCEDURE — C1894 INTRO/SHEATH, NON-LASER: HCPCS

## 2021-02-26 PROCEDURE — 74011250637 HC RX REV CODE- 250/637: Performed by: NURSE PRACTITIONER

## 2021-02-26 PROCEDURE — 74011250636 HC RX REV CODE- 250/636: Performed by: SURGERY

## 2021-02-26 RX ORDER — FENTANYL CITRATE 50 UG/ML
100 INJECTION, SOLUTION INTRAMUSCULAR; INTRAVENOUS
Status: DISCONTINUED | OUTPATIENT
Start: 2021-02-26 | End: 2021-02-26

## 2021-02-26 RX ORDER — MIDAZOLAM HYDROCHLORIDE 1 MG/ML
5 INJECTION INTRAMUSCULAR; INTRAVENOUS
Status: DISCONTINUED | OUTPATIENT
Start: 2021-02-26 | End: 2021-02-26

## 2021-02-26 RX ADMIN — OXYCODONE 5 MG: 5 TABLET ORAL at 22:17

## 2021-02-26 RX ADMIN — CEFEPIME HYDROCHLORIDE 2 G: 2 INJECTION, POWDER, FOR SOLUTION INTRAVENOUS at 22:10

## 2021-02-26 RX ADMIN — FAMOTIDINE 20 MG: 20 TABLET ORAL at 17:40

## 2021-02-26 RX ADMIN — CEFEPIME HYDROCHLORIDE 2 G: 2 INJECTION, POWDER, FOR SOLUTION INTRAVENOUS at 05:47

## 2021-02-26 RX ADMIN — MIDAZOLAM HYDROCHLORIDE 1 MG: 1 INJECTION, SOLUTION INTRAMUSCULAR; INTRAVENOUS at 13:09

## 2021-02-26 RX ADMIN — DOCUSATE SODIUM 50 MG AND SENNOSIDES 8.6 MG 1 TABLET: 8.6; 5 TABLET, FILM COATED ORAL at 22:16

## 2021-02-26 RX ADMIN — CEFEPIME HYDROCHLORIDE 2 G: 2 INJECTION, POWDER, FOR SOLUTION INTRAVENOUS at 15:13

## 2021-02-26 RX ADMIN — Medication 1000 MG: at 22:10

## 2021-02-26 RX ADMIN — ONDANSETRON 4 MG: 2 INJECTION INTRAMUSCULAR; INTRAVENOUS at 22:17

## 2021-02-26 RX ADMIN — POTASSIUM CHLORIDE 20 MEQ: 750 TABLET, FILM COATED, EXTENDED RELEASE ORAL at 15:18

## 2021-02-26 RX ADMIN — FENTANYL CITRATE 25 MCG: 50 INJECTION, SOLUTION INTRAMUSCULAR; INTRAVENOUS at 13:05

## 2021-02-26 RX ADMIN — METRONIDAZOLE 500 MG: 500 INJECTION, SOLUTION INTRAVENOUS at 02:38

## 2021-02-26 RX ADMIN — DOCUSATE SODIUM 50 MG AND SENNOSIDES 8.6 MG 1 TABLET: 8.6; 5 TABLET, FILM COATED ORAL at 15:18

## 2021-02-26 RX ADMIN — Medication 1000 MG: at 15:12

## 2021-02-26 RX ADMIN — HYDROMORPHONE HYDROCHLORIDE 1 MG: 1 INJECTION, SOLUTION INTRAMUSCULAR; INTRAVENOUS; SUBCUTANEOUS at 10:56

## 2021-02-26 RX ADMIN — MIDAZOLAM HYDROCHLORIDE 1 MG: 1 INJECTION, SOLUTION INTRAMUSCULAR; INTRAVENOUS at 13:05

## 2021-02-26 RX ADMIN — FENTANYL CITRATE 25 MCG: 50 INJECTION, SOLUTION INTRAMUSCULAR; INTRAVENOUS at 13:09

## 2021-02-26 RX ADMIN — POTASSIUM CHLORIDE 20 MEQ: 750 TABLET, FILM COATED, EXTENDED RELEASE ORAL at 22:16

## 2021-02-26 RX ADMIN — FUROSEMIDE 20 MG: 10 INJECTION, SOLUTION INTRAMUSCULAR; INTRAVENOUS at 09:46

## 2021-02-26 RX ADMIN — FUROSEMIDE 20 MG: 10 INJECTION, SOLUTION INTRAMUSCULAR; INTRAVENOUS at 17:39

## 2021-02-26 RX ADMIN — METRONIDAZOLE 500 MG: 500 INJECTION, SOLUTION INTRAVENOUS at 15:13

## 2021-02-26 NOTE — PROGRESS NOTES
Pt received to [facility] on stretcher, states discomfort in back and belly. Confirmed NPO/NKA for CT aspiration/drain placement. New IV initiated in L forearm. Vitals stable, LS diminished bilaterally, HS S1, S2. Dr Janina Mc in for consent. To CT, timeout at 1310, start time 1311. Stop time 70 [phone], pt tolerated well. Total of 2 mg versed and 50 mcg fentanyl given for procedure. 2 cc sanguinous drainage was aspirated from R side abdomen, no drain was placed. Sent to lab for analysis. Bandaid applied and returned to [facility] in stable condition. Tolerating water.   Report called to Pito Salvador

## 2021-02-26 NOTE — PROGRESS NOTES
Problem: Mobility Impaired (Adult and Pediatric)  Goal: *Acute Goals and Plan of Care (Insert Text)  Description: FUNCTIONAL STATUS PRIOR TO ADMISSION: Patient was independent and active without use of DME. Patient works as a     HOME SUPPORT [address]: The patient lived with his wife but did not require assist.    Physical Therapy Goals  Initiated 2/15/2021  1. Patient will move from supine to sit and sit to supine  in bed with modified independence within 7 day(s). 2.  Patient will transfer from bed to chair and chair to bed with modified independence using the least restrictive device within 7 day(s). 3.  Patient will perform sit to stand with modified independence within 7 day(s). 4.  Patient will ambulate with modified independence for 200 feet with the least restrictive device within 7 day(s). 5.  Patient will ascend/descend 3 stairs with 1 handrail(s) with modified independence within 7 day(s). Note:   PHYSICAL THERAPY TREATMENT  Patient: Lola Bailey (65 y.o. male)  Date: 2/26/2021  Diagnosis: Appendicitis [K37]  Appendicitis with perforation [K35.32] <principal problem not specified>  Procedure(s) (LRB):  LAPAROSCOPY GENERAL DIAGNOSTIC with evacuation of hematoma (N/A) 7 Days Post-Op  Precautions:    Chart, physical therapy assessment, plan of care and goals were reviewed. ASSESSMENT  Patient continues with skilled PT services. Pt had just returned from testing. Pt supine to sit with CGA to min assist from stretcher. Pt CGA sit to stand. Pt ambulated 6ft to chair with RW CGA. Pts SPO2 was 93% on RA with mobility. Pt left with nurse attending. PT will continue to follow. Current Level of Function Impacting Discharge (mobility/balance): Pt CGA to min assist for mobility. PLAN :  Patient continues to benefit from skilled intervention to address the above impairments. Continue treatment per established plan of care.   to address goals.     Recommendation for discharge: (in order for the patient to meet his/her long term goals)  Physical therapy at least 2 days/week in the home AND ensure assist and/or supervision    This discharge recommendation:  Has been made in collaboration with the attending provider and/or case management    IF patient discharges home will need the following DME: rolling walker       SUBJECTIVE:       OBJECTIVE DATA SUMMARY:   Critical Behavior:  Neurologic State: Alert  Orientation Level: Oriented X4  Cognition: Appropriate for age attention/concentration, Follows commands     Functional Mobility Training:  Bed Mobility:     Supine to Sit: Contact guard assistance;Minimum assistance              Transfers:  Sit to Stand: Contact guard assistance  Stand to Sit: Contact guard assistance                             Balance:  Standing: With support  Standing - Static: Fair  Ambulation/Gait Training:  Distance (ft): 6 Feet (ft)  Assistive Device: Gait belt;Walker, rolling  Ambulation - Level of Assistance: Contact guard assistance        Gait Abnormalities: Decreased step clearance        Base of Support: Narrowed     Speed/Ludmila: Pace decreased (<100 feet/min)  Step Length: Right shortened;Left shortened                   Activity Tolerance:   Fair    After treatment patient left in no apparent distress:   Sitting in chair    COMMUNICATION/COLLABORATION:   The patients plan of care was discussed with: Physical therapist.     Fernando Valdez PTA   Time Calculation: 23 mins

## 2021-02-26 NOTE — PROGRESS NOTES
[facility] Progress Note           Ayesha Terrell DO    [phone] Office  [phone]  Fax    2021      Assessment & Plan:   · Perforated appendicitis. Status post cecectomy 2021. Intra-abdominal collections noted on CT scan . Bilateral drains cultures from  growing gram-negative rods. Antibiotics changed to cefepime and metronidazole. IR consulted for CT guided drainage today. · Leukocytosis. Multifactorial.   Remains afebrile. Suspect reactive/intra-abdominal infection/+/- HCAP. Continue antibiotics as above   · Abnormal chest CT with hypoxia. No cough or pleuritic chest pain. CT from  reveals posterior bibasilar consolidation versus atelectasis with air bronchograms. Likely combination of PE and volume overload. HCAP in differential.  Check MRSA screen. Continue antibiotics as above          Subjective:     Hot flashes overnight. Abdomen uncomfortable    Objective:     Vitals:   Visit Vitals  BP (!) 100/53 (BP 1 Location: Left upper arm, BP Patient Position: At rest)   Pulse 78   Temp 98.7 °F (37.1 °C)   Resp 18   Ht 5' 9\" (1.753 m)   Wt 253 lb 4.9 oz (114.9 kg)   SpO2 96%   BMI 37.41 kg/m²        Tmax:  Temp (24hrs), Av.9 °F (37.2 °C), Min:98 °F (36.7 °C), Max:99.4 °F (37.4 °C)      Exam:   Patient is intubated:  no    Physical Examination:   General:  Alert, cooperative, no distress   Head:  Normocephalic, atraumatic. Eyes:  Conjunctivae clear   Neck: Supple       Lungs:   No distress. Chest wall:     Heart:     Abdomen:    Distended. Mildly TTP. Bilateral ALMA ROSA drains with serosanguineous drainage   Extremities: Moves all. Skin:  No rash   Neurologic: CNII-XII intact. Normal strength     Labs:        No lab exists for component: ITNL   No results for input(s): CPK, CKMB, TROIQ in the last 72 hours.   Recent Labs     21  0142 21  0722 21  0627    139 137   K 3.6 3.5 3.7    104 105   CO2 29 29 28 BUN 14 14 14   CREA 0.87 0.84 0.79   GLU 99 101* 100   PHOS  --  3.2  --    MG  --  2.4  --    ALB 2.6* 2.4* 1.7*   WBC 16.4* 15.8* 18.7*   HGB 7.2* 7.4* 7.8*   HCT 22.5* 23.1* 24.6*   * 475* 454*     No results for input(s): INR, PTP, APTT, INREXT in the last 72 hours.   Needs: urine analysis, urine sodium, protein and creatinine  No results found for: SHARITA, CREAU      Cultures:     No results found for: SDES  Lab Results   Component Value Date/Time    Culture result: No growth (<1,000 CFU/ML) 02/24/2021 01:04 PM    Culture result: NO GROWTH 2 DAYS 02/24/2021 10:21 AM    Culture result: NO GROWTH 2 DAYS 02/24/2021 10:21 AM    Culture result: LIGHT GRAM NEGATIVE RODS (A) 02/24/2021 10:21 AM    Culture result: HEAVY GRAM NEGATIVE RODS (A) 02/24/2021 10:21 AM       Radiology:     Medications       Current Facility-Administered Medications   Medication Dose Route Frequency Last Admin    [Held by provider] enoxaparin (LOVENOX) injection 105 mg  105 mg SubCUTAneous Q12H      cefepime (MAXIPIME) 2 g in sterile water (preservative free) 10 mL IV syringe  2 g IntraVENous Q8H 2 g at 02/26/21 0547    metroNIDAZOLE (FLAGYL) IVPB premix 500 mg  500 mg IntraVENous Q12H 500 mg at 02/26/21 0238    furosemide (LASIX) injection 20 mg  20 mg IntraVENous BID 20 mg at 02/26/21 0946    potassium chloride SR (KLOR-CON 10) tablet 20 mEq  20 mEq Oral BID 20 mEq at 02/25/21 1728    HYDROmorphone (PF) (DILAUDID) injection 1 mg  1 mg IntraVENous Q4H PRN 1 mg at 02/20/21 2003    oxyCODONE IR (ROXICODONE) tablet 10 mg  10 mg Oral Q4H PRN 10 mg at 02/25/21 1736    Or    oxyCODONE IR (ROXICODONE) tablet 15 mg  15 mg Oral Q4H PRN 15 mg at 02/25/21 2352    senna-docusate (PERICOLACE) 8.6-50 mg per tablet 1 Tab  1 Tab Oral BID 1 Tab at 02/25/21 1728    famotidine (PEPCID) tablet 20 mg  20 mg Oral ACB&D Stopped at 02/26/21 0730    bisacodyL (DULCOLAX) suppository 10 mg  10 mg Rectal DAILY PRN      alum-mag hydroxide-simeth (MYLANTA) oral suspension 30 mL  30 mL Oral Q4H PRN 30 mL at 02/18/21 1411    oxyCODONE IR (ROXICODONE) tablet 5 mg  5 mg Oral Q4H PRN 5 mg at 02/24/21 2159    albuterol-ipratropium (DUO-NEB) 2.5 MG-0.5 MG/3 ML  3 mL Nebulization Q4H PRN      ondansetron (ZOFRAN) injection 4 mg  4 mg IntraVENous Q6H PRN 4 mg at 02/25/21 0543    acetaminophen (TYLENOL) tablet 1,000 mg  1,000 mg Oral Q8H Stopped at 02/26/21 0600    diphenhydrAMINE (BENADRYL) capsule 25 mg  25 mg Oral Q6H PRN             Case discussed with:  Wife at [address], DO

## 2021-02-26 NOTE — PROGRESS NOTES
12:54 PM   02/26/21         Transition of Care Plan - RUR 14%:        1. PT following patient has RW for discharge   2. ID following  3. Complete care card application  4. Establish with PCP - patient resides nearest to [address]  5.  CM will continue to follow for discharge needs    Angie Rose RN CCM

## 2021-02-26 NOTE — PROGRESS NOTES
Discussed case with Dr. Nnamdi Varghese. He will attempt aspiration/drain placement. Will also take a look at the scrotum as well radiologically with repeat imaging. Derek Walker MD    Addendum    Medial/mesentery fluid collection drained. Dr. Nnamdi Varghese reported old thin blood, similar findings to hematoma evacuation 2/19/2021. Sent for micro. Since the other lateral right sided collection had no air, was smaller and appeared uncomplicated, was not drained. Prior hematoma collection behind staple line appears evacuated after hematoma evacuation. Scrotum was swollen and looks inflamed on CT during aspiration, but clinically on physical exam consistent with edema. Nonpainful, just heavy according to patient. Sleeping and very drowsy on rounds this afternoon. Awaiting cultures. Can restart therapeutic lovenox tomorrow. No change in care at this juncture. Will put in order to pull OR drains tomorrow.      Derek Walker MD

## 2021-02-26 NOTE — PROGRESS NOTES
[address] Adult  Hospitalist Group                                                                                          Hospitalist Progress Note  Nasim Amaya MD        Date of Service:  2021  NAME:  Kemar Griffiths  :  1960  MRN:  [id number]      Admission Summary:    61 yo otherwise healthy, presented w/ a perforated appendicitis s/p cecectomy .  Complicated by BERONICA, hypoxia, acute bilateral PEs.  Medicine is following as a consultant  Interval history / Subjective:     No complaints today, was SOB this am, but feels much better now     Assessment & Plan:     Anasarca - likely 2/2 hypoalbuminemia from poor nutritional status + IVF's/IV antibiotics over the course of his hospitalization. Recent TTE with preserved EF. With marginal BP's, diuresis will gently diurese  -cont albumin again today, nutrition consult   -IV lasix   -strict I's and O's   -daily weights   -encourage optimization of nutritional status   -continue Lovenox for PE as likely some of his dyspnea is from this   -will need eval for hypoxia prior to d/c      Bilateral PEs: Chest CT on 02/15 confirmed bilateral PEs, likely due to prior hospitalization.  LE dopplers neg.  Cont Lovenox, transition to Eliquis on discharge. Provoked therefore would anticoagulate for 3-6 months   -as above on Lovenox   -eval for hypoxia prior to d/c      Perforated appendicitis: s/p cecectomy .  Management per Gen surg. Noted inta-abdominal fluid collections on Ct, IR consulted for CT guided drainage.      Acute blood loss anemia/Fe def anemia: due to surgery.  S/p IV iron.      GERD: [id number]     Constipation: surgery following. Repeat CT  did not show any obstruction     Leukocytosis: on zosyn; UA not c/w infection. CT as follows:   \"1. Small right pleural effusion. 2. Right greater than left posterior bibasilar consolidation versus atelectasis,  with air bronchograms. 3. Mild ascites. \"   -procalcitonin mildly elevated  -antibiotics changed per ID  -start incentive spirometry   -mobilize       Code status: full  DVT prophylaxis: [facility]  Date Reviewed: 6/14/2014          Codes Class Noted POA    Appendicitis with perforation ICD-10-CM: K35.32  ICD-9-CM: 540.0  2/16/2021 Unknown        Appendicitis ICD-10-CM: K37  ICD-9-CM: 008  2/13/2021 Unknown                Review of Systems:   A comprehensive review of systems was negative except for that written in the HPI. Vital Signs:    Last 24hrs VS reviewed since prior progress note. Most recent are:  Visit Vitals  BP (!) 99/55 (BP 1 Location: Right arm, BP Patient Position: At rest)   Pulse 88   Temp 98.7 °F (37.1 °C)   Resp 22   Ht 5' 9\" (1.753 m)   Wt 114.9 kg (253 lb 4.9 oz)   SpO2 97%   BMI 37.41 kg/m²         Intake/Output Summary (Last 24 hours) at 2/26/2021 1345  Last data filed at 2/26/2021 0641  Gross per 24 hour   Intake 2060 ml   Output 1220 ml   Net 840 ml        Physical Examination:      Gen:  Well-developed, well-nourished, in no acute distress  HEENT:  Pink conjunctivae, PERRL, hearing intact to voice, moist mucous membranes  Neck:  Supple, without masses, thyroid non-tender  Resp: Bibasilar crackles   Card:  No murmurs, normal S1, S2 without thrills, bruits. Diffusely anasarcic. Scrotal edema and 3+ bilateral pitting edema of LE's noted   Abd: tense, firm, distended. Minimally tender. Normoactive BS. ALMA ROSA drain in place   Lymph:  No cervical adenopathy  Musc:  No cyanosis or clubbing  Skin:  No rashes or ulcers, skin turgor is good  Neuro:  Cranial nerves 3-12 are grossly intact,  strength is 5/5 bilaterally, dorsi / plantarflexion strength is 5/5 bilaterally, follows commands appropriately  Psych:  Alert with good insight.   Oriented to person, place, and time       Data Review:    Review and/or order of clinical lab test      Labs:     Recent Labs     02/26/21  0142 02/25/21  0722   WBC 16.4* 15.8*   HGB 7.2* 7.4*   HCT 22.5* 23.1*   * 475*     Recent Labs     02/26/21  0142 02/25/21 0722 02/24/21 0627    139 137   K 3.6 3.5 3.7    104 105   CO2 29 29 28   BUN 14 14 14   CREA 0.87 0.84 0.79   GLU 99 101* 100   CA 7.7* 7.6* 7.4*   MG  --  2.4  --    PHOS  --  3.2  --      Recent Labs     02/26/21  0142 02/25/21 0722 02/24/21 0627   ALT 48 37 30    103 98   TBILI 0.7 0.6 0.5   TP 6.5 6.2* 5.5*   ALB 2.6* 2.4* 1.7*   GLOB 3.9 3.8 3.8     No results for input(s): INR, PTP, APTT, INREXT, INREXT in the last 72 hours. No results for input(s): FE, TIBC, PSAT, FERR in the last 72 hours. Lab Results   Component Value Date/Time    Folate 10.9 02/16/2021 12:07 AM      No results for input(s): PH, PCO2, PO2 in the last 72 hours. No results for input(s): CPK, CKNDX, TROIQ in the last 72 hours.     No lab exists for component: CPKMB  No results found for: CHOL, CHOLX, CHLST, CHOLV, HDL, HDLP, LDL, LDLC, DLDLP, TGLX, TRIGL, TRIGP, CHHD, CHHDX  Lab Results   Component Value Date/Time    Glucose (POC) 120 (H) 06/14/2014 09:32 AM     Lab Results   Component Value Date/Time    Color DARK YELLOW 02/24/2021 01:04 PM    Appearance CLEAR 02/24/2021 01:04 PM    Specific gravity 1.029 02/24/2021 01:04 PM    Specific gravity 1.010 02/13/2021 06:33 AM    pH (UA) 6.0 02/24/2021 01:04 PM    Protein 30 (A) 02/24/2021 01:04 PM    Glucose Negative 02/24/2021 01:04 PM    Ketone TRACE (A) 02/24/2021 01:04 PM    Bilirubin Negative 02/13/2021 06:33 AM    Urobilinogen 1.0 02/24/2021 01:04 PM    Nitrites Negative 02/24/2021 01:04 PM    Leukocyte Esterase Negative 02/24/2021 01:04 PM    Epithelial cells FEW 02/24/2021 01:04 PM    Bacteria Negative 02/24/2021 01:04 PM    WBC 0-4 02/24/2021 01:04 PM    RBC 0-5 02/24/2021 01:04 PM         Medications Reviewed:     Current Facility-Administered Medications   Medication Dose Route Frequency    [Held by provider] enoxaparin (LOVENOX) injection 105 mg  105 mg SubCUTAneous Q12H    cefepime (MAXIPIME) 2 g in sterile water (preservative free) 10 mL IV syringe  2 g IntraVENous Q8H    metroNIDAZOLE (FLAGYL) IVPB premix 500 mg  500 mg IntraVENous Q12H    furosemide (LASIX) injection 20 mg  20 mg IntraVENous BID    potassium chloride SR (KLOR-CON 10) tablet 20 mEq  20 mEq Oral BID    HYDROmorphone (PF) (DILAUDID) injection 1 mg  1 mg IntraVENous Q4H PRN    oxyCODONE IR (ROXICODONE) tablet 10 mg  10 mg Oral Q4H PRN    Or    oxyCODONE IR (ROXICODONE) tablet 15 mg  15 mg Oral Q4H PRN    senna-docusate (PERICOLACE) 8.6-50 mg per tablet 1 Tab  1 Tab Oral BID    famotidine (PEPCID) tablet 20 mg  20 mg Oral ACB&D    bisacodyL (DULCOLAX) suppository 10 mg  10 mg Rectal DAILY PRN    alum-mag hydroxide-simeth (MYLANTA) oral suspension 30 mL  30 mL Oral Q4H PRN    oxyCODONE IR (ROXICODONE) tablet 5 mg  5 mg Oral Q4H PRN    albuterol-ipratropium (DUO-NEB) 2.5 MG-0.5 MG/3 ML  3 mL Nebulization Q4H PRN    ondansetron (ZOFRAN) injection 4 mg  4 mg IntraVENous Q6H PRN    acetaminophen (TYLENOL) tablet 1,000 mg  1,000 mg Oral Q8H    diphenhydrAMINE (BENADRYL) capsule 25 mg  25 mg Oral Q6H PRN     ______________________________________________________________________  EXPECTED LENGTH OF STAY: 9d 19h  ACTUAL LENGTH OF STAY:          Yasemin Park MD

## 2021-02-27 ENCOUNTER — APPOINTMENT (OUTPATIENT)
Dept: ULTRASOUND IMAGING | Age: 61
DRG: 853 | End: 2021-02-27
Attending: SURGERY

## 2021-02-27 PROBLEM — K21.9 GERD (GASTROESOPHAGEAL REFLUX DISEASE): Status: ACTIVE | Noted: 2021-02-27

## 2021-02-27 PROBLEM — I26.99 ACUTE PULMONARY EMBOLISM (HCC): Status: ACTIVE | Noted: 2021-02-27

## 2021-02-27 PROBLEM — K59.00 CONSTIPATION: Status: ACTIVE | Noted: 2021-02-27

## 2021-02-27 LAB
ALBUMIN SERPL-MCNC: 2.3 G/DL (ref 3.5–5)
ALBUMIN/GLOB SERPL: 0.6 {RATIO} (ref 1.1–2.2)
ALP SERPL-CCNC: 98 U/L (ref 45–117)
ALT SERPL-CCNC: 36 U/L (ref 12–78)
ANION GAP SERPL CALC-SCNC: 4 MMOL/L (ref 5–15)
AST SERPL-CCNC: 22 U/L (ref 15–37)
BACTERIA SPEC CULT: ABNORMAL
BASOPHILS # BLD: 0 K/UL (ref 0–0.1)
BASOPHILS NFR BLD: 0 % (ref 0–1)
BILIRUB SERPL-MCNC: 0.6 MG/DL (ref 0.2–1)
BUN SERPL-MCNC: 16 MG/DL (ref 6–20)
BUN/CREAT SERPL: 19 (ref 12–20)
CALCIUM SERPL-MCNC: 7.8 MG/DL (ref 8.5–10.1)
CHLORIDE SERPL-SCNC: 104 MMOL/L (ref 97–108)
CO2 SERPL-SCNC: 29 MMOL/L (ref 21–32)
CREAT SERPL-MCNC: 0.85 MG/DL (ref 0.7–1.3)
DIFFERENTIAL METHOD BLD: ABNORMAL
EOSINOPHIL # BLD: 0.1 K/UL (ref 0–0.4)
EOSINOPHIL NFR BLD: 1 % (ref 0–7)
ERYTHROCYTE [DISTWIDTH] IN BLOOD BY AUTOMATED COUNT: 14 % (ref 11.5–14.5)
GLOBULIN SER CALC-MCNC: 4 G/DL (ref 2–4)
GLUCOSE SERPL-MCNC: 97 MG/DL (ref 65–100)
GRAM STN SPEC: ABNORMAL
HCT VFR BLD AUTO: 22.9 % (ref 36.6–50.3)
HGB BLD-MCNC: 7.3 G/DL (ref 12.1–17)
IMM GRANULOCYTES # BLD AUTO: 0.3 K/UL (ref 0–0.04)
IMM GRANULOCYTES NFR BLD AUTO: 2 % (ref 0–0.5)
LYMPHOCYTES # BLD: 1.4 K/UL (ref 0.8–3.5)
LYMPHOCYTES NFR BLD: 10 % (ref 12–49)
MCH RBC QN AUTO: 29.7 PG (ref 26–34)
MCHC RBC AUTO-ENTMCNC: 31.9 G/DL (ref 30–36.5)
MCV RBC AUTO: 93.1 FL (ref 80–99)
MONOCYTES # BLD: 1 K/UL (ref 0–1)
MONOCYTES NFR BLD: 7 % (ref 5–13)
NEUTS SEG # BLD: 11.9 K/UL (ref 1.8–8)
NEUTS SEG NFR BLD: 80 % (ref 32–75)
NRBC # BLD: 0 K/UL (ref 0–0.01)
NRBC BLD-RTO: 0 PER 100 WBC
PLATELET # BLD AUTO: 515 K/UL (ref 150–400)
PMV BLD AUTO: 9.3 FL (ref 8.9–12.9)
POTASSIUM SERPL-SCNC: 3.8 MMOL/L (ref 3.5–5.1)
PROT SERPL-MCNC: 6.3 G/DL (ref 6.4–8.2)
RBC # BLD AUTO: 2.46 M/UL (ref 4.1–5.7)
SERVICE CMNT-IMP: ABNORMAL
SERVICE CMNT-IMP: ABNORMAL
SODIUM SERPL-SCNC: 137 MMOL/L (ref 136–145)
WBC # BLD AUTO: 14.8 K/UL (ref 4.1–11.1)

## 2021-02-27 PROCEDURE — 74011250637 HC RX REV CODE- 250/637: Performed by: NURSE PRACTITIONER

## 2021-02-27 PROCEDURE — 85025 COMPLETE CBC W/AUTO DIFF WBC: CPT

## 2021-02-27 PROCEDURE — 74011250636 HC RX REV CODE- 250/636: Performed by: NURSE PRACTITIONER

## 2021-02-27 PROCEDURE — 74011250636 HC RX REV CODE- 250/636: Performed by: INTERNAL MEDICINE

## 2021-02-27 PROCEDURE — 74011250637 HC RX REV CODE- 250/637: Performed by: SURGERY

## 2021-02-27 PROCEDURE — 76870 US EXAM SCROTUM: CPT

## 2021-02-27 PROCEDURE — 36415 COLL VENOUS BLD VENIPUNCTURE: CPT

## 2021-02-27 PROCEDURE — 77010033678 HC OXYGEN DAILY

## 2021-02-27 PROCEDURE — 80053 COMPREHEN METABOLIC PANEL: CPT

## 2021-02-27 PROCEDURE — 74011000250 HC RX REV CODE- 250: Performed by: INTERNAL MEDICINE

## 2021-02-27 PROCEDURE — 65270000029 HC RM PRIVATE

## 2021-02-27 PROCEDURE — 94760 N-INVAS EAR/PLS OXIMETRY 1: CPT

## 2021-02-27 RX ADMIN — ENOXAPARIN SODIUM 105 MG: 120 INJECTION SUBCUTANEOUS at 18:43

## 2021-02-27 RX ADMIN — FAMOTIDINE 20 MG: 20 TABLET ORAL at 16:00

## 2021-02-27 RX ADMIN — METRONIDAZOLE 500 MG: 500 INJECTION, SOLUTION INTRAVENOUS at 13:36

## 2021-02-27 RX ADMIN — Medication 1000 MG: at 22:10

## 2021-02-27 RX ADMIN — OXYCODONE 15 MG: 5 TABLET ORAL at 08:53

## 2021-02-27 RX ADMIN — CEFEPIME HYDROCHLORIDE 2 G: 2 INJECTION, POWDER, FOR SOLUTION INTRAVENOUS at 05:03

## 2021-02-27 RX ADMIN — FAMOTIDINE 20 MG: 20 TABLET ORAL at 07:59

## 2021-02-27 RX ADMIN — Medication 1000 MG: at 13:36

## 2021-02-27 RX ADMIN — DOCUSATE SODIUM 50 MG AND SENNOSIDES 8.6 MG 1 TABLET: 8.6; 5 TABLET, FILM COATED ORAL at 18:03

## 2021-02-27 RX ADMIN — FUROSEMIDE 20 MG: 10 INJECTION, SOLUTION INTRAMUSCULAR; INTRAVENOUS at 18:08

## 2021-02-27 RX ADMIN — CEFEPIME HYDROCHLORIDE 2 G: 2 INJECTION, POWDER, FOR SOLUTION INTRAVENOUS at 22:09

## 2021-02-27 RX ADMIN — Medication 1000 MG: at 05:03

## 2021-02-27 RX ADMIN — POTASSIUM CHLORIDE 20 MEQ: 750 TABLET, FILM COATED, EXTENDED RELEASE ORAL at 18:03

## 2021-02-27 RX ADMIN — ENOXAPARIN SODIUM 105 MG: 120 INJECTION SUBCUTANEOUS at 05:06

## 2021-02-27 RX ADMIN — CEFEPIME HYDROCHLORIDE 2 G: 2 INJECTION, POWDER, FOR SOLUTION INTRAVENOUS at 13:36

## 2021-02-27 RX ADMIN — METRONIDAZOLE 500 MG: 500 INJECTION, SOLUTION INTRAVENOUS at 03:54

## 2021-02-27 RX ADMIN — POTASSIUM CHLORIDE 20 MEQ: 750 TABLET, FILM COATED, EXTENDED RELEASE ORAL at 08:54

## 2021-02-27 RX ADMIN — FUROSEMIDE 20 MG: 10 INJECTION, SOLUTION INTRAMUSCULAR; INTRAVENOUS at 08:54

## 2021-02-27 RX ADMIN — DOCUSATE SODIUM 50 MG AND SENNOSIDES 8.6 MG 1 TABLET: 8.6; 5 TABLET, FILM COATED ORAL at 08:53

## 2021-02-27 RX ADMIN — OXYCODONE 15 MG: 5 TABLET ORAL at 22:09

## 2021-02-27 NOTE — PROGRESS NOTES
SURGERY PROGRESS NOTE      Admit Date: 2021    POD 8 Days Post-Op    Procedure: Procedure(s):  LAPAROSCOPY GENERAL DIAGNOSTIC with evacuation of hematoma      Subjective:   Feels better today  Little appetite      Objective:     Visit Vitals  BP (!) 100/59 (BP 1 Location: Left upper arm, BP Patient Position: At rest)   Pulse 83   Temp 97.8 °F (36.6 °C)   Resp 18   Ht 5' 9\" (1.753 m)   Wt 115.6 kg (254 lb 13.6 oz)   SpO2 93%   BMI 37.64 kg/m²        Temp (24hrs), Av.5 °F (36.9 °C), Min:97.8 °F (36.6 °C), Max:99.6 °F (37.6 °C)      Physical Exam:     Abdomen:  Soft. Non-tender, non-distended. Incision C/D/I.         Lab Results   Component Value Date/Time    WBC 14.8 (H) 2021 03:38 AM    HGB 7.3 (L) 2021 03:38 AM    HCT 22.9 (L) 2021 03:38 AM    PLATELET 383 (H)  03:38 AM    MCV 93.1 2021 03:38 AM     Lab Results   Component Value Date/Time    GFR est non-AA >60 2021 03:38 AM    GFR est AA >60 2021 03:38 AM    Creatinine 0.85 2021 03:38 AM    BUN 16 2021 03:38 AM    Sodium 137 2021 03:38 AM    Potassium 3.8 2021 03:38 AM    Chloride 104 2021 03:38 AM    CO2 29 2021 03:38 AM    Magnesium 2.4 2021 07:22 AM    Phosphorus 3.2 2021 07:22 AM       Assessment:     Active Problems:    Appendicitis (2021)      Appendicitis with perforation (2021)      GERD (gastroesophageal reflux disease) (2021)      Constipation (2021)      Acute pulmonary embolism ([address] (2021)        Plan/Recommendations/Medical Decision Making:   Drain removed by nurse today per patient  Continue diet as tolerates  OOB as able  Scrotal US pending  Continue IV abx

## 2021-02-27 NOTE — PROGRESS NOTES
HECTOR MACDONALD [facility]  [address] [place] [address]  [phone]      Medical Progress Note      NAME:         Sebastián Hickey   :        1960  MRM:        [id number]    Date of service: 2021      Subjective: Patient has been seen and examined as a follow up for multiple medical issues. Chart, labs, diagnostics reviewed. Patient feels well other than scrotal soreness and swelling. No fever or chills. No nausea or vomiting    Objective:    Vital Signs:    Visit Vitals  BP (!) 100/59 (BP 1 Location: Left upper arm, BP Patient Position: At rest)   Pulse 83   Temp 97.8 °F (36.6 °C)   Resp 18   Ht 5' 9\" (1.753 m)   Wt 115.6 kg (254 lb 13.6 oz)   SpO2 93%   BMI 37.64 kg/m²          Intake/Output Summary (Last 24 hours) at 2021 1245  Last data filed at 2021 1000  Gross per 24 hour   Intake 340 ml   Output 1525 ml   Net -1185 ml        Physical Examination:    General:   Weak looking although not in any acute distress   Eyes:   pink conjunctivae, PERRLA with no discharge.  ENT:   no ottorrhea or rhinorrhea with dry mucous membranes  Neck: no masses, thyroid non-tender and trachea central.  Pulm:  no accessory muscle use, clear breath sounds without crackles or wheezes  Card:  no JVD or murmurs, has regular and normal S1, S2 without thrills, bruits or peripheral edema  Abd:  Soft, non-tender, non-distended, normoactive bowel sounds. Scrotal edema   Musc:  No cyanosis, clubbing, atrophy or deformities.  Skin:  No rashes, bruising or ulcers.   Neuro: Awake and alert. Generally a non focal exam. Follows commands appropriately  Psych:  Has a good insight and is oriented x 3    Current Facility-Administered Medications   Medication Dose Route Frequency   • enoxaparin (LOVENOX) injection 105 mg  105 mg SubCUTAneous Q12H   • cefepime (MAXIPIME) 2 g in sterile water (preservative free) 10 mL IV syringe  2 g  IntraVENous Q8H    metroNIDAZOLE (FLAGYL) IVPB premix 500 mg  500 mg IntraVENous Q12H    furosemide (LASIX) injection 20 mg  20 mg IntraVENous BID    potassium chloride SR (KLOR-CON 10) tablet 20 mEq  20 mEq Oral BID    HYDROmorphone (PF) (DILAUDID) injection 1 mg  1 mg IntraVENous Q4H PRN    oxyCODONE IR (ROXICODONE) tablet 10 mg  10 mg Oral Q4H PRN    Or    oxyCODONE IR (ROXICODONE) tablet 15 mg  15 mg Oral Q4H PRN    senna-docusate (PERICOLACE) 8.6-50 mg per tablet 1 Tab  1 Tab Oral BID    famotidine (PEPCID) tablet 20 mg  20 mg Oral ACB&D    bisacodyL (DULCOLAX) suppository 10 mg  10 mg Rectal DAILY PRN    alum-mag hydroxide-simeth (MYLANTA) oral suspension 30 mL  30 mL Oral Q4H PRN    oxyCODONE IR (ROXICODONE) tablet 5 mg  5 mg Oral Q4H PRN    albuterol-ipratropium (DUO-NEB) 2.5 MG-0.5 MG/3 ML  3 mL Nebulization Q4H PRN    ondansetron (ZOFRAN) injection 4 mg  4 mg IntraVENous Q6H PRN    acetaminophen (TYLENOL) tablet 1,000 mg  1,000 mg Oral Q8H    diphenhydrAMINE (BENADRYL) capsule 25 mg  25 mg Oral Q6H PRN        Laboratory data and review:    Recent Labs     02/27/21  0338 02/26/21  0142 02/25/21  0722   WBC 14.8* 16.4* 15.8*   HGB 7.3* 7.2* 7.4*   HCT 22.9* 22.5* 23.1*   * 513* 475*     Recent Labs     02/27/21  0338 02/26/21  0142 02/25/21  0722    137 139   K 3.8 3.6 3.5    103 104   CO2 29 29 29   GLU 97 99 101*   BUN 16 14 14   CREA 0.85 0.87 0.84   CA 7.8* 7.7* 7.6*   MG  --   --  2.4   PHOS  --   --  3.2   ALB 2.3* 2.6* 2.4*   ALT 36 48 37     No components found for: [place]    Diagnostics: Imaging studies have been reviewed    Assessment and Plan:    Acute pulmonary embolism ([address] (2/27/2021) POA: likely provoked by current illness. Chest CT on 02/15 confirmed bilateral PEs.  LE dopplers neg.  Continue enoxaparin with plan for a DOAC for at least 3 months at discharge     Anasarca / Scrotal edema POA: likely multifactorial from hypoalbuminemia from poor nutritional status + IVF's/IV antibiotics over the course of his hospitalization. Recent TTE with preserved EF. Continue IV furosemide, ambulation as tolerated. Diet as tolerated     Acute blood loss anemia POA: also iron deficient. He is sp IV iron.      GERD: Continue Famotidine     Constipation: surgery following. Repeat CT 2/24 did not show any obstruction     Perforated appendicitis POA: s/p cecectomy 02/13. Surgery is attending and post operative care as per them.      Total time spent for the patient's care: [address] discussed with: Patient, Care Manager and Nursing Staff    Discussed:  Care Plan and D/C Planning    Prophylaxis:  Lovenox    Anticipated Disposition:   PT, OT, RN           ___________________________________________________    Attending Physician:   Patricia Grande MD

## 2021-02-27 NOTE — PROGRESS NOTES
Bedside and Verbal shift change report given to  [address], RN (oncoming nurse) by   Sydni Pagan (offgoing nurse). Report included the following information SBAR, Kardex, Intake/Output, MAR, Accordion, Recent Results and Med Rec Status.

## 2021-02-28 LAB
ALBUMIN SERPL-MCNC: 2.3 G/DL (ref 3.5–5)
ALBUMIN/GLOB SERPL: 0.7 {RATIO} (ref 1.1–2.2)
ALP SERPL-CCNC: 102 U/L (ref 45–117)
ALT SERPL-CCNC: 29 U/L (ref 12–78)
ANION GAP SERPL CALC-SCNC: 7 MMOL/L (ref 5–15)
AST SERPL-CCNC: 27 U/L (ref 15–37)
BACTERIA SPEC CULT: ABNORMAL
BACTERIA SPEC CULT: NORMAL
BASOPHILS # BLD: 0 K/UL (ref 0–0.1)
BASOPHILS NFR BLD: 0 % (ref 0–1)
BILIRUB SERPL-MCNC: 0.4 MG/DL (ref 0.2–1)
BUN SERPL-MCNC: 19 MG/DL (ref 6–20)
BUN/CREAT SERPL: 23 (ref 12–20)
CALCIUM SERPL-MCNC: 8.1 MG/DL (ref 8.5–10.1)
CHLORIDE SERPL-SCNC: 92 MMOL/L (ref 97–108)
CO2 SERPL-SCNC: 25 MMOL/L (ref 21–32)
CREAT SERPL-MCNC: 0.81 MG/DL (ref 0.7–1.3)
DIFFERENTIAL METHOD BLD: ABNORMAL
EOSINOPHIL # BLD: 0.1 K/UL (ref 0–0.4)
EOSINOPHIL NFR BLD: 1 % (ref 0–7)
ERYTHROCYTE [DISTWIDTH] IN BLOOD BY AUTOMATED COUNT: 14.3 % (ref 11.5–14.5)
GLOBULIN SER CALC-MCNC: 3.5 G/DL (ref 2–4)
GLUCOSE SERPL-MCNC: 98 MG/DL (ref 65–100)
GRAM STN SPEC: ABNORMAL
GRAM STN SPEC: ABNORMAL
HCT VFR BLD AUTO: 22 % (ref 36.6–50.3)
HGB BLD-MCNC: 7 G/DL (ref 12.1–17)
IMM GRANULOCYTES # BLD AUTO: 0.2 K/UL (ref 0–0.04)
IMM GRANULOCYTES NFR BLD AUTO: 1 % (ref 0–0.5)
LYMPHOCYTES # BLD: 2.2 K/UL (ref 0.8–3.5)
LYMPHOCYTES NFR BLD: 13 % (ref 12–49)
MCH RBC QN AUTO: 30 PG (ref 26–34)
MCHC RBC AUTO-ENTMCNC: 31.8 G/DL (ref 30–36.5)
MCV RBC AUTO: 94.4 FL (ref 80–99)
MONOCYTES # BLD: 1.3 K/UL (ref 0–1)
MONOCYTES NFR BLD: 8 % (ref 5–13)
NEUTS SEG # BLD: 12.7 K/UL (ref 1.8–8)
NEUTS SEG NFR BLD: 77 % (ref 32–75)
NRBC # BLD: 0 K/UL (ref 0–0.01)
NRBC BLD-RTO: 0 PER 100 WBC
PLATELET # BLD AUTO: 517 K/UL (ref 150–400)
PMV BLD AUTO: 9.9 FL (ref 8.9–12.9)
POTASSIUM SERPL-SCNC: 4 MMOL/L (ref 3.5–5.1)
PROT SERPL-MCNC: 5.8 G/DL (ref 6.4–8.2)
RBC # BLD AUTO: 2.33 M/UL (ref 4.1–5.7)
SERVICE CMNT-IMP: ABNORMAL
SERVICE CMNT-IMP: NORMAL
SODIUM SERPL-SCNC: 124 MMOL/L (ref 136–145)
WBC # BLD AUTO: 16.6 K/UL (ref 4.1–11.1)

## 2021-02-28 PROCEDURE — 74011250636 HC RX REV CODE- 250/636: Performed by: NURSE PRACTITIONER

## 2021-02-28 PROCEDURE — 74011250637 HC RX REV CODE- 250/637: Performed by: SURGERY

## 2021-02-28 PROCEDURE — 80053 COMPREHEN METABOLIC PANEL: CPT

## 2021-02-28 PROCEDURE — 36415 COLL VENOUS BLD VENIPUNCTURE: CPT

## 2021-02-28 PROCEDURE — 74011250636 HC RX REV CODE- 250/636: Performed by: INTERNAL MEDICINE

## 2021-02-28 PROCEDURE — 97116 GAIT TRAINING THERAPY: CPT

## 2021-02-28 PROCEDURE — 85025 COMPLETE CBC W/AUTO DIFF WBC: CPT

## 2021-02-28 PROCEDURE — 74011250637 HC RX REV CODE- 250/637: Performed by: NURSE PRACTITIONER

## 2021-02-28 PROCEDURE — 97530 THERAPEUTIC ACTIVITIES: CPT

## 2021-02-28 PROCEDURE — 65270000029 HC RM PRIVATE

## 2021-02-28 PROCEDURE — 74011250636 HC RX REV CODE- 250/636: Performed by: SURGERY

## 2021-02-28 PROCEDURE — 74011000250 HC RX REV CODE- 250: Performed by: INTERNAL MEDICINE

## 2021-02-28 PROCEDURE — 77010033678 HC OXYGEN DAILY

## 2021-02-28 PROCEDURE — 94760 N-INVAS EAR/PLS OXIMETRY 1: CPT

## 2021-02-28 RX ORDER — ENOXAPARIN SODIUM 150 MG/ML
120 INJECTION SUBCUTANEOUS EVERY 12 HOURS
Status: DISCONTINUED | OUTPATIENT
Start: 2021-02-28 | End: 2021-03-01

## 2021-02-28 RX ORDER — FUROSEMIDE 10 MG/ML
20 INJECTION INTRAMUSCULAR; INTRAVENOUS DAILY
Status: DISCONTINUED | OUTPATIENT
Start: 2021-02-28 | End: 2021-03-05 | Stop reason: HOSPADM

## 2021-02-28 RX ADMIN — Medication 1000 MG: at 13:31

## 2021-02-28 RX ADMIN — CEFEPIME HYDROCHLORIDE 2 G: 2 INJECTION, POWDER, FOR SOLUTION INTRAVENOUS at 13:32

## 2021-02-28 RX ADMIN — CEFEPIME HYDROCHLORIDE 2 G: 2 INJECTION, POWDER, FOR SOLUTION INTRAVENOUS at 21:59

## 2021-02-28 RX ADMIN — METRONIDAZOLE 500 MG: 500 INJECTION, SOLUTION INTRAVENOUS at 13:31

## 2021-02-28 RX ADMIN — POTASSIUM CHLORIDE 20 MEQ: 750 TABLET, FILM COATED, EXTENDED RELEASE ORAL at 17:08

## 2021-02-28 RX ADMIN — POTASSIUM CHLORIDE 20 MEQ: 750 TABLET, FILM COATED, EXTENDED RELEASE ORAL at 08:29

## 2021-02-28 RX ADMIN — ENOXAPARIN SODIUM 105 MG: 120 INJECTION SUBCUTANEOUS at 06:01

## 2021-02-28 RX ADMIN — FAMOTIDINE 20 MG: 20 TABLET ORAL at 17:08

## 2021-02-28 RX ADMIN — METRONIDAZOLE 500 MG: 500 INJECTION, SOLUTION INTRAVENOUS at 02:37

## 2021-02-28 RX ADMIN — FAMOTIDINE 20 MG: 20 TABLET ORAL at 08:29

## 2021-02-28 RX ADMIN — Medication 1000 MG: at 05:59

## 2021-02-28 RX ADMIN — ONDANSETRON 4 MG: 2 INJECTION INTRAMUSCULAR; INTRAVENOUS at 21:54

## 2021-02-28 RX ADMIN — ENOXAPARIN SODIUM 120 MG: 120 INJECTION SUBCUTANEOUS at 18:14

## 2021-02-28 RX ADMIN — DOCUSATE SODIUM 50 MG AND SENNOSIDES 8.6 MG 1 TABLET: 8.6; 5 TABLET, FILM COATED ORAL at 08:29

## 2021-02-28 RX ADMIN — CEFEPIME HYDROCHLORIDE 2 G: 2 INJECTION, POWDER, FOR SOLUTION INTRAVENOUS at 05:58

## 2021-02-28 RX ADMIN — FUROSEMIDE 20 MG: 10 INJECTION, SOLUTION INTRAMUSCULAR; INTRAVENOUS at 08:28

## 2021-02-28 RX ADMIN — Medication 1000 MG: at 21:55

## 2021-02-28 RX ADMIN — DOCUSATE SODIUM 50 MG AND SENNOSIDES 8.6 MG 1 TABLET: 8.6; 5 TABLET, FILM COATED ORAL at 17:08

## 2021-02-28 NOTE — PROGRESS NOTES
Bedside and Verbal shift change report given to   Lary JONES (oncoming nurse) by   Christie Navarrete RN (offgoing nurse). Report included the following information SBAR, Kardex, Intake/Output, MAR, Accordion, Recent Results and Med Rec Status.

## 2021-02-28 NOTE — PROGRESS NOTES
Problem: Mobility Impaired (Adult and Pediatric)  Goal: *Acute Goals and Plan of Care (Insert Text)  Description: FUNCTIONAL STATUS PRIOR TO ADMISSION: Patient was independent and active without use of DME. Patient works as a     HOME [address] [employer] [address]: The patient lived with his wife but did not require assist.    Physical Therapy Goals  Initiated 2/15/2021, re-assessed 2/28/2021 and all remain appropriate  1. Patient will move from supine to sit and sit to supine  in bed with modified independence within 7 day(s). 2.  Patient will transfer from bed to chair and chair to bed with modified independence using the least restrictive device within 7 day(s). 3.  Patient will perform sit to stand with modified independence within 7 day(s). 4.  Patient will ambulate with modified independence for 200 feet with the least restrictive device within 7 day(s). 5.  Patient will ascend/descend 3 stairs with 1 handrail(s) with modified independence within 7 day(s). Outcome: Progressing Towards Goal   PHYSICAL THERAPY TREATMENT: WEEKLY REASSESSMENT  Patient: Sánchez Blanca (65 y.o. male)  Date: 2/28/2021  Primary Diagnosis: Appendicitis [K37]  Appendicitis with perforation [K35.32]  Procedure(s) (LRB):  LAPAROSCOPY GENERAL DIAGNOSTIC with evacuation of hematoma (N/A) 9 Days Post-Op   Precautions:          ASSESSMENT  Patient continues with skilled PT services and is progressing towards goals. Pt requires largely SBA-CGA for all mobility. Mostly limited by decreased endurance this day though stable vitals on RA(SpO2 96%). Maintains intact balance with RW. Ambulated 60ft into hallway with slowed gait speed and decreased step clearance. Recommend pt up to chair for all meals and ambulating with staff throughout the day.      Patient's progression toward goals since last assessment: slow progress, off oxygen    Current Level of Function Impacting Discharge (mobility/balance): CGA mobility    Functional Outcome Measure: The patient scored 21/28 on the Tinetti outcome measure which is indicative of moderate fall risk. Other factors to consider for discharge: good pain control, INDEP PTA         PLAN :  Goals have been updated based on progression since last assessment. Patient continues to benefit from skilled intervention to address the above impairments. Recommendations and Planned Interventions: bed mobility training, transfer training, gait training, therapeutic exercises, neuromuscular re-education, patient and family training/education, and therapeutic activities      Frequency/Duration: Patient will be followed by physical therapy:  5 times a week to address goals. Recommendation for discharge: (in order for the patient to meet his/her long term goals)  Physical therapy at least 2 days/week in the home     This discharge recommendation:  Has been made in collaboration with the attending provider and/or case management    IF patient discharges home will need the following DME: rolling walker         SUBJECTIVE:   Patient stated This is the first time in the hallway and it feels pretty good.     OBJECTIVE DATA SUMMARY:   HISTORY:    History reviewed. No pertinent past medical history. Past Surgical History:   Procedure Laterality Date    HX HEENT      toncills       Personal factors and/or comorbidities impacting plan of care: prolonged hospital admission    Home Situation  Home Environment: (P) Private residence  # Steps to Enter: (P) 3  Rails to Enter: (P) Yes  One/Two Story Residence: (P) One story  Patient Expects to be Discharged to[de-identified] (P) Private residence  Current DME Used/Available at Home: (P) Shower chair  Tub or Shower Type: (P) Shower    EXAMINATION/PRESENTATION/DECISION MAKING:   Critical Behavior:  Neurologic State: Alert  Orientation Level: Oriented X4  Cognition: Follows commands     Hearing:   Auditory  Auditory Impairment: Hard of hearing, bilateral  Hearing Aids/Status: Bilateral  Skin:    Edema:   Range Of Motion:  AROM: Within functional limits           PROM: Within functional limits           Strength:    Strength: Generally decreased, functional                    Tone & Sensation:   Tone: Normal                              Coordination:  Coordination: Within functional limits  Vision:      Functional Mobility:  Bed Mobility:  Rolling: Stand-by assistance  Supine to Sit: Stand-by assistance  Sit to Supine: Supervision     Transfers:  Sit to Stand: Stand-by assistance  Stand to Sit: Contact guard assistance                       Balance:   Sitting: Intact  Standing: Impaired  Standing - Static: Good;Constant support  Standing - Dynamic : Fair;Constant support  Ambulation/Gait Training:  Distance (ft): 60 Feet (ft)  Assistive Device: Gait belt;Walker, rolling  Ambulation - Level of Assistance: Contact guard assistance        Gait Abnormalities: Decreased step clearance        Base of Support: Widened     Speed/Ludmila: Pace decreased (<100 feet/min); Slow  Step Length: Left shortened;Right shortened                     Stairs: Therapeutic Exercises:       Functional Measure:  Tinetti test:    Sitting Balance: 1  Arises: 2  Attempts to Rise: 2  Immediate Standing Balance: 2  Standing Balance: 1  Nudged: 1  Eyes Closed: 1  Turn 360 Degrees - Continuous/Discontinuous: 1  Turn 360 Degrees - Steady/Unsteady: 1  Sitting Down: 1  Balance Score: 13 Balance total score  Indication of Gait: 1  R Step Length/Height: 1  L Step Length/Height: 1  R Foot Clearance: 1  L Foot Clearance: 1  Step Symmetry: 1  Step Continuity: 1  Path: 1  Trunk: 0  Walking Time: 0  Gait Score: 8 Gait total score  Total Score: 21/28 Overall total score         Tinetti Tool Score Risk of Falls  <19 = High Fall Risk  19-24 = Moderate Fall Risk  25-28 = Low Fall Risk  Tinetti ME. Performance-Oriented Assessment of Mobility Problems in Elderly Patients. [address]; [id number].  (Scoring Description: PT Bulletin Feb. 10, 1993)    Older adults: Kay Rinaldi et al, 2009; n = 1000 [facility] elderly evaluated with ABC, ALICIA, ADL, and IADL)  · Mean ALICIA score for males aged 69-68 years = 26.21(3.40)  · Mean ALICIA score for females age 69-68 years = 25.16(4.30)  · Mean ALICIA score for males over 80 years = 23.29(6.02)  · Mean ALICIA score for females over 80 years = 17.20(8.32)           Pain Rating:  none    Activity Tolerance:   Good and requires rest breaks    After treatment patient left in no apparent distress:   Supine in bed and Call bell within reach    COMMUNICATION/EDUCATION:   The patients plan of care was discussed with: Registered nurse. Fall prevention education was provided and the patient/caregiver indicated understanding., Patient/family have participated as able in goal setting and plan of care. , and Patient/family agree to work toward stated goals and plan of care.     Thank you for this referral.  Sid Palacios, PT   Time Calculation: 24 mins

## 2021-02-28 NOTE — PROGRESS NOTES
Carlos [facility]cho[facility]  [address] [facility], [address] [place] [address]  [phone]      Medical Progress Note      NAME:         Sebastian Welch   :        1960  MRM:        [id number]    Date of service: 2021      Subjective: Patient has been seen and examined as a follow up for multiple medical issues. Chart, labs, diagnostics reviewed. Patient feels weak. Still with aching scrotal soreness and swelling. No fever or chills. No nausea or vomiting    Objective:    Vital Signs:    Visit Vitals  BP (!) 97/58 (BP 1 Location: Left upper arm, BP Patient Position: Sitting)   Pulse 82   Temp 98.4 °F (36.9 °C)   Resp 16   Ht 5' 9\" (1.753 m)   Wt 115.6 kg (254 lb 13.6 oz)   SpO2 97%   BMI 37.64 kg/m²          Intake/Output Summary (Last 24 hours) at 2021 0802  Last data filed at 2021 2354  Gross per 24 hour   Intake 480 ml   Output 1512 ml   Net -1032 ml        Physical Examination:    General:   Weak looking although not in any acute distress   Eyes:   pink conjunctivae, PERRLA with no discharge. ENT:   no ottorrhea or rhinorrhea with dry mucous membranes  Neck: no masses, thyroid non-tender and trachea central.  Pulm: clear breath sounds without crackles or wheezes  Card:  has regular and normal S1, S2 without thrills, bruits or peripheral edema  Abd:  Soft, non-tender, non-distended, normoactive bowel sounds. Scrotal edema   Musc:  No cyanosis, clubbing, atrophy or deformities. Skin:  No rashes, bruising or ulcers. Neuro: Awake and alert.  Generally a non focal exam.   Psych:  Has a good insight and is oriented x 3    Current Facility-Administered Medications   Medication Dose Route Frequency    furosemide (LASIX) injection 20 mg  20 mg IntraVENous DAILY    enoxaparin (LOVENOX) injection 120 mg  120 mg SubCUTAneous Q12H    cefepime (MAXIPIME) 2 g in sterile water (preservative free) 10 mL IV syringe  2 g IntraVENous Q8H    metroNIDAZOLE (FLAGYL) IVPB premix 500 mg  500 mg IntraVENous Q12H    potassium chloride SR (KLOR-CON 10) tablet 20 mEq  20 mEq Oral BID    HYDROmorphone (PF) (DILAUDID) injection 1 mg  1 mg IntraVENous Q4H PRN    oxyCODONE IR (ROXICODONE) tablet 10 mg  10 mg Oral Q4H PRN    Or    oxyCODONE IR (ROXICODONE) tablet 15 mg  15 mg Oral Q4H PRN    senna-docusate (PERICOLACE) 8.6-50 mg per tablet 1 Tab  1 Tab Oral BID    famotidine (PEPCID) tablet 20 mg  20 mg Oral ACB&D    bisacodyL (DULCOLAX) suppository 10 mg  10 mg Rectal DAILY PRN    alum-mag hydroxide-simeth (MYLANTA) oral suspension 30 mL  30 mL Oral Q4H PRN    albuterol-ipratropium (DUO-NEB) 2.5 MG-0.5 MG/3 ML  3 mL Nebulization Q4H PRN    ondansetron (ZOFRAN) injection 4 mg  4 mg IntraVENous Q6H PRN    acetaminophen (TYLENOL) tablet 1,000 mg  1,000 mg Oral Q8H    diphenhydrAMINE (BENADRYL) capsule 25 mg  25 mg Oral Q6H PRN        Laboratory data and review:    Recent Labs     02/28/21  0254 02/27/21  0338 02/26/21  0142   WBC 16.6* 14.8* 16.4*   HGB 7.0* 7.3* 7.2*   HCT 22.0* 22.9* 22.5*   * 515* 513*     Recent Labs     02/28/21  0254 02/27/21  0338 02/26/21  0142   * 137 137   K 4.0 3.8 3.6   CL 92* 104 103   CO2 25 29 29   GLU 98 97 99   BUN 19 16 14   CREA 0.81 0.85 0.87   CA 8.1* 7.8* 7.7*   ALB 2.3* 2.3* 2.6*   ALT 29 36 48     No components found for: [address] [facility]    Diagnostics: Imaging studies have been reviewed    Assessment and Plan:    Acute pulmonary embolism ([facility] [address] (2/27/2021) POA: likely provoked by current illness. Chest CT on 02/15 confirmed bilateral PEs. LE dopplers neg.  Continue enoxaparin with plan for a DOAC for at least 3 months at discharge. Monitor     Anasarca / Scrotal edema POA: likely multifactorial from hypoalbuminemia, IVF's vs cellulitis vs abscess as noted on the scrotal ultrasound. Recent TTE with preserved EF. Decrease IV furosemide given hyponatremia, ambulation as tolerated.  Diet as tolerated. Surgery folowing    Hyponatremia: likely from diuresis. Decrease Furosemide dose and hold if hypotensive. Monitor Na    Acute blood loss anemia POA: also iron deficient. He is sp IV iron.      GERD: Continue Famotidine     Constipation: surgery following. Repeat CT 2/24 did not show any obstruction     Perforated appendicitis POA: s/p cecectomy 02/13. Surgery is attending and post operative care as per them.      Total time spent for the patient's care: [address] [place] [address] discussed with: Patient, Care Manager and Nursing Staff    Discussed:  Care Plan and D/C Planning    Prophylaxis:  Lovenox    Anticipated Disposition:   PT, OT, RN           ___________________________________________________    Attending Physician:   Kosta Olivares MD

## 2021-03-01 ENCOUNTER — APPOINTMENT (OUTPATIENT)
Dept: CT IMAGING | Age: 61
DRG: 853 | End: 2021-03-01
Attending: UROLOGY

## 2021-03-01 LAB
ALBUMIN SERPL-MCNC: 2.2 G/DL (ref 3.5–5)
ALBUMIN/GLOB SERPL: 0.5 {RATIO} (ref 1.1–2.2)
ALP SERPL-CCNC: 86 U/L (ref 45–117)
ALT SERPL-CCNC: 24 U/L (ref 12–78)
ANION GAP SERPL CALC-SCNC: 7 MMOL/L (ref 5–15)
AST SERPL-CCNC: 11 U/L (ref 15–37)
BASOPHILS # BLD: 0 K/UL (ref 0–0.1)
BASOPHILS NFR BLD: 0 % (ref 0–1)
BILIRUB SERPL-MCNC: 0.5 MG/DL (ref 0.2–1)
BUN SERPL-MCNC: 15 MG/DL (ref 6–20)
BUN/CREAT SERPL: 20 (ref 12–20)
CALCIUM SERPL-MCNC: 7.8 MG/DL (ref 8.5–10.1)
CHLORIDE SERPL-SCNC: 105 MMOL/L (ref 97–108)
CO2 SERPL-SCNC: 26 MMOL/L (ref 21–32)
COMMENT, HOLDF: NORMAL
CREAT SERPL-MCNC: 0.76 MG/DL (ref 0.7–1.3)
DIFFERENTIAL METHOD BLD: ABNORMAL
EOSINOPHIL # BLD: 0.2 K/UL (ref 0–0.4)
EOSINOPHIL NFR BLD: 1 % (ref 0–7)
ERYTHROCYTE [DISTWIDTH] IN BLOOD BY AUTOMATED COUNT: 14 % (ref 11.5–14.5)
GLOBULIN SER CALC-MCNC: 4.4 G/DL (ref 2–4)
GLUCOSE SERPL-MCNC: 105 MG/DL (ref 65–100)
HCT VFR BLD AUTO: 22.2 % (ref 36.6–50.3)
HGB BLD-MCNC: 7.2 G/DL (ref 12.1–17)
IMM GRANULOCYTES # BLD AUTO: 0.2 K/UL (ref 0–0.04)
IMM GRANULOCYTES NFR BLD AUTO: 2 % (ref 0–0.5)
LYMPHOCYTES # BLD: 1.7 K/UL (ref 0.8–3.5)
LYMPHOCYTES NFR BLD: 11 % (ref 12–49)
MCH RBC QN AUTO: 29.8 PG (ref 26–34)
MCHC RBC AUTO-ENTMCNC: 32.4 G/DL (ref 30–36.5)
MCV RBC AUTO: 91.7 FL (ref 80–99)
MONOCYTES # BLD: 1.1 K/UL (ref 0–1)
MONOCYTES NFR BLD: 7 % (ref 5–13)
NEUTS SEG # BLD: 11.9 K/UL (ref 1.8–8)
NEUTS SEG NFR BLD: 79 % (ref 32–75)
NRBC # BLD: 0 K/UL (ref 0–0.01)
NRBC BLD-RTO: 0 PER 100 WBC
PLATELET # BLD AUTO: 551 K/UL (ref 150–400)
PMV BLD AUTO: 9.4 FL (ref 8.9–12.9)
POTASSIUM SERPL-SCNC: 3.8 MMOL/L (ref 3.5–5.1)
PROT SERPL-MCNC: 6.6 G/DL (ref 6.4–8.2)
RBC # BLD AUTO: 2.42 M/UL (ref 4.1–5.7)
SAMPLES BEING HELD,HOLD: NORMAL
SODIUM SERPL-SCNC: 138 MMOL/L (ref 136–145)
WBC # BLD AUTO: 15 K/UL (ref 4.1–11.1)

## 2021-03-01 PROCEDURE — 36415 COLL VENOUS BLD VENIPUNCTURE: CPT

## 2021-03-01 PROCEDURE — 77010033678 HC OXYGEN DAILY

## 2021-03-01 PROCEDURE — 65270000029 HC RM PRIVATE

## 2021-03-01 PROCEDURE — 74011250636 HC RX REV CODE- 250/636: Performed by: INTERNAL MEDICINE

## 2021-03-01 PROCEDURE — 74177 CT ABD & PELVIS W/CONTRAST: CPT

## 2021-03-01 PROCEDURE — 94760 N-INVAS EAR/PLS OXIMETRY 1: CPT

## 2021-03-01 PROCEDURE — 74011250637 HC RX REV CODE- 250/637: Performed by: NURSE PRACTITIONER

## 2021-03-01 PROCEDURE — 74011250636 HC RX REV CODE- 250/636: Performed by: NURSE PRACTITIONER

## 2021-03-01 PROCEDURE — 74011250636 HC RX REV CODE- 250/636: Performed by: SURGERY

## 2021-03-01 PROCEDURE — 74011000250 HC RX REV CODE- 250: Performed by: INTERNAL MEDICINE

## 2021-03-01 PROCEDURE — 74011000636 HC RX REV CODE- 636: Performed by: RADIOLOGY

## 2021-03-01 PROCEDURE — 74011250637 HC RX REV CODE- 250/637: Performed by: SURGERY

## 2021-03-01 PROCEDURE — 99232 SBSQ HOSP IP/OBS MODERATE 35: CPT | Performed by: INTERNAL MEDICINE

## 2021-03-01 PROCEDURE — 85025 COMPLETE CBC W/AUTO DIFF WBC: CPT

## 2021-03-01 PROCEDURE — 80053 COMPREHEN METABOLIC PANEL: CPT

## 2021-03-01 RX ORDER — ENOXAPARIN SODIUM 150 MG/ML
105 INJECTION SUBCUTANEOUS EVERY 12 HOURS
Status: DISCONTINUED | OUTPATIENT
Start: 2021-03-01 | End: 2021-03-05

## 2021-03-01 RX ADMIN — POTASSIUM CHLORIDE 20 MEQ: 750 TABLET, FILM COATED, EXTENDED RELEASE ORAL at 08:05

## 2021-03-01 RX ADMIN — FAMOTIDINE 20 MG: 20 TABLET ORAL at 06:20

## 2021-03-01 RX ADMIN — CEFEPIME HYDROCHLORIDE 2 G: 2 INJECTION, POWDER, FOR SOLUTION INTRAVENOUS at 22:12

## 2021-03-01 RX ADMIN — Medication 1000 MG: at 06:20

## 2021-03-01 RX ADMIN — DOCUSATE SODIUM 50 MG AND SENNOSIDES 8.6 MG 1 TABLET: 8.6; 5 TABLET, FILM COATED ORAL at 17:48

## 2021-03-01 RX ADMIN — POTASSIUM CHLORIDE 20 MEQ: 750 TABLET, FILM COATED, EXTENDED RELEASE ORAL at 17:48

## 2021-03-01 RX ADMIN — Medication 1000 MG: at 22:12

## 2021-03-01 RX ADMIN — CEFEPIME HYDROCHLORIDE 2 G: 2 INJECTION, POWDER, FOR SOLUTION INTRAVENOUS at 14:05

## 2021-03-01 RX ADMIN — METRONIDAZOLE 500 MG: 500 INJECTION, SOLUTION INTRAVENOUS at 14:05

## 2021-03-01 RX ADMIN — ENOXAPARIN SODIUM 105 MG: 150 INJECTION SUBCUTANEOUS at 18:10

## 2021-03-01 RX ADMIN — METRONIDAZOLE 500 MG: 500 INJECTION, SOLUTION INTRAVENOUS at 02:13

## 2021-03-01 RX ADMIN — CEFEPIME HYDROCHLORIDE 2 G: 2 INJECTION, POWDER, FOR SOLUTION INTRAVENOUS at 06:21

## 2021-03-01 RX ADMIN — FAMOTIDINE 20 MG: 20 TABLET ORAL at 17:48

## 2021-03-01 RX ADMIN — DOCUSATE SODIUM 50 MG AND SENNOSIDES 8.6 MG 1 TABLET: 8.6; 5 TABLET, FILM COATED ORAL at 08:05

## 2021-03-01 RX ADMIN — FUROSEMIDE 20 MG: 10 INJECTION, SOLUTION INTRAMUSCULAR; INTRAVENOUS at 08:06

## 2021-03-01 RX ADMIN — ENOXAPARIN SODIUM 120 MG: 120 INJECTION SUBCUTANEOUS at 06:24

## 2021-03-01 RX ADMIN — Medication 1000 MG: at 14:04

## 2021-03-01 RX ADMIN — IOPAMIDOL 100 ML: 755 INJECTION, SOLUTION INTRAVENOUS at 13:35

## 2021-03-01 NOTE — PROGRESS NOTES
SURGERY PROGRESS NOTE      Admit Date: 2021    POD 10 Days Post-Op    Procedure: Procedure(s):  LAPAROSCOPY GENERAL DIAGNOSTIC with evacuation of hematoma      Subjective:   Resting comfortably  Appetite getting better  Still complaints of lower extrem swelling and scrotal pain and swelling  Dec abd pain      Objective:     Visit Vitals  BP (!) 97/57 (BP 1 Location: Left upper arm, BP Patient Position: At rest)   Pulse 84   Temp 98.3 °F (36.8 °C)   Resp 18   Ht 5' 9\" (1.753 m)   Wt 105 kg (231 lb 6.4 oz)   SpO2 96%   BMI 34.17 kg/m²        Temp (24hrs), Av.4 °F (36.9 °C), Min:97.8 °F (36.6 °C), Max:98.9 °F (37.2 °C)      Physical Exam:     Abdomen:  Soft. Non-tender, non-distended. Incision C/D/I. Extrem:  bilat pitting edema  Urinary:  Swollen scrotum        Lab Results   Component Value Date/Time    WBC 15.0 (H) 2021 07:51 AM    HGB 7.2 (L) 2021 07:51 AM    HCT 22.2 (L) 2021 07:51 AM    PLATELET 078 (H)  07:51 AM    MCV 91.7 2021 07:51 AM       Assessment:     Active Problems:    Appendicitis (2021)      Appendicitis with perforation (2021)      GERD (gastroesophageal reflux disease) (2021)      Constipation (2021)      Acute pulmonary embolism ([address] (2021)        Plan/Recommendations/Medical Decision Making:   Diet as tolerates  IV abx per ID  Will consult urology to help eval/asses recent scrotal ultrasound  OOB ambulate- aggressive PT/OT      Pt seen and discussed with Dr Maria A Fragoso, JOSETTE    Agree. Don't think there is abscess in scrotum. Denies pain, but is heavy when he walks. Size likely related to overall edema in abdominal wall and lower extremities. Still would like to make sure that that Urology agrees, and as Infectious Disease service recommends.

## 2021-03-01 NOTE — PROGRESS NOTES
2225: Pt stats that he does not \"feel happy\". He states that \"food doesn't taste good. It will for one bite and then it's not good anymore\". Psych consult may need to be put in on patient.

## 2021-03-01 NOTE — PROGRESS NOTES
SURGERY PROGRESS NOTE      Admit Date: 2021    POD 9 Days Post-Op    Procedure: Procedure(s):  LAPAROSCOPY GENERAL DIAGNOSTIC with evacuation of hematoma      Subjective:   Resting comfortably      Objective:     Visit Vitals  /64 (BP 1 Location: Left upper arm, BP Patient Position: At rest)   Pulse 89   Temp 98 °F (36.7 °C)   Resp 16   Ht 5' 9\" (1.753 m)   Wt 105 kg (231 lb 6.4 oz)   SpO2 94%   BMI 34.17 kg/m²        Temp (24hrs), Av.5 °F (36.9 °C), Min:97.8 °F (36.6 °C), Max:100 °F (37.8 °C)      Physical Exam:     Abdomen:  Soft. Non-tender, non-distended. Incision C/D/I.         Lab Results   Component Value Date/Time    WBC 16.6 (H) 2021 02:54 AM    HGB 7.0 (L) 2021 02:54 AM    HCT 22.0 (L) 2021 02:54 AM    PLATELET 978 (H) 18/15/3570 02:54 AM    MCV 94.4 2021 02:54 AM       Assessment:     Active Problems:    Appendicitis (2021)      Appendicitis with perforation (2021)      GERD (gastroesophageal reflux disease) (2021)      Constipation (2021)      Acute pulmonary embolism ([address] (2021)        Plan/Recommendations/Medical Decision Making:   Diet as tolerates  IV abx per ID  OOB ambulate

## 2021-03-01 NOTE — PROGRESS NOTES
0700: Bedside and Verbal shift change report given to Manohar Nuñez. 74 (oncoming nurse) by Patrizia Yoo RN (offgoing nurse). Report included the following information SBAR, Kardex, Intake/Output, MAR, Accordion, Recent Results and Med Rec Status.

## 2021-03-01 NOTE — PROGRESS NOTES
Bedside and Verbal shift change report given to [address] [place] [address],[address]  by Luis Abbott RN. Report included the following information SBAR, Kardex, MAR, Accordion and Recent Results.

## 2021-03-01 NOTE — CONSULTS
New Urology Consult Note    Patient: Melissa Campos MRN: [id number]  SSN: [id number]    YOB: 1960  Age: 62 y.o. Sex: male            Assessment:     Melissa Campos is a 62 y.o. male with who presented to the ED on 2/13/2021 with perforated appendicitis s/p cecectomy 2/13/2021. CT scan 2/25/2021 with intra-abdominal collections and s/p CT guided aspiration 2/26/2021, with findings of hematoma. Also with leokocytosis, scrotal edema and scrotal US on 2/27/2021 concerning for left scrotal abscess. Recommendations:     1. No abscess noted on CT. 2. Scrotum elevated. 3. Left inguinal fat/fluid hernia with management per surgery. Thank you for this consult. Please contact [facility] with any further questions/concerns. Abena Leslie NP [phone]    History of Present Illness:     Reason for Consult:  Scrotal abscess    Melissa Campos is seen in consultation for reasons noted above at the request of Brenden Villarreal MD.    This is a 62 y.o. male with history as noted above. Has had scrotal     CT scan abd/pelivs done today. IMPRESSION     Interval removal of right lateral drain with decrease in size of subhepatic  abscess  Interval stable trilobar abscess in mid abdomen  Large bilateral fluid collections in the scrotum without a definable abscess  Left inguinal canal loculated fluid collection. Subjective     Past Medical History  History reviewed. No pertinent past medical history.     Past Surgical History:   Past Surgical History:   Procedure Laterality Date    HX HEENT      toncills       Medication:  Current Facility-Administered Medications   Medication Dose Route Frequency Provider Last Rate Last Admin    enoxaparin (LOVENOX) injection 105 mg  105 mg SubCUTAneous Q12H Brenden Villarreal MD        furosemide (LASIX) injection 20 mg  20 mg IntraVENous DAILY Alyssa Quan MD   20 mg at 03/01/21 0806    cefepime (MAXIPIME) 2 g in sterile water (preservative free) 10 mL IV syringe  2 g IntraVENous Q8H Ferol Sane, DO   2 g at 03/01/21 1405    metroNIDAZOLE (FLAGYL) IVPB premix 500 mg  500 mg IntraVENous Q12H Ferol Sane,  mL/hr at 03/01/21 1405 500 mg at 03/01/21 1405    potassium chloride SR (KLOR-CON 10) tablet 20 mEq  20 mEq Oral BID Ulis Common H, NP   20 mEq at 03/01/21 0805    HYDROmorphone (PF) (DILAUDID) injection 1 mg  1 mg IntraVENous Q4H PRN Chris Guo MD   1 mg at 02/26/21 1056    oxyCODONE IR (ROXICODONE) tablet 10 mg  10 mg Oral Q4H PRN Chris Guo MD   10 mg at 02/25/21 1736    Or    oxyCODONE IR (ROXICODONE) tablet 15 mg  15 mg Oral Q4H PRN Chris Guo MD   15 mg at 02/27/21 2209    senna-docusate (Claudette Minium) 8.6-50 mg per tablet 1 Tab  1 Tab Oral BID Chris Guo MD   1 Tab at 03/01/21 0805    famotidine (PEPCID) tablet 20 mg  20 mg Oral ACB&D Chris Guo MD   20 mg at 03/01/21 0268    bisacodyL (DULCOLAX) suppository 10 mg  10 mg Rectal DAILY PRN Chris Guo MD        alum-mag hydroxide-simeth (MYLANTA) oral suspension 30 mL  30 mL Oral Q4H PRN Chris Guo MD   30 mL at 02/18/21 1411    albuterol-ipratropium (DUO-NEB) 2.5 MG-0.5 MG/3 ML  3 mL Nebulization Q4H PRN Chris Guo MD        ondansetron [facility]) injection 4 mg  4 mg IntraVENous Q6H PRN Chris Guo MD   4 mg at 02/28/21 2154    acetaminophen (TYLENOL) tablet 1,000 mg  1,000 mg Oral Q8H Chris Guo MD   1,000 mg at 03/01/21 1404    diphenhydrAMINE (BENADRYL) capsule 25 mg  25 mg Oral Q6H PRN Chris Guo MD           Allergies:  No Known Allergies    Social History:  Social History     Tobacco Use    Smoking status: Never Smoker    Smokeless tobacco: Never Used   Substance Use Topics    Alcohol use: No    Drug use: No       Family History  Family History   Problem Relation Age of Onset    Diabetes Mother     Diabetes Father        Review of Systems  ROS from attending provider note from 3/1/2021 reviewed and changes (other than per HPI) include : none.     Objective:     Vital signs in last 24 hours:  Visit Vitals  BP (!) 94/59 (BP 1 Location: Right upper arm, BP Patient Position: At rest)   Pulse 88   Temp 98.9 °F (37.2 °C)   Resp 18   Ht 5' 9\" (1.753 m)   Wt 105 kg (231 lb 6.4 oz)   SpO2 91%   BMI 34.17 kg/m²       Intake/Output last 3 shifts:  Date 02/28/21 0700 - 03/01/21 0659 03/01/21 0700 - 03/02/21 0659   Shift 5410-6057 3622-5877 24 Hour Total 5781-4364 0950-1572 24 Hour Total   INTAKE   P.O. 460 500 960 240  240     P.O. 460 500 960 240  240   I.V.(mL/kg/hr) 130(0.1)  130(0.1)        I.V. 130  130      Blood 0  0        Autotransfused 0  0      Other 0  0        Other 0  0      Shift Total(mL/kg) 590(5.6) 500(4.8) 1090(10.4) 240(2.3)  240(2.3)   OUTPUT   Urine(mL/kg/hr) 400(0.3) 500(0.4) 900(0.4) 300  300     Urine Voided 400 500 900 300  300     Urine Occurrence(s) 1 x 1 x 2 x 2 x  2 x   Stool           Stool Occurrence(s) 1 x 1 x 2 x 1 x  1 x   Shift Total(mL/kg) 400(3.8) 500(4.8) 900(8.6) 300(2.9)  300(2.9)    0 190 -60  -60   Weight (kg) 105 105 105 105 105 105         Physical Exam  General: NAD, A&O,   Head: Atraumatic  ENT: normal ears  Neck: no tracheal deviation  Cardiac: not tachycardic   Pulmonary: Normal work of breathing   Abdomen: rounded, dressing in place for recent sx  :generalized scrotal edema, no evidence of cellulitis or abscess   Extremities: moves all  Gait: not observed  Neuro: Appropriate, no focal neurological deficits  Mood/Affect: normal    Lab/Imaging Review:       Most Recent Labs:  Lab Results   Component Value Date/Time    WBC 15.0 (H) 03/01/2021 07:51 AM    HGB 7.2 (L) 03/01/2021 07:51 AM    HCT 22.2 (L) 03/01/2021 07:51 AM    PLATELET 551 (H) 03/01/2021 07:51 AM    MCV 91.7 03/01/2021 07:51 AM        Lab Results   Component Value Date/Time    Sodium 138 03/01/2021 07:51 AM    Potassium 3.8 03/01/2021 07:51 AM    Chloride 105  03/01/2021 07:51 AM    CO2 26 03/01/2021 07:51 AM    Anion gap 7 03/01/2021 07:51 AM    Glucose 105 (H) 03/01/2021 07:51 AM    BUN 15 03/01/2021 07:51 AM    Creatinine 0.76 03/01/2021 07:51 AM    BUN/Creatinine ratio 20 03/01/2021 07:51 AM    GFR est AA >60 03/01/2021 07:51 AM    GFR est non-AA >60 03/01/2021 07:51 AM    Calcium 7.8 (L) 03/01/2021 07:51 AM    Bilirubin, total 0.5 03/01/2021 07:51 AM    Alk. phosphatase 86 03/01/2021 07:51 AM    Protein, total 6.6 03/01/2021 07:51 AM    Albumin 2.2 (L) 03/01/2021 07:51 AM    Globulin 4.4 (H) 03/01/2021 07:51 AM    A-G Ratio 0.5 (L) 03/01/2021 07:51 AM    ALT (SGPT) 24 03/01/2021 07:51 AM        No results found for: PSA, PSA2, PSAR1, Kasia Isha, PSAR3, [id number], [id number], 56327, PSAEXT     COAGS:  No results found for: APTT, PTP, INR, INREXT    Lab Results   Component Value Date/Time    Hemoglobin A1c 6.1 06/15/2014 04:20 AM        Lab Results   Component Value Date/Time    Troponin-I, Qt. <0.05 02/15/2021 12:32 AM          Urine/Blood Cultures:  Results     Procedure Component Value Units Date/Time    CULTURE, MRSA [[id number]]     Order Status: Sent Specimen: Nasal from [address], ANAEROBIC [[id number]] Collected: 02/26/21 1300    Order Status: Canceled Specimen: Abscess     CULTURE, BODY FLUID W Venda Beams [[id number]] Collected: 02/26/21 1300    Order Status: Canceled Specimen: Body Fluid from Abscess     CULTURE, MRSA [[id number]] Collected: 02/26/21 1255    Order Status: Canceled Specimen: Nasal from Nares     CULTURE, ANAEROBIC [[id number]] Collected: 02/26/21 1255    Order Status: Completed Specimen: Abscess Updated: 02/28/21 1327     Special Requests: NO SPECIAL REQUESTS        Culture result: NO ANAEROBES ISOLATED       CULTURE, BODY FLUID Oscar Childers STAIN [[id number]]  (Abnormal)  (Susceptibility) Collected: 02/26/21 1252    Order Status: Completed Specimen:  Body Fluid from Abscess Updated: 02/28/21 1327     Special Requests: NO SPECIAL REQUESTS GRAM STAIN 1+ WBCS SEEN               OCCASIONAL GRAM NEGATIVE RODS           Culture result: LIGHT ESCHERICHIA COLI       Susceptibility      Escherichia coli     ARIANNA     Amikacin ($) Susceptible     Ampicillin ($) Resistant     Ampicillin/sulbactam ($) Resistant     Cefazolin ($) Resistant     Cefepime ($$) Susceptible     Cefoxitin Susceptible     Ceftazidime ($) Susceptible     Ceftriaxone ($) Susceptible     Ciprofloxacin ($) Resistant     Gentamicin ($) Susceptible     Levofloxacin ($) Resistant     Meropenem ($$) Susceptible     Piperacillin/Tazobac ($) Resistant     Tobramycin ($) Susceptible     Trimeth/Sulfa Susceptible                    CULTURE, MRSA [[id number]]     Order Status: Canceled Specimen: Nasal from Nares     CULTURE, URINE [[id number]] Collected: 02/24/21 1304    Order Status: Completed Specimen: Urine from Clean catch Updated: 02/25/21 1253     Special Requests: NO SPECIAL REQUESTS        Culture result: No growth (<1,000 CFU/ML)       CULTURE, BLOOD [[id number]] Collected: 02/24/21 1021    Order Status: Completed Specimen: Blood Updated: 03/01/21 0033     Special Requests: NO SPECIAL REQUESTS        Culture result: NO GROWTH 5 DAYS       CULTURE, BLOOD [[id number]] Collected: 02/24/21 1021    Order Status: Completed Specimen: Blood Updated: 03/01/21 0033     Special Requests: NO SPECIAL REQUESTS        Culture result: NO GROWTH 5 DAYS       CULTURE, BODY FLUID Jennye Coombe STAIN [[id number]]  (Abnormal) Collected: 02/24/21 1021    Order Status: Completed Specimen: Body Fluid from Abdominal Fluid Updated: 02/27/21 1440     Special Requests: NO SPECIAL REQUESTS        GRAM STAIN FEW WBCS SEEN        Culture result: MODERATE ESCHERICHIA COLI         PLEASE REFER TO CULTURE [id number] COLLECTED 2/24/21 FOR SENSITIVITIES    CULTURE, BODY FLUID Jennye Coombe STAIN [[id number]]  (Abnormal)  (Susceptibility) Collected: 02/24/21 1021    Order Status: Completed Specimen:  Body Fluid from Abdominal Fluid Updated: 02/27/21 1438     Special Requests: NO SPECIAL REQUESTS        GRAM STAIN 1+ WBCS SEEN         NO ORGANISMS SEEN        Culture result: HEAVY ESCHERICHIA COLI       Susceptibility      Escherichia coli     ARIANNA     Amikacin ($) Susceptible     Ampicillin ($) Resistant     Ampicillin/sulbactam ($) Resistant     Cefazolin ($) Resistant     Cefepime ($$) Susceptible     Cefoxitin Susceptible     Ceftazidime ($) Susceptible     Ceftriaxone ($) Susceptible     Ciprofloxacin ($) Resistant     Gentamicin ($) Susceptible     Levofloxacin ($) Resistant     Meropenem ($$) Susceptible     Piperacillin/Tazobac ($) Resistant     Tobramycin ($) Susceptible     Trimeth/Sulfa Susceptible                             IMAGING:  Ct Abd Pelv W Cont    Result Date: 3/1/2021  EXAM: CT ABD PELV W CONT INDICATION: scrotal fluid collection, evaluate abscess COMPARISON: 2/25/2021 CONTRAST: 100 mL of Isovue-370. TECHNIQUE: Following the uneventful intravenous administration of contrast, thin axial images were obtained through the abdomen and pelvis. Coronal and sagittal reconstructions were generated. Oral contrast was not administered. CT dose reduction was achieved through use of a standardized protocol tailored for this examination and automatic exposure control for dose modulation. FINDINGS: LOWER THORAX: Tiny bilateral pleural effusions LIVER: No mass. Diffuse hypodensity of the liver. BILIARY TREE: Gallbladder is within normal limits. CBD is not dilated. SPLEEN: within normal limits. PANCREAS: No mass or ductal dilatation. ADRENALS: Unremarkable. KIDNEYS: No mass, calculus, or hydronephrosis. STOMACH: Unremarkable. SMALL BOWEL: No dilatation or wall thickening. COLON: No dilatation or wall thickening. APPENDIX: Surgically absent PERITONEUM: Right lateral drain has been removed and there is a persistent 6.6 x 2.3 x 2.5 cm subcapsular collection (axial image 55 and coronal image 54).  There is a second right mid abdomen collection with 3 arms that measures 7.1 cm anterior to posterior, centimeters right to left, and 2.6 cm anterior posterior (series 2, images 66 and 60 and coronal images 33 and 41). In the prior study, this measured 7.5 cm anterior to posterior, 5.9 cm right to left and 2.5 cm anterior to posterior. RETROPERITONEUM: No lymphadenopathy or aortic aneurysm. REPRODUCTIVE ORGANS: Small prostate URINARY BLADDER: No mass or calculus. BONES: No destructive bone lesion. ABDOMINAL WALL: No mass or hernia. ADDITIONAL COMMENTS: Marked edema of the scrotum bilaterally. No discernible abscess or gas gangrene (coronal image 43 and axial image 122). Additional oval fluid collection in the left inguinal canal that measures 5.7 x 2.8 x 2.8 cm (coronal image 153)     Interval removal of right lateral drain with decrease in size of subhepatic abscess Interval stable trilobar abscess in mid abdomen Large bilateral fluid collections in the scrotum without a definable abscess Left inguinal canal loculated fluid collection.           Signed By: Jose Martin Jefferson NP  - March 1, 2021

## 2021-03-01 NOTE — PROGRESS NOTES
Physical Therapy Note    Patient is SAVANA at this time. Will return for PT tx as able.     Thank you,  Zenaida DE DIOST

## 2021-03-01 NOTE — PROGRESS NOTES
Comprehensive Nutrition Assessment    Type and Reason for Visit: Reassess    Nutrition Recommendations/Plan:   1. Continue regular diet. 2. Add fredy Magic Cup BID to promote adequate intake. 3. Add fruit as snacks BID. Nutrition Assessment:      3/1: F/u. Spoke with pt's wife in room. Per wife, pt ate 0% breakfast tray this AM, but did eat 3/4 of a donut that she brought in. Continues with poor po intake. Recorded intake of 25% breakfast this AM and 25-75% meals yesterday. Wife says pt doesn't like Gelatein, only the regular jello. Will d/c. Likes ice cream, will try [employer]. Encouraged wife to bring in any foods pt may like and order his meals. Continue to monitor intakes. Labs- Hgb 7.2, Ca 7.8. Meds- cefepime, flagyl, lasix, Zofran, KCl, pericolace. 2/25: F/u. Pt NPO and OOR at time of visit for testing. Spoke with wife in room. Per wife, pt's poor po intake continues. Says he'll eat a couple bites of meat and dessert and usually all of the fruit at meals. Says he hasn't been eating the pudding- will d/c Ensure Pudding. Says he likes the red jello- will continue Gelatein and increase to BID. Labs- Hgb 7.4, Ca 7.6. Meds- lasix, Zofran, zosyn, KCl, pericolace. 2/22: F/u. Pt s/p evacuation of hematoma 2/19. Reports appetite is improving. Recorded intake of 85% breakfast this AM. Says he ate the eggs, potatoes, and oranges. Reports he is eating at least 1/3 of all meals. No c/o N/V. Doesn't like Ensure clear very much, but says he likes pudding and jello. Will try Ensure Pudding and Gelatein. Labs- Hgb 8.5, Ca 7.5. Meds- zosyn, pericolace. 2/19: 63 y/o male admitted with appendicitis w/ perforation. S/p appendectomy 2/13. Pt NPO today for CT. Was on GI lite diet. Says he was eating well PTA and first few days of admission (on liquids), but his appetite has been decreased the past couple days d/t some abd discomfort. No c/o N/V. He denies any recent weight changes.  Says he was drinking sips of the Ensure but not much. He would like to try Ensure Clear when diet advanced. Will add and monitor intakes. Labs- mg 2.5, Ca 7.7, B12 141, Hgb 9.0. Meds- zosyn, pericolace. Intakes:  Patient Vitals for the past 168 hrs:   % Diet Eaten   03/01/21 0810 25 %   02/28/21 1455 25 %   02/28/21 0854 75 %   02/26/21 1841 10 %   02/26/21 1130 0 %   02/26/21 0830 0 %   02/25/21 1745 10 %   02/25/21 1456 20 %   02/25/21 0800 25 %   02/24/21 1402 35 %   02/23/21 1807 15 %   02/23/21 1742 20 %   02/22/21 1411 75 %     Weight hx: Wt Readings from Last 10 Encounters:   02/28/21 105 kg (231 lb 6.4 oz)   03/21/17 99.8 kg (220 lb)   06/14/14 95.3 kg (210 lb)     Malnutrition Assessment:  Malnutrition Status: none identified    Estimated Daily Nutrient Needs:  Energy (kcal): 6414(1776 x 1.3 AF); Weight Used for Energy Requirements: Current  Protein (g): 107(0.8-1 g/kg); Weight Used for Protein Requirements: Current  Fluid (ml/day): 2437; Method Used for Fluid Requirements: 1 ml/kcal      Nutrition Related Findings:  last BM 2/28; 2+ generalized and LE edema      Wounds:    Surgical incision       Current Nutrition Therapies:  DIET REGULAR  DIET SNACKS AM Snack, PM Snack; fruit  DIET NUTRITIONAL SUPPLEMENTS Lunch, Dinner; Magic Cups    Anthropometric Measures:  · Height:  5' 9\" (175.3 cm)  · Current Body Wt:  105 kg (231 lb 7.7 oz)   · Admission Body Wt:       · Usual Body Wt:        · Ideal Body Wt:  160 lbs:  147.4 %   · Adjusted Body Weight:   ; Weight Adjustment for: No adjustment   · Adjusted BMI:       · BMI Category:  Obese class 1 (BMI 30.0-34. 9)       Nutrition Diagnosis:   · Inadequate oral intake related to inadequate protein-energy intake as evidenced by (pt reports poor po intake x2 days, currently NPO for CT)    2/22: Nutrition dx improving.  2/25: Nutrition dx continues. 3/1: Nutrition dx continues.     Nutrition Interventions:   Food and/or Nutrient Delivery: Continue current diet, Modify oral nutrition supplement  Nutrition Education and Counseling: No recommendations at this time  Coordination of Nutrition Care: Continue to monitor while inpatient    Goals:  PO intake >50% meals + ONS next 2-4 days       Nutrition Monitoring and Evaluation:   Behavioral-Environmental Outcomes: None identified  Food/Nutrient Intake Outcomes: Food and nutrient intake, Diet advancement/tolerance, Supplement intake  Physical Signs/Symptoms Outcomes: Biochemical data, GI status, Weight    Discharge Planning:    Continue oral nutrition supplement     Electronically signed by Junior Mercado RDN on 3/1/2021     Contact: [phone]

## 2021-03-01 NOTE — PROGRESS NOTES
[facility] [place] [facility]  [address] [facility], [address] [place] [address]  [phone]      Medical Progress Note      NAME:         Shaquille Henao   :        1960  MRM:        [id number]    Date of service: 3/1/2021      Subjective: Patient has been seen and examined as a follow up for multiple medical issues. Chart, labs, diagnostics reviewed. Patient says he feels weak but not in any acute distress. Minimal aching scrotal soreness and swelling. No fever or chills. No nausea or vomiting    Objective:    Vital Signs:    Visit Vitals  BP (!) 102/55 (BP 1 Location: Left upper arm, BP Patient Position: At rest)   Pulse 85   Temp 98.5 °F (36.9 °C)   Resp 18   Ht 5' 9\" (1.753 m)   Wt 105 kg (231 lb 6.4 oz)   SpO2 93%   BMI 34.17 kg/m²          Intake/Output Summary (Last 24 hours) at 3/1/2021 0949  Last data filed at 3/1/2021 0851  Gross per 24 hour   Intake 840 ml   Output 1200 ml   Net -360 ml        Physical Examination:    General:   Weak looking although not in any acute distress   Eyes:   pink conjunctivae, PERRLA with no discharge. ENT:   no ottorrhea or rhinorrhea with dry mucous membranes  Pulm: clear breath sounds without crackles or wheezes  Card:  has regular and normal S1, S2 without thrills, bruits or peripheral edema  Abd:  Soft, non-tender, non-distended, normoactive bowel sounds. Scrotal edema   Musc:  No cyanosis, clubbing, atrophy or deformities. Neuro: Awake and alert.  Generally a non focal exam.   Psych:  Has a good insight and is oriented x 3    Current Facility-Administered Medications   Medication Dose Route Frequency    enoxaparin (LOVENOX) injection 105 mg  105 mg SubCUTAneous Q12H    furosemide (LASIX) injection 20 mg  20 mg IntraVENous DAILY    cefepime (MAXIPIME) 2 g in sterile water (preservative free) 10 mL IV syringe  2 g IntraVENous Q8H    metroNIDAZOLE (FLAGYL) IVPB premix 500 mg  500 mg IntraVENous Q12H    potassium chloride SR (KLOR-CON 10) tablet 20 mEq  20 mEq Oral BID    HYDROmorphone (PF) (DILAUDID) injection 1 mg  1 mg IntraVENous Q4H PRN    oxyCODONE IR (ROXICODONE) tablet 10 mg  10 mg Oral Q4H PRN    Or    oxyCODONE IR (ROXICODONE) tablet 15 mg  15 mg Oral Q4H PRN    senna-docusate (PERICOLACE) 8.6-50 mg per tablet 1 Tab  1 Tab Oral BID    famotidine (PEPCID) tablet 20 mg  20 mg Oral ACB&D    bisacodyL (DULCOLAX) suppository 10 mg  10 mg Rectal DAILY PRN    alum-mag hydroxide-simeth (MYLANTA) oral suspension 30 mL  30 mL Oral Q4H PRN    albuterol-ipratropium (DUO-NEB) 2.5 MG-0.5 MG/3 ML  3 mL Nebulization Q4H PRN    ondansetron (ZOFRAN) injection 4 mg  4 mg IntraVENous Q6H PRN    acetaminophen (TYLENOL) tablet 1,000 mg  1,000 mg Oral Q8H    diphenhydrAMINE (BENADRYL) capsule 25 mg  25 mg Oral Q6H PRN        Laboratory data and review:    Recent Labs     03/01/21  0751 02/28/21  0254 02/27/21  0338   WBC 15.0* 16.6* 14.8*   HGB 7.2* 7.0* 7.3*   HCT 22.2* 22.0* 22.9*   * 517* 515*     Recent Labs     03/01/21  0751 02/28/21  0254 02/27/21  0338    124* 137   K 3.8 4.0 3.8    92* 104   CO2 26 25 29   * 98 97   BUN 15 19 16   CREA 0.76 0.81 0.85   CA 7.8* 8.1* 7.8*   ALB 2.2* 2.3* 2.3*   ALT 24 29 36     No components found for: [place]    Diagnostics: Imaging studies have been reviewed    Assessment and Plan:    Acute pulmonary embolism ([facility] [address] (2/27/2021) POA: likely provoked by current illness. Chest CT on 02/15 confirmed bilateral PEs. LE dopplers neg.  Continue enoxaparin with plan for a DOAC for at least 3 months at discharge. Continue to monitor      Anasarca / Scrotal edema POA: likely multifactorial from hypoalbuminemia, IVF's vs cellulitis vs abscess as noted on the scrotal ultrasound. Recent TTE with preserved EF. Continue furosemide, ambulation as tolerated. Diet as tolerated. Surgery folowing    Hyponatremia: likely from diuresis. Na better today. No further work up necessary     Acute blood loss anemia POA: also iron deficient. He is sp IV iron.      GERD: Continue Famotidine     Constipation: surgery following. Repeat CT 2/24 did not show any obstruction     Perforated appendicitis POA: s/p cecectomy 02/13. Surgery is attending and post operative care as per them.      Total time spent for the patient's care: [address] discussed with: Patient, Care Manager and Nursing Staff    Discussed:  Care Plan and D/C Planning    Prophylaxis:  Lovenox    Anticipated Disposition:   PT, OT, RN           ___________________________________________________    Attending Physician:   Sandi Trevino MD

## 2021-03-01 NOTE — PROGRESS NOTES
[facility] Progress Note           Jeremy Avila DO    [phone] Office  [phone]  Fax    3/1/2021      Assessment & Plan:   · Perforated appendicitis. Status post cecectomy 2021. Intra-abdominal collections noted on CT scan . Status post CT-guided aspiration  which revealed hematoma. Cultures growing Escherichia coli which also grew from previous drain cultures. This organism is resistant to piperacillintazobactam.  Continue cefepime and metronidazole. Anticipate PICC line and IV antibiotics at discharge  · Scrotal edema. Collection noted on ultrasound. Consider urology evaluation to rule out abscess  · Leukocytosis. Multifactorial.   Remains afebrile. Suspect reactive/intra-abdominal infection/+/- HCAP. Continue antibiotics as above   · Abnormal chest CT with hypoxia. No cough or pleuritic chest pain. CT from  reveals posterior bibasilar consolidation versus atelectasis with air bronchograms. Likely combination of PE and volume overload. HCAP in differential.  Check MRSA screen. Continue antibiotics as above. Now on room air          Subjective:       Still weak. Scrotal edema persists    Objective:     Vitals:   Visit Vitals  BP (!) 97/57 (BP 1 Location: Left upper arm, BP Patient Position: At rest)   Pulse 84   Temp 98.3 °F (36.8 °C)   Resp 18   Ht 5' 9\" (1.753 m)   Wt 231 lb 6.4 oz (105 kg)   SpO2 96%   BMI 34.17 kg/m²        Tmax:  Temp (24hrs), Av.4 °F (36.9 °C), Min:97.8 °F (36.6 °C), Max:98.9 °F (37.2 °C)      Exam:   Patient is intubated:  no    Physical Examination:   General:  Alert, cooperative, no distress   Head:  Normocephalic, atraumatic. Eyes:  Conjunctivae clear   Neck: Supple       Lungs:   No distress. Chest wall:     Heart:     Abdomen:    Distended. Mildly TTP. Drains removed. Significant scrotal edema   Extremities: Moves all. Skin:  No rash   Neurologic: CNII-XII intact.  Normal strength     Labs: No lab exists for component: ITNL   No results for input(s): CPK, CKMB, TROIQ in the last 72 hours. Recent Labs     03/01/21  0751 02/28/21  0254 02/27/21  0338    124* 137   K 3.8 4.0 3.8    92* 104   CO2 26 25 29   BUN 15 19 16   CREA 0.76 0.81 0.85   * 98 97   ALB 2.2* 2.3* 2.3*   WBC 15.0* 16.6* 14.8*   HGB 7.2* 7.0* 7.3*   HCT 22.2* 22.0* 22.9*   * 517* 515*     No results for input(s): INR, PTP, APTT, INREXT, INREXT in the last 72 hours.   Needs: urine analysis, urine sodium, protein and creatinine  No results found for: SHARITA, CREAU      Cultures:     No results found for: SDES  Lab Results   Component Value Date/Time    Culture result: NO ANAEROBES ISOLATED 02/26/2021 12:55 PM    Culture result: LIGHT ESCHERICHIA COLI (A) 02/26/2021 12:55 PM    Culture result: No growth (<1,000 CFU/ML) 02/24/2021 01:04 PM       Radiology:     Medications       Current Facility-Administered Medications   Medication Dose Route Frequency Last Admin    enoxaparin (LOVENOX) injection 105 mg  105 mg SubCUTAneous Q12H      furosemide (LASIX) injection 20 mg  20 mg IntraVENous DAILY 20 mg at 03/01/21 0806    cefepime (MAXIPIME) 2 g in sterile water (preservative free) 10 mL IV syringe  2 g IntraVENous Q8H 2 g at 03/01/21 6592    metroNIDAZOLE (FLAGYL) IVPB premix 500 mg  500 mg IntraVENous Q12H 500 mg at 03/01/21 3583    potassium chloride SR (KLOR-CON 10) tablet 20 mEq  20 mEq Oral BID 20 mEq at 03/01/21 0805    HYDROmorphone (PF) (DILAUDID) injection 1 mg  1 mg IntraVENous Q4H PRN 1 mg at 02/26/21 1056    oxyCODONE IR (ROXICODONE) tablet 10 mg  10 mg Oral Q4H PRN 10 mg at 02/25/21 1736    Or    oxyCODONE IR (ROXICODONE) tablet 15 mg  15 mg Oral Q4H PRN 15 mg at 02/27/21 2209    senna-docusate (PERICOLACE) 8.6-50 mg per tablet 1 Tab  1 Tab Oral BID 1 Tab at 03/01/21 0805    famotidine (PEPCID) tablet 20 mg  20 mg Oral ACB&D 20 mg at 03/01/21 1893    bisacodyL (DULCOLAX) suppository 10 mg  10 mg Rectal DAILY PRN      alum-mag hydroxide-simeth (MYLANTA) oral suspension 30 mL  30 mL Oral Q4H PRN 30 mL at 02/18/21 1411    albuterol-ipratropium (DUO-NEB) 2.5 MG-0.5 MG/3 ML  3 mL Nebulization Q4H PRN      ondansetron (ZOFRAN) injection 4 mg  4 mg IntraVENous Q6H PRN 4 mg at 02/28/21 2154    acetaminophen (TYLENOL) tablet 1,000 mg  1,000 mg Oral Q8H 1,000 mg at 03/01/21 0620    diphenhydrAMINE (BENADRYL) capsule 25 mg  25 mg Oral Q6H PRN             Case discussed with: Wife at bedside.   [address], [place]

## 2021-03-01 NOTE — CONSULTS
Us reviewed. Looks like simple fluid. Nothing corresponds on exam. Just edema. Maybe thick cord. No erythema. Will get ct to see if communicating fluid to abd. maybe present on previous ct.  Will look for change

## 2021-03-02 LAB
ALBUMIN SERPL-MCNC: 2.3 G/DL (ref 3.5–5)
ALBUMIN/GLOB SERPL: 0.6 {RATIO} (ref 1.1–2.2)
ALP SERPL-CCNC: 86 U/L (ref 45–117)
ALT SERPL-CCNC: 20 U/L (ref 12–78)
ANION GAP SERPL CALC-SCNC: 5 MMOL/L (ref 5–15)
AST SERPL-CCNC: 16 U/L (ref 15–37)
BACTERIA SPEC CULT: NORMAL
BACTERIA SPEC CULT: NORMAL
BILIRUB SERPL-MCNC: 0.5 MG/DL (ref 0.2–1)
BUN SERPL-MCNC: 14 MG/DL (ref 6–20)
BUN/CREAT SERPL: 18 (ref 12–20)
CALCIUM SERPL-MCNC: 7.9 MG/DL (ref 8.5–10.1)
CHLORIDE SERPL-SCNC: 105 MMOL/L (ref 97–108)
CO2 SERPL-SCNC: 28 MMOL/L (ref 21–32)
CREAT SERPL-MCNC: 0.79 MG/DL (ref 0.7–1.3)
ERYTHROCYTE [DISTWIDTH] IN BLOOD BY AUTOMATED COUNT: 14.3 % (ref 11.5–14.5)
GLOBULIN SER CALC-MCNC: 3.9 G/DL (ref 2–4)
GLUCOSE SERPL-MCNC: 96 MG/DL (ref 65–100)
HCT VFR BLD AUTO: 24.9 % (ref 36.6–50.3)
HGB BLD-MCNC: 7.7 G/DL (ref 12.1–17)
MCH RBC QN AUTO: 29.2 PG (ref 26–34)
MCHC RBC AUTO-ENTMCNC: 30.9 G/DL (ref 30–36.5)
MCV RBC AUTO: 94.3 FL (ref 80–99)
NRBC # BLD: 0 K/UL (ref 0–0.01)
NRBC BLD-RTO: 0 PER 100 WBC
PLATELET # BLD AUTO: 386 K/UL (ref 150–400)
PMV BLD AUTO: 10.3 FL (ref 8.9–12.9)
POTASSIUM SERPL-SCNC: 4.3 MMOL/L (ref 3.5–5.1)
PROT SERPL-MCNC: 6.2 G/DL (ref 6.4–8.2)
RBC # BLD AUTO: 2.64 M/UL (ref 4.1–5.7)
SERVICE CMNT-IMP: NORMAL
SERVICE CMNT-IMP: NORMAL
SODIUM SERPL-SCNC: 138 MMOL/L (ref 136–145)
WBC # BLD AUTO: 15.5 K/UL (ref 4.1–11.1)

## 2021-03-02 PROCEDURE — 74011000250 HC RX REV CODE- 250: Performed by: INTERNAL MEDICINE

## 2021-03-02 PROCEDURE — 74011250637 HC RX REV CODE- 250/637: Performed by: SURGERY

## 2021-03-02 PROCEDURE — 36415 COLL VENOUS BLD VENIPUNCTURE: CPT

## 2021-03-02 PROCEDURE — 85027 COMPLETE CBC AUTOMATED: CPT

## 2021-03-02 PROCEDURE — 80053 COMPREHEN METABOLIC PANEL: CPT

## 2021-03-02 PROCEDURE — 65270000029 HC RM PRIVATE

## 2021-03-02 PROCEDURE — 74011250636 HC RX REV CODE- 250/636: Performed by: INTERNAL MEDICINE

## 2021-03-02 PROCEDURE — 94760 N-INVAS EAR/PLS OXIMETRY 1: CPT

## 2021-03-02 PROCEDURE — 74011250636 HC RX REV CODE- 250/636: Performed by: SURGERY

## 2021-03-02 PROCEDURE — 74011250637 HC RX REV CODE- 250/637: Performed by: NURSE PRACTITIONER

## 2021-03-02 RX ADMIN — POTASSIUM CHLORIDE 20 MEQ: 750 TABLET, FILM COATED, EXTENDED RELEASE ORAL at 08:28

## 2021-03-02 RX ADMIN — CEFEPIME HYDROCHLORIDE 2 G: 2 INJECTION, POWDER, FOR SOLUTION INTRAVENOUS at 06:15

## 2021-03-02 RX ADMIN — METRONIDAZOLE 500 MG: 500 INJECTION, SOLUTION INTRAVENOUS at 02:11

## 2021-03-02 RX ADMIN — Medication 1000 MG: at 06:15

## 2021-03-02 RX ADMIN — METRONIDAZOLE 500 MG: 500 INJECTION, SOLUTION INTRAVENOUS at 13:49

## 2021-03-02 RX ADMIN — POTASSIUM CHLORIDE 20 MEQ: 750 TABLET, FILM COATED, EXTENDED RELEASE ORAL at 17:42

## 2021-03-02 RX ADMIN — Medication 1000 MG: at 22:51

## 2021-03-02 RX ADMIN — ENOXAPARIN SODIUM 105 MG: 150 INJECTION SUBCUTANEOUS at 06:17

## 2021-03-02 RX ADMIN — DOCUSATE SODIUM 50 MG AND SENNOSIDES 8.6 MG 1 TABLET: 8.6; 5 TABLET, FILM COATED ORAL at 08:28

## 2021-03-02 RX ADMIN — DOCUSATE SODIUM 50 MG AND SENNOSIDES 8.6 MG 1 TABLET: 8.6; 5 TABLET, FILM COATED ORAL at 17:41

## 2021-03-02 RX ADMIN — FAMOTIDINE 20 MG: 20 TABLET ORAL at 06:15

## 2021-03-02 RX ADMIN — CEFEPIME HYDROCHLORIDE 2 G: 2 INJECTION, POWDER, FOR SOLUTION INTRAVENOUS at 13:47

## 2021-03-02 RX ADMIN — FAMOTIDINE 20 MG: 20 TABLET ORAL at 17:42

## 2021-03-02 RX ADMIN — ENOXAPARIN SODIUM 105 MG: 150 INJECTION SUBCUTANEOUS at 17:48

## 2021-03-02 RX ADMIN — FUROSEMIDE 20 MG: 10 INJECTION, SOLUTION INTRAMUSCULAR; INTRAVENOUS at 08:28

## 2021-03-02 RX ADMIN — Medication 1000 MG: at 13:47

## 2021-03-02 RX ADMIN — CEFEPIME HYDROCHLORIDE 2 G: 2 INJECTION, POWDER, FOR SOLUTION INTRAVENOUS at 22:51

## 2021-03-02 NOTE — PROGRESS NOTES
Bedside shift change report given to [address] [place] [address],[address] (oncoming nurse) by Yonatan Jewell and Kia Norman (offgoing nurse). Report included the following information SBAR, Kardex, MAR, Accordion and Recent Results.

## 2021-03-02 NOTE — PROGRESS NOTES
[facility]    Medical Progress Note      NAME: Carey Lopez   :  1960  MRM:  [id number]    Date/Time of service 3/2/2021  8:01 AM          Assessment and Plan:     Perforated appendicitis / Constipation / Leukocytosis - POA  - Tolerated cecectomy . General surgery is attending to diet, Abx, pain control, laxatives, DVT prophylaxis and rehab decisions as usual. ID consulted. He is on cefepime, flagyl, prn oxycodone PO and dilaudid IV    Acute pulmonary embolism - POA: presumed provoked by current illness. CT 2/15 showed bilateral PEs. LE dopplers neg.  Continue enoxaparin with plan for a DOAC for at least 3 months at discharge. Continue to monitor. He is on oxygen, though not documented if that is required. He may have atelectasis and hypoventilation contributing to low oxygen.     Acute blood loss anemia - POA, severe, on borderline for another transfusion. Iron deficient. He is sp IV iron.      Anasarca / Scrotal edema / hypoalbuminemia - POA: likely multifactorial from hypoalbuminemia, surgery, infection. Urology consulted to rule out abscess. ECHO with normal EF. Continue furosemide, thought that is limited by low BP, ambulation as tolerated. Nutrition is key     Hyponatremia - Presumed due to diuresis. Na better today. No further work up necessary      GERD - Continue Famotidine     Obese - Advise weight loss and outpatient ANDREINA testing. Subjective:     Chief Complaint:  Scrotal swelling    ROS:  (bold if positive, if negative)    Tolerating PT  Tolerating Diet        Objective:     Last 24hrs VS reviewed since prior progress note.  Most recent are:    Visit Vitals  /65 (BP 1 Location: Right upper arm, BP Patient Position: At rest)   Pulse 80   Temp 98 °F (36.7 °C)   Resp 16   Ht 5' 9\" (1.753 m)   Wt 105 kg (231 lb 6.4 oz)   SpO2 98%   BMI 34.17 kg/m²     SpO2 Readings from Last 6 Encounters:   21 98%   17 95%   06/15/14 98%    O2 Flow Rate (L/min): 2 l/min       Intake/Output Summary (Last 24 hours) at 3/2/2021 0801  Last data filed at 3/2/2021 1560  Gross per 24 hour   Intake 940 ml   Output 550 ml   Net 390 ml        Physical Exam:    Gen:  Obes, in no acute distress  HEENT:  Pink conjunctivae, PERRL, hearing intact to voice, moist mucous membranes  Neck:  Supple, without masses, thyroid non-tender  Resp:  No accessory muscle use, clear breath sounds without wheezes rales or rhonchi  Card:  No murmurs, normal S1, S2 without thrills, bruits or peripheral edema  Abd:  Soft, non-tender, non-distended, normoactive bowel sounds are present, no mass  Lymph:  No cervical or inguinal adenopathy  Musc:  No cyanosis or clubbing  Skin:  No rashes or ulcers, skin turgor is good  Neuro:  Cranial nerves are grossly intact, no focal motor weakness, follows commands appropriately  Psych:  Good insight, oriented to person, place and time, alert    Telemetry reviewed:   normal sinus rhythm  __________________________________________________________________  Medications Reviewed: (see below)  Medications:     Current Facility-Administered Medications   Medication Dose Route Frequency    enoxaparin (LOVENOX) injection 105 mg  105 mg SubCUTAneous Q12H    furosemide (LASIX) injection 20 mg  20 mg IntraVENous DAILY    cefepime (MAXIPIME) 2 g in sterile water (preservative free) 10 mL IV syringe  2 g IntraVENous Q8H    metroNIDAZOLE (FLAGYL) IVPB premix 500 mg  500 mg IntraVENous Q12H    potassium chloride SR (KLOR-CON 10) tablet 20 mEq  20 mEq Oral BID    HYDROmorphone (PF) (DILAUDID) injection 1 mg  1 mg IntraVENous Q4H PRN    oxyCODONE IR (ROXICODONE) tablet 10 mg  10 mg Oral Q4H PRN    Or    oxyCODONE IR (ROXICODONE) tablet 15 mg  15 mg Oral Q4H PRN    senna-docusate (PERICOLACE) 8.6-50 mg per tablet 1 Tab  1 Tab Oral BID    famotidine (PEPCID) tablet 20 mg  20 mg Oral ACB&D    bisacodyL (DULCOLAX) suppository 10 mg  10 mg Rectal DAILY PRN    alum-mag hydroxide-simeth (MYLANTA) oral suspension 30 mL  30 mL Oral Q4H PRN    albuterol-ipratropium (DUO-NEB) 2.5 MG-0.5 MG/3 ML  3 mL Nebulization Q4H PRN    ondansetron (ZOFRAN) injection 4 mg  4 mg IntraVENous Q6H PRN    acetaminophen (TYLENOL) tablet 1,000 mg  1,000 mg Oral Q8H    diphenhydrAMINE (BENADRYL) capsule 25 mg  25 mg Oral Q6H PRN        Lab Data Reviewed: (see below)  Lab Review:     Recent Labs     03/01/21 0751 02/28/21 0254   WBC 15.0* 16.6*   HGB 7.2* 7.0*   HCT 22.2* 22.0*   * 517*     Recent Labs     03/01/21 0751 02/28/21 0254    124*   K 3.8 4.0    92*   CO2 26 25   * 98   BUN 15 19   CREA 0.76 0.81   CA 7.8* 8.1*   ALB 2.2* 2.3*   TBILI 0.5 0.4   ALT 24 29     Lab Results   Component Value Date/Time    Glucose (POC) 120 (H) 06/14/2014 09:32 AM     No results for input(s): PH, PCO2, PO2, HCO3, FIO2 in the last 72 hours. No results for input(s): INR, INREXT in the last 72 hours. All Micro Results     Procedure Component Value Units Date/Time    CULTURE, BLOOD [[id number]] Collected: 02/24/21 1021    Order Status: Completed Specimen: Blood Updated: 03/02/21 0525     Special Requests: NO SPECIAL REQUESTS        Culture result: NO GROWTH 6 DAYS       CULTURE, BLOOD [[id number]] Collected: 02/24/21 1021    Order Status: Completed Specimen: Blood Updated: 03/02/21 0525     Special Requests: NO SPECIAL REQUESTS        Culture result: NO GROWTH 6 DAYS       CULTURE, MRSA [[id number]] Collected: 03/01/21 1745    Order Status: Completed Specimen: Nasal from Nares Updated: 03/01/21 2225    CULTURE, ANAEROBIC [[id number]] Collected: 02/26/21 1255    Order Status: Completed Specimen: Abscess Updated: 02/28/21 1327     Special Requests: NO SPECIAL REQUESTS        Culture result: NO ANAEROBES ISOLATED       CULTURE, BODY FLUID Dannial Hane STAIN [[id number]]  (Abnormal)  (Susceptibility) Collected: 02/26/21 1255    Order Status: Completed Specimen:  Body Fluid from Abscess Updated: 02/28/21 1327     Special Requests: NO SPECIAL REQUESTS        GRAM STAIN 1+ WBCS SEEN               OCCASIONAL GRAM NEGATIVE RODS           Culture result: LIGHT ESCHERICHIA COLI       CULTURE, BODY FLUID [address] [[id number]]  (Abnormal) Collected: 02/24/21 1021    Order Status: Completed Specimen: Body Fluid from Abdominal Fluid Updated: 02/27/21 1440     Special Requests: NO SPECIAL REQUESTS        GRAM STAIN FEW WBCS SEEN        Culture result: MODERATE ESCHERICHIA COLI         PLEASE REFER TO CULTURE [id number] COLLECTED 2/24/21 FOR SENSITIVITIES    CULTURE, BODY FLUID Garcia Hugh STAIN [[id number]]  (Abnormal)  (Susceptibility) Collected: 02/24/21 1021    Order Status: Completed Specimen: Body Fluid from Abdominal Fluid Updated: 02/27/21 1438     Special Requests: NO SPECIAL REQUESTS        GRAM STAIN 1+ WBCS SEEN         NO ORGANISMS SEEN        Culture result: HEAVY ESCHERICHIA COLI       CULTURE, ANAEROBIC [[id number]] Collected: 02/26/21 1300    Order Status: Canceled Specimen: Abscess     CULTURE, BODY FLUID [address] [[id number]] Collected: 02/26/21 1300    Order Status: Canceled Specimen: Body Fluid from Abscess     CULTURE, MRSA [[id number]] Collected: 02/26/21 1255    Order Status: Canceled Specimen: Nasal from [address], MRSA [[id number]]     Order Status: Canceled Specimen: Nasal from Nares     CULTURE, URINE [[id number]] Collected: 02/24/21 1304    Order Status: Completed Specimen: Urine from Clean catch Updated: 02/25/21 1253     Special Requests: NO SPECIAL REQUESTS        Culture result: No growth (<1,000 CFU/ML)       COVID-19 RAPID TEST [[id number]] Collected: 02/13/21 0509    Order Status: Completed Specimen: Nasopharyngeal Updated: 02/13/21 0558     Specimen source Nasopharyngeal        COVID-19 rapid test Not detected        Comment: Rapid Abbott ID Now       Rapid NAAT:  The specimen is NEGATIVE for SARS-CoV-2, the novel coronavirus associated with COVID-19.        Negative results should be treated as presumptive and, if inconsistent with clinical signs and symptoms or necessary for patient management, should be tested with an alternative molecular assay. Negative results do not preclude SARS-CoV-2 infection and should not be used as the sole basis for patient management decisions. This test has been authorized by the FDA under an Emergency Use Authorization (EUA) for use by authorized laboratories. Fact sheet for Healthcare Providers: ConventionUpdate.co.nz  Fact sheet for Patients: ConventionAxial Exchangedate.co.nz       Methodology: Isothermal Nucleic Acid Amplification               Other pertinent lab: none    Total time spent with patient: 30 Minutes I personally reviewed chart, notes, data and current medications in the medical record. I have personally examined and treated the patient at bedside during this period.                  Care Plan discussed with: Patient, Nursing Staff and >50% of time spent in counseling and coordination of care    Discussed:  Care Plan    Prophylaxis:  Lovenox and H2B/PPI    Disposition:  Home w/Family           ___________________________________________________    Attending Physician: Ludmila Walker MD

## 2021-03-02 NOTE — PROGRESS NOTES
SURGERY PROGRESS NOTE      Admit Date: 2021    POD 10 Days Post-Op    Procedure: Procedure(s):  LAPAROSCOPY GENERAL DIAGNOSTIC with evacuation of hematoma      Subjective:   Up and OOB walking hallways  Appetite getting better  Still complaints of lower extrem swelling and scrotal pain and swelling  Urology consulted yest        Objective:     Visit Vitals  BP 98/61 (BP 1 Location: Left upper arm, BP Patient Position: At rest)   Pulse 84   Temp 97.9 °F (36.6 °C)   Resp 18   Ht 5' 9\" (1.753 m)   Wt 106.6 kg (235 lb)   SpO2 97%   BMI 34.70 kg/m²        Temp (24hrs), Av.8 °F (37.1 °C), Min:97.9 °F (36.6 °C), Max:99.9 °F (37.7 °C)      Physical Exam:                General:  NAD -  WEIGHT 103.3 Kg today   Abdomen:  Soft. Non-tender, non-distended. Incision C/D/I. Extrem:  bilat pitting edema  Urinary:  Swollen scrotum            Lab Results   Component Value Date/Time    WBC 15.0 (H) 2021 07:51 AM    HGB 7.2 (L) 2021 07:51 AM    HCT 22.2 (L) 2021 07:51 AM    PLATELET 932 (H)  07:51 AM    MCV 91.7 2021 07:51 AM       Assessment:     Active Problems:    Appendicitis (2021)      Appendicitis with perforation (2021)      GERD (gastroesophageal reflux disease) (2021)      Constipation (2021)      Acute pulmonary embolism ([address] (2021)        Plan/Recommendations/Medical Decision Making: Follow labs/leukocytosis  Diet as tolerates  IV abx per ID  Urology to f/u on scrotum swelling and small area of excoriation Thursday  Weight 102.3kg today- follow  OOB ambulate- aggressive PT/OT  Appreciate Hospitalist management ie fluid/PE management         Pt seen and discussed with Dr Carla Stern, NP     Still has leukocytosis, but all other factors improving. Trend labs, diurese, PT/OT, IV antibiotics. Appreciate Urology input.

## 2021-03-02 NOTE — CONSULTS
[facility] Consult Note    Patient: Sheryle Holland MRN: [id number]  SSN: [id number]    YOB: 1960  Age: 62 y.o. Sex: male            Assessment:     Sheryle Holland is a 62 y.o. male with who presented to the ED on 2/13/2021 with perforated appendicitis s/p cecectomy 2/13/2021. CT scan 2/25/2021 with intra-abdominal collections and s/p CT guided aspiration 2/26/2021, with findings of hematoma. Also with leokocytosis, scrotal edema and scrotal US on 2/27/2021 concerning for left scrotal abscess. Recommendations:     1. No scrotal abscess noted on CT. 2. Aggressive scrotal elevation  3. Left inguinal fat/fluid hernia with management per surgery. 4. Continued serial exams of scrotum. No signs of abscess. Small left flat yellow eschar/scab left inferior hemiscrotum on exam today that only requires serial exams, no intervention. Thank you for this consult. Please contact [facility] with any further questions/concerns. Subjective:     Voiding well. Trying to elevate scrotum. No fevers. Objective:     Vital signs in last 24 hours:  Visit Vitals  /65 (BP 1 Location: Right upper arm, BP Patient Position: At rest)   Pulse 80   Temp 98 °F (36.7 °C)   Resp 16   Ht 5' 9\" (1.753 m)   Wt 105 kg (231 lb 6.4 oz)   SpO2 98%   BMI 34.17 kg/m²       Intake/Output last 3 shifts:  Date 03/01/21 0700 - 03/02/21 0659 03/02/21 0700 - 03/03/21 0659   Shift 0108-5016 6310-1367 24 Hour Total 4163-3060 2787-8787 24 Hour Total   INTAKE   P.O. 240 600 840        P. O. 240 600 840      I. V.(mL/kg/hr)  100(0.1) 100(0)        Volume (metroNIDAZOLE (FLAGYL) IVPB premix 500 mg)  100 100      Shift Total(mL/kg) 240(2.3) 700(6.7) 940(9)      OUTPUT   Urine(mL/kg/hr) 300(0.2) 250(0.2) 550(0.2)        Urine Voided 300 250 550        Urine Occurrence(s) 2 x 1 x 3 x      Stool           Stool Occurrence(s) 2 x 1 x 3 x      Shift Total(mL/kg) 300(2.9) 250(2.4) 550(5.2)      NET -60 450 390      Weight (kg) 105 105 105 105 105 105         Physical Exam  General: NAD, A&O  Head: Atraumatic  ENT: normal ears, wears hearing aids  Cardiac: not tachycardic, adequate peripheral perfusion  Pulmonary: Normal work of breathing   Abdomen: rounded, dressing in place for recent sx, surgical incisions c/d/i  :generalized scrotal edema, no evidence of cellulitis or abscess, crepitus, necrosis. There is a small likely existing flat eschar/scab noted on the inferior left hemiscrotum  Extremities: moves all  Gait: not observed  Neuro: Appropriate, no focal neurological deficits  Mood/Affect: normal            Lab/Imaging Review:       Most Recent Labs:  Lab Results   Component Value Date/Time    WBC 15.0 (H) 03/01/2021 07:51 AM    HGB 7.2 (L) 03/01/2021 07:51 AM    HCT 22.2 (L) 03/01/2021 07:51 AM    PLATELET 495 (H) 68/64/7679 07:51 AM    MCV 91.7 03/01/2021 07:51 AM        Lab Results   Component Value Date/Time    Sodium 138 03/01/2021 07:51 AM    Potassium 3.8 03/01/2021 07:51 AM    Chloride 105 03/01/2021 07:51 AM    CO2 26 03/01/2021 07:51 AM    Anion gap 7 03/01/2021 07:51 AM    Glucose 105 (H) 03/01/2021 07:51 AM    BUN 15 03/01/2021 07:51 AM    Creatinine 0.76 03/01/2021 07:51 AM    BUN/Creatinine ratio 20 03/01/2021 07:51 AM    GFR est AA >60 03/01/2021 07:51 AM    GFR est non-AA >60 03/01/2021 07:51 AM    Calcium 7.8 (L) 03/01/2021 07:51 AM    Bilirubin, total 0.5 03/01/2021 07:51 AM    Alk.  phosphatase 86 03/01/2021 07:51 AM    Protein, total 6.6 03/01/2021 07:51 AM    Albumin 2.2 (L) 03/01/2021 07:51 AM    Globulin 4.4 (H) 03/01/2021 07:51 AM    A-G Ratio 0.5 (L) 03/01/2021 07:51 AM    ALT (SGPT) 24 03/01/2021 07:51 AM        No results found for: PSA, Thirza Karena, PSAR3, LIB676083, CUS406602, 71660, PSAEXT     COAGS:  No results found for: APTT, PTP, INR, INREXT, INREXT    Lab Results   Component Value Date/Time    Hemoglobin A1c 6.1 06/15/2014 04:20 AM        Lab Results Component Value Date/Time    Troponin-I, Qt. <0.05 02/15/2021 12:32 AM          Urine/Blood Cultures:  Results     Procedure Component Value Units Date/Time    CULTURE, MRSA [[id number]] Collected: 03/01/21 1745    Order Status: Completed Specimen: Nasal from Nares Updated: 03/01/21 2225    CULTURE, ANAEROBIC [[id number]] Collected: 02/26/21 1300    Order Status: Canceled Specimen: Abscess     CULTURE, BODY FLUID W Tisha Hemp [[id number]] Collected: 02/26/21 1300    Order Status: Canceled Specimen: Body Fluid from Abscess     CULTURE, MRSA [[id number]] Collected: 02/26/21 1255    Order Status: Canceled Specimen: Nasal from Nares     CULTURE, ANAEROBIC [[id number]] Collected: 02/26/21 1255    Order Status: Completed Specimen: Abscess Updated: 02/28/21 1327     Special Requests: NO SPECIAL REQUESTS        Culture result: NO ANAEROBES ISOLATED       CULTURE, BODY FLUID Dana Alosa STAIN [[id number]]  (Abnormal)  (Susceptibility) Collected: 02/26/21 1255    Order Status: Completed Specimen:  Body Fluid from Abscess Updated: 02/28/21 1327     Special Requests: NO SPECIAL REQUESTS        GRAM STAIN 1+ WBCS SEEN               OCCASIONAL GRAM NEGATIVE RODS           Culture result: LIGHT ESCHERICHIA COLI       Susceptibility      Escherichia coli     ARIANNA     Amikacin ($) Susceptible     Ampicillin ($) Resistant     Ampicillin/sulbactam ($) Resistant     Cefazolin ($) Resistant     Cefepime ($$) Susceptible     Cefoxitin Susceptible     Ceftazidime ($) Susceptible     Ceftriaxone ($) Susceptible     Ciprofloxacin ($) Resistant     Gentamicin ($) Susceptible     Levofloxacin ($) Resistant     Meropenem ($$) Susceptible     Piperacillin/Tazobac ($) Resistant     Tobramycin ($) Susceptible     Trimeth/Sulfa Susceptible                    CULTURE, MRSA [[id number]]     Order Status: Canceled Specimen: Nasal from Nares     CULTURE, URINE [[id number]] Collected: 02/24/21 1304    Order Status: Completed Specimen: Urine from Clean catch Updated: 02/25/21 1253     Special Requests: NO SPECIAL REQUESTS        Culture result: No growth (<1,000 CFU/ML)       CULTURE, BLOOD [[id number]] Collected: 02/24/21 1021    Order Status: Completed Specimen: Blood Updated: 03/02/21 0525     Special Requests: NO SPECIAL REQUESTS        Culture result: NO GROWTH 6 DAYS       CULTURE, BLOOD [[id number]] Collected: 02/24/21 1021    Order Status: Completed Specimen: Blood Updated: 03/02/21 0525     Special Requests: NO SPECIAL REQUESTS        Culture result: NO GROWTH 6 DAYS       CULTURE, BODY FLUID Selene Chyle STAIN [[id number]]  (Abnormal) Collected: 02/24/21 1021    Order Status: Completed Specimen: Body Fluid from Abdominal Fluid Updated: 02/27/21 1440     Special Requests: NO SPECIAL REQUESTS        GRAM STAIN FEW WBCS SEEN        Culture result: MODERATE ESCHERICHIA COLI         PLEASE REFER TO CULTURE [id number] COLLECTED 2/24/21 FOR SENSITIVITIES    CULTURE, BODY FLUID Selene Chyle STAIN [[id number]]  (Abnormal)  (Susceptibility) Collected: 02/24/21 1021    Order Status: Completed Specimen:  Body Fluid from Abdominal Fluid Updated: 02/27/21 1438     Special Requests: NO SPECIAL REQUESTS        GRAM STAIN 1+ WBCS SEEN         NO ORGANISMS SEEN        Culture result: HEAVY ESCHERICHIA COLI       Susceptibility      Escherichia coli     ARIANNA     Amikacin ($) Susceptible     Ampicillin ($) Resistant     Ampicillin/sulbactam ($) Resistant     Cefazolin ($) Resistant     Cefepime ($$) Susceptible     Cefoxitin Susceptible     Ceftazidime ($) Susceptible     Ceftriaxone ($) Susceptible     Ciprofloxacin ($) Resistant     Gentamicin ($) Susceptible     Levofloxacin ($) Resistant     Meropenem ($$) Susceptible     Piperacillin/Tazobac ($) Resistant     Tobramycin ($) Susceptible     Trimeth/Sulfa Susceptible                             IMAGING:  Ct Abd Pelv W Cont    Result Date: 3/1/2021  EXAM: CT ABD PELV W CONT INDICATION: scrotal fluid collection, evaluate abscess COMPARISON: 2/25/2021 CONTRAST: 100 mL of Isovue-370. TECHNIQUE: Following the uneventful intravenous administration of contrast, thin axial images were obtained through the abdomen and pelvis. Coronal and sagittal reconstructions were generated. Oral contrast was not administered. CT dose reduction was achieved through use of a standardized protocol tailored for this examination and automatic exposure control for dose modulation. FINDINGS: LOWER THORAX: Tiny bilateral pleural effusions LIVER: No mass. Diffuse hypodensity of the liver. BILIARY TREE: Gallbladder is within normal limits. CBD is not dilated. SPLEEN: within normal limits. PANCREAS: No mass or ductal dilatation. ADRENALS: Unremarkable. KIDNEYS: No mass, calculus, or hydronephrosis. STOMACH: Unremarkable. SMALL BOWEL: No dilatation or wall thickening. COLON: No dilatation or wall thickening. APPENDIX: Surgically absent PERITONEUM: Right lateral drain has been removed and there is a persistent 6.6 x 2.3 x 2.5 cm subcapsular collection (axial image 55 and coronal image 54). There is a second right mid abdomen collection with 3 arms that measures 7.1 cm anterior to posterior, centimeters right to left, and 2.6 cm anterior posterior (series 2, images 66 and 60 and coronal images 33 and 41). In the prior study, this measured 7.5 cm anterior to posterior, 5.9 cm right to left and 2.5 cm anterior to posterior. RETROPERITONEUM: No lymphadenopathy or aortic aneurysm. REPRODUCTIVE ORGANS: Small prostate URINARY BLADDER: No mass or calculus. BONES: No destructive bone lesion. ABDOMINAL WALL: No mass or hernia. ADDITIONAL COMMENTS: Marked edema of the scrotum bilaterally. No discernible abscess or gas gangrene (coronal image 43 and axial image 122).  Additional oval fluid collection in the left inguinal canal that measures 5.7 x 2.8 x 2.8 cm (coronal image 153)     Interval removal of right lateral drain with decrease in size of subhepatic abscess Interval stable trilobar abscess in mid abdomen Large bilateral fluid collections in the scrotum without a definable abscess Left inguinal canal loculated fluid collection.           Signed By: Jalen Degroot MD  - March 2, 2021

## 2021-03-02 NOTE — PROGRESS NOTES
Physical Therapy Note    Chart reviewed in prep for PT treatment. Attempted to work with pt x 2. First attempt pt returning to bed after toileting with RN staff and requested that PT return in 30 minutes. Followed up in 30 min but pt reported that he had just returned to bed after ambulating with RN students and asked that he be allowed to rest. Will reattempt later in day as schedule permits.     Nelida Jo PT, DPT

## 2021-03-02 NOTE — PROGRESS NOTES
0700: Bedside and Verbal shift change report given to ANDRES hyman RN (oncoming nurse) by Ann Luna (offgoing nurse). Report included the following information SBAR, Kardex, Intake/Output, MAR, Accordion, Recent Results and Med Rec Status.

## 2021-03-02 NOTE — PROGRESS NOTES
Transition of Care Plan - RUR 13%:        1. Monitor patient response to treatment and recommendations  2. CM following - Patient is uninsured and does not qualify for Medicaid. He has been given the care card application  3. PT following - recommending HH. Patient has RW for discharge   4. ID following - anticipate PICC line and IV antibiotics at discharge  5.  Establish with PCP - patient resides nearest to [address] [place] [address]

## 2021-03-03 LAB
ALBUMIN SERPL-MCNC: 2.3 G/DL (ref 3.5–5)
ALBUMIN/GLOB SERPL: 0.5 {RATIO} (ref 1.1–2.2)
ALP SERPL-CCNC: 83 U/L (ref 45–117)
ALT SERPL-CCNC: 20 U/L (ref 12–78)
ANION GAP SERPL CALC-SCNC: 7 MMOL/L (ref 5–15)
AST SERPL-CCNC: 16 U/L (ref 15–37)
BACTERIA SPEC CULT: NORMAL
BACTERIA SPEC CULT: NORMAL
BASOPHILS # BLD: 0.1 K/UL (ref 0–0.1)
BASOPHILS NFR BLD: 0 % (ref 0–1)
BILIRUB SERPL-MCNC: 0.5 MG/DL (ref 0.2–1)
BUN SERPL-MCNC: 15 MG/DL (ref 6–20)
BUN/CREAT SERPL: 19 (ref 12–20)
CALCIUM SERPL-MCNC: 7.9 MG/DL (ref 8.5–10.1)
CHLORIDE SERPL-SCNC: 107 MMOL/L (ref 97–108)
CO2 SERPL-SCNC: 25 MMOL/L (ref 21–32)
CREAT SERPL-MCNC: 0.8 MG/DL (ref 0.7–1.3)
DIFFERENTIAL METHOD BLD: ABNORMAL
EOSINOPHIL # BLD: 0.2 K/UL (ref 0–0.4)
EOSINOPHIL NFR BLD: 1 % (ref 0–7)
ERYTHROCYTE [DISTWIDTH] IN BLOOD BY AUTOMATED COUNT: 14.2 % (ref 11.5–14.5)
GLOBULIN SER CALC-MCNC: 4.7 G/DL (ref 2–4)
GLUCOSE SERPL-MCNC: 100 MG/DL (ref 65–100)
HCT VFR BLD AUTO: 25.1 % (ref 36.6–50.3)
HGB BLD-MCNC: 7.9 G/DL (ref 12.1–17)
IMM GRANULOCYTES # BLD AUTO: 0.3 K/UL (ref 0–0.04)
IMM GRANULOCYTES NFR BLD AUTO: 2 % (ref 0–0.5)
LYMPHOCYTES # BLD: 2.1 K/UL (ref 0.8–3.5)
LYMPHOCYTES NFR BLD: 14 % (ref 12–49)
MAGNESIUM SERPL-MCNC: 2.6 MG/DL (ref 1.6–2.4)
MCH RBC QN AUTO: 29.3 PG (ref 26–34)
MCHC RBC AUTO-ENTMCNC: 31.5 G/DL (ref 30–36.5)
MCV RBC AUTO: 93 FL (ref 80–99)
MONOCYTES # BLD: 1.2 K/UL (ref 0–1)
MONOCYTES NFR BLD: 8 % (ref 5–13)
NEUTS SEG # BLD: 11.4 K/UL (ref 1.8–8)
NEUTS SEG NFR BLD: 75 % (ref 32–75)
NRBC # BLD: 0 K/UL (ref 0–0.01)
NRBC BLD-RTO: 0 PER 100 WBC
PHOSPHATE SERPL-MCNC: 3.1 MG/DL (ref 2.6–4.7)
PLATELET # BLD AUTO: 563 K/UL (ref 150–400)
PMV BLD AUTO: 9.2 FL (ref 8.9–12.9)
POTASSIUM SERPL-SCNC: 4 MMOL/L (ref 3.5–5.1)
PROT SERPL-MCNC: 7 G/DL (ref 6.4–8.2)
RBC # BLD AUTO: 2.7 M/UL (ref 4.1–5.7)
SERVICE CMNT-IMP: NORMAL
SODIUM SERPL-SCNC: 139 MMOL/L (ref 136–145)
WBC # BLD AUTO: 15.2 K/UL (ref 4.1–11.1)

## 2021-03-03 PROCEDURE — 83735 ASSAY OF MAGNESIUM: CPT

## 2021-03-03 PROCEDURE — 80053 COMPREHEN METABOLIC PANEL: CPT

## 2021-03-03 PROCEDURE — 74011250636 HC RX REV CODE- 250/636: Performed by: INTERNAL MEDICINE

## 2021-03-03 PROCEDURE — 97116 GAIT TRAINING THERAPY: CPT

## 2021-03-03 PROCEDURE — 74011250636 HC RX REV CODE- 250/636: Performed by: SURGERY

## 2021-03-03 PROCEDURE — 74011250637 HC RX REV CODE- 250/637: Performed by: SURGERY

## 2021-03-03 PROCEDURE — 84100 ASSAY OF PHOSPHORUS: CPT

## 2021-03-03 PROCEDURE — 97530 THERAPEUTIC ACTIVITIES: CPT

## 2021-03-03 PROCEDURE — 85025 COMPLETE CBC W/AUTO DIFF WBC: CPT

## 2021-03-03 PROCEDURE — 36415 COLL VENOUS BLD VENIPUNCTURE: CPT

## 2021-03-03 PROCEDURE — 74011000250 HC RX REV CODE- 250: Performed by: INTERNAL MEDICINE

## 2021-03-03 PROCEDURE — 74011250637 HC RX REV CODE- 250/637: Performed by: INTERNAL MEDICINE

## 2021-03-03 PROCEDURE — 65270000029 HC RM PRIVATE

## 2021-03-03 PROCEDURE — 94760 N-INVAS EAR/PLS OXIMETRY 1: CPT

## 2021-03-03 PROCEDURE — 74011250637 HC RX REV CODE- 250/637: Performed by: NURSE PRACTITIONER

## 2021-03-03 RX ORDER — MULTIVITAMIN WITH IRON
1 TABLET ORAL DAILY
Status: DISCONTINUED | OUTPATIENT
Start: 2021-03-03 | End: 2021-03-05 | Stop reason: HOSPADM

## 2021-03-03 RX ADMIN — ENOXAPARIN SODIUM 105 MG: 150 INJECTION SUBCUTANEOUS at 18:01

## 2021-03-03 RX ADMIN — Medication 1000 MG: at 13:45

## 2021-03-03 RX ADMIN — POTASSIUM CHLORIDE 20 MEQ: 750 TABLET, FILM COATED, EXTENDED RELEASE ORAL at 09:51

## 2021-03-03 RX ADMIN — FAMOTIDINE 20 MG: 20 TABLET ORAL at 18:01

## 2021-03-03 RX ADMIN — POTASSIUM CHLORIDE 20 MEQ: 750 TABLET, FILM COATED, EXTENDED RELEASE ORAL at 18:01

## 2021-03-03 RX ADMIN — CEFEPIME HYDROCHLORIDE 2 G: 2 INJECTION, POWDER, FOR SOLUTION INTRAVENOUS at 13:49

## 2021-03-03 RX ADMIN — Medication 1000 MG: at 05:37

## 2021-03-03 RX ADMIN — Medication 1 TABLET: at 10:08

## 2021-03-03 RX ADMIN — DOCUSATE SODIUM 50 MG AND SENNOSIDES 8.6 MG 1 TABLET: 8.6; 5 TABLET, FILM COATED ORAL at 18:01

## 2021-03-03 RX ADMIN — METRONIDAZOLE 500 MG: 500 INJECTION, SOLUTION INTRAVENOUS at 14:02

## 2021-03-03 RX ADMIN — ENOXAPARIN SODIUM 105 MG: 150 INJECTION SUBCUTANEOUS at 05:37

## 2021-03-03 RX ADMIN — CEFEPIME HYDROCHLORIDE 2 G: 2 INJECTION, POWDER, FOR SOLUTION INTRAVENOUS at 21:14

## 2021-03-03 RX ADMIN — Medication 1000 MG: at 21:14

## 2021-03-03 RX ADMIN — DOCUSATE SODIUM 50 MG AND SENNOSIDES 8.6 MG 1 TABLET: 8.6; 5 TABLET, FILM COATED ORAL at 09:51

## 2021-03-03 RX ADMIN — METRONIDAZOLE 500 MG: 500 INJECTION, SOLUTION INTRAVENOUS at 02:09

## 2021-03-03 RX ADMIN — FUROSEMIDE 20 MG: 10 INJECTION, SOLUTION INTRAMUSCULAR; INTRAVENOUS at 09:57

## 2021-03-03 RX ADMIN — FAMOTIDINE 20 MG: 20 TABLET ORAL at 07:45

## 2021-03-03 RX ADMIN — CEFEPIME HYDROCHLORIDE 2 G: 2 INJECTION, POWDER, FOR SOLUTION INTRAVENOUS at 05:37

## 2021-03-03 NOTE — PROGRESS NOTES
Bedside shift change report given to Jayden Baptiste RN (oncoming nurse) by KAREN SEVILLA and Fabrizio Carson RN (offgoing nurse). Report included the following information SBAR, Kardex, Procedure Summary, Intake/Output, MAR, Accordion and Recent Results.

## 2021-03-03 NOTE — PROGRESS NOTES
3/3/2021  2:30 PM  RUR 15 %  LOS 15 Days  POD #11   LAPAROSCOPY GENERAL DIAGNOSTIC with evacuation of hematoma    Transition of Care Plan        1. Monitor patient response to treatment and recommendations  2. CM following - Patient is uninsured and does not qualify for Medicaid. He has been given the care card application  3. PT following - recommending HH. Patient has RW for discharge, pt uninsured option for outpatient PT at [address] [place] [address] as hey hae a financial assistance program  4. ID following - anticipate PICC line and IV antibiotics at discharge, discussed w/ Dr Mya Calvo pt is self pay would need to go to outpatient infusion center for IV Abx  5. Establish with PCP - patient resides nearest to [address]  6.  Wife to transport at [address] [place] [address]

## 2021-03-03 NOTE — PROGRESS NOTES
[facility]    Medical Progress Note      NAME: Diallo Gracia   :  1960  MRM:  [id number]    Date/Time of service 3/3/2021  8:25 AM          Assessment and Plan:     Perforated appendicitis / Constipation / Leukocytosis - POA  - Tolerated cecectomy . General surgery is attending to diet, Abx, pain control, laxatives, DVT prophylaxis and rehab decisions as usual. ID consulted. He is on cefepime, flagyl, prn oxycodone PO and dilaudid IV    Acute pulmonary embolism - POA: presumed provoked by current illness. CT 2/15 showed bilateral PEs. LE dopplers neg.  Continue enoxaparin with plan for a DOAC for at least 3 months at discharge. Continue to monitor. He is on oxygen, though not documented if that is required. He may have atelectasis and hypoventilation contributing to low oxygen.     Acute blood loss anemia - POA, severe, still borderline for another transfusion. Hb stable. Iron deficient. He is sp IV iron. Add MVI with iron     Anasarca / Scrotal edema / hypoalbuminemia - POA: likely multifactorial from hypoalbuminemia, surgery, infection. Urology consulted to rule out abscess. ECHO with normal EF. Continue furosemide, thought that is limited by low BP, ambulation as tolerated. Nutrition is key, I added Ensure     Hyponatremia - Presumed due to diuresis. Na better today. No further work up necessary      GERD - Continue Famotidine     Obese - Advise weight loss and outpatient ANDREINA testing. Confusion - I do not know his baseline. Narcotic pain meds may be involved, but he may have mild underlying dementia. Subjective:     Chief Complaint:  Scrotal swelling persistent, has ups and downs    ROS:  (bold if positive, if negative)    Tolerating some PT  Tolerating some Diet        Objective:     Last 24hrs VS reviewed since prior progress note.  Most recent are:    Visit Vitals  /69 (BP 1 Location: Right lower arm, BP Patient Position: At rest)   Pulse 81   Temp 98.3 °F (36.8 °C)   Resp 16   Ht 5' 9\" (1.753 m)   Wt 106.6 kg (235 lb)   SpO2 95%   BMI 34.70 kg/m²     SpO2 Readings from Last 6 Encounters:   03/03/21 95%   03/21/17 95%   06/15/14 98%    O2 Flow Rate (L/min): 2 l/min       Intake/Output Summary (Last 24 hours) at 3/3/2021 0825  Last data filed at 3/3/2021 0731  Gross per 24 hour   Intake 960 ml   Output 750 ml   Net 210 ml        Physical Exam:    Gen:  Obese, in no acute distress  HEENT:  Pink conjunctivae, PERRL, hearing intact to voice, moist mucous membranes  Neck:  Supple, without masses, thyroid non-tender  Resp:  No accessory muscle use, clear breath sounds without wheezes rales or rhonchi  Card:  No murmurs, normal S1, S2 without thrills, bruits or peripheral edema  Abd:  Soft, non-tender, non-distended, normoactive bowel sounds are present, scrotal swelling  Lymph:  No cervical or inguinal adenopathy  Musc:  No cyanosis or clubbing  Skin:  No rashes or ulcers, skin turgor is good  Neuro:  Cranial nerves are grossly intact, general motor weakness, follows commands   Psych:  Poor insight, oriented to person, place    Telemetry reviewed:   normal sinus rhythm  __________________________________________________________________  Medications Reviewed: (see below)  Medications:     Current Facility-Administered Medications   Medication Dose Route Frequency    enoxaparin (LOVENOX) injection 105 mg  105 mg SubCUTAneous Q12H    furosemide (LASIX) injection 20 mg  20 mg IntraVENous DAILY    cefepime (MAXIPIME) 2 g in sterile water (preservative free) 10 mL IV syringe  2 g IntraVENous Q8H    metroNIDAZOLE (FLAGYL) IVPB premix 500 mg  500 mg IntraVENous Q12H    potassium chloride SR (KLOR-CON 10) tablet 20 mEq  20 mEq Oral BID    HYDROmorphone (PF) (DILAUDID) injection 1 mg  1 mg IntraVENous Q4H PRN    oxyCODONE IR (ROXICODONE) tablet 10 mg  10 mg Oral Q4H PRN    Or    oxyCODONE IR (ROXICODONE) tablet 15 mg  15 mg Oral Q4H PRN    senna-docusate (PERICOLACE) 8.6-50 mg per tablet 1 Tab  1 Tab Oral BID    famotidine (PEPCID) tablet 20 mg  20 mg Oral ACB&D    bisacodyL (DULCOLAX) suppository 10 mg  10 mg Rectal DAILY PRN    alum-mag hydroxide-simeth (MYLANTA) oral suspension 30 mL  30 mL Oral Q4H PRN    albuterol-ipratropium (DUO-NEB) 2.5 MG-0.5 MG/3 ML  3 mL Nebulization Q4H PRN    ondansetron (ZOFRAN) injection 4 mg  4 mg IntraVENous Q6H PRN    acetaminophen (TYLENOL) tablet 1,000 mg  1,000 mg Oral Q8H    diphenhydrAMINE (BENADRYL) capsule 25 mg  25 mg Oral Q6H PRN        Lab Data Reviewed: (see below)  Lab Review:     Recent Labs     03/03/21  0333 03/02/21  1404 03/01/21  0751   WBC 15.2* 15.5* 15.0*   HGB 7.9* 7.7* 7.2*   HCT 25.1* 24.9* 22.2*   * 386 551*     Recent Labs     03/03/21  0333 03/02/21  1215 03/01/21  0751    138 138   K 4.0 4.3 3.8    105 105   CO2 25 28 26    96 105*   BUN 15 14 15   CREA 0.80 0.79 0.76   CA 7.9* 7.9* 7.8*   MG 2.6*  --   --    PHOS 3.1  --   --    ALB 2.3* 2.3* 2.2*   TBILI 0.5 0.5 0.5   ALT 20 20 24     Lab Results   Component Value Date/Time    Glucose (POC) 120 (H) 06/14/2014 09:32 AM     No results for input(s): PH, PCO2, PO2, HCO3, FIO2 in the last 72 hours. No results for input(s): INR, INREXT, INREXT in the last 72 hours. All Micro Results     Procedure Component Value Units Date/Time    CULTURE, MRSA [[id number]] Collected: 03/01/21 6548    Order Status: Completed Specimen: Nasal from Nares Updated: 03/03/21 0048     Special Requests: NO SPECIAL REQUESTS        Culture result: MRSA NOT PRESENT               Screening of patient nares for MRSA is for surveillance purposes and, if positive, to facilitate isolation considerations in high risk settings. It is not intended for automatic decolonization interventions per se as regimens are not sufficiently effective to warrant routine use.           CULTURE, BLOOD [[id number]] Collected: 02/24/21 1021    Order Status: Completed Specimen: Blood Updated: 03/02/21 0525     Special Requests: NO SPECIAL REQUESTS        Culture result: NO GROWTH 6 DAYS       CULTURE, BLOOD [[id number]] Collected: 02/24/21 1021    Order Status: Completed Specimen: Blood Updated: 03/02/21 0525     Special Requests: NO SPECIAL REQUESTS        Culture result: NO GROWTH 6 DAYS       CULTURE, ANAEROBIC [[id number]] Collected: 02/26/21 1255    Order Status: Completed Specimen: Abscess Updated: 02/28/21 1327     Special Requests: NO SPECIAL REQUESTS        Culture result: NO ANAEROBES ISOLATED       CULTURE, BODY FLUID Margurite Bill STAIN [[id number]]  (Abnormal)  (Susceptibility) Collected: 02/26/21 1255    Order Status: Completed Specimen: Body Fluid from Abscess Updated: 02/28/21 1327     Special Requests: NO SPECIAL REQUESTS        GRAM STAIN 1+ WBCS SEEN               OCCASIONAL GRAM NEGATIVE RODS           Culture result: LIGHT ESCHERICHIA COLI       CULTURE, BODY FLUID Margurite Bill STAIN [[id number]]  (Abnormal) Collected: 02/24/21 1021    Order Status: Completed Specimen: Body Fluid from Abdominal Fluid Updated: 02/27/21 1440     Special Requests: NO SPECIAL REQUESTS        GRAM STAIN FEW WBCS SEEN        Culture result: MODERATE ESCHERICHIA COLI         PLEASE REFER TO CULTURE [id number] COLLECTED 2/24/21 FOR SENSITIVITIES    CULTURE, BODY FLUID Margurite Bill STAIN [[id number]]  (Abnormal)  (Susceptibility) Collected: 02/24/21 1021    Order Status: Completed Specimen: Body Fluid from Abdominal Fluid Updated: 02/27/21 1438     Special Requests: NO SPECIAL REQUESTS        GRAM STAIN 1+ WBCS SEEN         NO ORGANISMS SEEN        Culture result: HEAVY ESCHERICHIA COLI       CULTURE, ANAEROBIC [[id number]] Collected: 02/26/21 1300    Order Status: Canceled Specimen: Abscess     CULTURE, BODY FLUID W Uriel Power [[id number]] Collected: 02/26/21 1300    Order Status: Canceled Specimen:  Body Fluid from Abscess     CULTURE, MRSA [[id number]] Collected: 02/26/21 1255    Order Status: Canceled Specimen: Nasal from Nares CULTURE, MRSA [[id number]]     Order Status: Canceled Specimen: Nasal from Nares     CULTURE, URINE [[id number]] Collected: 02/24/21 1304    Order Status: Completed Specimen: Urine from Clean catch Updated: 02/25/21 1253     Special Requests: NO SPECIAL REQUESTS        Culture result: No growth (<1,000 CFU/ML)       COVID-19 RAPID TEST [[id number]] Collected: 02/13/21 0509    Order Status: Completed Specimen: Nasopharyngeal Updated: 02/13/21 0558     Specimen source Nasopharyngeal        COVID-19 rapid test Not detected        Comment: Rapid [employer] ID Now       Rapid NAAT:  The specimen is NEGATIVE for SARS-CoV-2, the novel coronavirus associated with COVID-19. Negative results should be treated as presumptive and, if inconsistent with clinical signs and symptoms or necessary for patient management, should be tested with an alternative molecular assay. Negative results do not preclude SARS-CoV-2 infection and should not be used as the sole basis for patient management decisions. This test has been authorized by the FDA under an Emergency Use Authorization (EUA) for use by authorized laboratories. Fact sheet for Healthcare Providers: [employer]stattoo[employer]com  Fact sheet for Patients: kstattoo.com       Methodology: Isothermal Nucleic Acid Amplification               Other pertinent lab: none    Total time spent with patient: 30 Minutes I personally reviewed chart, notes, data and current medications in the medical record. I have personally examined and treated the patient at bedside during this period.                  Care Plan discussed with: Patient, Nursing Staff and >50% of time spent in counseling and coordination of care    Discussed:  Care Plan    Prophylaxis:  Lovenox and H2B/PPI    Disposition:  Home w/Family           ___________________________________________________    Attending Physician: Kandis Navarro MD

## 2021-03-03 NOTE — PROGRESS NOTES
Problem: Mobility Impaired (Adult and Pediatric)  Goal: *Acute Goals and Plan of Care (Insert Text)  Description: FUNCTIONAL STATUS PRIOR TO ADMISSION: Patient was independent and active without use of DME. Patient works as a     HOME [address] [employer]: The patient lived with his wife but did not require assist.    Physical Therapy Goals  Initiated 2/15/2021, re-assessed 2/28/2021 and all remain appropriate  1. Patient will move from supine to sit and sit to supine  in bed with modified independence within 7 day(s). 2.  Patient will transfer from bed to chair and chair to bed with modified independence using the least restrictive device within 7 day(s). 3.  Patient will perform sit to stand with modified independence within 7 day(s). 4.  Patient will ambulate with modified independence for 200 feet with the least restrictive device within 7 day(s). 5.  Patient will ascend/descend 3 stairs with 1 handrail(s) with modified independence within 7 day(s). Outcome: Progressing Towards Goal   PHYSICAL THERAPY TREATMENT  Patient: Lidia Martinez (65 y.o. male)  Date: 3/3/2021  Diagnosis: Appendicitis [K37]  Appendicitis with perforation [K35.32] <principal problem not specified>  Procedure(s) (LRB):  LAPAROSCOPY GENERAL DIAGNOSTIC with evacuation of hematoma (N/A) 12 Days Post-Op  Precautions:    Chart, physical therapy assessment, plan of care and goals were reviewed. ASSESSMENT  Patient continues with skilled PT services and is progressing towards goals. Pt received in supine, reported general fatigue after up with nursing and sitting in chair earlier. Mobilized with supervision overall. Pt tolerated amb in room, toileting, functional dynamic standing activity in restroom. Continues to require use of RW for stability with amb and intermittent UE support for dynamic standing.     Pt will benefit from continued mobilization as tolerated with nursing staff and balance/ gait progression with acute PT. Recommend follow up [facility] PT at d/c. Current Level of Function Impacting Discharge (mobility/balance): supervision for bed mob, transfers, amb using RW    Other factors to consider for discharge: wife able to provide support at home         PLAN :  Patient continues to benefit from skilled intervention to address the above impairments. Continue treatment per established plan of care. to address goals. Recommendation for discharge: (in order for the patient to meet his/her long term goals)  Physical therapy at least 2 days/week in the home     This discharge recommendation:  Has been made in collaboration with the attending provider and/or case management    IF patient discharges home will need the following DME: patient owns DME required for discharge       SUBJECTIVE:   Patient stated I can't believe I've been here this long    OBJECTIVE DATA SUMMARY:   Critical Behavior:  Neurologic State: Alert  Orientation Level: Oriented X4  Cognition: Follows commands     Functional Mobility Training:  Bed Mobility:     Supine to Sit: Supervision  Sit to Supine: Supervision  Scooting: Independent        Transfers:  Sit to Stand: Supervision(increased effort from low toilet)  Stand to Sit: Supervision                             Balance:  Sitting: Intact  Standing: Intact; With support  Standing - Static: Good  Standing - Dynamic : Good;Constant support  Ambulation/Gait Training:  Distance (ft): (30)  Assistive Device: Gait belt;Walker, rolling  Ambulation - Level of Assistance: Supervision        Gait Abnormalities: Decreased step clearance        Base of Support: Widened     Speed/Ludmila: Pace decreased (<100 feet/min)  Step Length: Left shortened;Right shortened        Pain Rating:  No c/o pain    Activity Tolerance:   Fair    After treatment patient left in no apparent distress:   Supine in bed, Call bell within reach, Side rails x 3, and HOB elevated    COMMUNICATION/COLLABORATION:   The patients plan of care was discussed with: Registered nurse.      Kitty Delgado, PT   Time Calculation: 34 mins

## 2021-03-03 NOTE — PROGRESS NOTES
SURGERY PROGRESS NOTE      Admit Date: 2021    POD 11 Days Post-Op    Procedure: Procedure(s):  LAPAROSCOPY GENERAL DIAGNOSTIC with evacuation of hematoma      Subjective: Tolerating diet, no major issues. Objective:     Visit Vitals  BP (!) 98/58 (BP 1 Location: Right upper arm, BP Patient Position: Supine; At rest) Comment: contact Christiano RN   Pulse 80   Temp 97.7 °F (36.5 °C)   Resp 16   Ht 5' 9\" (1.753 m)   Wt 106.6 kg (235 lb)   SpO2 97%   BMI 34.70 kg/m²        Temp (24hrs), Av.3 °F (36.8 °C), Min:97.7 °F (36.5 °C), Max:98.7 °F (37.1 °C)      Physical Exam:                General:  NAD -  WEIGHT 103.3 Kg today   Abdomen:  Soft. Non-tender, non-distended. Incision C/D/I. Extrem:  bilat pitting edema  Urinary:  Swollen scrotum            Lab Results   Component Value Date/Time    WBC 15.2 (H) 2021 03:33 AM    HGB 7.9 (L) 2021 03:33 AM    HCT 25.1 (L) 2021 03:33 AM    PLATELET 948 (H)  03:33 AM    MCV 93.0 2021 03:33 AM       Assessment:     Active Problems:    Appendicitis (2021)      Appendicitis with perforation (2021)      GERD (gastroesophageal reflux disease) (2021)      Constipation (2021)      Acute pulmonary embolism ([address] (2021)        Plan/Recommendations/Medical Decision Making:     Continue current care.   AM labs    Gabe Palmer MD

## 2021-03-03 NOTE — PROGRESS NOTES
Problem: Falls - Risk of  Goal: *Absence of Falls  Description: Document Minh Gilliam Fall Risk and appropriate interventions in the flowsheet.   Outcome: Progressing Towards Goal  Note: Fall Risk Interventions:  Mobility Interventions: Bed/chair exit alarm, Communicate number of staff needed for ambulation/transfer, OT consult for ADLs, Patient to call before getting OOB, PT Consult for mobility concerns, PT Consult for assist device competence    Mentation Interventions: Bed/chair exit alarm    Medication Interventions: Bed/chair exit alarm, Evaluate medications/consider consulting pharmacy, Patient to call before getting OOB    Elimination Interventions: Bed/chair exit alarm, Call light in reach, Patient to call for help with toileting needs    History of Falls Interventions: Bed/chair exit alarm

## 2021-03-03 NOTE — PROGRESS NOTES
Bedside and Verbal shift change report given to Malik/Katiuska RN (oncoming nurse) by Emelyn Chi RN (offgoing nurse). Report included the following information SBAR, Kardex, Intake/Output, MAR, Accordion and Recent Results.

## 2021-03-04 PROCEDURE — 74011000250 HC RX REV CODE- 250: Performed by: INTERNAL MEDICINE

## 2021-03-04 PROCEDURE — 99232 SBSQ HOSP IP/OBS MODERATE 35: CPT | Performed by: INTERNAL MEDICINE

## 2021-03-04 PROCEDURE — 65270000029 HC RM PRIVATE

## 2021-03-04 PROCEDURE — 74011250637 HC RX REV CODE- 250/637: Performed by: SURGERY

## 2021-03-04 PROCEDURE — 74011250636 HC RX REV CODE- 250/636: Performed by: INTERNAL MEDICINE

## 2021-03-04 PROCEDURE — 74011250637 HC RX REV CODE- 250/637: Performed by: INTERNAL MEDICINE

## 2021-03-04 PROCEDURE — 74011250637 HC RX REV CODE- 250/637: Performed by: NURSE PRACTITIONER

## 2021-03-04 PROCEDURE — 94760 N-INVAS EAR/PLS OXIMETRY 1: CPT

## 2021-03-04 PROCEDURE — 74011250636 HC RX REV CODE- 250/636: Performed by: SURGERY

## 2021-03-04 PROCEDURE — 97116 GAIT TRAINING THERAPY: CPT

## 2021-03-04 RX ADMIN — Medication 1000 MG: at 14:48

## 2021-03-04 RX ADMIN — ENOXAPARIN SODIUM 105 MG: 150 INJECTION SUBCUTANEOUS at 06:51

## 2021-03-04 RX ADMIN — POTASSIUM CHLORIDE 20 MEQ: 750 TABLET, FILM COATED, EXTENDED RELEASE ORAL at 17:31

## 2021-03-04 RX ADMIN — Medication 1000 MG: at 21:35

## 2021-03-04 RX ADMIN — FAMOTIDINE 20 MG: 20 TABLET ORAL at 17:31

## 2021-03-04 RX ADMIN — DOCUSATE SODIUM 50 MG AND SENNOSIDES 8.6 MG 1 TABLET: 8.6; 5 TABLET, FILM COATED ORAL at 08:58

## 2021-03-04 RX ADMIN — CEFEPIME HYDROCHLORIDE 2 G: 2 INJECTION, POWDER, FOR SOLUTION INTRAVENOUS at 21:35

## 2021-03-04 RX ADMIN — POTASSIUM CHLORIDE 20 MEQ: 750 TABLET, FILM COATED, EXTENDED RELEASE ORAL at 08:58

## 2021-03-04 RX ADMIN — Medication 1 TABLET: at 08:58

## 2021-03-04 RX ADMIN — ENOXAPARIN SODIUM 105 MG: 150 INJECTION SUBCUTANEOUS at 17:31

## 2021-03-04 RX ADMIN — CEFEPIME HYDROCHLORIDE 2 G: 2 INJECTION, POWDER, FOR SOLUTION INTRAVENOUS at 14:48

## 2021-03-04 RX ADMIN — FUROSEMIDE 20 MG: 10 INJECTION, SOLUTION INTRAMUSCULAR; INTRAVENOUS at 09:09

## 2021-03-04 RX ADMIN — DOCUSATE SODIUM 50 MG AND SENNOSIDES 8.6 MG 1 TABLET: 8.6; 5 TABLET, FILM COATED ORAL at 17:31

## 2021-03-04 RX ADMIN — Medication 1000 MG: at 06:36

## 2021-03-04 RX ADMIN — CEFEPIME HYDROCHLORIDE 2 G: 2 INJECTION, POWDER, FOR SOLUTION INTRAVENOUS at 06:36

## 2021-03-04 RX ADMIN — FAMOTIDINE 20 MG: 20 TABLET ORAL at 06:36

## 2021-03-04 RX ADMIN — METRONIDAZOLE 500 MG: 500 INJECTION, SOLUTION INTRAVENOUS at 02:22

## 2021-03-04 RX ADMIN — METRONIDAZOLE 500 MG: 500 INJECTION, SOLUTION INTRAVENOUS at 14:48

## 2021-03-04 NOTE — PROGRESS NOTES
3/4/2021  4:12 PM  Update via chart review  RUR 15 %  LOS 16Days  POD #12   LAPAROSCOPY GENERAL DIAGNOSTIC with evacuation of hematoma     Transition of Care Plan        1. Monitor patient response to treatment and recommendations, AM labs  2. CM following - Patient is uninsured and does not qualify for Medicaid. He has been given the care card application, CM emailed Deisy [place] financial counselor to contact pt's wife   3. PT following - recommending HH. Patient has RW for discharge, pt uninsured option for outpatient PT at [address] [place] [address] as hey hae a financial assistance program  4. ID following - anticipate PICC line and IV antibiotics at discharge, discussed w/ Dr Rupa Martinez pt is self pay would need to go to outpatient infusion center for IV Abx, Dr Rupa Martinez likely to DC on once daily ABx  5. Establish with PCP - patient resides nearest to [address] [place] [address]  6.  Wife to transport at [address]

## 2021-03-04 NOTE — PROGRESS NOTES
Bedside shift change report given to Eneida Lyle RN (oncoming nurse) by KAREN SCHROEDER and Tristan Lewis RN (offgoing nurse). Report included the following information SBAR, Kardex, Intake/Output, MAR, Accordion and Recent Results.

## 2021-03-04 NOTE — PROGRESS NOTES
SURGERY PROGRESS NOTE      Admit Date: 2021    POD 12 Days Post-Op    Procedure: Procedure(s):  LAPAROSCOPY GENERAL DIAGNOSTIC with evacuation of hematoma      Subjective: Tolerating diet, no major issues. Sleeping. Objective:     Visit Vitals  BP (!) 97/53 (BP 1 Location: Left upper arm, BP Patient Position: At rest)   Pulse 85   Temp 98.6 °F (37 °C)   Resp 18   Ht 5' 9\" (1.753 m)   Wt 109.6 kg (241 lb 10 oz)   SpO2 97%   BMI 35.68 kg/m²        Temp (24hrs), Av.5 °F (36.9 °C), Min:98 °F (36.7 °C), Max:100.2 °F (37.9 °C)      Physical Exam:                General:  NAD -  WEIGHT 103.3 Kg today   Abdomen:  Soft. Non-tender, non-distended. Incision C/D/I. Extrem:  bilat pitting edema  Urinary:  Swollen scrotum            Lab Results   Component Value Date/Time    WBC 15.2 (H) 2021 03:33 AM    HGB 7.9 (L) 2021 03:33 AM    HCT 25.1 (L) 2021 03:33 AM    PLATELET 060 (H)  03:33 AM    MCV 93.0 2021 03:33 AM       Assessment:     Active Problems:    Appendicitis (2021)      Appendicitis with perforation (2021)      GERD (gastroesophageal reflux disease) (2021)      Constipation (2021)      Acute pulmonary embolism ([address] (2021)        Plan/Recommendations/Medical Decision Making:     Appreciate IV antibiotic reccs. Appreciate hospitalist reccs. Appreciate Urology reccs. Will need case management help, given his insurance status. AM Labs  PT reccs:  2 days home PT.    Will work on Ryan Rm MD

## 2021-03-04 NOTE — PROGRESS NOTES
Problem: Mobility Impaired (Adult and Pediatric)  Goal: *Acute Goals and Plan of Care (Insert Text)  Description: FUNCTIONAL STATUS PRIOR TO ADMISSION: Patient was independent and active without use of DME. Patient works as a     HOME [employer]: The patient lived with his wife but did not require assist.    Physical Therapy Goals  Initiated 2/15/2021, re-assessed 2/28/2021 and all remain appropriate  1. Patient will move from supine to sit and sit to supine  in bed with modified independence within 7 day(s). 2.  Patient will transfer from bed to chair and chair to bed with modified independence using the least restrictive device within 7 day(s). 3.  Patient will perform sit to stand with modified independence within 7 day(s). 4.  Patient will ambulate with modified independence for 200 feet with the least restrictive device within 7 day(s). 5.  Patient will ascend/descend 3 stairs with 1 handrail(s) with modified independence within 7 day(s). Outcome: Progressing Towards Goal   PHYSICAL THERAPY TREATMENT  Patient: Ovi Hidalgo (65 y.o. male)  Date: 3/4/2021  Diagnosis: Appendicitis [K37]  Appendicitis with perforation [K35.32] <principal problem not specified>  Procedure(s) (LRB):  LAPAROSCOPY GENERAL DIAGNOSTIC with evacuation of hematoma (N/A) 13 Days Post-Op  Precautions:    Chart, physical therapy assessment, plan of care and goals were reviewed. ASSESSMENT  Patient continues with skilled PT services and is progressing towards goals. Patient eager to work with therapy and demonstrated improved gait tolerance today. Needs cues occasionally for pacing and to cease speaking when feeling SOB during gait to conserve O2 and energy. Demonstrates improved balance without support with mild dynamic movement, will benefit from trialing short distances without AD next session.      Current Level of Function Impacting Discharge (mobility/balance): Supervision for gait and transfers with RW    Other factors to consider for discharge:          PLAN :  Patient continues to benefit from skilled intervention to address the above impairments. Continue treatment per established plan of care. to address goals. Recommendation for discharge: (in order for the patient to meet his/her long term goals)  Physical therapy at least 2 days/week in the home     This discharge recommendation:  Has been made in collaboration with the attending provider and/or case management    IF patient discharges home will need the following DME: to be determined (TBD), possibly RW       SUBJECTIVE:   Patient stated Marlene Sawyer got up and walked with you earlier today didn't I (no).     OBJECTIVE DATA SUMMARY:   Critical Behavior:  Neurologic State: Alert  Orientation Level: Oriented X4  Cognition: Follows commands     Functional Mobility Training:  Bed Mobility:     Supine to Sit: Supervision  Sit to Supine: Supervision  Scooting: Independent        Transfers:  Sit to Stand: Supervision  Stand to Sit: Supervision                             Balance:  Sitting: Intact  Standing: Intact; With support  Standing - Static: Good  Standing - Dynamic : Good;Constant support  Ambulation/Gait Training:  Distance (ft): 100 Feet (ft)  Assistive Device: Gait belt;Walker, rolling  Ambulation - Level of Assistance: Supervision        Gait Abnormalities: Decreased step clearance        Base of Support: Widened     Speed/Ludmila: Pace decreased (<100 feet/min)  Step Length: Left shortened;Right shortened             Pain Rating:  None reported    Activity Tolerance:   Fair and requires rest breaks    After treatment patient left in no apparent distress:   Sitting in chair and Call bell within reach    COMMUNICATION/COLLABORATION:   The patients plan of care was discussed with: Registered nurse.      Cat Potts, PT   Time Calculation: 16 mins

## 2021-03-04 NOTE — PROGRESS NOTES
[facility] Progress Note           Primo Mason DO    [phone] Office  [phone]  Fax    3/4/2021      Assessment & Plan:   · Perforated appendicitis. Status post cecectomy 2021. Intra-abdominal collections noted on CT scan . Status post CT-guided aspiration  which revealed hematoma. Cultures growing an MDR Escherichia coli which also grew from previous drain cultures. Repeat CT scan 3/1/2021 reveals stable trilobar abscess in the mid abdomen. Due to persistent leukocytosis, MDR organism with limited antibiotic options, and ongoing abdominal abscess recommend IV antibiotics at discharge. This will be challenging as patient is uninsured. Will discuss further with case management. Anticipate Invanz 1 g IV daily x6 weeks to be administered at the infusion center. Patient is agreeable to this plan   · Scrotal edema. Collection noted on ultrasound. Urology following. · Leukocytosis. Multifactorial.   Remains afebrile. Suspect reactive/intra-abdominal infection Continue antibiotics as above           Subjective:       Still weak. Scrotal edema persists    Objective:     Vitals:   Visit Vitals  /61 (BP 1 Location: Right upper arm, BP Patient Position: At rest)   Pulse 80   Temp 98 °F (36.7 °C)   Resp 18   Ht 5' 9\" (1.753 m)   Wt 241 lb 10 oz (109.6 kg)   SpO2 96%   BMI 35.68 kg/m²        Tmax:  Temp (24hrs), Av.5 °F (36.9 °C), Min:98 °F (36.7 °C), Max:100.2 °F (37.9 °C)      Exam:   Patient is intubated:  no    Physical Examination:   General:  Alert, cooperative, no distress   Head:  Normocephalic, atraumatic. Eyes:  Conjunctivae clear   Neck: Supple       Lungs:   No distress. Chest wall:     Heart:     Abdomen:    Distended. Mildly TTP. Drains removed. Significant scrotal edema   Extremities: Moves all. Skin:  No rash   Neurologic: CNII-XII intact.  Normal strength     Labs:        No lab exists for component: ITNL   No results for input(s): CPK, CKMB, TROIQ in the last 72 hours. Recent Labs     03/03/21  0333 03/02/21  1404 03/02/21  1215     --  138   K 4.0  --  4.3     --  105   CO2 25  --  28   BUN 15  --  14   CREA 0.80  --  0.79     --  96   PHOS 3.1  --   --    MG 2.6*  --   --    ALB 2.3*  --  2.3*   WBC 15.2* 15.5*  --    HGB 7.9* 7.7*  --    HCT 25.1* 24.9*  --    * 386  --      No results for input(s): INR, PTP, APTT, INREXT, INREXT in the last 72 hours. Needs: urine analysis, urine sodium, protein and creatinine  No results found for: SHARITA, CREAU      Cultures:     No results found for: SDES  Lab Results   Component Value Date/Time    Culture result: MRSA NOT PRESENT 03/01/2021 05:45 PM    Culture result:  03/01/2021 05:45 PM     Screening of patient nares for MRSA is for surveillance purposes and, if positive, to facilitate isolation considerations in high risk settings. It is not intended for automatic decolonization interventions per se as regimens are not sufficiently effective to warrant routine use.     Culture result: NO ANAEROBES ISOLATED 02/26/2021 12:55 PM    Culture result: LIGHT ESCHERICHIA COLI (A) 02/26/2021 12:55 PM       Radiology:     Medications       Current Facility-Administered Medications   Medication Dose Route Frequency Last Admin    multivitamin with iron tablet 1 Tab  1 Tab Oral DAILY 1 Tab at 03/04/21 0858    enoxaparin (LOVENOX) injection 105 mg  105 mg SubCUTAneous Q12H 105 mg at 03/04/21 0651    furosemide (LASIX) injection 20 mg  20 mg IntraVENous DAILY 20 mg at 03/04/21 0909    cefepime (MAXIPIME) 2 g in sterile water (preservative free) 10 mL IV syringe  2 g IntraVENous Q8H 2 g at 03/04/21 0636    metroNIDAZOLE (FLAGYL) IVPB premix 500 mg  500 mg IntraVENous Q12H 500 mg at 03/04/21 0222    potassium chloride SR (KLOR-CON 10) tablet 20 mEq  20 mEq Oral BID 20 mEq at 03/04/21 0858    HYDROmorphone (PF) (DILAUDID) injection 1 mg  1 mg IntraVENous Q4H PRN 1 mg at 02/26/21 1056    oxyCODONE IR (ROXICODONE) tablet 10 mg  10 mg Oral Q4H PRN 10 mg at 02/25/21 1736    Or    oxyCODONE IR (ROXICODONE) tablet 15 mg  15 mg Oral Q4H PRN 15 mg at 02/27/21 2209    senna-docusate (PERICOLACE) 8.6-50 mg per tablet 1 Tab  1 Tab Oral BID 1 Tab at 03/04/21 0858    famotidine (PEPCID) tablet 20 mg  20 mg Oral ACB&D 20 mg at 03/04/21 0636    bisacodyL (DULCOLAX) suppository 10 mg  10 mg Rectal DAILY PRN      alum-mag hydroxide-simeth (MYLANTA) oral suspension 30 mL  30 mL Oral Q4H PRN 30 mL at 02/18/21 1411    albuterol-ipratropium (DUO-NEB) 2.5 MG-0.5 MG/3 ML  3 mL Nebulization Q4H PRN      ondansetron (ZOFRAN) injection 4 mg  4 mg IntraVENous Q6H PRN 4 mg at 02/28/21 2154    acetaminophen (TYLENOL) tablet 1,000 mg  1,000 mg Oral Q8H 1,000 mg at 03/04/21 0636    diphenhydrAMINE (BENADRYL) capsule 25 mg  25 mg Oral Q6H PRN             Case discussed with:       Kamran Skinner DO

## 2021-03-04 NOTE — PROGRESS NOTES
1900: Bedside shift change report given to Carlotta Cabrera RN (oncoming nurse) by Long Rivera RN (offgoing nurse). Report included the following information SBAR, Kardex, Intake/Output and Recent Results.

## 2021-03-04 NOTE — PROGRESS NOTES
Post Discharge PICC and Antibiotic Orders    1. Diagnosis: Abdominal abscess. MDR E. coli  2. Antibiotic: Invanz 1 g IV daily through 4/14/2021  3. Routine PICC/ Oksana/ Portacath Care including PRN catheter flow management  4. Weekly labs:   _x__CBC/diff/platelets   _x__BUN/Creatinine   ___CPK   _x__AST/TotalBilirubin/AlkalinePhosphatase   _x__CRP  5. Fax lab to [phone]  Call Critical Lab Values to [phone]  6. May send to IR for line evaluation or replacement [phone]  7. Home Health to pull PIC line at end of therapy or send to IR for Oksana removal  8.   Allergies:  No Known Allergies      Jeremy Avila DO n/a patient left against medical advice

## 2021-03-04 NOTE — PROGRESS NOTES
New Urology Consult Note    Patient: Diallo Gracia MRN: [id number]  SSN: [id number]    YOB: 1960  Age: 62 y.o. Sex: male            Assessment:     Diallo Gracia is a 62 y.o. male with who presented to the ED on 2/13/2021 with perforated appendicitis s/p cecectomy 2/13/2021. CT scan 2/25/2021 with intra-abdominal collections and s/p CT guided aspiration 2/26/2021, with findings of hematoma. Also with leokocytosis, scrotal edema and scrotal US on 2/27/2021 concerning for left scrotal abscess. Recommendations:   .  1. Continue aggressive scrotal elevation  2. Left inguinal fat/fluid hernia with management per surgery. Patient seen with Dr. Clifton Saeed. Will sign off. Please call if needed. Thank you for this consult. Please contact [facility] with any further questions/concerns. Subjective:     Trying to elevate scrotum. Scrotal swelling improved. No fevers. Objective:     Vital signs in last 24 hours:  Visit Vitals  /68 (BP 1 Location: Left upper arm, BP Patient Position: At rest)   Pulse 84   Temp 98.1 °F (36.7 °C)   Resp 20   Ht 5' 9\" (1.753 m)   Wt 109.6 kg (241 lb 10 oz)   SpO2 95%   BMI 35.68 kg/m²       Intake/Output last 3 shifts:  Date 03/03/21 0700 - 03/04/21 0659 03/04/21 0700 - 03/05/21 0659   Shift 2122-1391 3386-2878 24 Hour Total 1695-5181 1062-5267 24 Hour Total   INTAKE   P.O. 460  460        P. O. 460  460      I. V.(mL/kg/hr)  110(0.1) 110(0)        Volume (cefepime (MAXIPIME) 2 g in sterile water (preservative free) 10 mL IV syringe)  10 10        Volume (metroNIDAZOLE (FLAGYL) IVPB premix 500 mg)  100 100      Shift Total(mL/kg) 460(4.3) 110(1) 570(5.2)      OUTPUT   Urine(mL/kg/hr) 875(0.7) 400(0.3) 1275(0.5) 250  250     Urine Voided  250  250     Urine Occurrence(s)  1 x 1 x 1 x  1 x   Emesis/NG output 0  0        Emesis 0  0        Emesis Occurrence(s)  0 x 0 x      Stool 0  0        Stool Occurrence(s)  1 x 1 x 1 x  1 x     Stool 0  0      Shift Total(mL/kg) 875(8.2) 400(3.6) 1275(11.6) 250(2.3)  250(2.3)   NET -415 -290 -705 -250  -250   Weight (kg) 106.6 109.6 109.6 109.6 109.6 109.6         Physical Exam  General: NAD, A&O  Pulmonary: Normal work of breathing   Abdomen: rounded, dressing in place for recent sx, surgical incisions c/d/i  : improved generalized scrotal edema, no evidence of cellulitis or abscess, crepitus, necrosis. Unchanged flat eschar/scab noted on the inferior left hemiscrotum  Extremities: moves all, LE edema improved  Gait: not observed  Neuro: Appropriate, no focal neurological deficits  Mood/Affect: normal      Lab/Imaging Review:       Most Recent Labs:  Lab Results   Component Value Date/Time    WBC 15.2 (H) 03/03/2021 03:33 AM    HGB 7.9 (L) 03/03/2021 03:33 AM    HCT 25.1 (L) 03/03/2021 03:33 AM    PLATELET 945 (H) 89/97/7195 03:33 AM    MCV 93.0 03/03/2021 03:33 AM        Lab Results   Component Value Date/Time    Sodium 139 03/03/2021 03:33 AM    Potassium 4.0 03/03/2021 03:33 AM    Chloride 107 03/03/2021 03:33 AM    CO2 25 03/03/2021 03:33 AM    Anion gap 7 03/03/2021 03:33 AM    Glucose 100 03/03/2021 03:33 AM    BUN 15 03/03/2021 03:33 AM    Creatinine 0.80 03/03/2021 03:33 AM    BUN/Creatinine ratio 19 03/03/2021 03:33 AM    GFR est AA >60 03/03/2021 03:33 AM    GFR est non-AA >60 03/03/2021 03:33 AM    Calcium 7.9 (L) 03/03/2021 03:33 AM    Bilirubin, total 0.5 03/03/2021 03:33 AM    Alk. phosphatase 83 03/03/2021 03:33 AM    Protein, total 7.0 03/03/2021 03:33 AM    Albumin 2.3 (L) 03/03/2021 03:33 AM    Globulin 4.7 (H) 03/03/2021 03:33 AM    A-G Ratio 0.5 (L) 03/03/2021 03:33 AM    ALT (SGPT) 20 03/03/2021 03:33 AM        IMAGING:  No results found.         Signed By: Efren Nichols NP  - March 4, 2021

## 2021-03-05 ENCOUNTER — APPOINTMENT (OUTPATIENT)
Dept: GENERAL RADIOLOGY | Age: 61
DRG: 853 | End: 2021-03-05
Attending: SURGERY

## 2021-03-05 LAB
BASOPHILS # BLD: 0 K/UL (ref 0–0.1)
BASOPHILS NFR BLD: 0 % (ref 0–1)
CRP SERPL-MCNC: 14.6 MG/DL (ref 0–0.6)
DIFFERENTIAL METHOD BLD: ABNORMAL
EOSINOPHIL # BLD: 0.2 K/UL (ref 0–0.4)
EOSINOPHIL NFR BLD: 2 % (ref 0–7)
ERYTHROCYTE [DISTWIDTH] IN BLOOD BY AUTOMATED COUNT: 14.2 % (ref 11.5–14.5)
HCT VFR BLD AUTO: 23.1 % (ref 36.6–50.3)
HGB BLD-MCNC: 7.2 G/DL (ref 12.1–17)
IMM GRANULOCYTES # BLD AUTO: 0.2 K/UL (ref 0–0.04)
IMM GRANULOCYTES NFR BLD AUTO: 2 % (ref 0–0.5)
LYMPHOCYTES # BLD: 2 K/UL (ref 0.8–3.5)
LYMPHOCYTES NFR BLD: 18 % (ref 12–49)
MCH RBC QN AUTO: 28.9 PG (ref 26–34)
MCHC RBC AUTO-ENTMCNC: 31.2 G/DL (ref 30–36.5)
MCV RBC AUTO: 92.8 FL (ref 80–99)
MONOCYTES # BLD: 0.7 K/UL (ref 0–1)
MONOCYTES NFR BLD: 6 % (ref 5–13)
NEUTS SEG # BLD: 8 K/UL (ref 1.8–8)
NEUTS SEG NFR BLD: 72 % (ref 32–75)
NRBC # BLD: 0 K/UL (ref 0–0.01)
NRBC BLD-RTO: 0 PER 100 WBC
PLATELET # BLD AUTO: 531 K/UL (ref 150–400)
PMV BLD AUTO: 9 FL (ref 8.9–12.9)
RBC # BLD AUTO: 2.49 M/UL (ref 4.1–5.7)
WBC # BLD AUTO: 11 K/UL (ref 4.1–11.1)

## 2021-03-05 PROCEDURE — 74011250636 HC RX REV CODE- 250/636: Performed by: INTERNAL MEDICINE

## 2021-03-05 PROCEDURE — 36573 INSJ PICC RS&I 5 YR+: CPT | Performed by: NURSE PRACTITIONER

## 2021-03-05 PROCEDURE — 74011250637 HC RX REV CODE- 250/637: Performed by: SURGERY

## 2021-03-05 PROCEDURE — 97116 GAIT TRAINING THERAPY: CPT

## 2021-03-05 PROCEDURE — 74011250637 HC RX REV CODE- 250/637: Performed by: NURSE PRACTITIONER

## 2021-03-05 PROCEDURE — 74011250636 HC RX REV CODE- 250/636: Performed by: SURGERY

## 2021-03-05 PROCEDURE — 85025 COMPLETE CBC W/AUTO DIFF WBC: CPT

## 2021-03-05 PROCEDURE — 76937 US GUIDE VASCULAR ACCESS: CPT

## 2021-03-05 PROCEDURE — 94760 N-INVAS EAR/PLS OXIMETRY 1: CPT

## 2021-03-05 PROCEDURE — 36415 COLL VENOUS BLD VENIPUNCTURE: CPT

## 2021-03-05 PROCEDURE — 77030018786 HC NDL GD F/USND BARD -B

## 2021-03-05 PROCEDURE — 2709999900 HC NON-CHARGEABLE SUPPLY

## 2021-03-05 PROCEDURE — 86140 C-REACTIVE PROTEIN: CPT

## 2021-03-05 PROCEDURE — 74011250637 HC RX REV CODE- 250/637: Performed by: INTERNAL MEDICINE

## 2021-03-05 PROCEDURE — 99232 SBSQ HOSP IP/OBS MODERATE 35: CPT | Performed by: INTERNAL MEDICINE

## 2021-03-05 PROCEDURE — C1751 CATH, INF, PER/CENT/MIDLINE: HCPCS

## 2021-03-05 PROCEDURE — 74011000250 HC RX REV CODE- 250: Performed by: INTERNAL MEDICINE

## 2021-03-05 PROCEDURE — 97530 THERAPEUTIC ACTIVITIES: CPT

## 2021-03-05 RX ORDER — FAMOTIDINE 20 MG/1
20 TABLET, FILM COATED ORAL
Qty: 60 TAB | Refills: 0 | Status: SHIPPED | OUTPATIENT
Start: 2021-03-05 | End: 2021-04-04

## 2021-03-05 RX ORDER — MULTIVITAMIN WITH IRON
1 TABLET ORAL DAILY
Qty: 30 TAB | Refills: 0 | Status: SHIPPED | OUTPATIENT
Start: 2021-03-06 | End: 2021-04-05

## 2021-03-05 RX ADMIN — Medication 1 TABLET: at 09:30

## 2021-03-05 RX ADMIN — CEFEPIME HYDROCHLORIDE 2 G: 2 INJECTION, POWDER, FOR SOLUTION INTRAVENOUS at 06:03

## 2021-03-05 RX ADMIN — Medication 1000 MG: at 13:39

## 2021-03-05 RX ADMIN — CEFEPIME HYDROCHLORIDE 2 G: 2 INJECTION, POWDER, FOR SOLUTION INTRAVENOUS at 13:39

## 2021-03-05 RX ADMIN — POTASSIUM CHLORIDE 20 MEQ: 750 TABLET, FILM COATED, EXTENDED RELEASE ORAL at 17:31

## 2021-03-05 RX ADMIN — POTASSIUM CHLORIDE 20 MEQ: 750 TABLET, FILM COATED, EXTENDED RELEASE ORAL at 09:30

## 2021-03-05 RX ADMIN — ENOXAPARIN SODIUM 105 MG: 150 INJECTION SUBCUTANEOUS at 06:03

## 2021-03-05 RX ADMIN — FAMOTIDINE 20 MG: 20 TABLET ORAL at 06:36

## 2021-03-05 RX ADMIN — APIXABAN 5 MG: 5 TABLET, FILM COATED ORAL at 17:31

## 2021-03-05 RX ADMIN — FAMOTIDINE 20 MG: 20 TABLET ORAL at 17:31

## 2021-03-05 RX ADMIN — METRONIDAZOLE 500 MG: 500 INJECTION, SOLUTION INTRAVENOUS at 13:39

## 2021-03-05 RX ADMIN — DOCUSATE SODIUM 50 MG AND SENNOSIDES 8.6 MG 1 TABLET: 8.6; 5 TABLET, FILM COATED ORAL at 09:30

## 2021-03-05 RX ADMIN — Medication 1000 MG: at 06:03

## 2021-03-05 RX ADMIN — METRONIDAZOLE 500 MG: 500 INJECTION, SOLUTION INTRAVENOUS at 01:13

## 2021-03-05 RX ADMIN — DOCUSATE SODIUM 50 MG AND SENNOSIDES 8.6 MG 1 TABLET: 8.6; 5 TABLET, FILM COATED ORAL at 17:31

## 2021-03-05 RX ADMIN — IRON SUCROSE 100 MG: 20 INJECTION, SOLUTION INTRAVENOUS at 10:45

## 2021-03-05 NOTE — PROGRESS NOTES
Spiritual Care Assessment/Progress Note  [facility]      NAME: Hector Stack      MRN: [id number]  AGE: 62 y.o. SEX: male  Mormonism Affiliation: Sikhism   Language: English     3/5/2021     Total Time (in minutes): 5     Spiritual Assessment begun in [facility] 5M1 MED SURG 1 through conversation with:         []Patient        [] Family    [] Friend(s)        Reason for Consult: Initial/Spiritual assessment, patient floor     Spiritual beliefs: (Please include comment if needed)     [] Identifies with a shannon tradition:         [] Supported by a shannon community:            [] Claims no spiritual orientation:           [] Seeking spiritual identity:                [] Adheres to an individual form of spirituality:           [x] Not able to assess:                           Identified resources for coping:      [] Prayer                               [] Music                  [] Guided Imagery     [] Family/friends                 [] Pet visits     [] Devotional reading                         [x] Unknown     [] Other:                                              Interventions offered during this visit: (See comments for more details)    Patient Interventions: Other (comment)(Unable to assess)           Plan of Care:     [] Support spiritual and/or cultural needs    [] Support AMD and/or advance care planning process      [] Support grieving process   [] Coordinate Rites and/or Rituals    [] Coordination with community clergy   [] No spiritual needs identified at this time   [] Detailed Plan of Care below (See Comments)  [] Make referral to Music Therapy  [] Make referral to Pet Therapy     [] Make referral to Addiction services  [] Make referral to [facility]  [] Make referral to Spiritual Care Partner  [] No future visits requested        [x] Follow up upon further referrals     Comments:  attempted to visit Mr. Hickey for an initial spiritual care assessment on the Med. Surgical Unit. Mr. Hickey's door had a sign that indicated that he was having a surgical procedure done at the time of the 's visit. 's are available for further support upon referral  Chaplain Elvira Dixon MDiv.     Paging Service: [phone])

## 2021-03-05 NOTE — PROGRESS NOTES
VAT Note -  To acknowledge order for PICC line for long term antibiotics. Chart reviewed for PICC placement evaluation. Areas reviewed include, but are not limited to, patient's current and past H&P, Lab and Procedure Results, all notes, media records and medications. Spoke to PARVIN Felix NP regarding plan for infusion center for pt's PICC care after discharge. Will place today. Please call Ext [phone] for questions or concerns.

## 2021-03-05 NOTE — DISCHARGE INSTRUCTIONS
Pulmonary Embolism: Care Instructions  Your Care Instructions     Pulmonary embolism is the sudden blockage of an artery in the lung. Blood clots in the deep veins of the leg or pelvis (deep vein thrombosis, or DVT) are the most common cause. These blood clots can travel to the lungs. Pulmonary embolism can be very serious. Because you have had one pulmonary embolism, you are at greater risk for having another one. But you can take steps to prevent another pulmonary embolism by following your doctor's instructions. You will probably take a prescription blood-thinning medicine to prevent blood clots. A blood thinner can stop a blood clot from growing larger and prevent new clots from forming. Follow-up care is a key part of your treatment and safety. Be sure to make and go to all appointments, and call your doctor if you are having problems. It's also a good idea to know your test results and keep a list of the medicines you take. How can you care for yourself at home? · Take your medicines exactly as prescribed. Call your doctor if you think you are having a problem with your medicine. You will get more details on the specific medicines your doctor prescribes. · If you are taking a blood thinner, be sure you get instructions about how to take your medicine safely. Blood thinners can cause serious bleeding problems. Preventing future pulmonary embolisms  · Exercise. Keep blood moving in your legs to keep clots from forming. If you are traveling by car, stop every hour or so. Get out and walk around for a few minutes. If you are traveling by bus, train, or plane, get out of your seat and walk up and down the aisles every hour or so. You also can do leg exercises while you are seated. Pump your feet up and down by pulling your toes up toward your knees then pointing them down. · Get up out of bed as soon as possible after an illness or surgery. · Do not smoke.  If you need help quitting, talk to your doctor about stop-smoking programs and medicines. These can increase your chances of quitting for good. · Check with your doctor before taking hormone or birth control pills. These may increase your risk of blood clots. · Ask your doctor about wearing compression stockings to help prevent blood clots in your legs. There are different types of stockings, and they need to fit right. So your doctor will recommend what you need. When should you call for help? Call 911 anytime you think you may need emergency care. For example, call if:    · You have shortness of breath.     · You have chest pain.     · You passed out (lost consciousness).     · You cough up blood. Call your doctor now or seek immediate medical care if:    · You have new or worsening pain or swelling in your leg. Watch closely for changes in your health, and be sure to contact your doctor if:    · You do not get better as expected. Where can you learn more? Go to http://www.gray.com/  Enter E609 in the search box to learn more about \"Pulmonary Embolism: Care Instructions. \"  Current as of: March 4, 2020               Content Version: 12.6  © 6496-8840 [employer]. Care instructions adapted under license by [employer] (which disclaims liability or warranty for this information). If you have questions about a medical condition or this instruction, always ask your healthcare professional. [place] [address] any warranty or liability for your use of this information. Learning About Sepsis  What is sepsis? Sepsis is an intense reaction to an infection. It can cause deadly damage to the body and lead to dangerously low blood pressure. You may have inflammation across large areas of your body. It can damage tissue and even go deep into your organs. Sepsis can develop very quickly.  It requires immediate care in a hospital.  Infections that can lead to sepsis include:  · A skin infection such as from a cut. · A lung infection like pneumonia. · A kidney infection. · A gut infection such as E. coli. Symptoms can include low blood pressure, breathing problems, fast heartbeat, and confusion. Other symptoms include fever or low body temperature, chills, cool clammy skin, skin rashes, and shaking. Sepsis can cause problems in many organs. People with sepsis might need to be treated in an intensive care unit (ICU) for several days or weeks. An ICU is a part of the hospital where very sick people get care. Septic shock is sepsis that causes extremely low blood pressure, which limits blood flow to the body. It can cause organ failure and death. How is sepsis treated? Doctors will treat the person with antibiotics. They will try to find the infection that led to sepsis. Machines will track the person's vital signs, including temperature, blood pressure, breathing rate, and pulse rate. The person will get fluids through an IV. He or she may also get strong medicine. This can help raise the blood pressure. If a person with sepsis is very sick, equipment in the ICU can support many body systems. That includes breathing, circulation, fluids, and help for organs like the kidneys and heart. If the person needs help breathing, a ventilator may be used. What can you expect when someone has sepsis? The person may start new treatments while still in the hospital. Different doctors may help with different symptoms. If a person needs to be treated in the intensive care unit (ICU), the ICU staff will do everything they can to treat all of the problems sepsis causes, including the infection. The ICU can be scary and confusing for patients and their families, friends, and supporters. But it's designed to keep your loved one comfortable and safe and to provide the best medical care. Expect a long recovery after the person leaves the ICU. If you need it, ask for support from friends and family.   Where can you learn more? Go to http://www.gray.com/  Enter [id number] in the search box to learn more about \"Learning About Sepsis. \"  Current as of: June 26, 2019               Content Version: 12.6  © 6483-4129 [employer]. Care instructions adapted under license by [employer] (which disclaims liability or warranty for this information). If you have questions about a medical condition or this instruction, always ask your healthcare professional. [address] any warranty or liability for your use of this information. Appendectomy: What to Expect at Home  Your Recovery     Your doctor removed your appendix either by making many small cuts, called incisions, in your belly (laparoscopic surgery) or through open surgery. In open surgery, the doctor makes one large incision. The incisions leave scars that usually fade over time. After your surgery, it is normal to feel weak and tired for several days after you return home. Your belly may be swollen and may be painful. If you had laparoscopic surgery, you may have pain in your shoulder for about 24 hours. You may also feel sick to your stomach and have diarrhea, constipation, gas, or a headache. This usually goes away in a few days. Your recovery time depends on the type of surgery you had. If you had laparoscopic surgery, you will probably be able to return to work or a normal routine 1 to 3 weeks after surgery. If you had an open surgery, it may take 2 to 4 weeks. If your appendix ruptured, you may have a drain in your incision. Your body will work fine without an appendix. You won't have to make any changes in your diet or lifestyle. This care sheet gives you a general idea about how long it will take for you to recover. But each person recovers at a different pace. Follow the steps below to get better as quickly as possible. How can you care for yourself at home?   Activity    · Rest when you feel tired. Getting enough sleep will help you recover.     · Try to walk each day. Start by walking a little more than you did the day before. Bit by bit, increase the amount you walk. Walking boosts blood flow and helps prevent pneumonia and constipation.     · For about 2 weeks, avoid lifting anything that would make you strain. This may include a child, heavy grocery bags and milk containers, a heavy briefcase or backpack, cat litter or dog food bags, or a vacuum .     · Avoid strenuous activities, such as bicycle riding, jogging, weight lifting, or aerobic exercise, until your doctor says it is okay.     · You may be able to take showers (unless you have a drain near your incision) 24 to 48 hours after surgery. Pat the incision dry. Do not take a bath for the first 2 weeks, or until your doctor tells you it is okay. If you have a drain near your incision, follow your doctor's instructions.     · You may drive when you are no longer taking pain medicine and can quickly move your foot from the gas pedal to the brake. You must also be able to sit comfortably for a long period of time, even if you do not plan on going far. You might get caught in traffic.     · You will probably be able to go back to work in 1 to 3 weeks. If you had an open surgery, it may take 3 to 4 weeks.     · Your doctor will tell you when you can have sex again. Diet    · You can eat your normal diet. If your stomach is upset, try bland, low-fat foods like plain rice, broiled chicken, toast, and yogurt.     · Drink plenty of fluids (unless your doctor tells you not to).     · You may notice that your bowel movements are not regular right after your surgery. This is common. Try to avoid constipation and straining with bowel movements. You may want to take a fiber supplement every day. If you have not had a bowel movement after a couple of days, ask your doctor about taking a mild laxative.    Medicines    · Your doctor will tell you if and when you can restart your medicines. He or she will also give you instructions about taking any new medicines.     · If you take aspirin or some other blood thinner, ask your doctor if and when to start taking it again. Make sure that you understand exactly what your doctor wants you to do.     · If your appendix ruptured, you will need to take antibiotics. Take them as directed. Do not stop taking them just because you feel better. You need to take the full course of antibiotics.     · Be safe with medicines. Take pain medicines exactly as directed. ? If the doctor gave you a prescription medicine for pain, take it as prescribed. ? If you are not taking a prescription pain medicine, take an over-the-counter medicine such as acetaminophen (Tylenol), ibuprofen (Advil, Motrin), or naproxen (Aleve). Read and follow all instructions on the label. ? Do not take two or more pain medicines at the same time unless the doctor told you to. Many pain medicines have acetaminophen, which is Tylenol. Too much Tylenol can be harmful.     · If you think your pain medicine is making you sick to your stomach:  ? Take your medicine after meals (unless your doctor has told you not to). ? Ask your doctor for a different pain medicine. Incision care    · If you had an open surgery, you may have staples in your incision. The doctor will take these out in 7 to 10 days.     · If you have strips of tape on the incision, leave the tape on for a week or until it falls off.     · You may wash the area with warm, soapy water 24 to 48 hours after your surgery, unless your doctor tells you not to. Pat the area dry.     · Keep the area clean and dry. You may cover it with a gauze bandage if it weeps or rubs against clothing. Change the bandage every day.     · If your appendix ruptured, you may have an incision with packing in it. Change the packing as often as your doctor tells you to.   ? Packing changes may hurt at first. Taking pain medicine about half an hour before you change the dressing can help. ? If your dressing sticks to your wound, try soaking it with warm water for about 10 minutes before you remove it. You can do this in the shower or by placing a wet washcloth over the dressing. ? Remove the old packing and flush the incision with water. Gently pat the top area dry. ? The size of the incision determines how much gauze you need to put inside. Fold the gauze over once, but do not wad it up so that it hurts. Put it in the wound carefully. You want to keep the sides of the wound from touching. A cotton swab may help you push the gauze in as needed. ? Put a gauze pad over the wound, and tape it down. ? You may notice greenish gray fluid seeping from your wound as you start to heal. This is normal. It is a sign that your wound is healing. Follow-up care is a key part of your treatment and safety. Be sure to make and go to all appointments, and call your doctor if you are having problems. It's also a good idea to know your test results and keep a list of the medicines you take. When should you call for help? Call 911 anytime you think you may need emergency care. For example, call if:    · You passed out (lost consciousness).     · You are short of breath. .   Call your doctor now or seek immediate medical care if:    · You are sick to your stomach and cannot drink fluids.     · You cannot pass stools or gas.     · You have pain that does not get better when you take your pain medicine.     · You have signs of infection, such as:  ? Increased pain, swelling, warmth, or redness. ? Red streaks leading from the wound. ? Pus draining from the wound.   ? A fever.     · You have loose stitches, or your incision comes open.     · Bright red blood has soaked through the bandage over your incision.     · You have signs of a blood clot in your leg (called a deep vein thrombosis), such as:  ? Pain in your calf, back of knee, thigh, or groin.  ? Redness and swelling in your leg or groin. Watch closely for any changes in your health, and be sure to contact your doctor if you have any problems. Where can you learn more? Go to http://www.gray.com/  Enter X801 in the search box to learn more about \"Appendectomy: What to Expect at Home. \"  Current as of: April 15, 2020               Content Version: 12.6  © 2006-2020 [employer]. Care instructions adapted under license by [employer] (which disclaims liability or warranty for this information). If you have questions about a medical condition or this instruction, always ask your healthcare professional. [place] [address] any warranty or liability for your use of this information. Patient Education        Surgical Drain Care: Care Instructions  Your Care Instructions     After a surgery, fluid may collect inside your body in the surgical area. This makes an infection or other problems more likely. A surgical drain allows the fluid to flow out. The doctor puts a thin, flexible rubber tube into the area of your body where the fluid is likely to collect. The rubber tube carries the fluid outside your body. The most common type of surgical drain carries the fluid into a collection bulb that you empty. This is called a Eduar-Ellis (ALMA ROSA) drain. The drain uses suction created by the bulb to pull the fluid from your body into the bulb. The rubber tube will probably be held in place by one or two stitches in your skin. The bulb will probably be attached with a safety pin to your clothes or near the bandage so that it doesn't flip around or pull on the stitches. Another type of drain is called a Penrose drain. This type of drain doesn't have a bulb. Instead, the end of the tube is open. That allows the fluid to drain onto a dressing taped to your skin.  The drain may be kept in place next to your skin with a stitch or a safety pin in the tube. When you first get the drain, the fluid will be bloody. It will change color from red to pink to a light yellow or clear as the wound heals and the fluid starts to go away. Your doctor may give you information on when you no longer need the drain and when it will be removed. Follow-up care is a key part of your treatment and safety. Be sure to make and go to all appointments, and call your doctor if you are having problems. It's also a good idea to know your test results and keep a list of the medicines you take. How do you empty the bulb of a Eduar-Ellis drain? Follow any instructions your doctor gives you. How often you empty the bulb depends on how much fluid is draining. Empty the bulb when it is half full. 1. Wash your hands with soap and water. 2. Take the plug out of the bulb. 3. Empty the bulb. If your doctor asks you to measure the fluid, empty the fluid into a measuring cup, and write down the color and how much you collected. Your doctor will want to know this information. 4. Clean the plug with alcohol. 5. Squeeze the bulb until it is flat. This removes all the air from the bulb. You may need to put the bulb on a table or a counter to flatten it. 6. Keep the bulb flat, and put the plug in. The bulb should stay flat after you put the plug back in. This creates the suction that pulls the fluid into the bulb. 7. Empty the fluid into the toilet. 8. Wash your hands. How do you change the dressing around your surgical drain? You may have a dressing (bandage). The dressing is often made of gauze pads held on with tape. Your doctor will tell you how often to change it. 1. Wash your hands with soap and water. 2. Take off the dressing from around the drain. 3. Clean the drain site and the skin around it with soap and water. Use gauze or a cotton swab. 4. When the site is dry, put on a new dressing. The way your dressing is put on depends on what kind of drain you have.  You will get instructions for your type of drain. 5. Wash your hands again with soap and water. Your doctor may ask you to keep track of your dressing changes. Write down the time of day and the amount and color of the fluid on the dressing. How do you help prevent clogs in your surgical drain? Squeezing or \"milking\" the tube of your surgical drain can help prevent clogs so that it drains correctly. Your doctor will tell you when you need to do this. In general, you do this when:  · You see a clot in the tube that prevents fluid from draining. The clot may look like a dark, stringy lining. · You see fluid leaking around the tube where it goes into the skin. Follow these steps for milking the tube. 1. Use one hand to hold and pinch the tube where it leaves the skin. 2. With the thumb and first finger of your other hand, pinch the tube just below where you're holding it. 3. Slowly and firmly push your thumb and first finger down the tubing toward the end of the tube. 4. Repeat this as many times as needed to move the clot. If you have a Eduar-Ellis (ALMA ROSA) drain, the clot should move down the tube and into the bulb. If you have a Penrose drain, the clot should move into the dressing. When should you call for help? Call your doctor now or seek immediate medical care if:    · You have signs of infection, such as:  ? Increased pain, swelling, warmth, or redness around the area. ? Red streaks leading from the area. ? Pus draining from the area. ? A fever.     · You see a sudden change in the color or smell of the drainage.     · The tube is coming loose where it leaves your skin. Watch closely for changes in your health, and be sure to contact your doctor if:    · You see a lot of fluid around the drain.     · You cannot remove a clot from the tube by milking the tube. Where can you learn more?   Go to http://www.gray.com/  Enter K117 in the search box to learn more about \"Surgical Drain Care: Care Instructions. \"  Current as of: June 26, 2019               Content Version: 12.6  © 5815-5479 [employer]. Care instructions adapted under license by [employer] (which disclaims liability or warranty for this information). If you have questions about a medical condition or this instruction, always ask your healthcare professional. [place] [address] any warranty or liability for your use of this information.

## 2021-03-05 NOTE — PROGRESS NOTES
[facility] Progress Note           Amber Chadwick DO    [phone] Office  [phone]  Fax    3/5/2021      Assessment & Plan:   · Perforated appendicitis. Status post cecectomy 2021. Intra-abdominal collections noted on CT scan . Status post CT-guided aspiration  which revealed hematoma. Cultures growing an MDR Escherichia coli which also grew from previous drain cultures. Repeat CT scan 3/1/2021 reveals stable trilobar abscess in the mid abdomen. Due to persistent leukocytosis, MDR organism with limited antibiotic options, and ongoing abdominal abscess recommend IV antibiotics at discharge. This will be challenging as patient is uninsured. Will discuss further with case management. Anticipate Invanz 1 g IV daily x6 weeks to be administered at the infusion center. Patient is agreeable to this plan. Discussed with Dr. Echo Jones. Plan CT scan in next several weeks. If significant improvement may stop antibiotics early  · Scrotal edema. Collection noted on ultrasound. Urology following. · Leukocytosis. Multifactorial.   Remains afebrile. Suspect reactive/intra-abdominal infection Continue antibiotics as above. WBC has normalized today          Subjective:       Still weak. Scrotal edema better    Objective:     Vitals:   Visit Vitals  BP (!) 94/53 (BP 1 Location: Left upper arm, BP Patient Position: At rest;Lying)   Pulse (!) 102   Temp 98.5 °F (36.9 °C)   Resp 19   Ht 5' 9\" (1.753 m)   Wt 241 lb 10 oz (109.6 kg)   SpO2 96%   BMI 35.68 kg/m²        Tmax:  Temp (24hrs), Av.3 °F (36.8 °C), Min:98.1 °F (36.7 °C), Max:98.6 °F (37 °C)      Exam:   Patient is intubated:  no    Physical Examination:   General:  Alert, cooperative, no distress   Head:  Normocephalic, atraumatic. Eyes:  Conjunctivae clear   Neck: Supple       Lungs:   No distress. Chest wall:     Heart:     Abdomen:    Distended. Mildly TTP. Drains removed.   Scrotal edema improved Extremities: Moves all. Skin:  No rash   Neurologic: CNII-XII intact. Normal strength     Labs:        No lab exists for component: ITNL   No results for input(s): CPK, CKMB, TROIQ in the last 72 hours. Recent Labs     03/05/21  0903 03/03/21  0333 03/02/21  1404   NA  --  139  --    K  --  4.0  --    CL  --  107  --    CO2  --  25  --    BUN  --  15  --    CREA  --  0.80  --    GLU  --  100  --    PHOS  --  3.1  --    MG  --  2.6*  --    ALB  --  2.3*  --    WBC 11.0 15.2* 15.5*   HGB 7.2* 7.9* 7.7*   HCT 23.1* 25.1* 24.9*   * 563* 386     No results for input(s): INR, PTP, APTT, INREXT, INREXT in the last 72 hours. Needs: urine analysis, urine sodium, protein and creatinine  No results found for: SHARITA, CREAU      Cultures:     No results found for: SDES  Lab Results   Component Value Date/Time    Culture result: MRSA NOT PRESENT 03/01/2021 05:45 PM    Culture result:  03/01/2021 05:45 PM     Screening of patient nares for MRSA is for surveillance purposes and, if positive, to facilitate isolation considerations in high risk settings. It is not intended for automatic decolonization interventions per se as regimens are not sufficiently effective to warrant routine use.     Culture result: NO ANAEROBES ISOLATED 02/26/2021 12:55 PM    Culture result: LIGHT ESCHERICHIA COLI (A) 02/26/2021 12:55 PM       Radiology:     Medications       Current Facility-Administered Medications   Medication Dose Route Frequency Last Admin    apixaban (ELIQUIS) tablet 5 mg  5 mg Oral BID      multivitamin with iron tablet 1 Tab  1 Tab Oral DAILY 1 Tab at 03/05/21 0930    furosemide (LASIX) injection 20 mg  20 mg IntraVENous DAILY Stopped at 03/05/21 0929    cefepime (MAXIPIME) 2 g in sterile water (preservative free) 10 mL IV syringe  2 g IntraVENous Q8H 2 g at 03/05/21 0603    metroNIDAZOLE (FLAGYL) IVPB premix 500 mg  500 mg IntraVENous Q12H 500 mg at 03/05/21 0113    potassium chloride SR (KLOR-CON 10) tablet 20 mEq 20 mEq Oral BID 20 mEq at 03/05/21 0930    HYDROmorphone (PF) (DILAUDID) injection 1 mg  1 mg IntraVENous Q4H PRN 1 mg at 02/26/21 1056    oxyCODONE IR (ROXICODONE) tablet 10 mg  10 mg Oral Q4H PRN 10 mg at 02/25/21 1736    Or    oxyCODONE IR (ROXICODONE) tablet 15 mg  15 mg Oral Q4H PRN 15 mg at 02/27/21 2209    senna-docusate (PERICOLACE) 8.6-50 mg per tablet 1 Tab  1 Tab Oral BID 1 Tab at 03/05/21 0930    famotidine (PEPCID) tablet 20 mg  20 mg Oral ACB&D 20 mg at 03/05/21 0636    bisacodyL (DULCOLAX) suppository 10 mg  10 mg Rectal DAILY PRN      alum-mag hydroxide-simeth (MYLANTA) oral suspension 30 mL  30 mL Oral Q4H PRN 30 mL at 02/18/21 1411    albuterol-ipratropium (DUO-NEB) 2.5 MG-0.5 MG/3 ML  3 mL Nebulization Q4H PRN      ondansetron (ZOFRAN) injection 4 mg  4 mg IntraVENous Q6H PRN 4 mg at 02/28/21 2154    acetaminophen (TYLENOL) tablet 1,000 mg  1,000 mg Oral Q8H 1,000 mg at 03/05/21 0603    diphenhydrAMINE (BENADRYL) capsule 25 mg  25 mg Oral Q6H PRN             Case discussed with:  Wife via phone      Jeremy Avila DO

## 2021-03-05 NOTE — PROGRESS NOTES
SURGERY PROGRESS NOTE      Admit Date: 2021    POD 13 Days Post-Op    Procedure: Procedure(s):  LAPAROSCOPY GENERAL DIAGNOSTIC with evacuation of hematoma      Subjective:   Good night, eating. Slept well. Objective:     Visit Vitals  BP (!) 94/53 (BP 1 Location: Left upper arm, BP Patient Position: At rest;Lying)   Pulse (!) 102   Temp 98.5 °F (36.9 °C)   Resp 19   Ht 5' 9\" (1.753 m)   Wt 109.6 kg (241 lb 10 oz)   SpO2 96%   BMI 35.68 kg/m²        Temp (24hrs), Av.3 °F (36.8 °C), Min:98.1 °F (36.7 °C), Max:98.6 °F (37 °C)      Physical Exam:                General:  NAD -  WEIGHT 103.3 Kg today   Abdomen:  Soft. Non-tender, non-distended. Incision C/D/I. Extrem:  bilat pitting edema, now 1+  Urinary:  Swollen scrotum. Greatly improve.d             Lab Results   Component Value Date/Time    WBC 11.0 2021 09:03 AM    HGB 7.2 (L) 2021 09:03 AM    HCT 23.1 (L) 2021 09:03 AM    PLATELET 420 (H)  09:03 AM    MCV 92.8 2021 09:03 AM       Assessment:     Active Problems:    Appendicitis (2021)      Appendicitis with perforation (2021)      GERD (gastroesophageal reflux disease) (2021)      Constipation (2021)      Acute pulmonary embolism ([address] (2021)        Plan/Recommendations/Medical Decision Making:     Working on IV antibiotics for home. Leukocytosis finally normalized. Continue current care.      Saintclair Jules, MD

## 2021-03-05 NOTE — PROGRESS NOTES
4:30pm:  Per RN, patient to d/c tomorrow at wife's request.  SHWETA contacted [facility] central intake for OPIC and s/w Lesle Layman to notify that patient will start infusion with them on Sunday. Dona Servin LCSW    3pm:  SHWETA sent Rx for Outpatient PT to [address] [place] [address]. S/w intake, Sita Gibbons, who has received order. She will reach out to the patient to schedule an appointment. Dona Servin LCSW    Transition of Care Plan - RUR 15%:       1. CM following - Patient is uninsured and does not qualify for Medicaid. He has been given the care card application  2. PT following - recommending HH. Patient has RW for discharge, pt is uninsured and therefore [facility] is not an option. [facility] outpatient therapy has a financial assistance program.    3. ID following - anticipate PICC line and IV antibiotics at discharge. CM sent referral to the [facility] outpatient infusion center for IV Abx.  4. Establish with PCP - patient resides nearest to [address] [place] [address]  5.  Wife to transport at [address] [place] [address]

## 2021-03-05 NOTE — PROGRESS NOTES
Attempted to schedule hospital follow up new PCP appointment. Office requesting patient to call the office to schedule Virtual appointment.   Mehsa Dickson, Care Management Specialist.

## 2021-03-05 NOTE — PROGRESS NOTES
Comprehensive Nutrition Assessment    Type and Reason for Visit: Reassess    Nutrition Recommendations/Plan:   1. Continue regular diet. 2. Continue fruit as snacks BID. Nutrition Assessment:      3/5: F/u. Spoke with pt in room. Pt reports appetite is much better now. Reports eating Bengali toast, 1/2 banana, and 12 apple juice for breakfast this AM. Recorded intakes % meals the past few days. Dislikes Ensure and Magic Cup- will d/c. Continues to eat fruit as snacks No c/o N/v.   Labs- Ca 7.9, Mg 2.6, Hgb 7.9. Meds- cefepime, flagyl, lasix, KCl, MVI, w/ iron, pericolace. 3/1: F/u. Spoke with pt's wife in room. Per wife, pt ate 0% breakfast tray this AM, but did eat 3/4 of a donut that she brought in. Continues with poor po intake. Recorded intake of 25% breakfast this AM and 25-75% meals yesterday. Wife says pt doesn't like Gelatein, only the regular jello. Will d/c. Likes ice cream, will try [employer]. Encouraged wife to bring in any foods pt may like and order his meals. Continue to monitor intakes. Labs- Hgb 7.2, Ca 7.8. Meds- cefepime, flagyl, lasix, Zofran, KCl, pericolace. 2/25: F/u. Pt NPO and OOR at time of visit for testing. Spoke with wife in room. Per wife, pt's poor po intake continues. Says he'll eat a couple bites of meat and dessert and usually all of the fruit at meals. Says he hasn't been eating the pudding- will d/c Ensure Pudding. Says he likes the red jello- will continue Gelatein and increase to BID. Labs- Hgb 7.4, Ca 7.6. Meds- lasix, Zofran, zosyn, KCl, pericolace. 2/22: F/u. Pt s/p evacuation of hematoma 2/19. Reports appetite is improving. Recorded intake of 85% breakfast this AM. Says he ate the eggs, potatoes, and oranges. Reports he is eating at least 1/3 of all meals. No c/o N/V. Doesn't like Ensure clear very much, but says he likes pudding and jello. Will try Ensure Pudding and Gelatein. Labs- Hgb 8.5, Ca 7.5. Meds- zosyn, pericolace.      2/19: 63 y/o male admitted with appendicitis w/ perforation. S/p appendectomy 2/13. Pt NPO today for CT. Was on GI lite diet. Says he was eating well PTA and first few days of admission (on liquids), but his appetite has been decreased the past couple days d/t some abd discomfort. No c/o N/V. He denies any recent weight changes. Says he was drinking sips of the Ensure but not much. He would like to try Ensure Clear when diet advanced. Will add and monitor intakes. Labs- mg 2.5, Ca 7.7, B12 141, Hgb 9.0. Meds- zosyn, pericolace. Intakes:  Patient Vitals for the past 168 hrs:   % Diet Eaten   03/05/21 0801 50 %   03/03/21 1714 50 %   03/03/21 0800 100 %   03/02/21 1553 100 %   03/02/21 0906 100 %   03/01/21 1500 25 %   03/01/21 0810 25 %   02/28/21 1455 25 %   02/28/21 0854 75 %   02/26/21 1841 10 %   02/26/21 1130 0 %     Weight hx: Wt Readings from Last 10 Encounters:   03/05/21 109.6 kg (241 lb 10 oz)   03/21/17 99.8 kg (220 lb)   06/14/14 95.3 kg (210 lb)     Malnutrition Assessment:  Malnutrition Status: none identified    Estimated Daily Nutrient Needs:  Energy (kcal): 0974(2776 x 1.3 AF); Weight Used for Energy Requirements: Current  Protein (g): 107(0.8-1 g/kg); Weight Used for Protein Requirements: Current  Fluid (ml/day): 2437; Method Used for Fluid Requirements: 1 ml/kcal      Nutrition Related Findings:  last BM 3/5 per pt; 1+ LE edema      Wounds:    Surgical incision       Current Nutrition Therapies:  DIET REGULAR  DIET SNACKS AM Snack, PM Snack; fruit    Anthropometric Measures:  · Height:  5' 9\" (175.3 cm)  · Current Body Wt:  109.6 kg (241 lb 10 oz)   · Admission Body Wt:       · Usual Body Wt:        · Ideal Body Wt:  160 lbs:  147.4 %   · Adjusted Body Weight:   ; Weight Adjustment for: No adjustment   · Adjusted BMI:       · BMI Category:  Obese class 1 (BMI 30.0-34. 9)       Nutrition Diagnosis:   · Inadequate oral intake related to inadequate protein-energy intake as evidenced by (pt reports poor po intake x2 days, currently NPO for CT)    2/22: Nutrition dx improving.  2/25: Nutrition dx continues. 3/1: Nutrition dx continues. 3/5: Nutrition dx improved, % meals.     Nutrition Interventions:   Food and/or Nutrient Delivery: Continue current diet, Snacks (specify)  Nutrition Education and Counseling: No recommendations at this time  Coordination of Nutrition Care: Continue to monitor while inpatient    Goals:  PO intake >50% meals +snacks next 5-7 days       Nutrition Monitoring and Evaluation:   Behavioral-Environmental Outcomes: None identified  Food/Nutrient Intake Outcomes: Food and nutrient intake, Diet advancement/tolerance, Supplement intake  Physical Signs/Symptoms Outcomes: Biochemical data, GI status, Weight    Discharge Planning:    Continue current diet     Electronically signed by Oh Almaraz RDN on 3/5/2021     Contact: [phone]

## 2021-03-05 NOTE — ADVANCED PRACTICE NURSE
Called PICC team to discuss line placement. Will await call back for plan going forward.     Ayden Remy, JOSETTE

## 2021-03-05 NOTE — PROGRESS NOTES
PICC Placement Note  4 FR single lumen POWER PICC    PRE-PROCEDURE VERIFICATION  Correct Procedure: yes  Correct Site:  yes  Temperature: Temp: 98.5 °F (36.9 °C), Temperature Source: Temp Source: Oral  Recent Labs     03/05/21  0903 03/03/21  0333   BUN  --  15   CREA  --  0.80   * 563*   WBC 11.0 15.2*     Allergies: Patient has no known allergies. Education materials for PICC Care given: yes. See Patient Education activity for further details. PICC Booklet placed at bedside: yes    Closed Ended PICC Catheters:  Flush Lumens as Follows:  Intermittent Medication:   Flush before and after each medication with 10 ml NS. Unused Ports:  Flush every 8 hours with 10 ml NS.  TPN Ports:  Flush every 24 hours with 20 ml NS prior to hanging new bag. Blood Draws: Stop infusion, draw off and waste 10 ml of blood. Draw sample with 10cc syringe or greater. DO NOT USE VACUTAINER . Transfer with appropriate device to lab  tubes. Flush with 20 ml NS. Dressing Change:  Every 7 days, and PRN using sterile technique if integrity of dressing is compromised. Initial dressing change for central line 24-48 hours post insertion if gauze is used. Apply new dressing per policy. PROCEDURE DETAIL  Consent was obtained and all questions were answered related to risks and benefits. A single lumen PICC line was inserted, as a sterile procedure using ultrasound and modified Seldinger technique for Home IV Therapy. The following documentation is in addition to the PICC properties in the lines/airways flowsheet :  Lot #: [id number]  Lidocaine 1% administered intradermally :yes  Internal Catheter Total Length: 41 (cm) ( 2 cm out)  Vein Selection for PICC:right basilic  Central Line Bundle followed yes  Complication Related to Insertion:no    The placement was verified by EKG, MAX P WAVE @ 41 (cm). PER EKG PICC TIP @ C/A junction.       X-ray: no.   Line is okay to use: yes    Lisbeth Stone RN

## 2021-03-05 NOTE — DISCHARGE SUMMARY
Discharge Summary    Patient: Fátima Hinson               Sex: male          DOA: 2/13/2021  2:14 AM       YOB: 1960      Age:  62 y.o.        LOS:  LOS: 17 days                Discharge Date:      Admission Diagnoses: Appendicitis [K37]  Appendicitis with perforation [K35.32]    Discharge Diagnoses:  Same  Pulmonary embolus    Procedure:  Procedure(s):  LAPAROSCOPY Appendectomy, Drainage abscess. LAPAROSCOPY GENERAL DIAGNOSTIC with evacuation of hematoma    Discharge Condition: Good    Hospital Course: Unremarkable operative procedure for perforated appendicitis. Post-operative course complicated by symptomatic bilateral pulmonary embolus POD2. Subsequently was found to have complex intra-abdominal hematoma after start of therapeutic anticoagulation, requiring operative drainage. The hematoma became infected, likely secondary to POA intra-abdominal contamination. Found to have MDR organism on percutaneous aspiration old hematoma. Had persistent leukocytosis. Stable tri-lobar abscess in mid-abdomen, according to follow up CT 3/1/2021. Urology was consulted to rule out scrotal abscess (which was ruled out). Discharge plan is home IV antibiotic infusion x 6 weeks, PT. Discharge to home in stable condition. Consults: Hospitalist, ID, Urology, PT, OT. Significant Diagnostic Studies: See full electronic record. Discharge Medications:     Current Discharge Medication List      START taking these medications    Details   apixaban (ELIQUIS) 5 mg tablet Take 1 Tab by mouth two (2) times a day for 30 days. Qty: 60 Tab, Refills: 0      famotidine (PEPCID) 20 mg tablet Take 1 Tab by mouth Before breakfast and dinner for 30 days. Qty: 60 Tab, Refills: 0      multivitamin with iron tablet Take 1 Tab by mouth daily for 30 days. Qty: 30 Tab, Refills: 0      ondansetron (ZOFRAN ODT) 4 mg disintegrating tablet Take 1 Tab by mouth every six (6) hours as needed for Nausea. Indications: prevent nausea and vomiting after surgery  Qty: 12 Tab, Refills: 1         STOP taking these medications       oxyCODONE IR (ROXICODONE) 5 mg immediate release tablet Comments:   Reason for Stopping:         promethazine-codeine (PHENERGAN WITH CODEINE) 6.25-10 mg/5 mL syrup Comments:   Reason for Stopping:             Activity/Diet/Wound Care: See patient administered discharge instructions.     Follow-up: 2 weeks    Krysta Phillips MD  [facility]  Office:  [phone]  Fax:  [phone]

## 2021-03-05 NOTE — PROGRESS NOTES
[facility]    Medical Progress Note      NAME: Sheryle Holland   :  1960  MRM:  [id number]    Date/Time of service 3/5/2021  9:38 AM          Assessment and Plan:     Perforated appendicitis / Constipation / Leukocytosis - POA  - Tolerated cecectomy . General surgery is attending to diet, Abx, pain control, laxatives, DVT prophylaxis and rehab decisions as usual. ID consulted. WBC stable. He is on cefepime, flagyl, prn oxycodone PO and dilaudid IV. He is improving and seems to go home with [facility], home IV Abx. Patient is medically stable and I will sign off. Please call with any questions. Acute pulmonary embolism - POA: presumed provoked by current illness. CT 2/15 showed bilateral PEs. LE dopplers neg.  Continue enoxaparin with plan for a DOAC for at least 3 months at discharge. Continue to monitor. He is on oxygen, though not documented if that is required. He may have atelectasis and hypoventilation contributing to low oxygen.     Acute blood loss anemia - POA, severe, still borderline for another transfusion. Hb stable. Iron deficient. He is sp IV iron, I will order one more dose. Add MVI with iron at discharge. PCP to follow, closely, asa he is on DOAC     Anasarca / Scrotal edema / hypoalbuminemia - POA: likely multifactorial from hypoalbuminemia, surgery, infection. Urology consulted to rule out abscess. ECHO with normal EF. Continue furosemide, thought that is limited by low BP, ambulation as tolerated. Nutrition is key, I added Ensure. This is improving     Hyponatremia - Presumed due to diuresis. Na better today. No further work up necessary      GERD - Continue Famotidine     Obese - Advise weight loss and outpatient ANDREINA testing. Confusion - I do not know his baseline. Narcotic pain meds may be involved, but he may have mild underlying dementia. Subjective:     Chief Complaint:  Scrotal swelling better, feels he is getting better.       ROS:  (bold if positive, if negative)    Tolerating some PT  Tolerating Chic Thomas A Diet        Objective:     Last 24hrs VS reviewed since prior progress note.  Most recent are:    Visit Vitals  BP (!) 98/54   Pulse 78   Temp 98.1 °F (36.7 °C)   Resp 18   Ht 5' 9\" (1.753 m)   Wt 109.6 kg (241 lb 10 oz)   SpO2 95%   BMI 35.68 kg/m²     SpO2 Readings from Last 6 Encounters:   03/05/21 95%   03/21/17 95%   06/15/14 98%    O2 Flow Rate (L/min): 2 l/min       Intake/Output Summary (Last 24 hours) at 3/5/2021 7496  Last data filed at 3/4/2021 2238  Gross per 24 hour   Intake    Output 500 ml   Net -500 ml        Physical Exam:    Gen:  Obese, in no acute distress  HEENT:  Pink conjunctivae, PERRL, hearing intact to voice, moist mucous membranes  Neck:  Supple, without masses, thyroid non-tender  Resp:  No accessory muscle use, clear breath sounds without wheezes rales or rhonchi  Card:  No murmurs, normal S1, S2 without thrills, bruits or peripheral edema  Abd:  Soft, non-tender, non-distended, normoactive bowel sounds are present, scrotal swelling  Lymph:  No cervical or inguinal adenopathy  Musc:  No cyanosis or clubbing  Skin:  No rashes or ulcers, skin turgor is good  Neuro:  Cranial nerves are grossly intact, general motor weakness, follows commands   Psych:  Poor insight, oriented to person, place    Telemetry reviewed:   normal sinus rhythm  __________________________________________________________________  Medications Reviewed: (see below)  Medications:     Current Facility-Administered Medications   Medication Dose Route Frequency    multivitamin with iron tablet 1 Tab  1 Tab Oral DAILY    enoxaparin (LOVENOX) injection 105 mg  105 mg SubCUTAneous Q12H    furosemide (LASIX) injection 20 mg  20 mg IntraVENous DAILY    cefepime (MAXIPIME) 2 g in sterile water (preservative free) 10 mL IV syringe  2 g IntraVENous Q8H    metroNIDAZOLE (FLAGYL) IVPB premix 500 mg  500 mg IntraVENous Q12H    potassium chloride SR (KLOR-CON 10) tablet 20 mEq  20 mEq Oral BID    HYDROmorphone (PF) (DILAUDID) injection 1 mg  1 mg IntraVENous Q4H PRN    oxyCODONE IR (ROXICODONE) tablet 10 mg  10 mg Oral Q4H PRN    Or    oxyCODONE IR (ROXICODONE) tablet 15 mg  15 mg Oral Q4H PRN    senna-docusate (PERICOLACE) 8.6-50 mg per tablet 1 Tab  1 Tab Oral BID    famotidine (PEPCID) tablet 20 mg  20 mg Oral ACB&D    bisacodyL (DULCOLAX) suppository 10 mg  10 mg Rectal DAILY PRN    alum-mag hydroxide-simeth (MYLANTA) oral suspension 30 mL  30 mL Oral Q4H PRN    albuterol-ipratropium (DUO-NEB) 2.5 MG-0.5 MG/3 ML  3 mL Nebulization Q4H PRN    ondansetron (ZOFRAN) injection 4 mg  4 mg IntraVENous Q6H PRN    acetaminophen (TYLENOL) tablet 1,000 mg  1,000 mg Oral Q8H    diphenhydrAMINE (BENADRYL) capsule 25 mg  25 mg Oral Q6H PRN        Lab Data Reviewed: (see below)  Lab Review:     Recent Labs     03/05/21  0903 03/03/21  0333 03/02/21  1404   WBC 11.0 15.2* 15.5*   HGB 7.2* 7.9* 7.7*   HCT 23.1* 25.1* 24.9*   * 563* 386     Recent Labs     03/03/21  0333 03/02/21  1215    138   K 4.0 4.3    105   CO2 25 28    96   BUN 15 14   CREA 0.80 0.79   CA 7.9* 7.9*   MG 2.6*  --    PHOS 3.1  --    ALB 2.3* 2.3*   TBILI 0.5 0.5   ALT 20 20     Lab Results   Component Value Date/Time    Glucose (POC) 120 (H) 06/14/2014 09:32 AM     No results for input(s): PH, PCO2, PO2, HCO3, FIO2 in the last 72 hours. No results for input(s): INR, INREXT, INREXT in the last 72 hours. All Micro Results     Procedure Component Value Units Date/Time    CULTURE, MRSA [[id number]] Collected: 03/01/21 1745    Order Status: Completed Specimen: Nasal from Nares Updated: 03/03/21 0048     Special Requests: NO SPECIAL REQUESTS        Culture result: MRSA NOT PRESENT               Screening of patient nares for MRSA is for surveillance purposes and, if positive, to facilitate isolation considerations in high risk settings.  It is not intended for automatic decolonization interventions per se as regimens are not sufficiently effective to warrant routine use. CULTURE, BLOOD [[id number]] Collected: 02/24/21 1021    Order Status: Completed Specimen: Blood Updated: 03/02/21 0525     Special Requests: NO SPECIAL REQUESTS        Culture result: NO GROWTH 6 DAYS       CULTURE, BLOOD [[id number]] Collected: 02/24/21 1021    Order Status: Completed Specimen: Blood Updated: 03/02/21 0525     Special Requests: NO SPECIAL REQUESTS        Culture result: NO GROWTH 6 DAYS       CULTURE, ANAEROBIC [[id number]] Collected: 02/26/21 1255    Order Status: Completed Specimen: Abscess Updated: 02/28/21 1327     Special Requests: NO SPECIAL REQUESTS        Culture result: NO ANAEROBES ISOLATED       CULTURE, BODY FLUID Edwyna Petra STAIN [[id number]]  (Abnormal)  (Susceptibility) Collected: 02/26/21 1255    Order Status: Completed Specimen: Body Fluid from Abscess Updated: 02/28/21 1327     Special Requests: NO SPECIAL REQUESTS        GRAM STAIN 1+ WBCS SEEN               OCCASIONAL GRAM NEGATIVE RODS           Culture result: LIGHT ESCHERICHIA COLI       CULTURE, BODY FLUID Edwyna Petra STAIN [[id number]]  (Abnormal) Collected: 02/24/21 1021    Order Status: Completed Specimen: Body Fluid from Abdominal Fluid Updated: 02/27/21 1440     Special Requests: NO SPECIAL REQUESTS        GRAM STAIN FEW WBCS SEEN        Culture result: MODERATE ESCHERICHIA COLI         PLEASE REFER TO CULTURE [id number] COLLECTED 2/24/21 FOR SENSITIVITIES    CULTURE, BODY FLUID Edwyna Petra STAIN [[id number]]  (Abnormal)  (Susceptibility) Collected: 02/24/21 1021    Order Status: Completed Specimen:  Body Fluid from Abdominal Fluid Updated: 02/27/21 1438     Special Requests: NO SPECIAL REQUESTS        GRAM STAIN 1+ WBCS SEEN         NO ORGANISMS SEEN        Culture result: HEAVY ESCHERICHIA COLI       CULTURE, ANAEROBIC [[id number]] Collected: 02/26/21 1300    Order Status: Canceled Specimen: Abscess     CULTURE, BODY FLUID W GRAM STAIN [[id number]] Collected: 02/26/21 1300    Order Status: Canceled Specimen: Body Fluid from Abscess     CULTURE, MRSA [[id number]] Collected: 02/26/21 1255    Order Status: Canceled Specimen: Nasal from [address], MRSA [[id number]]     Order Status: Canceled Specimen: Nasal from Nares     CULTURE, URINE [[id number]] Collected: 02/24/21 1304    Order Status: Completed Specimen: Urine from Clean catch Updated: 02/25/21 1253     Special Requests: NO SPECIAL REQUESTS        Culture result: No growth (<1,000 CFU/ML)       COVID-19 RAPID TEST [[id number]] Collected: 02/13/21 0509    Order Status: Completed Specimen: Nasopharyngeal Updated: 02/13/21 0558     Specimen source Nasopharyngeal        COVID-19 rapid test Not detected        Comment: Rapid Abbott ID Now       Rapid NAAT:  The specimen is NEGATIVE for SARS-CoV-2, the novel coronavirus associated with COVID-19. Negative results should be treated as presumptive and, if inconsistent with clinical signs and symptoms or necessary for patient management, should be tested with an alternative molecular assay. Negative results do not preclude SARS-CoV-2 infection and should not be used as the sole basis for patient management decisions. This test has been authorized by the FDA under an Emergency Use Authorization (EUA) for use by authorized laboratories. Fact sheet for Healthcare Providers: ConventionUpdate.co.nz  Fact sheet for Patients: ConventionUpdate.co.nz       Methodology: Isothermal Nucleic Acid Amplification               Other pertinent lab: none    Total time spent with patient: 30 Minutes I personally reviewed chart, notes, data and current medications in the medical record. I have personally examined and treated the patient at bedside during this period.                  Care Plan discussed with: Patient, Nursing Staff and >50% of time spent in counseling and coordination of care    Discussed:  Care Plan    Prophylaxis:  Lovenox and H2B/PPI    Disposition:  Home w/Family           ___________________________________________________    Attending Physician: Isidra Zavala MD

## 2021-03-05 NOTE — PROGRESS NOTES
Problem: Mobility Impaired (Adult and Pediatric)  Goal: *Acute Goals and Plan of Care (Insert Text)  Description: FUNCTIONAL STATUS PRIOR TO ADMISSION: Patient was independent and active without use of DME. Patient works as a     HOME [employer]: The patient lived with his wife but did not require assist.    Physical Therapy Goals  Initiated 2/15/2021, re-assessed 2/28/2021 and all remain appropriate  1. Patient will move from supine to sit and sit to supine  in bed with modified independence within 7 day(s). 2.  Patient will transfer from bed to chair and chair to bed with modified independence using the least restrictive device within 7 day(s). 3.  Patient will perform sit to stand with modified independence within 7 day(s). 4.  Patient will ambulate with modified independence for 200 feet with the least restrictive device within 7 day(s). 5.  Patient will ascend/descend 3 stairs with 1 handrail(s) with modified independence within 7 day(s). Note:   PHYSICAL THERAPY TREATMENT  Patient: Maggie Phillips (65 y.o. male)  Date: 3/5/2021  Diagnosis: Appendicitis [K37]  Appendicitis with perforation [K35.32] <principal problem not specified>  Procedure(s) (LRB):  LAPAROSCOPY GENERAL DIAGNOSTIC with evacuation of hematoma (N/A) 14 Days Post-Op  Precautions:    Chart, physical therapy assessment, plan of care and goals were reviewed. ASSESSMENT  Patient continues with skilled PT services. Pt supine to sit to stand with CGA. Pt moves slowly. Pt ambulated 50ft with RW CGA. Pt ambulated 40ft with no device using hallway rail CGA. Pt progressing with mobility but is safer with RW secondary to fatiguing rather quickly and continued gait instability. Pt progressing slowly. Continue goals. Current Level of Function Impacting Discharge (mobility/balance): Pt is CGA to min assist for mobility.            PLAN :  Patient continues to benefit from skilled intervention to address the above impairments. Continue treatment per established plan of care. to address goals.     Recommendation for discharge: (in order for the patient to meet his/her long term goals)  Physical therapy at least 2 days/week in the home     This discharge recommendation:  Has been made in collaboration with the attending provider and/or case management    IF patient discharges home will need the following DME: rolling walker       SUBJECTIVE:     OBJECTIVE DATA SUMMARY:   Critical Behavior:  Neurologic State: Alert  Orientation Level: Oriented X4  Cognition: Appropriate decision making, Appropriate for age attention/concentration, Follows commands     Functional Mobility Training:  Bed Mobility:     Supine to Sit: Contact guard assistance              Transfers:  Sit to Stand: Contact guard assistance  Stand to Sit: Contact guard assistance                             Balance:  Sitting: Intact  Standing: With support  Standing - Static: Fair;Good  Ambulation/Gait Training:  Distance (ft): 90 Feet (ft)  Assistive Device: Gait belt;Walker, rolling  Ambulation - Level of Assistance: Contact guard assistance        Gait Abnormalities: Decreased step clearance        Base of Support: Narrowed     Speed/Ludmila: Pace decreased (<100 feet/min)  Step Length: Right shortened;Left shortened             Activity Tolerance:   Fair to Good    After treatment patient left in no apparent distress:   Sitting in chair    COMMUNICATION/COLLABORATION:   The patients plan of care was discussed with: Physical therapist.     Stuart Shaw PTA   Time Calculation: 23 mins

## 2021-03-06 ENCOUNTER — HOSPITAL ENCOUNTER (OUTPATIENT)
Dept: INFUSION THERAPY | Age: 61
Discharge: HOME | End: 2021-03-06

## 2021-03-07 ENCOUNTER — HOSPITAL ENCOUNTER (OUTPATIENT)
Dept: INFUSION THERAPY | Age: 61
Discharge: HOME | End: 2021-03-07

## 2021-03-07 VITALS
HEART RATE: 82 BPM | RESPIRATION RATE: 18 BRPM | TEMPERATURE: 97.1 F | SYSTOLIC BLOOD PRESSURE: 108 MMHG | DIASTOLIC BLOOD PRESSURE: 65 MMHG

## 2021-03-07 PROCEDURE — 74011000258 HC RX REV CODE- 258: Performed by: INTERNAL MEDICINE

## 2021-03-07 PROCEDURE — 74011250636 HC RX REV CODE- 250/636

## 2021-03-07 PROCEDURE — 96365 THER/PROPH/DIAG IV INF INIT: CPT

## 2021-03-07 PROCEDURE — 74011250636 HC RX REV CODE- 250/636: Performed by: INTERNAL MEDICINE

## 2021-03-07 RX ORDER — SODIUM CHLORIDE 0.9 % (FLUSH) 0.9 %
5-10 SYRINGE (ML) INJECTION AS NEEDED
Status: DISCONTINUED | OUTPATIENT
Start: 2021-03-07 | End: 2021-03-08 | Stop reason: HOSPADM

## 2021-03-07 RX ORDER — HEPARIN 100 UNIT/ML
500 SYRINGE INTRAVENOUS AS NEEDED
Status: DISCONTINUED | OUTPATIENT
Start: 2021-03-07 | End: 2021-03-08 | Stop reason: HOSPADM

## 2021-03-07 RX ADMIN — Medication 20 ML: at 09:00

## 2021-03-07 RX ADMIN — HEPARIN 500 UNITS: 100 SYRINGE at 09:00

## 2021-03-07 RX ADMIN — SODIUM CHLORIDE 1 G: 900 INJECTION INTRAVENOUS at 08:30

## 2021-03-07 NOTE — PROGRESS NOTES
Outpatient Infusion Center - Chemotherapy Progress Note    0805 - Pt admit to [facility] for daily Invanz in stable condition. Assessment completed. Patient denied having any symptoms of COVID-19, i.e. SOB, coughing, fever, or generally not feeling well. Also denies having been exposed to COVID-19 recently or having had any recent contact with family/friend that has a pending COVID test.     Discharged from hospital 3/5 s/p appendectomy with perforation. TONY SL PICC line with positive blood return. Patient Vitals for the past 12 hrs:   Temp Pulse Resp BP   03/07/21 0807 97.1 °F (36.2 °C) 82 18 108/65        Medications:  Medications Administered     ertapenem (INVANZ) 1 g in 0.9% sodium chloride (MBP/ADV) 50 mL MBP     Admin Date  03/07/2021 Action  New Bag Dose  1 g Rate  100 mL/hr Route  IntraVENous Administered By  Lysle [place]          heparin (porcine) pf 500 Units     Admin Date  03/07/2021 Action  Given Dose  500 Units Route  IntraVENous Administered By  Lysle [place]          sodium chloride (NS) flush 5-10 mL     Admin Date  03/07/2021 Action  Given Dose  20 mL Route  IntraVENous Administered By  Lysle [place]                 Pt tolerated treatment well. PICC maintained positive blood return throughout treatment, flushed with positive blood return at conclusion and capped. 5481 - D/c home in no distress. Pt aware of next OPIC appointment scheduled for: 3/8/21 at 1000 at [facility].     Future Appointments   Date Time Provider Susan Dyer   3/8/2021 10:00 AM [facility]   3/9/2021 11:00 AM SS [facility]   3/10/2021 11:00 AM SS INF7 CH2 [facility]H [facility]   3/11/2021 10:00 AM [facility] Danielle Rivera

## 2021-03-08 ENCOUNTER — HOSPITAL ENCOUNTER (OUTPATIENT)
Dept: INFUSION THERAPY | Age: 61
Discharge: HOME | End: 2021-03-08

## 2021-03-08 VITALS
SYSTOLIC BLOOD PRESSURE: 107 MMHG | BODY MASS INDEX: 31.6 KG/M2 | RESPIRATION RATE: 18 BRPM | HEART RATE: 78 BPM | TEMPERATURE: 97 F | WEIGHT: 214 LBS | DIASTOLIC BLOOD PRESSURE: 57 MMHG

## 2021-03-08 LAB
ALBUMIN SERPL-MCNC: 2.3 G/DL (ref 3.5–5)
ALBUMIN/GLOB SERPL: 0.5 {RATIO} (ref 1.1–2.2)
ALP SERPL-CCNC: 68 U/L (ref 45–117)
ALT SERPL-CCNC: 23 U/L (ref 12–78)
ANION GAP SERPL CALC-SCNC: 5 MMOL/L (ref 5–15)
AST SERPL-CCNC: 12 U/L (ref 15–37)
BASO+EOS+MONOS # BLD AUTO: 1.1 K/UL (ref 0.2–1.2)
BASO+EOS+MONOS NFR BLD AUTO: 11 % (ref 3.2–16.9)
BILIRUB DIRECT SERPL-MCNC: 0.1 MG/DL (ref 0–0.2)
BILIRUB SERPL-MCNC: 0.2 MG/DL (ref 0.2–1)
BUN SERPL-MCNC: 15 MG/DL (ref 6–20)
BUN/CREAT SERPL: 22 (ref 12–20)
CALCIUM SERPL-MCNC: 8.4 MG/DL (ref 8.5–10.1)
CHLORIDE SERPL-SCNC: 109 MMOL/L (ref 97–108)
CO2 SERPL-SCNC: 26 MMOL/L (ref 21–32)
CREAT SERPL-MCNC: 0.67 MG/DL (ref 0.7–1.3)
DIFFERENTIAL METHOD BLD: ABNORMAL
ERYTHROCYTE [DISTWIDTH] IN BLOOD BY AUTOMATED COUNT: 14.9 % (ref 11.8–15.8)
GLOBULIN SER CALC-MCNC: 4.7 G/DL (ref 2–4)
GLUCOSE SERPL-MCNC: 102 MG/DL (ref 65–100)
HCT VFR BLD AUTO: 24.1 % (ref 36.6–50.3)
HGB BLD-MCNC: 7.9 G/DL (ref 12.1–17)
LYMPHOCYTES # BLD: 2.4 K/UL (ref 0.8–3.5)
LYMPHOCYTES NFR BLD: 23 % (ref 12–49)
MCH RBC QN AUTO: 30 PG (ref 26–34)
MCHC RBC AUTO-ENTMCNC: 32.8 G/DL (ref 30–36.5)
MCV RBC AUTO: 91.6 FL (ref 80–99)
NEUTS SEG # BLD: 7.1 K/UL (ref 1.8–8)
NEUTS SEG NFR BLD: 66 % (ref 32–75)
PLATELET # BLD AUTO: 598 K/UL (ref 150–400)
POTASSIUM SERPL-SCNC: 3.8 MMOL/L (ref 3.5–5.1)
PROT SERPL-MCNC: 7 G/DL (ref 6.4–8.2)
RBC # BLD AUTO: 2.63 M/UL (ref 4.1–5.7)
SODIUM SERPL-SCNC: 140 MMOL/L (ref 136–145)
WBC # BLD AUTO: 10.6 K/UL (ref 4.1–11.1)

## 2021-03-08 PROCEDURE — 74011250636 HC RX REV CODE- 250/636: Performed by: INTERNAL MEDICINE

## 2021-03-08 PROCEDURE — 85025 COMPLETE CBC W/AUTO DIFF WBC: CPT

## 2021-03-08 PROCEDURE — 80048 BASIC METABOLIC PNL TOTAL CA: CPT

## 2021-03-08 PROCEDURE — 36415 COLL VENOUS BLD VENIPUNCTURE: CPT

## 2021-03-08 PROCEDURE — 74011000258 HC RX REV CODE- 258: Performed by: INTERNAL MEDICINE

## 2021-03-08 PROCEDURE — 96365 THER/PROPH/DIAG IV INF INIT: CPT

## 2021-03-08 PROCEDURE — 80076 HEPATIC FUNCTION PANEL: CPT

## 2021-03-08 RX ADMIN — SODIUM CHLORIDE 1 G: 900 INJECTION, SOLUTION INTRAVENOUS at 10:41

## 2021-03-08 NOTE — PROGRESS NOTES
[facility] Progress Note    Date: 2021    Name: Glory Vaughn    MRN: [id number]         : 1960    Mr. Kb Boyce Arrived in wheelchair and in no distress for antibiotic Regimen. Assessment was completed, no acute issues at this time, no new complaints voiced. PICC line accessed without difficulty, labs drawn & sent for processing. Covid questionnaire completed. 1. Do you have any symptoms of COVID-19? SOB, coughing, fever, or generally not feeling well - No    2. Have you been exposed to COVID-19 recently? - No    3. Have you had any recent contact with family/friend that has a pending COVID test? - No      Mr. Glover American vitals were reviewed. Visit Vitals  BP (!) 107/57   Pulse 78   Temp 97 °F (36.1 °C)   Resp 18       Labs were obtained and pending, please see connectcare. Medications:  Solo Salk IV    Mr. Kb Boyce tolerated treatment well and was discharged from [place] [address] in stable condition. PICC line flushed & heparinized per protocol. He is to return on 3/9/21 for his next appointment.     Jaymie Barroso RN  2021

## 2021-03-09 ENCOUNTER — HOSPITAL ENCOUNTER (OUTPATIENT)
Dept: INFUSION THERAPY | Age: 61
Discharge: HOME | End: 2021-03-09

## 2021-03-09 VITALS — TEMPERATURE: 98.2 F | DIASTOLIC BLOOD PRESSURE: 59 MMHG | SYSTOLIC BLOOD PRESSURE: 114 MMHG | HEART RATE: 85 BPM

## 2021-03-09 PROCEDURE — 74011250636 HC RX REV CODE- 250/636: Performed by: INTERNAL MEDICINE

## 2021-03-09 PROCEDURE — 74011000258 HC RX REV CODE- 258: Performed by: INTERNAL MEDICINE

## 2021-03-09 PROCEDURE — 96365 THER/PROPH/DIAG IV INF INIT: CPT

## 2021-03-09 RX ADMIN — SODIUM CHLORIDE 1 G: 900 INJECTION, SOLUTION INTRAVENOUS at 11:33

## 2021-03-09 NOTE — PROGRESS NOTES
Outpatient Infusion Center Short Visit Progress Note    1110 Patient admitted to [address] [place] [address] for daily Ivanz ambulatory in stable condition. Assessment completed. No new concerns voiced. Covid Screening      1. Do you have any symptoms of COVID-19? SOB, coughing, fever, or generally not feeling well ? NO  2. Have you been exposed to COVID-19 recently? NO  3. Have you had any recent contact with family/friend that has a pending COVID test? NO    Vital Signs:  Visit Vitals  BP (!) 114/59   Pulse 85   Temp 98.2 °F (36.8 °C)         Right single lumen PICC with positive blood return. Lab Results:  N/A        Medications:  Medications Administered     ertapenem (INVANZ) 1 g in 0.9% sodium chloride (MBP/ADV) 50 mL     Admin Date  03/09/2021 Action  New Bag Dose  1 g Rate  100 mL/hr Route  IntraVENous Administered By  Johnathon Jerez RN                 Patient tolerated treatment well. Patient discharged from [facility] ambulatory in no distress at 1210. Patient aware of next appointment.     Future Appointments   Date Time Provider Susan Dyer   3/10/2021 11:00 AM SS INF7 CH2 <1H [facility]   3/11/2021 10:00 AM SS [facility]   3/12/2021 11:00 AM SS INF5 CH4 <1H [facility]   3/15/2021 10:00 AM [facility]   3/16/2021 11:00 AM SS INF7 CH2 [facility]H [facility]   3/17/2021 10:00 AM SS INF4 CH4 <1H [facility]   3/18/2021 11:30 AM SS INF4 CH4 <1H [facility]   3/19/2021 10:00 AM SS INF7 CH2 <1H [facility]   3/22/2021 10:00 AM SS INF4 CH4 <1H [facility]   3/23/2021 11:30 AM SS INF4 CH4 <1H [facility]   3/24/2021 11:00 AM SS INF5 CH4 <1H [facility]   3/25/2021 11:30 AM [facility]   3/26/2021 11:00 AM SS INF4 CH4 <1H [facility]   3/29/2021 10:00 AM SS INF4 CH4 <1H [facility]   3/30/2021 11:30 AM SS INF4 CH4 <1H [facility]   3/31/2021 11:00 AM SS INF5 CH4 <1H [address] [place] [address]   4/1/2021 11:00 AM SS INF4 CH4 [facility]H [facility]   4/2/2021 10:00 AM SS INF6 CH4 <1H [facility]   4/5/2021 11:00 AM SS INF7 CH2 <1H [facility]   4/6/2021 10:00 AM SS INF4 CH4 <1H [facility]   4/7/2021 10:30 AM SS INF4 CH4 <1H [facility]   4/8/2021 11:30 AM SS INF4 CH4 <1H [facility]   4/9/2021 10:15 AM SS INF4 CH4 <1H [facility]   4/12/2021 10:00 AM SS INF4 CH4 <1H [facility]   4/13/2021 11:00 AM SS INF7 CH2 <1H [facility]   4/14/2021 10:00 AM SS INF7 CH3 [facility]H [facility] Damon

## 2021-03-10 ENCOUNTER — HOSPITAL ENCOUNTER (OUTPATIENT)
Dept: INFUSION THERAPY | Age: 61
Discharge: HOME | End: 2021-03-10

## 2021-03-10 VITALS
RESPIRATION RATE: 18 BRPM | HEART RATE: 81 BPM | SYSTOLIC BLOOD PRESSURE: 110 MMHG | TEMPERATURE: 97.6 F | DIASTOLIC BLOOD PRESSURE: 56 MMHG

## 2021-03-10 PROCEDURE — 96365 THER/PROPH/DIAG IV INF INIT: CPT

## 2021-03-10 PROCEDURE — 74011250636 HC RX REV CODE- 250/636: Performed by: INTERNAL MEDICINE

## 2021-03-10 PROCEDURE — 74011000258 HC RX REV CODE- 258: Performed by: INTERNAL MEDICINE

## 2021-03-10 RX ORDER — HEPARIN 100 UNIT/ML
500 SYRINGE INTRAVENOUS AS NEEDED
Status: DISCONTINUED | OUTPATIENT
Start: 2021-03-10 | End: 2021-03-11 | Stop reason: HOSPADM

## 2021-03-10 RX ORDER — SODIUM CHLORIDE 0.9 % (FLUSH) 0.9 %
5-10 SYRINGE (ML) INJECTION AS NEEDED
Status: DISCONTINUED | OUTPATIENT
Start: 2021-03-10 | End: 2021-03-11 | Stop reason: HOSPADM

## 2021-03-10 RX ADMIN — Medication 500 UNITS: at 12:02

## 2021-03-10 RX ADMIN — SODIUM CHLORIDE 1 G: 900 INJECTION, SOLUTION INTRAVENOUS at 11:25

## 2021-03-10 RX ADMIN — Medication 10 ML: at 12:01

## 2021-03-10 NOTE — PROGRESS NOTES
Outpatient Infusion Center Short Visit Progress Note    1110 Patient admitted to [address] [place] [address] for antibiotics via wheelchair in stable condition. Assessment completed. No new concerns voiced. Covid Screening      1. Do you have any symptoms of COVID-19? SOB, coughing, fever, or generally not feeling well ? no  2. Have you been exposed to COVID-19 recently? no  3. Have you had any recent contact with family/friend that has a pending COVID test? no    Vital Signs:  Visit Vitals  BP (!) 110/56   Pulse 81   Temp 97.6 °F (36.4 °C)   Resp 18         PICC with positive blood return. Lab Results:  None        Medications:  Medications Administered     ertapenem (INVANZ) 1 g in 0.9% sodium chloride (MBP/ADV) 50 mL     Admin Date  03/10/2021 Action  New Bag Dose  1 g Rate  100 mL/hr Route  IntraVENous Administered By  Junito Freed RN          heparin (porcine) pf 500 Units     Admin Date  03/10/2021 Action  Given Dose  500 Units Route  IntraVENous Administered By  Junito Freed RN          sodium chloride (NS) flush 5-10 mL     Admin Date  03/10/2021 Action  Given Dose  10 mL Route  IntraVENous Administered By  Junito Freed RN                1210Patient tolerated treatment well. Patient discharged from [place] [address] via wheelchair in no distress at 1210. Patient aware of next appointment.     Future Appointments   Date Time Provider Susan Dyer   3/11/2021 10:00 AM SS INF4 CH4 <1H [facility]   3/12/2021 11:00 AM SS IN[facility]5 [facility]   3/13/2021  9:30 AM G1 TOMMY FASTRACK [facility]   3/14/2021  9:30 AM G1 TOMMY FASTRACK [facility]   3/15/2021 10:00 AM SS INF4 CH4 <1H [facility]   3/16/2021 11:00 AM SS INF7 CH2 [facility]1H [facility]   3/17/2021 10:00 AM SS INF4 CH4 <1H [facility]   3/18/2021 11:30 AM SS INF4 CH4 <1H [facility]   3/19/2021 10:00 AM SS INF7 CH2 <1H [facility]   3/20/2021 10:45 AM JULIAN MCQUEEN [facility] 3/21/2021  9:00 AM G1 TOMMY FASTRACK [facility]   3/22/2021 10:00 AM SS IN[facility]H [facility]   3/23/2021 11:30 AM SS INF4 CH4 [facility]1H [facility]   3/24/2021 11:00 AM SS INF5 CH4 [facility]1H [facility]   3/25/2021 11:30 AM SS IN[facility]4 [facility]4 [facility]H [facility]   3/26/2021 11:00 AM SS INF4 [facility]4 [facility]1H [facility]   3/27/2021 10:45 AM G1 TOMMY FASTRACK [facility]   3/28/2021 10:45 AM G1 TOMMY FASTRACK [facility]   3/29/2021 10:00 AM SS INF4 CH4 <1H [facility]   3/30/2021 11:30 AM SS INF4 CH4 <1H [facility]   3/31/2021 11:00 AM SS INF5 CH4 <1H [facility]   4/1/2021 11:00 AM SS INF4 CH4 <1H [facility]   4/2/2021 10:00 AM SS INF6 CH4 [facility]1H [facility]   4/3/2021 10:45 AM [facility]   4/4/2021  9:30 AM G1 TOMMY FASTRACK [facility]   4/5/2021 11:00 AM SS INF7 CH2 [facility]1H [facility]   4/6/2021 10:00 AM SS INF4 CH4 <1H [facility]   4/7/2021 10:30 AM SS INF4 CH4 <1H [facility]   4/8/2021 11:30 AM SS INF4 CH4 <1H [facility]   4/9/2021 10:15 AM SS INF4 CH4 [facility]1H [facility]   4/10/2021 10:45 AM G1 TOMMY FASTRACK [facility]   4/11/2021  9:30 AM G1 TOMMY FASTRACK [facility]   4/12/2021 10:00 AM SS INF4 CH4 <1H [facility]   4/13/2021 11:00 AM SS INF7 [facility]2 [facility]H [facility]   4/14/2021 10:00 AM SS INF7 CH3 <1H [facility] [address] [place] [address]

## 2021-03-11 ENCOUNTER — HOSPITAL ENCOUNTER (OUTPATIENT)
Dept: INFUSION THERAPY | Age: 61
Discharge: HOME | End: 2021-03-11

## 2021-03-11 VITALS
HEART RATE: 81 BPM | TEMPERATURE: 97 F | OXYGEN SATURATION: 97 % | SYSTOLIC BLOOD PRESSURE: 102 MMHG | RESPIRATION RATE: 16 BRPM | DIASTOLIC BLOOD PRESSURE: 58 MMHG

## 2021-03-11 PROCEDURE — 74011000258 HC RX REV CODE- 258: Performed by: INTERNAL MEDICINE

## 2021-03-11 PROCEDURE — 74011250636 HC RX REV CODE- 250/636: Performed by: INTERNAL MEDICINE

## 2021-03-11 PROCEDURE — 96365 THER/PROPH/DIAG IV INF INIT: CPT

## 2021-03-11 RX ORDER — HEPARIN 100 UNIT/ML
500 SYRINGE INTRAVENOUS AS NEEDED
Status: DISCONTINUED | OUTPATIENT
Start: 2021-03-11 | End: 2021-03-12 | Stop reason: HOSPADM

## 2021-03-11 RX ORDER — SODIUM CHLORIDE 0.9 % (FLUSH) 0.9 %
5-10 SYRINGE (ML) INJECTION AS NEEDED
Status: DISCONTINUED | OUTPATIENT
Start: 2021-03-11 | End: 2021-03-12 | Stop reason: HOSPADM

## 2021-03-11 RX ADMIN — SODIUM CHLORIDE 1 G: 900 INJECTION, SOLUTION INTRAVENOUS at 10:07

## 2021-03-11 RX ADMIN — Medication 10 ML: at 10:01

## 2021-03-11 RX ADMIN — Medication 10 ML: at 10:46

## 2021-03-11 RX ADMIN — Medication 10 ML: at 10:45

## 2021-03-11 RX ADMIN — Medication 500 UNITS: at 10:47

## 2021-03-11 NOTE — PROGRESS NOTES
Outpatient Infusion Center Short Visit Progress Note    1000 Patient admitted to [facility] for antibiotics  Via wheelchair in stable condition. Assessment completed. No new concerns voiced. Covid Screening      1. Do you have any symptoms of COVID-19? SOB, coughing, fever, or generally not feeling well ? no  2. Have you been exposed to COVID-19 recently? no  3. Have you had any recent contact with family/friend that has a pending COVID test? no    Vital Signs:  Visit Vitals  BP (!) 99/54   Pulse 86   Temp 97.1 °F (36.2 °C)   Resp 18   SpO2 97%         PICC with positive blood return. Medications:  Medications Administered     ertapenem (INVANZ) 1 g in 0.9% sodium chloride (MBP/ADV) 50 mL     Admin Date  03/11/2021 Action  New Bag Dose  1 g Rate  100 mL/hr Route  IntraVENous Administered By  Tasha Yañez RN          heparin (porcine) pf 500 Units     Admin Date  03/11/2021 Action  Given Dose  500 Units Route  IntraVENous Administered By  Tasha Yañez RN          sodium chloride (NS) flush 5-10 mL     Admin Date  03/11/2021 Action  Given Dose  10 mL Route  IntraVENous Administered By  Tasha Yañez RN           Admin Date  03/11/2021 Action  Given Dose  10 mL Route  IntraVENous Administered By  Tasha Yañez RN           Admin Date  03/11/2021 Action  Given Dose  10 mL Route  IntraVENous Administered By  Tasha Yañez RN                6272 Patient tolerated treatment well. Patient discharged from [place] [address] via wheelchair in no distress at 1050. Patient aware of next appointment.     Future Appointments   Date Time Provider Susan Dyer   3/12/2021 11:00 AM [facility]   3/13/2021  9:30 AM G1 TOMMY FASTRACK [facility]    3/14/2021  9:30 AM G1 TOMMY FASTRACK [facility]    3/15/2021 10:00 AM [facility] IN[facility]4 [facility]   3/16/2021 11:00 AM SS INF7 CH2 [facility]H [facility]   3/17/2021 10:00 AM SS INF4 CH4 <1H [facility]   3/18/2021 11:30 AM SS INF4 CH4 <1H [facility]   3/19/2021 10:00 AM SS INF7 CH2 <1H [facility]   3/20/2021 10:45 AM G1 TOMMY FASTRACK [facility]   3/21/2021  9:00 AM G1 TOMMY FASTRACK [facility]   3/22/2021 10:00 [facility]   3/23/2021 11:30 AM [facility]   3/24/2021 11:00 AM [facility] IN[facility]5 [facility]   3/25/2021 11:30 AM [facility]   3/26/2021 11:00 AM [facility]   3/27/2021 10:45 AM G1 TOMMY FASTRACK R[facility]   3/28/2021 10:45 AM G1 TOMMY FASTRACK [facility]   3/29/2021 10:00 AM SS INF4 [facility]   3/30/2021 11:30 AM SS IN[facility]4 [facility]   3/31/2021 11:00 AM SS INF5 [facility]   4/1/2021 11:00 AM SS IN[facility]4 [facility]   4/2/2021 10:00 AM SS INF6 [facility]   4/3/2021 10:45 AM G1 TOMMY FASTRACK R[facility]   4/4/2021  9:30 AM G1 TOMMY FASTRACK R[facility]   4/5/2021 11:00 AM SS INF7 [facility]   4/6/2021 10:00 AM SS INF4 [facility]H [facility]   4/7/2021 10:30 AM SS INF4 [facility]   4/8/2021 11:30 AM SS INF4 [facility]   4/9/2021 10:15 AM [facility]   4/10/2021 10:45 AM G1 TOMMY FASTRACK [facility]   4/11/2021  9:30 AM G1 TOMMY FASTRACK [facility]   4/12/2021 10:00 AM [facility]   4/13/2021 11:00 AM SS INF7 [facility]2 [facility]   4/14/2021 10:00 AM SS INF7 CH3 <1H RCHIC[address]

## 2021-03-12 ENCOUNTER — HOSPITAL ENCOUNTER (OUTPATIENT)
Dept: INFUSION THERAPY | Age: 61
Discharge: HOME | End: 2021-03-12

## 2021-03-12 VITALS
TEMPERATURE: 98.2 F | SYSTOLIC BLOOD PRESSURE: 107 MMHG | HEART RATE: 82 BPM | RESPIRATION RATE: 18 BRPM | DIASTOLIC BLOOD PRESSURE: 59 MMHG | OXYGEN SATURATION: 98 %

## 2021-03-12 PROCEDURE — 96365 THER/PROPH/DIAG IV INF INIT: CPT

## 2021-03-12 PROCEDURE — 74011000258 HC RX REV CODE- 258: Performed by: INTERNAL MEDICINE

## 2021-03-12 PROCEDURE — 74011250636 HC RX REV CODE- 250/636: Performed by: INTERNAL MEDICINE

## 2021-03-12 PROCEDURE — 77030020847 HC STATLOK BARD -A

## 2021-03-12 RX ADMIN — SODIUM CHLORIDE 1 G: 900 INJECTION, SOLUTION INTRAVENOUS at 11:11

## 2021-03-13 ENCOUNTER — HOSPITAL ENCOUNTER (OUTPATIENT)
Dept: INFUSION THERAPY | Age: 61
Discharge: HOME | End: 2021-03-13

## 2021-03-13 VITALS
HEART RATE: 89 BPM | TEMPERATURE: 97.8 F | SYSTOLIC BLOOD PRESSURE: 111 MMHG | DIASTOLIC BLOOD PRESSURE: 66 MMHG | RESPIRATION RATE: 16 BRPM

## 2021-03-13 PROCEDURE — 74011000258 HC RX REV CODE- 258: Performed by: INTERNAL MEDICINE

## 2021-03-13 PROCEDURE — 96365 THER/PROPH/DIAG IV INF INIT: CPT

## 2021-03-13 PROCEDURE — 74011250636 HC RX REV CODE- 250/636: Performed by: INTERNAL MEDICINE

## 2021-03-13 RX ORDER — SODIUM CHLORIDE 0.9 % (FLUSH) 0.9 %
10 SYRINGE (ML) INJECTION AS NEEDED
Status: DISCONTINUED | OUTPATIENT
Start: 2021-03-13 | End: 2021-03-14 | Stop reason: HOSPADM

## 2021-03-13 RX ORDER — HEPARIN 100 UNIT/ML
500 SYRINGE INTRAVENOUS AS NEEDED
Status: DISCONTINUED | OUTPATIENT
Start: 2021-03-13 | End: 2021-03-14 | Stop reason: HOSPADM

## 2021-03-13 RX ORDER — SODIUM CHLORIDE 0.9 % (FLUSH) 0.9 %
10 SYRINGE (ML) INJECTION AS NEEDED
Status: DISPENSED | OUTPATIENT
Start: 2021-03-14 | End: 2021-03-14

## 2021-03-13 RX ORDER — HEPARIN 100 UNIT/ML
500 SYRINGE INTRAVENOUS AS NEEDED
Status: ACTIVE | OUTPATIENT
Start: 2021-03-14 | End: 2021-03-14

## 2021-03-13 RX ADMIN — HEPARIN 500 UNITS: 100 SYRINGE at 10:10

## 2021-03-13 RX ADMIN — SODIUM CHLORIDE 1 G: 900 INJECTION INTRAVENOUS at 09:40

## 2021-03-13 RX ADMIN — Medication 10 ML: at 09:38

## 2021-03-13 RX ADMIN — Medication 10 ML: at 10:10

## 2021-03-13 NOTE — PROGRESS NOTES
[facility] Short Visit Note:    3048 Pt arrived to [facility] ambulatory and in no distress for IV Invanz. Single lumen PICC intact to right upper arm, flushed with positive blood return noted. No new complaints voiced. Visit Vitals  /66   Pulse 89   Temp 97.8 °F (36.6 °C)   Resp 16       Medications received:  Invanz IV    1015 Pt tolerated treatment well, no adverse reaction noted. PICC line flushed per protocol and end cap applied. Discharged from [facility] ambulatory and in no acute distress accompanied by his wife. Next appt 3/14/21.

## 2021-03-14 ENCOUNTER — HOSPITAL ENCOUNTER (OUTPATIENT)
Dept: INFUSION THERAPY | Age: 61
Discharge: HOME | End: 2021-03-14

## 2021-03-14 VITALS
HEART RATE: 91 BPM | DIASTOLIC BLOOD PRESSURE: 61 MMHG | TEMPERATURE: 97.7 F | RESPIRATION RATE: 18 BRPM | SYSTOLIC BLOOD PRESSURE: 110 MMHG

## 2021-03-14 PROCEDURE — 96365 THER/PROPH/DIAG IV INF INIT: CPT

## 2021-03-14 PROCEDURE — 74011250636 HC RX REV CODE- 250/636: Performed by: INTERNAL MEDICINE

## 2021-03-14 PROCEDURE — 74011000258 HC RX REV CODE- 258: Performed by: INTERNAL MEDICINE

## 2021-03-14 RX ADMIN — HEPARIN 500 UNITS: 100 SYRINGE at 10:25

## 2021-03-14 RX ADMIN — Medication 10 ML: at 09:37

## 2021-03-14 RX ADMIN — SODIUM CHLORIDE 1 G: 900 INJECTION INTRAVENOUS at 09:36

## 2021-03-14 NOTE — PROGRESS NOTES
[facility]IC Short Consult Note:    0930: Pt arrived to [facility] ambulatory and in no distress for daily antibiotics. Right lumen PICC intact to right upper arm, flushed with positive blood return noted. No new complaints voiced. Visit Vitals  /61   Pulse 91   Temp 97.7 °F (36.5 °C)   Resp 18       Medications received:  Invanz 1g over 60mins    1030: Pt tolerated treatment well, no adverse reaction noted. PICC flushed per protocol and end cap applied. Discharged from [facility] ambulatory and in no acute distress accompanied by spouse.

## 2021-03-15 ENCOUNTER — HOSPITAL ENCOUNTER (OUTPATIENT)
Dept: INFUSION THERAPY | Age: 61
Discharge: HOME | End: 2021-03-15

## 2021-03-15 VITALS
OXYGEN SATURATION: 99 % | DIASTOLIC BLOOD PRESSURE: 70 MMHG | SYSTOLIC BLOOD PRESSURE: 104 MMHG | HEART RATE: 90 BPM | TEMPERATURE: 97.4 F | RESPIRATION RATE: 18 BRPM

## 2021-03-15 LAB
ALBUMIN SERPL-MCNC: 2.7 G/DL (ref 3.5–5)
ALBUMIN/GLOB SERPL: 0.5 {RATIO} (ref 1.1–2.2)
ALP SERPL-CCNC: 85 U/L (ref 45–117)
ALT SERPL-CCNC: 39 U/L (ref 12–78)
ANION GAP SERPL CALC-SCNC: 5 MMOL/L (ref 5–15)
AST SERPL-CCNC: 14 U/L (ref 15–37)
BASO+EOS+MONOS # BLD AUTO: 0.6 K/UL (ref 0.2–1.2)
BASO+EOS+MONOS NFR BLD AUTO: 8 % (ref 3.2–16.9)
BILIRUB DIRECT SERPL-MCNC: <0.1 MG/DL (ref 0–0.2)
BILIRUB SERPL-MCNC: 0.2 MG/DL (ref 0.2–1)
BUN SERPL-MCNC: 16 MG/DL (ref 6–20)
BUN/CREAT SERPL: 21 (ref 12–20)
CALCIUM SERPL-MCNC: 8.8 MG/DL (ref 8.5–10.1)
CHLORIDE SERPL-SCNC: 106 MMOL/L (ref 97–108)
CO2 SERPL-SCNC: 27 MMOL/L (ref 21–32)
CREAT SERPL-MCNC: 0.75 MG/DL (ref 0.7–1.3)
DIFFERENTIAL METHOD BLD: ABNORMAL
ERYTHROCYTE [DISTWIDTH] IN BLOOD BY AUTOMATED COUNT: 14.8 % (ref 11.8–15.8)
GLOBULIN SER CALC-MCNC: 5.1 G/DL (ref 2–4)
GLUCOSE SERPL-MCNC: 88 MG/DL (ref 65–100)
HCT VFR BLD AUTO: 27.5 % (ref 36.6–50.3)
HGB BLD-MCNC: 8.8 G/DL (ref 12.1–17)
LYMPHOCYTES # BLD: 2.2 K/UL (ref 0.8–3.5)
LYMPHOCYTES NFR BLD: 29 % (ref 12–49)
MCH RBC QN AUTO: 28.7 PG (ref 26–34)
MCHC RBC AUTO-ENTMCNC: 32 G/DL (ref 30–36.5)
MCV RBC AUTO: 89.6 FL (ref 80–99)
NEUTS SEG # BLD: 4.7 K/UL (ref 1.8–8)
NEUTS SEG NFR BLD: 63 % (ref 32–75)
PLATELET # BLD AUTO: 465 K/UL (ref 150–400)
POTASSIUM SERPL-SCNC: 3.6 MMOL/L (ref 3.5–5.1)
PROT SERPL-MCNC: 7.8 G/DL (ref 6.4–8.2)
RBC # BLD AUTO: 3.07 M/UL (ref 4.1–5.7)
SODIUM SERPL-SCNC: 138 MMOL/L (ref 136–145)
WBC # BLD AUTO: 7.5 K/UL (ref 4.1–11.1)

## 2021-03-15 PROCEDURE — 96365 THER/PROPH/DIAG IV INF INIT: CPT

## 2021-03-15 PROCEDURE — 85025 COMPLETE CBC W/AUTO DIFF WBC: CPT

## 2021-03-15 PROCEDURE — 80048 BASIC METABOLIC PNL TOTAL CA: CPT

## 2021-03-15 PROCEDURE — 36415 COLL VENOUS BLD VENIPUNCTURE: CPT

## 2021-03-15 PROCEDURE — 80076 HEPATIC FUNCTION PANEL: CPT

## 2021-03-15 PROCEDURE — 74011250636 HC RX REV CODE- 250/636: Performed by: INTERNAL MEDICINE

## 2021-03-15 PROCEDURE — 74011000258 HC RX REV CODE- 258: Performed by: INTERNAL MEDICINE

## 2021-03-15 RX ADMIN — SODIUM CHLORIDE 1 G: 900 INJECTION, SOLUTION INTRAVENOUS at 10:06

## 2021-03-15 NOTE — PROGRESS NOTES
Outpatient Infusion Center Short Visit Progress Note    9911 Patient admitted to [facility] for Daily Invanz ambulatory in stable condition. Assessment completed. No new concerns voiced. Covid Screening      1. Do you have any symptoms of COVID-19? SOB, coughing, fever, or generally not feeling well ? NO  2. Have you been exposed to COVID-19 recently? NO  3. Have you had any recent contact with family/friend that has a pending COVID test? NO    Vital Signs:  Visit Vitals  /70   Pulse 90   Temp 97.4 °F (36.3 °C)   Resp 18   SpO2 99%         Right single lumen PICC with positive blood return, labs drawn and sent for processing. Lab Results:  Recent Results (from the past 12 hour(s))   CBC WITH 3 PART DIFF    Collection Time: 03/15/21 10:07 AM   Result Value Ref Range    WBC 7.5 4.1 - 11.1 K/uL    RBC 3.07 (L) 4.10 - 5.70 M/uL    HGB 8.8 (L) 12.1 - 17.0 g/dL    HCT 27.5 (L) 36.6 - 50.3 %    MCV 89.6 80.0 - 99.0 FL    MCH 28.7 26.0 - 34.0 PG    MCHC 32.0 30.0 - 36.5 g/dL    RDW 14.8 11.8 - 15.8 %    PLATELET 860 (H) 237 - 400 K/uL    NEUTROPHILS 63 32 - 75 %    MIXED CELLS 8 3.2 - 16.9 %    LYMPHOCYTES 29 12 - 49 %    ABS. NEUTROPHILS 4.7 1.8 - 8.0 K/UL    ABS. MIXED CELLS 0.6 0.2 - 1.2 K/uL    ABS.  LYMPHOCYTES 2.2 0.8 - 3.5 K/UL    DF AUTOMATED     METABOLIC PANEL, BASIC    Collection Time: 03/15/21 10:07 AM   Result Value Ref Range    Sodium 138 136 - 145 mmol/L    Potassium 3.6 3.5 - 5.1 mmol/L    Chloride 106 97 - 108 mmol/L    CO2 27 21 - 32 mmol/L    Anion gap 5 5 - 15 mmol/L    Glucose 88 65 - 100 mg/dL    BUN 16 6 - 20 MG/DL    Creatinine 0.75 0.70 - 1.30 MG/DL    BUN/Creatinine ratio 21 (H) 12 - 20      GFR est AA >60 >60 ml/min/1.73m2    GFR est non-AA >60 >60 ml/min/1.73m2    Calcium 8.8 8.5 - 10.1 MG/DL   HEPATIC FUNCTION PANEL    Collection Time: 03/15/21 10:07 AM   Result Value Ref Range    Protein, total 7.8 6.4 - 8.2 g/dL    Albumin 2.7 (L) 3.5 - 5.0 g/dL    Globulin 5.1 (H) 2.0 - 4.0 g/dL    A-G Ratio 0.5 (L) 1.1 - 2.2      Bilirubin, total 0.2 0.2 - 1.0 MG/DL    Bilirubin, direct <0.1 0.0 - 0.2 MG/DL    Alk. phosphatase 85 45 - 117 U/L    AST (SGOT) 14 (L) 15 - 37 U/L    ALT (SGPT) 39 12 - 78 U/L           Medications:  Medications Administered     ertapenem (INVANZ) 1 g in 0.9% sodium chloride (MBP/ADV) 50 mL     Admin Date  03/15/2021 Action  New Bag Dose  1 g Rate  100 mL/hr Route  IntraVENous Administered By  Carmelita Villagomez, KAREN                 Patient tolerated treatment well. Patient discharged from [place] [address] ambulatory in no distress at 1040. Patient aware of next appointment.     Future Appointments   Date Time Provider Susan Dyer   3/16/2021 11:00 AM [facility]   3/17/2021 10:00 AM SS INF4 [facility]4 [facility]   3/18/2021 11:30 AM SS INF4 CH4 <[facility]H [facility]   3/19/2021 10:00 AM SS INF7 CH2 <1H [facility]   3/20/2021 10:45 AM G[facility]   3/21/2021  9:00 AM G1 [facility]OMMY [facility]STRACK [facility]   3/22/2021 10:00 AM SS INF4 CH4 [facility]H [facility]   3/23/2021 11:30 AM SS INF4 CH4 <1H [facility]   3/24/2021 11:00 AM SS INF5 CH4 <1H [facility]   3/25/2021 11:30 AM SS INF4 CH4 <1H [facility]   3/26/2021 11:00 AM SS INF4 CH4 <1H [facility]   3/27/2021 10:45 AM G1 TOMMY FASTRACK [facility]   3/28/2021 10:45 AM G1 TOMMY FASTRACK [facility]   3/29/2021 10:00 AM SS INF4 CH4 <1H [facility]   3/30/2021 11:30 AM SS INF4 CH4 <1H [facility]   3/31/2021 11:00 AM SS INF5 CH4 <1H [facility]   4/1/2021 11:00 AM SS INF4 CH4 <1H [facility]   4/2/2021 10:00 AM SS INF6 CH4 <1H [facility]   4/3/2021 10:45 AM G1 TOMMY FASTRACK R[facility]   4/4/2021  9:30 AM G1 TOMMY FASTRACK [facility]   4/5/2021 11:00 AM SS INF7 CH2 <1H [facility]   4/6/2021 10:00 AM SS INF4 CH4 <1H [facility]   4/7/2021 10:30 AM SS INF4 CH4 <1H [facility]   4/8/2021 11:30 AM SS INF4 CH4 <1H [facility]   4/9/2021 10:15 AM SS INF4 CH4 <1H [facility]   4/10/2021 10:45 AM [facility]   4/11/2021  9:30 AM G[facility]   4/12/2021 10:00 AM SS INF4 CH4 <1H [facility]   4/13/2021 11:00 AM SS INF7 CH2 <1H [facility]   4/14/2021 10:00 AM SS INF7 CH3 <1H [facility]

## 2021-03-16 ENCOUNTER — HOSPITAL ENCOUNTER (OUTPATIENT)
Dept: INFUSION THERAPY | Age: 61
Discharge: HOME | End: 2021-03-16

## 2021-03-16 VITALS
HEART RATE: 85 BPM | SYSTOLIC BLOOD PRESSURE: 105 MMHG | TEMPERATURE: 97 F | RESPIRATION RATE: 18 BRPM | OXYGEN SATURATION: 100 % | DIASTOLIC BLOOD PRESSURE: 66 MMHG

## 2021-03-16 PROCEDURE — 74011250636 HC RX REV CODE- 250/636: Performed by: INTERNAL MEDICINE

## 2021-03-16 PROCEDURE — 96365 THER/PROPH/DIAG IV INF INIT: CPT

## 2021-03-16 PROCEDURE — 74011000258 HC RX REV CODE- 258: Performed by: INTERNAL MEDICINE

## 2021-03-16 RX ADMIN — SODIUM CHLORIDE 1 G: 900 INJECTION, SOLUTION INTRAVENOUS at 11:15

## 2021-03-17 ENCOUNTER — HOSPITAL ENCOUNTER (OUTPATIENT)
Dept: INFUSION THERAPY | Age: 61
Discharge: HOME | End: 2021-03-17

## 2021-03-17 VITALS
OXYGEN SATURATION: 98 % | SYSTOLIC BLOOD PRESSURE: 107 MMHG | HEART RATE: 88 BPM | TEMPERATURE: 97.4 F | DIASTOLIC BLOOD PRESSURE: 71 MMHG | RESPIRATION RATE: 16 BRPM

## 2021-03-17 PROCEDURE — 96365 THER/PROPH/DIAG IV INF INIT: CPT

## 2021-03-17 PROCEDURE — 74011000258 HC RX REV CODE- 258: Performed by: INTERNAL MEDICINE

## 2021-03-17 PROCEDURE — 74011250636 HC RX REV CODE- 250/636: Performed by: INTERNAL MEDICINE

## 2021-03-17 RX ADMIN — SODIUM CHLORIDE 1 G: 900 INJECTION, SOLUTION INTRAVENOUS at 10:14

## 2021-03-17 NOTE — PROGRESS NOTES
Outpatient Infusion Center Short Visit Progress Note    Patient admitted to [address] [place] [address] for daily antibiotic ambulatory in stable condition. Assessment completed. No new concerns voiced. Covid Screening    1. Do you have any symptoms of COVID-19? SOB, coughing, fever, or generally not feeling well ? no  2. Have you been exposed to COVID-19 recently? no  3. Have you had any recent contact with family/friend that has a pending COVID test? no    Vital Signs:  Visit Vitals  /71   Pulse 88   Temp 97.4 °F (36.3 °C)   Resp 16   SpO2 98%       R arm single lumen PICC with positive blood return. Medications:  Medications Administered     ertapenem (INVANZ) 1 g in 0.9% sodium chloride (MBP/ADV) 50 mL     Admin Date  03/17/2021 Action  New Bag Dose  1 g Rate  100 mL/hr Route  IntraVENous Administered By  Arpita Reyes                Patient tolerated treatment well. Patient discharged from [place] [address] ambulatory in no distress. Patient aware of next appointment.     Michelle Olson, RN    Future Appointments   Date Time Provider Susan Dyer   3/18/2021 11:30 AM [facility]   3/19/2021 10:00 AM SS IN[facility]7 [facility]   3/20/2021 10:45 AM G1 TOMMY FASTRACK [facility]   3/21/2021  9:00 AM G1 TOMMY FASTRACK [facility]   3/22/2021 10:00 AM SS INF4 [facility]4 [facility]H [facility]   3/23/2021 11:30 AM SS INF4 [facility]4 <[facility]H [facility]   3/24/2021 11:00 AM SS INF5 CH4 <1H [facility]   3/25/2021 11:30 AM SS INF4 CH4 <1H [facility]   3/26/2021 11:00 AM SS INF4 [facility]4 [facility]H [facility]   3/27/2021 10:45 AM G1 TOMMY FASTRACK R[facility]B [facility]   3/28/2021 10:45 AM G1 [facility]MY FASTRACK [facility]   3/29/2021 10:00 AM SS INF4 CH4 <1H [facility]   3/30/2021 11:30 AM SS INF4 CH4 <1H [facility]   3/31/2021 11:00 AM SS INF5 CH4 <1H [facility]   4/1/2021 11:00 AM SS INF4 CH4 <1H [facility]   4/2/2021 10:00 AM SS INF6 [facility]   4/3/2021 10:45 AM G1 TOMMY FASTRACK R[facility]B [facility]   4/4/2021  9:30 AM G1 TOMMY FASTRACK R[facility]B [facility]   4/5/2021 11:00 AM SS INF7 CH2 [facility]H [facility]   4/6/2021 10:00 AM SS INF4 CH4 <1H [facility]   4/7/2021 10:30 AM SS INF4 CH4 <1H [facility]   4/8/2021 11:30 AM SS INF4 CH4 <1H [facility]   4/9/2021 10:15 AM SS INF4 CH4 <1H [facility]   4/10/2021 10:45 AM G1 TOMMY FASTRACK RCH[facility]B [facility]   4/11/2021  9:30 AM G1 TOMMY FASTRACK RCH[facility]B [facility]   4/12/2021 10:00 AM SS INF4 CH4 [facility]1H [facility]   4/13/2021 11:00 AM SS INF7 CH2 <1H [facility]   4/14/2021 10:00 AM SS INF7 CH3 <1H [facility] [address] [place] [address]

## 2021-03-18 ENCOUNTER — HOSPITAL ENCOUNTER (OUTPATIENT)
Dept: INFUSION THERAPY | Age: 61
Discharge: HOME | End: 2021-03-18

## 2021-03-18 VITALS
DIASTOLIC BLOOD PRESSURE: 64 MMHG | TEMPERATURE: 97.1 F | SYSTOLIC BLOOD PRESSURE: 100 MMHG | RESPIRATION RATE: 18 BRPM | OXYGEN SATURATION: 100 % | HEART RATE: 87 BPM

## 2021-03-18 PROCEDURE — 74011000258 HC RX REV CODE- 258: Performed by: INTERNAL MEDICINE

## 2021-03-18 PROCEDURE — 96365 THER/PROPH/DIAG IV INF INIT: CPT

## 2021-03-18 PROCEDURE — 74011250636 HC RX REV CODE- 250/636: Performed by: INTERNAL MEDICINE

## 2021-03-18 RX ADMIN — SODIUM CHLORIDE 1 G: 900 INJECTION, SOLUTION INTRAVENOUS at 11:38

## 2021-03-18 NOTE — PROGRESS NOTES
Outpatient Infusion Center Short Visit Progress Note     Patient admitted to [facility] for Invanz infusion,  ambulatory in stable condition. Assessment completed. No new concerns voiced. Covid Screening      1. Do you have any symptoms of COVID-19? SOB, coughing, fever, or generally not feeling well ? NO  2. Have you been exposed to COVID-19 recently? NO  3. Have you had any recent contact with family/friend that has a pending COVID test? NO    Vital Signs:  Visit Vitals  /64   Pulse 87   Temp 97.1 °F (36.2 °C)   Resp 18   SpO2 100%         RPICC with positive blood return. Medications:  Medications Administered     ertapenem (INVANZ) 1 g in 0.9% sodium chloride (MBP/ADV) 50 mL     Admin Date  03/18/2021 Action  New Bag Dose  1 g Rate  100 mL/hr Route  IntraVENous Administered By  Vanita Drake RN                 Patient tolerated treatment well. Patient discharged from [place] [address] ambulatory in no distress at 1255. Patient aware of next appointment.     Future Appointments   Date Time Provider Susan Dyer   3/19/2021 10:00 AM SS [facility]7 [facility]   3/20/2021 10:45 AM G1 TOMMY FASTRACK [facility]   3/21/2021  9:00 AM G1 TOMMY FASTRACK [facility]   3/22/2021 10:00 AM SS INF4 [facility]4 [facility]H [facility]   3/23/2021 11:30 AM SS INF4 CH4 <[facility]H [facility]   3/24/2021 11:00 AM SS INF5 CH4 <1H [facility]   3/25/2021 11:30 AM SS INF4 CH4 <1H [facility]   3/26/2021 11:00 AM SS INF4 CH4 <1H [facility]   3/27/2021 10:45 AM G1 TOMMY FASTRACK [facility]    3/28/2021 10:45 AM G1 TOMMY FASTRACK [facility]   3/29/2021 10:00 AM SS INF4 CH4 <1H [facility]   3/30/2021 11:30 AM SS INF4 CH4 <1H [facility]   3/31/2021 11:00 AM SS INF5 CH4 <1H [facility]   4/1/2021 11:00 AM SS INF4 CH4 <1H [facility]   4/2/2021 10:00 AM SS INF6 CH4 <1H [facility]   4/3/2021 10:45 AM G1 TOMMY MINA [facility]   4/4/2021  9:30 AM G1 TOMMY FASTRACK [facility]   4/5/2021 11:00 AM SS INF7 [facility]2 [facility]H [facility]   4/6/2021 10:00 AM SS INF4 CH4 <1H [facility]   4/7/2021 10:30 AM SS INF4 CH4 <1H [facility]   4/8/2021 11:30 AM SS INF4 CH4 <1H [facility]   4/9/2021 10:15 AM SS INF4 [facility]4 [facility]H [facility]   4/10/2021 10:45 AM G1 TOMMY FASTRACK [facility]   4/11/2021  9:30 AM G1 TOMMY FASTRACK [facility]   4/12/2021 10:00 AM SS INF4 CH4 <1H [facility]   4/13/2021 11:00 AM SS INF7 CH2 <1H [facility]   4/14/2021 10:00 AM SS INF7 CH3 <1H [facility]

## 2021-03-19 ENCOUNTER — HOSPITAL ENCOUNTER (OUTPATIENT)
Dept: INFUSION THERAPY | Age: 61
Discharge: HOME | End: 2021-03-19

## 2021-03-19 VITALS
TEMPERATURE: 96.8 F | OXYGEN SATURATION: 100 % | HEART RATE: 81 BPM | RESPIRATION RATE: 20 BRPM | SYSTOLIC BLOOD PRESSURE: 109 MMHG | DIASTOLIC BLOOD PRESSURE: 66 MMHG

## 2021-03-19 PROCEDURE — 74011250636 HC RX REV CODE- 250/636: Performed by: INTERNAL MEDICINE

## 2021-03-19 PROCEDURE — 96365 THER/PROPH/DIAG IV INF INIT: CPT

## 2021-03-19 PROCEDURE — 74011000258 HC RX REV CODE- 258: Performed by: INTERNAL MEDICINE

## 2021-03-19 PROCEDURE — 77030020847 HC STATLOK BARD -A

## 2021-03-19 RX ADMIN — SODIUM CHLORIDE 1 G: 900 INJECTION, SOLUTION INTRAVENOUS at 10:20

## 2021-03-19 NOTE — PROGRESS NOTES
\[facility]\"" Progress Note    Date: 2021    Name: Ignacio Cordova    MRN: [id number]         : 1960    0955:  Mr. Yoselin Jeong Arrived ambulatory and in no distress for Daily Ertapenem. Assessment was completed, no acute issues at this time, no new complaints voiced. Right PICC with + blood return, dressing changed today per protocol. Mr. Erik Moctezuma vitals were reviewed. Visit Vitals  /66   Pulse 81   Temp 96.8 °F (36 °C)   Resp 20   SpO2 100%         Medications:  Medications Administered     ertapenem (INVANZ) 1 g in 0.9% sodium chloride (MBP/ADV) 50 mL     Admin Date  2021 Action  New Bag Dose  1 g Rate  100 mL/hr Route  IntraVENous Administered By  Devika Denson RN                Mr. Yoselin Jeong tolerated treatment well and was discharged from [place] [address] in stable condition. He is to return to [facility] on  at 1045 for his next appointment.     Bernadine Cerda RN  2021

## 2021-03-20 ENCOUNTER — HOSPITAL ENCOUNTER (OUTPATIENT)
Dept: INFUSION THERAPY | Age: 61
Discharge: HOME | End: 2021-03-20

## 2021-03-20 VITALS
HEART RATE: 82 BPM | SYSTOLIC BLOOD PRESSURE: 104 MMHG | TEMPERATURE: 97 F | DIASTOLIC BLOOD PRESSURE: 64 MMHG | RESPIRATION RATE: 18 BRPM

## 2021-03-20 PROCEDURE — 96365 THER/PROPH/DIAG IV INF INIT: CPT

## 2021-03-20 PROCEDURE — 74011000258 HC RX REV CODE- 258: Performed by: INTERNAL MEDICINE

## 2021-03-20 PROCEDURE — 74011250636 HC RX REV CODE- 250/636: Performed by: INTERNAL MEDICINE

## 2021-03-20 RX ORDER — SODIUM CHLORIDE 0.9 % (FLUSH) 0.9 %
5-10 SYRINGE (ML) INJECTION AS NEEDED
Status: DISCONTINUED | OUTPATIENT
Start: 2021-03-20 | End: 2021-03-21 | Stop reason: HOSPADM

## 2021-03-20 RX ORDER — HEPARIN 100 UNIT/ML
500 SYRINGE INTRAVENOUS AS NEEDED
Status: DISCONTINUED | OUTPATIENT
Start: 2021-03-20 | End: 2021-03-21 | Stop reason: HOSPADM

## 2021-03-20 RX ADMIN — SODIUM CHLORIDE 1 G: 900 INJECTION INTRAVENOUS at 11:01

## 2021-03-20 RX ADMIN — HEPARIN 500 UNITS: 100 SYRINGE at 11:37

## 2021-03-20 RX ADMIN — Medication 10 ML: at 11:35

## 2021-03-20 RX ADMIN — Medication 10 ML: at 11:37

## 2021-03-20 RX ADMIN — Medication 10 ML: at 11:02

## 2021-03-21 ENCOUNTER — HOSPITAL ENCOUNTER (OUTPATIENT)
Dept: INFUSION THERAPY | Age: 61
Discharge: HOME | End: 2021-03-21

## 2021-03-21 VITALS
TEMPERATURE: 97.5 F | OXYGEN SATURATION: 100 % | SYSTOLIC BLOOD PRESSURE: 102 MMHG | DIASTOLIC BLOOD PRESSURE: 65 MMHG | HEART RATE: 88 BPM | RESPIRATION RATE: 18 BRPM

## 2021-03-21 PROCEDURE — 74011250636 HC RX REV CODE- 250/636: Performed by: INTERNAL MEDICINE

## 2021-03-21 PROCEDURE — 74011000258 HC RX REV CODE- 258: Performed by: INTERNAL MEDICINE

## 2021-03-21 PROCEDURE — 96365 THER/PROPH/DIAG IV INF INIT: CPT

## 2021-03-21 RX ADMIN — SODIUM CHLORIDE 1 G: 900 INJECTION INTRAVENOUS at 08:59

## 2021-03-21 NOTE — PROGRESS NOTES
[facility] Progress Note    Date: 2021    Name: Larry Ritter    MRN: [id number]         : 1960    Mr. Hickey Arrived ambulatory and in no distress for Daily Invanz. Assessment was completed, no acute issues at this time, no new complaints voiced. TONY PICC with + blood return, dressing CDI. Mr. Meyer Speaker vitals were reviewed. Visit Vitals  /68   Pulse 88   Temp 97.5 °F (36.4 °C)   Resp 18   SpO2 100%       Medications:  Medications Administered     ertapenem (INVANZ) 1 g in 0.9% sodium chloride (MBP/ADV) 50 mL MBP     Admin Date  2021 Action  New Bag Dose  1 g Rate  100 mL/hr Route  IntraVENous Administered By  Magnolia Gonsalves RN                Mr. Worth Goodpasture tolerated treatment well and was discharged from [place] [address] in stable condition. He is to return on 2021 for his next appointment.     Ninoska Nugent RN  2021

## 2021-03-22 ENCOUNTER — HOSPITAL ENCOUNTER (OUTPATIENT)
Dept: INFUSION THERAPY | Age: 61
Discharge: HOME | End: 2021-03-22

## 2021-03-22 VITALS
OXYGEN SATURATION: 99 % | HEART RATE: 90 BPM | RESPIRATION RATE: 17 BRPM | SYSTOLIC BLOOD PRESSURE: 121 MMHG | DIASTOLIC BLOOD PRESSURE: 68 MMHG | TEMPERATURE: 97 F

## 2021-03-22 VITALS
HEIGHT: 69 IN | HEART RATE: 84 BPM | SYSTOLIC BLOOD PRESSURE: 98 MMHG | BODY MASS INDEX: 35.79 KG/M2 | DIASTOLIC BLOOD PRESSURE: 60 MMHG | OXYGEN SATURATION: 96 % | RESPIRATION RATE: 18 BRPM | TEMPERATURE: 98.1 F | WEIGHT: 241.62 LBS

## 2021-03-22 LAB
ALBUMIN SERPL-MCNC: 3.1 G/DL (ref 3.5–5)
ALBUMIN/GLOB SERPL: 0.7 {RATIO} (ref 1.1–2.2)
ALP SERPL-CCNC: 96 U/L (ref 45–117)
ALT SERPL-CCNC: 33 U/L (ref 12–78)
ANION GAP SERPL CALC-SCNC: 5 MMOL/L (ref 5–15)
AST SERPL-CCNC: 18 U/L (ref 15–37)
BASO+EOS+MONOS # BLD AUTO: 0.5 K/UL (ref 0.2–1.2)
BASO+EOS+MONOS NFR BLD AUTO: 8 % (ref 3.2–16.9)
BILIRUB DIRECT SERPL-MCNC: 0.1 MG/DL (ref 0–0.2)
BILIRUB SERPL-MCNC: 0.4 MG/DL (ref 0.2–1)
BUN SERPL-MCNC: 14 MG/DL (ref 6–20)
BUN/CREAT SERPL: 17 (ref 12–20)
CALCIUM SERPL-MCNC: 8.9 MG/DL (ref 8.5–10.1)
CHLORIDE SERPL-SCNC: 107 MMOL/L (ref 97–108)
CO2 SERPL-SCNC: 27 MMOL/L (ref 21–32)
CREAT SERPL-MCNC: 0.84 MG/DL (ref 0.7–1.3)
DIFFERENTIAL METHOD BLD: ABNORMAL
ERYTHROCYTE [DISTWIDTH] IN BLOOD BY AUTOMATED COUNT: 15.7 % (ref 11.8–15.8)
GLOBULIN SER CALC-MCNC: 4.7 G/DL (ref 2–4)
GLUCOSE SERPL-MCNC: 123 MG/DL (ref 65–100)
HCT VFR BLD AUTO: 33.1 % (ref 36.6–50.3)
HGB BLD-MCNC: 10.5 G/DL (ref 12.1–17)
LYMPHOCYTES # BLD: 2.3 K/UL (ref 0.8–3.5)
LYMPHOCYTES NFR BLD: 37 % (ref 12–49)
MCH RBC QN AUTO: 28.5 PG (ref 26–34)
MCHC RBC AUTO-ENTMCNC: 31.7 G/DL (ref 30–36.5)
MCV RBC AUTO: 89.9 FL (ref 80–99)
NEUTS SEG # BLD: 3.6 K/UL (ref 1.8–8)
NEUTS SEG NFR BLD: 55 % (ref 32–75)
PLATELET # BLD AUTO: 340 K/UL (ref 150–400)
POTASSIUM SERPL-SCNC: 3.7 MMOL/L (ref 3.5–5.1)
PROT SERPL-MCNC: 7.8 G/DL (ref 6.4–8.2)
RBC # BLD AUTO: 3.68 M/UL (ref 4.1–5.7)
SODIUM SERPL-SCNC: 139 MMOL/L (ref 136–145)
WBC # BLD AUTO: 6.4 K/UL (ref 4.1–11.1)

## 2021-03-22 PROCEDURE — 96365 THER/PROPH/DIAG IV INF INIT: CPT

## 2021-03-22 PROCEDURE — 80076 HEPATIC FUNCTION PANEL: CPT

## 2021-03-22 PROCEDURE — 36415 COLL VENOUS BLD VENIPUNCTURE: CPT

## 2021-03-22 PROCEDURE — 74011250636 HC RX REV CODE- 250/636: Performed by: INTERNAL MEDICINE

## 2021-03-22 PROCEDURE — 85025 COMPLETE CBC W/AUTO DIFF WBC: CPT

## 2021-03-22 PROCEDURE — 74011000258 HC RX REV CODE- 258: Performed by: INTERNAL MEDICINE

## 2021-03-22 PROCEDURE — 80048 BASIC METABOLIC PNL TOTAL CA: CPT

## 2021-03-22 RX ADMIN — SODIUM CHLORIDE 1 G: 900 INJECTION, SOLUTION INTRAVENOUS at 10:10

## 2021-03-22 NOTE — PROGRESS NOTES
[facility] Progress Note    Date: 2021    Name: Kathy Guy    MRN: [id number]         : 1960    Mr. Hickey Arrived ambulatory and in no distress for Antibiotics Regimen. Assessment was completed, no acute issues at this time, no new complaints voiced. PICC line accessed without difficulty, labs drawn & sent for processing. Covid questionnaire completed. 1. Do you have any symptoms of COVID-19? SOB, coughing, fever, or generally not feeling well - No    2. Have you been exposed to COVID-19 recently? - No    3. Have you had any recent contact with family/friend that has a pending COVID test? - No      Mr. Mehnaz Culp vitals were reviewed. Visit Vitals  /68   Pulse 90   Temp 97 °F (36.1 °C)   Resp 17   SpO2 99%       Labs were obtained and pending, please see [facility]. Medications:  [place] IV    Mr. Elham Davis tolerated treatment well and was discharged from [place] [address] in stable condition. PICC line flushed and heparinized per protocol. [place] He is to return on 3/23/21 for his next appointment.     Henrietta Calvillo RN  2021

## 2021-03-23 ENCOUNTER — HOSPITAL ENCOUNTER (OUTPATIENT)
Dept: INFUSION THERAPY | Age: 61
Discharge: HOME | End: 2021-03-23

## 2021-03-23 VITALS
RESPIRATION RATE: 16 BRPM | SYSTOLIC BLOOD PRESSURE: 104 MMHG | TEMPERATURE: 97.3 F | HEART RATE: 87 BPM | OXYGEN SATURATION: 97 % | DIASTOLIC BLOOD PRESSURE: 61 MMHG

## 2021-03-23 PROCEDURE — 74011250636 HC RX REV CODE- 250/636: Performed by: INTERNAL MEDICINE

## 2021-03-23 PROCEDURE — 96365 THER/PROPH/DIAG IV INF INIT: CPT

## 2021-03-23 PROCEDURE — 74011000258 HC RX REV CODE- 258: Performed by: INTERNAL MEDICINE

## 2021-03-23 RX ADMIN — SODIUM CHLORIDE 1 G: 900 INJECTION, SOLUTION INTRAVENOUS at 11:45

## 2021-03-23 NOTE — PROGRESS NOTES
[facility] Progress Note    Date: 2021    Name: Jeannette Montemayor    MRN: [id number]         : 1960      Mr. Hickey Arrived ambulatory and in no distress for Daily Ertapenem Antibitoics. Assessment was completed, no acute issues at this time, no new complaints voiced. Right PICC with + blood return, dressing CDI. No labs drawn today. 1. Do you have any symptoms of COVID-19? SOB, coughing, fever, or generally not feeling well NO    2. Have you been exposed to COVID-19 recently? NO    3. Have you had any recent contact with family/friend that has a pending COVID test? NO      Mr. Magaña Figures vitals were reviewed. Visit Vitals  /61 (BP 1 Location: Left upper arm)   Pulse 87   Temp 97.3 °F (36.3 °C)   Resp 16   SpO2 97%         Medications:  Medications Administered     ertapenem (INVANZ) 1 g in 0.9% sodium chloride (MBP/ADV) 50 mL     Admin Date  2021 Action  New Bag Dose  1 g Rate  100 mL/hr Route  IntraVENous Administered By  Perry Johnson RN            Saline flush  Heparin flush      Mr. Lesley Matthews tolerated treatment well and was discharged from [place] [address] in stable condition. He is to return on  at 1100 for his next appointment.     Caldwell Buerger, RN  2021

## 2021-03-24 ENCOUNTER — HOSPITAL ENCOUNTER (OUTPATIENT)
Dept: INFUSION THERAPY | Age: 61
Discharge: HOME | End: 2021-03-24

## 2021-03-24 VITALS
HEART RATE: 77 BPM | DIASTOLIC BLOOD PRESSURE: 60 MMHG | TEMPERATURE: 96.5 F | OXYGEN SATURATION: 96 % | SYSTOLIC BLOOD PRESSURE: 108 MMHG | RESPIRATION RATE: 16 BRPM

## 2021-03-24 PROCEDURE — 74011000258 HC RX REV CODE- 258: Performed by: INTERNAL MEDICINE

## 2021-03-24 PROCEDURE — 96365 THER/PROPH/DIAG IV INF INIT: CPT

## 2021-03-24 PROCEDURE — 74011250636 HC RX REV CODE- 250/636: Performed by: INTERNAL MEDICINE

## 2021-03-24 RX ADMIN — SODIUM CHLORIDE 1 G: 900 INJECTION, SOLUTION INTRAVENOUS at 11:08

## 2021-03-24 NOTE — PROGRESS NOTES
Outpatient Infusion Center Short Visit Progress Note    Patient admitted to [facility] for daily antibiotic ambulatory in stable condition. Assessment completed. No new concerns voiced. Covid Screening      1. Do you have any symptoms of COVID-19? SOB, coughing, fever, or generally not feeling well ? no  2. Have you been exposed to COVID-19 recently? no  3. Have you had any recent contact with family/friend that has a pending COVID test? no    Vital Signs:  Visit Vitals  /63   Pulse 86   Temp (!) 96.5 °F (35.8 °C)   Resp 19   SpO2 96%         R arm PICC with positive blood return. Medications:  Medications Administered     ertapenem (INVANZ) 1 g in 0.9% sodium chloride (MBP/ADV) 50 mL     Admin Date  03/24/2021 Action  New Bag Dose  1 g Rate  100 mL/hr Route  IntraVENous Administered By  David Catalan                Patient tolerated treatment well. Patient discharged from [place] [address] ambulatory in no distress 3/25. Patient aware of next appointment.     Meera Mcgregor RN    Future Appointments   Date Time Provider Susan Dyer   3/25/2021 11:30 AM SS INF6 CH4 <1H R[facility]   3/26/2021 11:00 AM SS [facility]   3/27/2021 10:45 AM G[facility]STRACK [facility]   3/28/2021 10:45 AM G1 [facility] FASTRACK [facility]   3/29/2021 10:00 AM SS INF4 [facility]4 [facility]H [facility]   3/30/2021 11:30 AM SS IN[facility]H [facility]   3/31/2021 11:00 AM SS INF5 [facility]H [facility]   4/1/2021 11:00 AM SS IN[facility]H [facility]   4/2/2021 10:00 AM SS INF6 CH4 <1H [facility]   4/3/2021 10:45 AM G1 [facility]MY FASTRACK [facility]   4/4/2021  9:30 AM G1 [facility]ACK [facility]   4/5/2021 11:00 AM SS INF7 CH2 <1H R[facility]ICS [facility]   4/6/2021 10:00 AM SS INF4 CH4 <1H [facility]   4/7/2021 10:30 AM SS INF4 CH4 <1H [facility]   4/8/2021 11:30 AM SS INF4 CH4 <1H [facility]   4/9/2021 10:15 AM SS INF4 CH4 <1H [address]   4/10/2021 10:45 AM G1 TOMMY FASTRACK [facility]   4/11/2021  9:30 AM G1 TOMMY FASTRACK [facility]   4/12/2021 10:00 AM SS INF4 CH4 <1H [facility]   4/13/2021 11:00 AM SS INF7 CH2 <1H [facility]   4/14/2021 10:00 AM SS INF7 CH3 <1H [address]

## 2021-03-25 ENCOUNTER — HOSPITAL ENCOUNTER (OUTPATIENT)
Dept: INFUSION THERAPY | Age: 61
Discharge: HOME | End: 2021-03-25

## 2021-03-25 VITALS
RESPIRATION RATE: 16 BRPM | HEART RATE: 84 BPM | SYSTOLIC BLOOD PRESSURE: 99 MMHG | TEMPERATURE: 98.3 F | DIASTOLIC BLOOD PRESSURE: 62 MMHG

## 2021-03-25 PROCEDURE — 74011000258 HC RX REV CODE- 258: Performed by: INTERNAL MEDICINE

## 2021-03-25 PROCEDURE — 96365 THER/PROPH/DIAG IV INF INIT: CPT

## 2021-03-25 PROCEDURE — 74011250636 HC RX REV CODE- 250/636: Performed by: INTERNAL MEDICINE

## 2021-03-25 RX ORDER — SODIUM CHLORIDE 0.9 % (FLUSH) 0.9 %
5-10 SYRINGE (ML) INJECTION AS NEEDED
Status: DISCONTINUED | OUTPATIENT
Start: 2021-03-25 | End: 2021-03-26 | Stop reason: HOSPADM

## 2021-03-25 RX ORDER — HEPARIN 100 UNIT/ML
500 SYRINGE INTRAVENOUS AS NEEDED
Status: DISCONTINUED | OUTPATIENT
Start: 2021-03-25 | End: 2021-03-26 | Stop reason: HOSPADM

## 2021-03-25 RX ADMIN — Medication 10 ML: at 12:45

## 2021-03-25 RX ADMIN — Medication 500 UNITS: at 12:45

## 2021-03-25 RX ADMIN — SODIUM CHLORIDE 1 G: 900 INJECTION, SOLUTION INTRAVENOUS at 12:05

## 2021-03-25 RX ADMIN — Medication 10 ML: at 12:02

## 2021-03-25 NOTE — PROGRESS NOTES
Outpatient Infusion Center Short Visit Progress Note     Patient admitted to [facility] for antibiotic therapy ambulatory in stable condition. Assessment completed. No new concerns voiced. Covid Screening      1. Do you have any symptoms of COVID-19? SOB, coughing, fever, or generally not feeling well ? NO  2. Have you been exposed to COVID-19 recently? NO  3. Have you had any recent contact with family/friend that has a pending COVID test? NO    Vital Signs:  Visit Vitals  BP 99/62   Pulse 84   Temp 98.3 °F (36.8 °C)   Resp 16         Right arm PICC with positive blood return. Medications:  Medications Administered     ertapenem (INVANZ) 1 g in 0.9% sodium chloride (MBP/ADV) 50 mL     Admin Date  03/25/2021 Action  New Bag Dose  1 g Rate  100 mL/hr Route  IntraVENous Administered By  Guilherme Sanchez RN          heparin (porcine) pf 500 Units     Admin Date  03/25/2021 Action  Given Dose  500 Units Route  IntraVENous Administered By  Екатерина Kellogg RN          sodium chloride (NS) flush 5-10 mL     Admin Date  03/25/2021 Action  Given Dose  10 mL Route  IntraVENous Administered By  Guilherme Sanchez RN           Admin Date  03/25/2021 Action  Given Dose  10 mL Route  IntraVENous Administered By  Екатерина Kellogg RN                 Patient tolerated treatment well. Patient discharged from [place] [address] ambulatory in no distress at. Patient aware of next appointment.     Future Appointments   Date Time Provider Susan Dyer   3/26/2021 11:00 AM [facility]   3/27/2021 10:45 AM G1 [facility]MY [facility]   3/28/2021 10:45 AM G1 TOMMY FASTRACK [facility]   3/29/2021 10:00 AM SS INF4 [facility]   3/30/2021 11:30 AM SS IN[facility]4 [facility]   3/31/2021 11:00 AM SS INF5 [facility]   4/1/2021 11:00 AM SS IN[facility]4 [facility]   4/2/2021 10:00 AM SS INF6 [facility]   4/3/2021 10:45 AM G1 TOMMY [address]   4/4/2021  9:30 AM G1 TOMMY FASTRACK [facility]   4/5/2021 11:00 AM [facility]   4/6/2021 10:00 AM [facility]   4/7/2021 10:30 AM [facility]   4/8/2021 11:30 AM SS INF4 CH4 <1H [facility]   4/9/2021 10:15 AM [facility]   4/10/2021 10:45 AM G1 TOMMY FASTRACK R[facility]   4/11/2021  9:30 AM G1 TOMMY FASTRACK [facility]   4/12/2021 10:00 AM SS INF4 CH4 <1H [facility]   4/13/2021 11:00 AM SS [facility]7 [facility]   4/14/2021 10:00 AM [facility] IN[facility]7 [facility] [facility]

## 2021-03-26 ENCOUNTER — HOSPITAL ENCOUNTER (OUTPATIENT)
Dept: INFUSION THERAPY | Age: 61
Discharge: HOME | End: 2021-03-26

## 2021-03-26 VITALS
RESPIRATION RATE: 18 BRPM | TEMPERATURE: 98 F | DIASTOLIC BLOOD PRESSURE: 66 MMHG | HEART RATE: 88 BPM | SYSTOLIC BLOOD PRESSURE: 125 MMHG

## 2021-03-26 PROCEDURE — 96365 THER/PROPH/DIAG IV INF INIT: CPT

## 2021-03-26 PROCEDURE — 77030020847 HC STATLOK BARD -A

## 2021-03-26 PROCEDURE — 74011250636 HC RX REV CODE- 250/636: Performed by: INTERNAL MEDICINE

## 2021-03-26 PROCEDURE — 74011000258 HC RX REV CODE- 258: Performed by: INTERNAL MEDICINE

## 2021-03-26 RX ADMIN — SODIUM CHLORIDE 1 G: 900 INJECTION, SOLUTION INTRAVENOUS at 11:11

## 2021-03-26 NOTE — PROGRESS NOTES
[facility] Progress Note    Date: 2021    Name: Matthieu Montero    MRN: [id number]         : 1960    Mr. Hickye Arrived ambulatory and in no distress for Daily Invanz. Assessment was completed, no acute issues at this time, no new complaints voiced. KALPANA PICC with + blood return, dressing CDI. Dressing changed today per protocol. Mr. Michael Tobar vitals were reviewed. Visit Vitals  /66   Pulse 88   Temp 98 °F (36.7 °C)   Resp 18       Medications:  Medications Administered     ertapenem (INVANZ) 1 g in 0.9% sodium chloride (MBP/ADV) 50 mL     Admin Date  2021 Action  New Bag Dose  1 g Rate  100 mL/hr Route  IntraVENous Administered By  Ervin Blanchard RN                Mr. Anastasiya Locke tolerated treatment well and was discharged from [place] [address] in stable condition. He is to return on 2021 for his next appointment.     Amina Mancia RN  2021

## 2021-03-27 ENCOUNTER — HOSPITAL ENCOUNTER (OUTPATIENT)
Dept: INFUSION THERAPY | Age: 61
Discharge: HOME | End: 2021-03-27

## 2021-03-27 VITALS
RESPIRATION RATE: 18 BRPM | DIASTOLIC BLOOD PRESSURE: 71 MMHG | HEART RATE: 79 BPM | SYSTOLIC BLOOD PRESSURE: 114 MMHG | TEMPERATURE: 97.7 F

## 2021-03-27 PROCEDURE — 74011000258 HC RX REV CODE- 258: Performed by: INTERNAL MEDICINE

## 2021-03-27 PROCEDURE — 96365 THER/PROPH/DIAG IV INF INIT: CPT

## 2021-03-27 PROCEDURE — 74011250636 HC RX REV CODE- 250/636: Performed by: INTERNAL MEDICINE

## 2021-03-27 RX ADMIN — SODIUM CHLORIDE 1 G: 900 INJECTION INTRAVENOUS at 10:47

## 2021-03-27 NOTE — PROGRESS NOTES
Outpatient Infusion Center Short Visit Progress Note    9152 Patient admitted to [facility] for daily Invanz ambulatory in stable condition. Assessment completed. No new concerns voiced. Covid Screening      1. Do you have any symptoms of COVID-19? SOB, coughing, fever, or generally not feeling well ? NO  2. Have you been exposed to COVID-19 recently? NO  3. Have you had any recent contact with family/friend that has a pending COVID test? NO    Vital Signs:  Visit Vitals  /71   Pulse 79   Temp 97.7 °F (36.5 °C)   Resp 18         Right single lumen PICC with positive blood return. Lab Results:  N/A        Medications:  Medications Administered     ertapenem (INVANZ) 1 g in 0.9% sodium chloride (MBP/ADV) 50 mL MBP     Admin Date  03/27/2021 Action  New Bag Dose  1 g Rate  100 mL/hr Route  IntraVENous Administered By  Lissa Barber RN                Patient tolerated treatment well. Patient discharged from [place] [address] ambulatory in no distress at 1120. Patient aware of next appointment.     Future Appointments   Date Time Provider Susan Dyer   3/28/2021 10:45 AM G1 [facility]MY [facility]   3/29/2021 10:00 AM [facility] [place]   3/30/2021 11:30 AM [facility]   3/31/2021 11:00 AM [facility]   4/1/2021 11:00 AM SS [facility]   4/2/2021 10:00 AM SS IN[facility]6 [facility]   4/3/2021 10:45 AM G1 TOMMY [facility]   4/4/2021  9:30 AM G1 TOMMY [facility]STRACK [facility]   4/5/2021 11:00 AM SS INF7 CH2 <1H [facility]   4/6/2021 10:00 AM SS INF4 CH4 <1H [facility]ICS [facility]   4/7/2021 10:30 AM SS IN[facility]   4/8/2021 11:30 AM SS INF4 CH4 <1H [facility] [place]   4/9/2021 10:15 AM SS INF4 CH4 <1H [facility]   4/10/2021 10:45 AM G1 TOMMY [facility]STR[facility]   4/11/2021  9:30 AM G1 [facility]STRACK [facility]   4/12/2021 10:00 AM SS INF4 CH4 <1H [facility]   4/13/2021 11:00 AM SS INF7 CH2 <1H [facility]   4/14/2021 10:00 AM SS INF7 CH3 <1H R[facility]ICS [facility]

## 2021-03-28 ENCOUNTER — HOSPITAL ENCOUNTER (OUTPATIENT)
Dept: INFUSION THERAPY | Age: 61
Discharge: HOME | End: 2021-03-28

## 2021-03-28 VITALS
DIASTOLIC BLOOD PRESSURE: 79 MMHG | HEART RATE: 99 BPM | SYSTOLIC BLOOD PRESSURE: 117 MMHG | OXYGEN SATURATION: 97 % | RESPIRATION RATE: 16 BRPM | TEMPERATURE: 98.2 F

## 2021-03-28 PROCEDURE — 74011250636 HC RX REV CODE- 250/636: Performed by: INTERNAL MEDICINE

## 2021-03-28 PROCEDURE — 74011000258 HC RX REV CODE- 258: Performed by: INTERNAL MEDICINE

## 2021-03-28 PROCEDURE — 96365 THER/PROPH/DIAG IV INF INIT: CPT

## 2021-03-28 RX ADMIN — SODIUM CHLORIDE 1 G: 900 INJECTION INTRAVENOUS at 10:49

## 2021-03-29 ENCOUNTER — HOSPITAL ENCOUNTER (OUTPATIENT)
Dept: INFUSION THERAPY | Age: 61
Discharge: HOME | End: 2021-03-29

## 2021-03-29 VITALS
SYSTOLIC BLOOD PRESSURE: 116 MMHG | OXYGEN SATURATION: 97 % | DIASTOLIC BLOOD PRESSURE: 65 MMHG | HEART RATE: 98 BPM | RESPIRATION RATE: 18 BRPM | TEMPERATURE: 96.9 F

## 2021-03-29 LAB
ALBUMIN SERPL-MCNC: 3.3 G/DL (ref 3.5–5)
ALBUMIN/GLOB SERPL: 0.8 {RATIO} (ref 1.1–2.2)
ALP SERPL-CCNC: 96 U/L (ref 45–117)
ALT SERPL-CCNC: 44 U/L (ref 12–78)
ANION GAP SERPL CALC-SCNC: 6 MMOL/L (ref 5–15)
AST SERPL-CCNC: 21 U/L (ref 15–37)
BASO+EOS+MONOS # BLD AUTO: 0.6 K/UL (ref 0.2–1.2)
BASO+EOS+MONOS NFR BLD AUTO: 10 % (ref 3.2–16.9)
BILIRUB DIRECT SERPL-MCNC: 0.1 MG/DL (ref 0–0.2)
BILIRUB SERPL-MCNC: 0.3 MG/DL (ref 0.2–1)
BUN SERPL-MCNC: 12 MG/DL (ref 6–20)
BUN/CREAT SERPL: 15 (ref 12–20)
CALCIUM SERPL-MCNC: 8.7 MG/DL (ref 8.5–10.1)
CHLORIDE SERPL-SCNC: 108 MMOL/L (ref 97–108)
CO2 SERPL-SCNC: 24 MMOL/L (ref 21–32)
CREAT SERPL-MCNC: 0.78 MG/DL (ref 0.7–1.3)
DIFFERENTIAL METHOD BLD: ABNORMAL
ERYTHROCYTE [DISTWIDTH] IN BLOOD BY AUTOMATED COUNT: 16.1 % (ref 11.8–15.8)
GLOBULIN SER CALC-MCNC: 4.2 G/DL (ref 2–4)
GLUCOSE SERPL-MCNC: 105 MG/DL (ref 65–100)
HCT VFR BLD AUTO: 33.6 % (ref 36.6–50.3)
HGB BLD-MCNC: 11 G/DL (ref 12.1–17)
LYMPHOCYTES # BLD: 2.1 K/UL (ref 0.8–3.5)
LYMPHOCYTES NFR BLD: 37 % (ref 12–49)
MCH RBC QN AUTO: 29.3 PG (ref 26–34)
MCHC RBC AUTO-ENTMCNC: 32.7 G/DL (ref 30–36.5)
MCV RBC AUTO: 89.6 FL (ref 80–99)
NEUTS SEG # BLD: 2.9 K/UL (ref 1.8–8)
NEUTS SEG NFR BLD: 53 % (ref 32–75)
PLATELET # BLD AUTO: 264 K/UL (ref 150–400)
POTASSIUM SERPL-SCNC: 3.8 MMOL/L (ref 3.5–5.1)
PROT SERPL-MCNC: 7.5 G/DL (ref 6.4–8.2)
RBC # BLD AUTO: 3.75 M/UL (ref 4.1–5.7)
SODIUM SERPL-SCNC: 138 MMOL/L (ref 136–145)
WBC # BLD AUTO: 5.6 K/UL (ref 4.1–11.1)

## 2021-03-29 PROCEDURE — 96365 THER/PROPH/DIAG IV INF INIT: CPT

## 2021-03-29 PROCEDURE — 80076 HEPATIC FUNCTION PANEL: CPT

## 2021-03-29 PROCEDURE — 85025 COMPLETE CBC W/AUTO DIFF WBC: CPT

## 2021-03-29 PROCEDURE — 74011000258 HC RX REV CODE- 258: Performed by: INTERNAL MEDICINE

## 2021-03-29 PROCEDURE — 36415 COLL VENOUS BLD VENIPUNCTURE: CPT

## 2021-03-29 PROCEDURE — 74011250636 HC RX REV CODE- 250/636: Performed by: INTERNAL MEDICINE

## 2021-03-29 PROCEDURE — 80048 BASIC METABOLIC PNL TOTAL CA: CPT

## 2021-03-29 RX ADMIN — SODIUM CHLORIDE 1 G: 900 INJECTION, SOLUTION INTRAVENOUS at 10:13

## 2021-03-29 NOTE — PROGRESS NOTES
Outpatient Infusion Center Short Visit Progress Note    7439 Patient admitted to [facility] for Invanz infusion ambulatory in stable condition. Assessment completed. No new concerns voiced. Covid Screening    1. Do you have any symptoms of COVID-19? SOB, coughing, fever, or generally not feeling well ? NO  2. Have you been exposed to COVID-19 recently? NO  3. Have you had any recent contact with family/friend that has a pending COVID test? NO    Vital Signs:  Visit Vitals  /65   Pulse 98   Temp 96.9 °F (36.1 °C)   Resp 18   SpO2 97%         Right single lumen PICC  with positive blood return. Labs drawn and sent for processing. Lab Results:  Recent Results (from the past 12 hour(s))   CBC WITH 3 PART DIFF    Collection Time: 03/29/21 10:08 AM   Result Value Ref Range    WBC 5.6 4.1 - 11.1 K/uL    RBC 3.75 (L) 4.10 - 5.70 M/uL    HGB 11.0 (L) 12.1 - 17.0 g/dL    HCT 33.6 (L) 36.6 - 50.3 %    MCV 89.6 80.0 - 99.0 FL    MCH 29.3 26.0 - 34.0 PG    MCHC 32.7 30.0 - 36.5 g/dL    RDW 16.1 (H) 11.8 - 15.8 %    PLATELET 825 762 - 958 K/uL    NEUTROPHILS 53 32 - 75 %    MIXED CELLS 10 3.2 - 16.9 %    LYMPHOCYTES 37 12 - 49 %    ABS. NEUTROPHILS 2.9 1.8 - 8.0 K/UL    ABS. MIXED CELLS 0.6 0.2 - 1.2 K/uL    ABS. LYMPHOCYTES 2.1 0.8 - 3.5 K/UL    DF AUTOMATED               Medications:  Medications Administered     ertapenem (INVANZ) 1 g in 0.9% sodium chloride (MBP/ADV) 50 mL     Admin Date  03/29/2021 Action  New Bag Dose  1 g Rate  100 mL/hr Route  IntraVENous Administered By  Sonja Lynch [facility]  Patient tolerated treatment well. Patient discharged from [place] [address] ambulatory in no distress. Patient aware of next appointment on 3/30/21.     Vinh Topete RN    Future Appointments   Date Time Provider Susan Dyer   3/30/2021 11:30 AM [facility]   3/31/2021 11:00 AM SS INF5 CH4 <1H [facility]   4/1/2021 11:00 AM SS INF4 CH4 <1H [facility] [place]   4/2/2021 10:00 AM SS IN[facility]6 [facility]   4/3/2021 10:45 AM G1 TOMMY FASTRACK [facility]   4/4/2021  9:30 AM G1 TOMMY FASTRACK [facility]   4/5/2021 11:00 AM SS INF7 [facility]   4/6/2021 10:00 AM SS INF4 [facility]   4/7/2021 10:30 AM SS IN[facility]4 [facility]   4/8/2021 11:30 AM SS INF4 [facility]   4/9/2021 10:15 AM SS IN[facility]4 [facility]   4/10/2021 10:45 AM G1 TOMMY FASTRACK [facility]   4/11/2021  9:30 AM G1 TOMMY FASTRACK [facility]   4/12/2021 10:00 AM SS INF4 [facility]H [facility]   4/13/2021 11:00 AM SS INF7 [facility]   4/14/2021 10:00 AM SS INF7 CH3 [facility]H [facility] [address] [place] [address]

## 2021-03-30 ENCOUNTER — HOSPITAL ENCOUNTER (OUTPATIENT)
Dept: INFUSION THERAPY | Age: 61
Discharge: HOME | End: 2021-03-30

## 2021-03-30 VITALS
SYSTOLIC BLOOD PRESSURE: 109 MMHG | RESPIRATION RATE: 18 BRPM | HEART RATE: 68 BPM | OXYGEN SATURATION: 98 % | TEMPERATURE: 97.3 F | DIASTOLIC BLOOD PRESSURE: 72 MMHG

## 2021-03-30 LAB — CRP SERPL-MCNC: 0.8 MG/DL (ref 0–0.6)

## 2021-03-30 PROCEDURE — 86140 C-REACTIVE PROTEIN: CPT

## 2021-03-30 PROCEDURE — 36415 COLL VENOUS BLD VENIPUNCTURE: CPT

## 2021-03-30 PROCEDURE — 74011000258 HC RX REV CODE- 258: Performed by: INTERNAL MEDICINE

## 2021-03-30 PROCEDURE — 96365 THER/PROPH/DIAG IV INF INIT: CPT

## 2021-03-30 PROCEDURE — 74011250636 HC RX REV CODE- 250/636: Performed by: INTERNAL MEDICINE

## 2021-03-30 RX ADMIN — SODIUM CHLORIDE 1 G: 900 INJECTION, SOLUTION INTRAVENOUS at 11:45

## 2021-03-31 ENCOUNTER — HOSPITAL ENCOUNTER (OUTPATIENT)
Dept: INFUSION THERAPY | Age: 61
Discharge: HOME | End: 2021-03-31

## 2021-03-31 VITALS
SYSTOLIC BLOOD PRESSURE: 105 MMHG | RESPIRATION RATE: 18 BRPM | DIASTOLIC BLOOD PRESSURE: 70 MMHG | TEMPERATURE: 96.6 F | OXYGEN SATURATION: 100 % | HEART RATE: 88 BPM

## 2021-03-31 PROCEDURE — 96365 THER/PROPH/DIAG IV INF INIT: CPT

## 2021-03-31 PROCEDURE — 74011000258 HC RX REV CODE- 258: Performed by: INTERNAL MEDICINE

## 2021-03-31 PROCEDURE — 74011250636 HC RX REV CODE- 250/636: Performed by: INTERNAL MEDICINE

## 2021-03-31 RX ADMIN — SODIUM CHLORIDE 1 G: 900 INJECTION, SOLUTION INTRAVENOUS at 11:19

## 2021-03-31 NOTE — PROGRESS NOTES
Outpatient Infusion Center Short Visit Progress Note    1110 Patient admitted to [facility] for daily Invanz ambulatory in stable condition. Assessment completed. No new concerns voiced. Covid Screening      1. Do you have any symptoms of COVID-19? SOB, coughing, fever, or generally not feeling well ? NO  2. Have you been exposed to COVID-19 recently? NO  3. Have you had any recent contact with family/friend that has a pending COVID test? NO    Vital Signs:  Visit Vitals  /70   Pulse 88   Temp (!) 96.6 °F (35.9 °C)   Resp 18   SpO2 100%         Right basilic single lumen PICC with positive blood return. Lab Results:  N/A        Medications:  Medications Administered     ertapenem (INVANZ) 1 g in 0.9% sodium chloride (MBP/ADV) 50 mL     Admin Date  03/31/2021 Action  New Bag Dose  1 g Rate  100 mL/hr Route  IntraVENous Administered By  Jose D Mukherjee RN                Patient tolerated treatment well. Patient discharged from [place] [address] ambulatory in no distress at 1200. Patient aware of next appointment.     Future Appointments   Date Time Provider Susan Dyer   4/1/2021 11:00 AM [facility]   4/2/2021 10:00 AM [facility]6 [facility]   4/3/2021 10:45 AM [facility]   4/4/2021  9:30 AM G1 TOMMY FASTRACK [facility]   4/5/2021 11:00 AM SS INF7 [facility]H [facility]   4/6/2021 10:00 AM SS INF4 [facility]H [facility]   4/7/2021 10:30 AM SS INF4 [facility]H [facility]   4/8/2021 11:30 AM SS INF4 [facility]H [facility]   4/9/2021 10:15 AM SS INF4 [facility]H [facility]   4/10/2021 10:45 AM G1 TOMMY FASTRACK [facility]   4/11/2021  9:30 AM G1 TOMMY FASTRACK [facility]   4/12/2021 10:00 AM SS INF4 CH4 <1H [facility]   4/13/2021 11:00 AM SS INF7 [facility]H [facility]   4/14/2021 10:00 AM SS INF7 CH3 <1H RCHICS [address] [place] [address]

## 2021-04-01 ENCOUNTER — HOSPITAL ENCOUNTER (OUTPATIENT)
Dept: INFUSION THERAPY | Age: 61
Discharge: HOME | End: 2021-04-01

## 2021-04-01 VITALS
TEMPERATURE: 96.8 F | OXYGEN SATURATION: 99 % | HEART RATE: 73 BPM | RESPIRATION RATE: 18 BRPM | DIASTOLIC BLOOD PRESSURE: 72 MMHG | SYSTOLIC BLOOD PRESSURE: 116 MMHG

## 2021-04-01 PROCEDURE — 74011000258 HC RX REV CODE- 258: Performed by: INTERNAL MEDICINE

## 2021-04-01 PROCEDURE — 74011250636 HC RX REV CODE- 250/636: Performed by: INTERNAL MEDICINE

## 2021-04-01 PROCEDURE — 96365 THER/PROPH/DIAG IV INF INIT: CPT

## 2021-04-01 RX ADMIN — SODIUM CHLORIDE 1 G: 900 INJECTION, SOLUTION INTRAVENOUS at 11:10

## 2021-04-01 NOTE — PROGRESS NOTES
[facility]    Date: 2021    Name: Sandor Gill    MRN: [id number]         : 1960    Mr. Hickey Arrived ambulatory and in no distress for Daily Invanz infusion. Assessment was completed, no acute issues at this time, no new complaints voiced. Right upper arm PICC flushed, + blood return. Covid questionnaire completed. 1. Do you have any symptoms of COVID-19? SOB, coughing, fever, or generally not feeling well - no    2. Have you been exposed to COVID-19 recently? - no    3. Have you had any recent contact with family/friend that has a pending COVID test? - no      Mr. Hickey's vitals were reviewed. Visit Vitals  /71 (BP 1 Location: Left arm, BP Patient Position: Sitting)   Pulse 65   Temp 96.8 °F (36 °C)   Resp 18   SpO2 99%     Medications:  Medications Administered     ertapenem (INVANZ) 1 g in 0.9% sodium chloride (MBP/ADV) 50 mL     Admin Date  2021 Action  New Bag Dose  1 g Rate  100 mL/hr Route  IntraVENous Administered By  Vale Darby RN                Mr. Jam Ivan tolerated treatment well and was discharged from [place] [address] in stable condition at 1200pm.   PIV flushed & removed. He is to return on 2021 at 1000am for his next appointment.     Raya Miller RN  2021

## 2021-04-02 ENCOUNTER — HOSPITAL ENCOUNTER (OUTPATIENT)
Dept: INFUSION THERAPY | Age: 61
Discharge: HOME | End: 2021-04-02

## 2021-04-02 VITALS
TEMPERATURE: 96.4 F | DIASTOLIC BLOOD PRESSURE: 78 MMHG | HEART RATE: 88 BPM | RESPIRATION RATE: 18 BRPM | SYSTOLIC BLOOD PRESSURE: 117 MMHG | OXYGEN SATURATION: 98 %

## 2021-04-02 PROCEDURE — 74011000258 HC RX REV CODE- 258: Performed by: INTERNAL MEDICINE

## 2021-04-02 PROCEDURE — 96365 THER/PROPH/DIAG IV INF INIT: CPT

## 2021-04-02 PROCEDURE — 77030020847 HC STATLOK BARD -A

## 2021-04-02 PROCEDURE — 74011250636 HC RX REV CODE- 250/636: Performed by: INTERNAL MEDICINE

## 2021-04-02 RX ADMIN — SODIUM CHLORIDE 1 G: 900 INJECTION, SOLUTION INTRAVENOUS at 10:10

## 2021-04-02 NOTE — PROGRESS NOTES
[facility] Progress Note    Date: 2021    Name: Kristen Smyth    MRN: [id number]         : 1960    Mr. Hickey Arrived ambulatory and in no distress for Daily Invanz. Assessment was completed, no acute issues at this time, no new complaints voiced. TONY PICC with + blood return, dressing CDI. Dressing changed today per policy. Mr. Cj Ding vitals were reviewed. Visit Vitals  /78   Pulse 88   Temp (!) 96.4 °F (35.8 °C)   Resp 18   SpO2 98%     Medications:  Medications Administered     ertapenem (INVANZ) 1 g in 0.9% sodium chloride (MBP/ADV) 50 mL     Admin Date  2021 Action  New Bag Dose  1 g Rate  100 mL/hr Route  IntraVENous Administered By  June Waters RN                Mr. Mainor Moore tolerated treatment well and was discharged from [place] [address] in stable condition. He is to return on April 3 2021 for his next appointment.     Janelle Hill RN  2021

## 2021-04-02 NOTE — PROGRESS NOTES
[facility] Progress Note    Date: 2021    Name: Vincenzo Dorman    MRN: [id number]         : 1960    Mr. Hickey Arrived ambulatory and in no distress for Daily Antibiotics. Assessment was completed, no acute issues at this time, no new complaints voiced. TONY PICC line flushed with saline, positive blood return noted. Dressing C/D/I and last changed on . No labs drawn today. The following COVID-19 screening questions were asked to the patient:  1. Do you have any symptoms of COVID-19? SOB, coughing, fever, or generally not feeling well? No.  2. Have you been exposed to COVID-19 recently? No.   3. Have you had any recent contact with family/friend that has a pending COVID test?   No.     Mr. Willeen Osgood vitals were reviewed. Patient Vitals for the past 12 hrs:   Temp Pulse Resp BP SpO2   21 0953 97.7 °F (36.5 °C) 78 18 109/66 98 %   21 0916 97.7 °F (36.5 °C) 84 18 125/87 100 %     Medications:  Medications Administered     ertapenem (INVANZ) 1 g in 0.9% sodium chloride (MBP/ADV) 50 mL MBP     Admin Date  2021 Action  New Bag Dose  1 g Rate  100 mL/hr Route  IntraVENous Administered By  Alvarez Cha RN              Mr. Mya Alejo tolerated treatment well and was discharged from [place] [address] in stable condition at 1000. PICC line flushed & heparinized per protocol and capped for tomorrows treatment. He is to return on  at 0930 for his next appointment.     Ave Dakin, RN  2021

## 2021-04-03 ENCOUNTER — HOSPITAL ENCOUNTER (OUTPATIENT)
Dept: INFUSION THERAPY | Age: 61
Discharge: HOME | End: 2021-04-03

## 2021-04-03 VITALS
OXYGEN SATURATION: 98 % | DIASTOLIC BLOOD PRESSURE: 66 MMHG | RESPIRATION RATE: 18 BRPM | SYSTOLIC BLOOD PRESSURE: 109 MMHG | TEMPERATURE: 97.7 F | HEART RATE: 78 BPM

## 2021-04-03 PROCEDURE — 74011000258 HC RX REV CODE- 258: Performed by: INTERNAL MEDICINE

## 2021-04-03 PROCEDURE — 96365 THER/PROPH/DIAG IV INF INIT: CPT

## 2021-04-03 PROCEDURE — 74011250636 HC RX REV CODE- 250/636: Performed by: INTERNAL MEDICINE

## 2021-04-03 RX ADMIN — SODIUM CHLORIDE 1 G: 900 INJECTION INTRAVENOUS at 09:20

## 2021-04-04 ENCOUNTER — HOSPITAL ENCOUNTER (OUTPATIENT)
Dept: INFUSION THERAPY | Age: 61
Discharge: HOME | End: 2021-04-04

## 2021-04-04 VITALS
SYSTOLIC BLOOD PRESSURE: 130 MMHG | HEART RATE: 75 BPM | DIASTOLIC BLOOD PRESSURE: 70 MMHG | RESPIRATION RATE: 16 BRPM | TEMPERATURE: 97.5 F

## 2021-04-04 PROCEDURE — 74011000258 HC RX REV CODE- 258: Performed by: INTERNAL MEDICINE

## 2021-04-04 PROCEDURE — 74011250636 HC RX REV CODE- 250/636: Performed by: INTERNAL MEDICINE

## 2021-04-04 PROCEDURE — 96365 THER/PROPH/DIAG IV INF INIT: CPT

## 2021-04-04 RX ADMIN — SODIUM CHLORIDE 1 G: 900 INJECTION INTRAVENOUS at 08:15

## 2021-04-04 NOTE — PROGRESS NOTES
[facility] Progress Note    Date: 2021    Name: Coleen Melgar    MRN: [id number]         : 1960    Mr. Hickey Arrived ambulatory and in no distress for Antibiotic Regimen. Assessment was completed, no acute issues at this time, no new complaints voiced. PICC lined flushed, + blood return. Covid questionnaire completed. 1. Do you have any symptoms of COVID-19? SOB, coughing, fever, or generally not feeling well - no    2. Have you been exposed to COVID-19 recently? - no    3. Have you had any recent contact with family/friend that has a pending COVID test? - no    Pt vitals were reviewed. Patient Vitals for the past 12 hrs:   Temp Pulse Resp BP   21 0858  75 16 130/70   21 0812 97.5 °F (36.4 °C) 79 16 120/72       Medications:  Wilbert Card IV    Mr. Kaycee Robertson tolerated treatment well and was discharged from [place] [address] in stable condition. PICC flushed & heparinized per protocol. He is to return on 21 to [address] [place] [address] for his next appointment.     Azul Partida RN  2021

## 2021-04-05 ENCOUNTER — HOSPITAL ENCOUNTER (OUTPATIENT)
Dept: INFUSION THERAPY | Age: 61
Discharge: HOME | End: 2021-04-05

## 2021-04-05 VITALS
DIASTOLIC BLOOD PRESSURE: 62 MMHG | RESPIRATION RATE: 16 BRPM | SYSTOLIC BLOOD PRESSURE: 119 MMHG | TEMPERATURE: 96.4 F | HEART RATE: 69 BPM

## 2021-04-05 LAB
ALBUMIN SERPL-MCNC: 3.3 G/DL (ref 3.5–5)
ALBUMIN/GLOB SERPL: 0.8 {RATIO} (ref 1.1–2.2)
ALP SERPL-CCNC: 99 U/L (ref 45–117)
ALT SERPL-CCNC: 56 U/L (ref 12–78)
ANION GAP SERPL CALC-SCNC: 5 MMOL/L (ref 5–15)
AST SERPL-CCNC: 29 U/L (ref 15–37)
BASO+EOS+MONOS # BLD AUTO: 0.3 K/UL (ref 0.2–1.2)
BASO+EOS+MONOS NFR BLD AUTO: 4 % (ref 3.2–16.9)
BILIRUB DIRECT SERPL-MCNC: 0.1 MG/DL (ref 0–0.2)
BILIRUB SERPL-MCNC: 0.4 MG/DL (ref 0.2–1)
BUN SERPL-MCNC: 15 MG/DL (ref 6–20)
BUN/CREAT SERPL: 17 (ref 12–20)
CALCIUM SERPL-MCNC: 8.4 MG/DL (ref 8.5–10.1)
CHLORIDE SERPL-SCNC: 107 MMOL/L (ref 97–108)
CO2 SERPL-SCNC: 27 MMOL/L (ref 21–32)
CREAT SERPL-MCNC: 0.86 MG/DL (ref 0.7–1.3)
CRP SERPL-MCNC: 1.14 MG/DL (ref 0–0.6)
DIFFERENTIAL METHOD BLD: ABNORMAL
ERYTHROCYTE [DISTWIDTH] IN BLOOD BY AUTOMATED COUNT: 16.2 % (ref 11.8–15.8)
GLOBULIN SER CALC-MCNC: 4 G/DL (ref 2–4)
GLUCOSE SERPL-MCNC: 124 MG/DL (ref 65–100)
HCT VFR BLD AUTO: 34.9 % (ref 36.6–50.3)
HGB BLD-MCNC: 11.3 G/DL (ref 12.1–17)
LYMPHOCYTES # BLD: 2.5 K/UL (ref 0.8–3.5)
LYMPHOCYTES NFR BLD: 42 % (ref 12–49)
MCH RBC QN AUTO: 28.9 PG (ref 26–34)
MCHC RBC AUTO-ENTMCNC: 32.4 G/DL (ref 30–36.5)
MCV RBC AUTO: 89.3 FL (ref 80–99)
NEUTS SEG # BLD: 3.2 K/UL (ref 1.8–8)
NEUTS SEG NFR BLD: 54 % (ref 32–75)
PLATELET # BLD AUTO: 229 K/UL (ref 150–400)
POTASSIUM SERPL-SCNC: 3.6 MMOL/L (ref 3.5–5.1)
PROT SERPL-MCNC: 7.3 G/DL (ref 6.4–8.2)
RBC # BLD AUTO: 3.91 M/UL (ref 4.1–5.7)
SODIUM SERPL-SCNC: 139 MMOL/L (ref 136–145)
WBC # BLD AUTO: 6 K/UL (ref 4.1–11.1)

## 2021-04-05 PROCEDURE — 96365 THER/PROPH/DIAG IV INF INIT: CPT

## 2021-04-05 PROCEDURE — 74011000258 HC RX REV CODE- 258: Performed by: INTERNAL MEDICINE

## 2021-04-05 PROCEDURE — 85025 COMPLETE CBC W/AUTO DIFF WBC: CPT

## 2021-04-05 PROCEDURE — 80048 BASIC METABOLIC PNL TOTAL CA: CPT

## 2021-04-05 PROCEDURE — 74011250636 HC RX REV CODE- 250/636: Performed by: INTERNAL MEDICINE

## 2021-04-05 PROCEDURE — 86140 C-REACTIVE PROTEIN: CPT

## 2021-04-05 PROCEDURE — 36415 COLL VENOUS BLD VENIPUNCTURE: CPT

## 2021-04-05 PROCEDURE — 80076 HEPATIC FUNCTION PANEL: CPT

## 2021-04-05 RX ADMIN — SODIUM CHLORIDE 1 G: 900 INJECTION, SOLUTION INTRAVENOUS at 11:15

## 2021-04-05 NOTE — PROGRESS NOTES
Outpatient Infusion Center Short Visit Progress Note    Patient admitted to [facility] for daily antibiotics and labs ambulatory in stable condition. Assessment completed. No new concerns voiced. Irritation on skin noted on edge of transparent dressing. Patient states it is just irritation not painful, slight pinkness noted. Covid Screening      1. Do you have any symptoms of COVID-19? SOB, coughing, fever, or generally not feeling well ?no  2. Have you been exposed to COVID-19 recently? no  3. Have you had any recent contact with family/friend that has a pending COVID test? no    Vital Signs:  Visit Vitals  /62   Pulse 69   Temp (!) 96.4 °F (35.8 °C)   Resp 16         R arm PICC with positive blood return. Lab Results:  Recent Results (from the past 12 hour(s))   CBC WITH 3 PART DIFF    Collection Time: 04/05/21 11:02 AM   Result Value Ref Range    WBC 6.0 4.1 - 11.1 K/uL    RBC 3.91 (L) 4.10 - 5.70 M/uL    HGB 11.3 (L) 12.1 - 17.0 g/dL    HCT 34.9 (L) 36.6 - 50.3 %    MCV 89.3 80.0 - 99.0 FL    MCH 28.9 26.0 - 34.0 PG    MCHC 32.4 30.0 - 36.5 g/dL    RDW 16.2 (H) 11.8 - 15.8 %    PLATELET 989 915 - 798 K/uL    NEUTROPHILS 54 32 - 75 %    MIXED CELLS 4 3.2 - 16.9 %    LYMPHOCYTES 42 12 - 49 %    ABS. NEUTROPHILS 3.2 1.8 - 8.0 K/UL    ABS. MIXED CELLS 0.3 0.2 - 1.2 K/uL    ABS.  LYMPHOCYTES 2.5 0.8 - 3.5 K/UL    DF AUTOMATED     METABOLIC PANEL, BASIC    Collection Time: 04/05/21 11:02 AM   Result Value Ref Range    Sodium 139 136 - 145 mmol/L    Potassium 3.6 3.5 - 5.1 mmol/L    Chloride 107 97 - 108 mmol/L    CO2 27 21 - 32 mmol/L    Anion gap 5 5 - 15 mmol/L    Glucose 124 (H) 65 - 100 mg/dL    BUN 15 6 - 20 MG/DL    Creatinine 0.86 0.70 - 1.30 MG/DL    BUN/Creatinine ratio 17 12 - 20      GFR est AA >60 >60 ml/min/1.73m2    GFR est non-AA >60 >60 ml/min/1.73m2    Calcium 8.4 (L) 8.5 - 10.1 MG/DL   HEPATIC FUNCTION PANEL    Collection Time: 04/05/21 11:02 AM   Result Value Ref Range    Protein, total 7.3 6.4 - 8.2 g/dL    Albumin 3.3 (L) 3.5 - 5.0 g/dL    Globulin 4.0 2.0 - 4.0 g/dL    A-G Ratio 0.8 (L) 1.1 - 2.2      Bilirubin, total 0.4 0.2 - 1.0 MG/DL    Bilirubin, direct 0.1 0.0 - 0.2 MG/DL    Alk. phosphatase 99 45 - 117 U/L    AST (SGOT) 29 15 - 37 U/L    ALT (SGPT) 56 12 - 78 U/L   C REACTIVE PROTEIN, QT    Collection Time: 04/05/21 11:02 AM   Result Value Ref Range    C-Reactive protein 1.14 (H) 0.00 - 0.60 mg/dL           Medications:  Medications Administered     ertapenem (INVANZ) 1 g in 0.9% sodium chloride (MBP/ADV) 50 mL     Admin Date  04/05/2021 Action  New Bag Dose  1 g Rate  100 mL/hr Route  IntraVENous Administered By  Cris Kumar                Patient tolerated treatment well. Patient discharged from [place] [address] ambulatory in no distress. Patient aware of next appointment.     Hair Aquino, KAREN    Future Appointments   Date Time Provider Susan Dyer   4/6/2021 10:00 AM SS INF4 CH4 <1H [facility]   4/7/2021  2:00 PM SS INF7 CH3 <1H [facility]   4/8/2021 11:30 AM SS INF4 CH4 <1H [facility]   4/9/2021 10:15 AM SS INF4 CH4 <1H [facility]   4/10/2021 10:45 AM G1 TOMMY [facility]STRACK [facility]   4/11/2021  9:30 AM G1 TOMMY FASTRACK [facility]   4/12/2021 10:00 AM SS INF4 CH4 <1H [facility]   4/13/2021 11:00 AM SS INF7 CH2 <1H [facility]   4/14/2021 10:00 AM SS INF7 CH3 <1H RCHICS [address] [place] [address]

## 2021-04-06 ENCOUNTER — HOSPITAL ENCOUNTER (OUTPATIENT)
Dept: INFUSION THERAPY | Age: 61
Discharge: HOME | End: 2021-04-06

## 2021-04-06 VITALS
WEIGHT: 207 LBS | DIASTOLIC BLOOD PRESSURE: 62 MMHG | RESPIRATION RATE: 16 BRPM | HEART RATE: 74 BPM | TEMPERATURE: 97.4 F | BODY MASS INDEX: 30.57 KG/M2 | SYSTOLIC BLOOD PRESSURE: 124 MMHG

## 2021-04-06 PROCEDURE — 74011000258 HC RX REV CODE- 258: Performed by: INTERNAL MEDICINE

## 2021-04-06 PROCEDURE — 96365 THER/PROPH/DIAG IV INF INIT: CPT

## 2021-04-06 PROCEDURE — 74011250636 HC RX REV CODE- 250/636: Performed by: INTERNAL MEDICINE

## 2021-04-06 RX ADMIN — SODIUM CHLORIDE 1 G: 900 INJECTION, SOLUTION INTRAVENOUS at 10:10

## 2021-04-06 NOTE — PROGRESS NOTES
Outpatient Infusion Center Short Visit Progress Note    Patient admitted to [facility] for daily antibiotics ambulatory in stable condition. Assessment completed. No new concerns voiced. Covid Screening  1. Do you have any symptoms of COVID-19? SOB, coughing, fever, or generally not feeling well ? no  2. Have you been exposed to COVID-19 recently? no  3. Have you had any recent contact with family/friend that has a pending COVID test? no    Vital Signs:  Visit Vitals  /62   Pulse 74   Temp 97.4 °F (36.3 °C)   Resp 16   Wt 93.9 kg (207 lb)   BMI 30.57 kg/m²       R PICC with positive blood return. Medications:  Medications Administered     ertapenem (INVANZ) 1 g in 0.9% sodium chloride (MBP/ADV) 50 mL     Admin Date  04/06/2021 Action  New Bag Dose  1 g Rate  100 mL/hr Route  IntraVENous Administered By  Jarocho Hebert                Patient tolerated treatment well. Patient discharged from [place] [address] ambulatory in no distress. Patient aware of next appointment.     Colleen Drake, KAREN    Future Appointments   Date Time Provider Susan Dyer   4/7/2021  2:00 PM [facility]   4/8/2021 11:30 AM SS INF4 [facility]4 [facility]   4/9/2021 10:15 AM [facility]   4/10/2021 10:45 AM G1 T[facility]MY [facility]ACK [facility]   4/11/2021  9:30 AM G1 TOMMY FASTRACK [facility]   4/12/2021 10:00 AM SS INF4 CH4 <1H [facility]   4/13/2021 11:00 AM SS INF7 [facility]2 [facility]H [facility]   4/14/2021 10:00 AM SS INF7 CH3 <1H RCHICS Sraita Benson

## 2021-04-07 ENCOUNTER — HOSPITAL ENCOUNTER (OUTPATIENT)
Dept: INFUSION THERAPY | Age: 61
Discharge: HOME | End: 2021-04-07

## 2021-04-07 VITALS
HEART RATE: 83 BPM | TEMPERATURE: 97.6 F | OXYGEN SATURATION: 98 % | RESPIRATION RATE: 16 BRPM | SYSTOLIC BLOOD PRESSURE: 100 MMHG | DIASTOLIC BLOOD PRESSURE: 61 MMHG

## 2021-04-07 PROCEDURE — 74011000258 HC RX REV CODE- 258: Performed by: INTERNAL MEDICINE

## 2021-04-07 PROCEDURE — 96365 THER/PROPH/DIAG IV INF INIT: CPT

## 2021-04-07 PROCEDURE — 74011250636 HC RX REV CODE- 250/636: Performed by: INTERNAL MEDICINE

## 2021-04-07 RX ADMIN — SODIUM CHLORIDE 1 G: 900 INJECTION, SOLUTION INTRAVENOUS at 14:03

## 2021-04-07 NOTE — PROGRESS NOTES
Outpatient Infusion Center Short Visit Progress Note    Patient admitted to [facility] for daily antibiotics ambulatory in stable condition. Assessment completed. No new concerns voiced. Covid Screening  1. Do you have any symptoms of COVID-19? SOB, coughing, fever, or generally not feeling well ? no  2. Have you been exposed to COVID-19 recently? no  3. Have you had any recent contact with family/friend that has a pending COVID test? no    Vital Signs:  Visit Vitals  BP (!) 144/78   Pulse 92   Temp 97.6 °F (36.4 °C)   Resp 16   SpO2 98%     R arm PICC  with positive blood return. Medications:  Medications Administered     ertapenem (INVANZ) 1 g in 0.9% sodium chloride (MBP/ADV) 50 mL     Admin Date  04/07/2021 Action  New Bag Dose  1 g Rate  100 mL/hr Route  IntraVENous Administered By  Clari Kahn RN              Patient tolerated treatment well. Patient discharged from [place] [address] ambulatory in no distress. Patient aware of next appointment.     Bethany Yan Rn    Future Appointments   Date Time Provider Susan Dyer   4/8/2021 11:30 AM SS INF4 CH4 <1H [facility]   4/9/2021 10:15 AM SS INF4 CH4 <1H [facility]   4/10/2021 10:45 AM G1 TOMMY FASTRACK [facility]   4/11/2021  9:30 AM G1 T[facility]ACK [facility]   4/12/2021 10:00 AM SS INF4 CH4 <1H [facility]   4/13/2021 11:00 AM SS INF7 CH2 <1H [facility]   4/14/2021 10:00 AM SS INF7 CH3 <1H RCHICS [address] [place] [address]

## 2021-04-08 ENCOUNTER — TRANSCRIBE ORDER (OUTPATIENT)
Dept: SCHEDULING | Age: 61
End: 2021-04-08

## 2021-04-08 ENCOUNTER — HOSPITAL ENCOUNTER (OUTPATIENT)
Dept: INFUSION THERAPY | Age: 61
Discharge: HOME | End: 2021-04-08

## 2021-04-08 VITALS
OXYGEN SATURATION: 98 % | SYSTOLIC BLOOD PRESSURE: 135 MMHG | RESPIRATION RATE: 16 BRPM | TEMPERATURE: 97.2 F | DIASTOLIC BLOOD PRESSURE: 76 MMHG | HEART RATE: 72 BPM

## 2021-04-08 DIAGNOSIS — T14.8XXA HEMATOMA: Primary | ICD-10-CM

## 2021-04-08 DIAGNOSIS — K35.80 ACUTE APPENDICITIS: Primary | ICD-10-CM

## 2021-04-08 PROCEDURE — 74011000258 HC RX REV CODE- 258: Performed by: INTERNAL MEDICINE

## 2021-04-08 PROCEDURE — 74011250636 HC RX REV CODE- 250/636: Performed by: INTERNAL MEDICINE

## 2021-04-08 PROCEDURE — 96365 THER/PROPH/DIAG IV INF INIT: CPT

## 2021-04-08 RX ORDER — SODIUM CHLORIDE 0.9 % (FLUSH) 0.9 %
5-10 SYRINGE (ML) INJECTION AS NEEDED
Status: DISCONTINUED | OUTPATIENT
Start: 2021-04-08 | End: 2021-04-09 | Stop reason: HOSPADM

## 2021-04-08 RX ORDER — HEPARIN 100 UNIT/ML
500 SYRINGE INTRAVENOUS AS NEEDED
Status: DISCONTINUED | OUTPATIENT
Start: 2021-04-08 | End: 2021-04-09 | Stop reason: HOSPADM

## 2021-04-08 RX ADMIN — Medication 10 ML: at 11:30

## 2021-04-08 RX ADMIN — SODIUM CHLORIDE 1 G: 900 INJECTION, SOLUTION INTRAVENOUS at 11:43

## 2021-04-08 RX ADMIN — Medication 500 UNITS: at 12:14

## 2021-04-08 RX ADMIN — Medication 10 ML: at 12:14

## 2021-04-08 NOTE — PROGRESS NOTES
Outpatient Infusion Center Short Visit Progress Note    1130 Patient admitted to [facility] for antibiotics ambulatory in stable condition. Assessment completed. No new concerns voiced. Covid Screening      1. Do you have any symptoms of COVID-19? SOB, coughing, fever, or generally not feeling well ? no  2. Have you been exposed to COVID-19 recently? no  3. Have you had any recent contact with family/friend that has a pending COVID test? no    Vital Signs:  Visit Vitals  /76   Pulse 72   Temp 97.2 °F (36.2 °C)   Resp 16   SpO2 98%         PICC with positive blood return. Medications:  Medications Administered     ertapenem (INVANZ) 1 g in 0.9% sodium chloride (MBP/ADV) 50 mL     Admin Date  04/08/2021 Action  New Bag Dose  1 g Rate  100 mL/hr Route  IntraVENous Administered By  Sissy Jaeger RN          heparin (porcine) pf 500 Units     Admin Date  04/08/2021 Action  Given Dose  500 Units Route  IntraVENous Administered By  Sissy Jaeger RN          sodium chloride (NS) flush 5-10 mL     Admin Date  04/08/2021 Action  Given Dose  10 mL Route  IntraVENous Administered By  Sissy Jaeger RN           Admin Date  04/08/2021 Action  Given Dose  10 mL Route  IntraVENous Administered By  Sissy Jaeger RN                1978 Patient tolerated treatment well. Patient discharged from [place] [address] ambulatory in no distress at 1230 Patient aware of next appointment.     Future Appointments   Date Time Provider Susan Dyer   4/9/2021 10:15 AM [facility]   4/10/2021 10:45 AM G1 TOMMY FASTRACK [facility]   4/11/2021  9:30 AM G1 TOMMY FASTRACK [facility]   4/12/2021 10:00 AM [facility]   4/13/2021 11:00 [facility]   4/14/2021  9:00 AM SS INF7 CH3 <1H R[facility] [address] [place] [address]

## 2021-04-09 ENCOUNTER — HOSPITAL ENCOUNTER (OUTPATIENT)
Dept: CT IMAGING | Age: 61
Discharge: HOME | End: 2021-04-09
Attending: SURGERY

## 2021-04-09 ENCOUNTER — HOSPITAL ENCOUNTER (OUTPATIENT)
Dept: INFUSION THERAPY | Age: 61
Discharge: HOME | End: 2021-04-09

## 2021-04-09 VITALS
HEART RATE: 74 BPM | RESPIRATION RATE: 18 BRPM | DIASTOLIC BLOOD PRESSURE: 81 MMHG | TEMPERATURE: 97.2 F | SYSTOLIC BLOOD PRESSURE: 138 MMHG

## 2021-04-09 DIAGNOSIS — T14.8XXA HEMATOMA: ICD-10-CM

## 2021-04-09 PROCEDURE — 96365 THER/PROPH/DIAG IV INF INIT: CPT

## 2021-04-09 PROCEDURE — 74011250636 HC RX REV CODE- 250/636: Performed by: INTERNAL MEDICINE

## 2021-04-09 PROCEDURE — 77030020847 HC STATLOK BARD -A

## 2021-04-09 PROCEDURE — 74011000258 HC RX REV CODE- 258: Performed by: INTERNAL MEDICINE

## 2021-04-09 PROCEDURE — 74011000636 HC RX REV CODE- 636: Performed by: RADIOLOGY

## 2021-04-09 PROCEDURE — 74177 CT ABD & PELVIS W/CONTRAST: CPT

## 2021-04-09 RX ADMIN — IOPAMIDOL 100 ML: 755 INJECTION, SOLUTION INTRAVENOUS at 08:54

## 2021-04-09 RX ADMIN — SODIUM CHLORIDE 1 G: 900 INJECTION, SOLUTION INTRAVENOUS at 10:32

## 2021-04-09 NOTE — PROGRESS NOTES
Outpatient Infusion Center Short Visit Progress Note    1010 Patient admitted to [facility] for IV Invanz ambulatory in stable condition. Assessment completed. No new concerns voiced. Covid Screening      1. Do you have any symptoms of COVID-19? SOB, coughing, fever, or generally not feeling well ? NO  2. Have you been exposed to COVID-19 recently? NO  3. Have you had any recent contact with family/friend that has a pending COVID test? NO    Vital Signs:  Visit Vitals  /81   Pulse 74   Temp 97.2 °F (36.2 °C)   Resp 18     Right upper arm PICC with positive blood return. Dressing dry and intact. Last changed 4/2/2021 per nurses note. No time, date or initials on dressing. Dressing changed per protocol with assistance of KAREN Bailey. Skin intact, no signs of trauma or redness. Pt tolerated well. Medications:  Medications Administered     ertapenem (INVANZ) 1 g in 0.9% sodium chloride (MBP/ADV) 50 mL     Admin Date  04/09/2021 Action  New Bag Dose  1 g Rate  100 mL/hr Route  IntraVENous Administered By  Ricardo Springer, RN                4775 Patient tolerated treatment well. PICC site secured with tubular netting. PICC flushed with NS and heparin per protocol. Patient discharged from [place] [address] ambulatory in no distress. Patient aware of next appointments.     Future Appointments   Date Time Provider Susan Dyer   4/10/2021 10:45 AM G1 TOMMY FASTRACK [facility]   4/11/2021  9:30 AM G1 TOMMY FASTRACK [facility]   4/12/2021 10:00 AM SS INF4 CH4 <1H [facility]   4/13/2021 11:00 AM SS INF7 CH2 <1H [facility]   4/14/2021  9:00 AM SS INF7 CH3 <1H [facility]

## 2021-04-10 ENCOUNTER — HOSPITAL ENCOUNTER (OUTPATIENT)
Dept: INFUSION THERAPY | Age: 61
Discharge: HOME | End: 2021-04-10

## 2021-04-10 VITALS
DIASTOLIC BLOOD PRESSURE: 78 MMHG | TEMPERATURE: 97.5 F | RESPIRATION RATE: 18 BRPM | HEART RATE: 75 BPM | SYSTOLIC BLOOD PRESSURE: 125 MMHG

## 2021-04-10 PROCEDURE — 74011250636 HC RX REV CODE- 250/636: Performed by: INTERNAL MEDICINE

## 2021-04-10 PROCEDURE — 96365 THER/PROPH/DIAG IV INF INIT: CPT

## 2021-04-10 PROCEDURE — 74011000258 HC RX REV CODE- 258: Performed by: INTERNAL MEDICINE

## 2021-04-10 RX ORDER — SODIUM CHLORIDE 0.9 % (FLUSH) 0.9 %
5-10 SYRINGE (ML) INJECTION AS NEEDED
Status: DISCONTINUED | OUTPATIENT
Start: 2021-04-10 | End: 2021-04-11 | Stop reason: HOSPADM

## 2021-04-10 RX ORDER — HEPARIN 100 UNIT/ML
500 SYRINGE INTRAVENOUS AS NEEDED
Status: DISCONTINUED | OUTPATIENT
Start: 2021-04-10 | End: 2021-04-11 | Stop reason: HOSPADM

## 2021-04-10 RX ADMIN — HEPARIN 500 UNITS: 100 SYRINGE at 10:16

## 2021-04-10 RX ADMIN — SODIUM CHLORIDE 1 G: 900 INJECTION INTRAVENOUS at 10:16

## 2021-04-10 NOTE — PROGRESS NOTES
Outpatient Infusion Center Short Visit Progress Note    Patient admitted to [facility] for daily antibiotics ambulatory in stable condition. Assessment completed. No new concerns voiced. Covid Screening      1. Do you have any symptoms of COVID-19? SOB, coughing, fever, or generally not feeling well ? no  2. Have you been exposed to COVID-19 recently? no  3. Have you had any recent contact with family/friend that has a pending COVID test?no    Vital Signs:  Visit Vitals  /78   Pulse 75   Temp 97.5 °F (36.4 °C)   Resp 18         R arm PICC with positive blood return. Medications:  Medications Administered     ertapenem (INVANZ) 1 g in 0.9% sodium chloride (MBP/ADV) 50 mL MBP     Admin Date  04/10/2021 Action  New Bag Dose  1 g Rate  100 mL/hr Route  IntraVENous Administered By  Lizandro Robles          heparin (porcine) pf 500 Units     Admin Date  04/10/2021 Action  Given Dose  500 Units Route  IntraVENous Administered By  Lizandro Robles                Patient tolerated treatment well. Patient discharged from [place] [address] ambulatory in no distress. Patient aware of next appointment.     Alan Rosado RN  Future Appointments   Date Time Provider Susan Dyer   4/10/2021 10:45 AM G1 TOMMY FASTRACK [facility]   4/11/2021  9:30 AM G1 [facility] FASTRACK [facility]    4/12/2021 10:00 AM SS INF4 CH4 <1H [facility]   4/13/2021 11:00 AM SS INF7 CH2 <1H [facility]   4/14/2021  9:00 AM SS INF7 CH3 <1H [facility]

## 2021-04-11 ENCOUNTER — HOSPITAL ENCOUNTER (OUTPATIENT)
Dept: INFUSION THERAPY | Age: 61
Discharge: HOME | End: 2021-04-11

## 2021-04-11 VITALS
RESPIRATION RATE: 18 BRPM | DIASTOLIC BLOOD PRESSURE: 80 MMHG | TEMPERATURE: 97.1 F | SYSTOLIC BLOOD PRESSURE: 130 MMHG | HEART RATE: 82 BPM

## 2021-04-11 PROCEDURE — 96365 THER/PROPH/DIAG IV INF INIT: CPT

## 2021-04-11 PROCEDURE — 74011250636 HC RX REV CODE- 250/636: Performed by: INTERNAL MEDICINE

## 2021-04-11 PROCEDURE — 74011000258 HC RX REV CODE- 258: Performed by: INTERNAL MEDICINE

## 2021-04-11 RX ORDER — SODIUM CHLORIDE 0.9 % (FLUSH) 0.9 %
10 SYRINGE (ML) INJECTION AS NEEDED
Status: DISCONTINUED | OUTPATIENT
Start: 2021-04-11 | End: 2021-04-13 | Stop reason: HOSPADM

## 2021-04-11 RX ORDER — HEPARIN 100 UNIT/ML
500 SYRINGE INTRAVENOUS AS NEEDED
Status: DISCONTINUED | OUTPATIENT
Start: 2021-04-11 | End: 2021-04-12 | Stop reason: HOSPADM

## 2021-04-11 RX ORDER — SODIUM CHLORIDE 9 MG/ML
25 INJECTION, SOLUTION INTRAVENOUS AS NEEDED
Status: DISCONTINUED | OUTPATIENT
Start: 2021-04-11 | End: 2021-04-12 | Stop reason: HOSPADM

## 2021-04-11 RX ADMIN — Medication 10 ML: at 10:21

## 2021-04-11 RX ADMIN — SODIUM CHLORIDE 25 ML/HR: 900 INJECTION, SOLUTION INTRAVENOUS at 09:38

## 2021-04-11 RX ADMIN — SODIUM CHLORIDE 1 G: 900 INJECTION INTRAVENOUS at 09:40

## 2021-04-11 RX ADMIN — Medication 10 ML: at 09:37

## 2021-04-11 RX ADMIN — HEPARIN 500 UNITS: 100 SYRINGE at 10:21

## 2021-04-11 NOTE — PROGRESS NOTES
[facility] Progress Note    Date: 2021    Name: Lori Brady    MRN: [id number]         : 1960    Mr. Hickey Arrived ambulatory and in no distress for Invanz Regimen. Assessment was completed, no acute issues at this time, no new complaints voiced. TONY single lumen PICC line flushed with positive blood return. Covid Questionnaire completed. 1. Do you have any symptoms of covid 19? SOB, coughing, fever, or generally not feeling well? - no  2. Have you been exposed to covid 19 recently? - no  3. Have you had any recent contact with family/friend that has a pending covid test? no      Mr. Hickey's vitals were reviewed. Visit Vitals  /80 (BP 1 Location: Left upper arm, BP Patient Position: Sitting)   Pulse 82   Temp 97.1 °F (36.2 °C)   Resp 18       Medications:  Medications Administered     0.9% sodium chloride infusion     Admin Date  2021 Action  New Bag Dose  25 mL/hr Rate  25 mL/hr Route  IntraVENous Administered By  Amaris Orona RN          ertapenem [facility]) 1 g in 0.9% sodium chloride (MBP/ADV) 50 mL MBP     Admin Date  2021 Action  New Bag Dose  1 g Rate  100 mL/hr Route  IntraVENous Administered By  Amaris Orona RN          heparin (porcine) pf 500 Units     Admin Date  2021 Action  Given Dose  500 Units Route  IntraVENous Administered By  Amaris Orona RN          sodium chloride (NS) flush 10 mL     Admin Date  2021 Action  Given Dose  10 mL Route  IntraVENous Administered By  Amaris Orona RN           Admin Date  2021 Action  Given Dose  10 mL Route  IntraVENous Administered By  Amaris Orona RN                Mr. Dona Talbert tolerated treatment well and was discharged from [place] [address] in stable condition at 1025. TONY single lumen PICC line flushed with positive blood return, heparinized, and capped per protocol.  He is to return on     Future Appointments   Date Time Provider Susan Dyer   2021 10:00 AM SS INF4 CH4 <1H [facility]   4/13/2021 11:00 AM SS INF7 CH2 <1H [facility]   4/14/2021  9:00 AM SS [facility]

## 2021-04-12 ENCOUNTER — HOSPITAL ENCOUNTER (OUTPATIENT)
Dept: INFUSION THERAPY | Age: 61
Discharge: HOME | End: 2021-04-12

## 2021-04-12 VITALS
SYSTOLIC BLOOD PRESSURE: 114 MMHG | DIASTOLIC BLOOD PRESSURE: 71 MMHG | OXYGEN SATURATION: 97 % | TEMPERATURE: 97.2 F | HEART RATE: 82 BPM | RESPIRATION RATE: 18 BRPM

## 2021-04-12 LAB
ALBUMIN SERPL-MCNC: 3.4 G/DL (ref 3.5–5)
ALBUMIN/GLOB SERPL: 0.9 {RATIO} (ref 1.1–2.2)
ALP SERPL-CCNC: 94 U/L (ref 45–117)
ALT SERPL-CCNC: 50 U/L (ref 12–78)
ANION GAP SERPL CALC-SCNC: 8 MMOL/L (ref 5–15)
AST SERPL-CCNC: 23 U/L (ref 15–37)
BASO+EOS+MONOS # BLD AUTO: 0.6 K/UL (ref 0.2–1.2)
BASO+EOS+MONOS NFR BLD AUTO: 10 % (ref 3.2–16.9)
BILIRUB DIRECT SERPL-MCNC: 0.1 MG/DL (ref 0–0.2)
BILIRUB SERPL-MCNC: 0.4 MG/DL (ref 0.2–1)
BUN SERPL-MCNC: 11 MG/DL (ref 6–20)
BUN/CREAT SERPL: 12 (ref 12–20)
CALCIUM SERPL-MCNC: 8.6 MG/DL (ref 8.5–10.1)
CHLORIDE SERPL-SCNC: 106 MMOL/L (ref 97–108)
CO2 SERPL-SCNC: 25 MMOL/L (ref 21–32)
CREAT SERPL-MCNC: 0.89 MG/DL (ref 0.7–1.3)
CRP SERPL-MCNC: 0.71 MG/DL (ref 0–0.6)
DIFFERENTIAL METHOD BLD: ABNORMAL
ERYTHROCYTE [DISTWIDTH] IN BLOOD BY AUTOMATED COUNT: 16.2 % (ref 11.8–15.8)
GLOBULIN SER CALC-MCNC: 3.9 G/DL (ref 2–4)
GLUCOSE SERPL-MCNC: 116 MG/DL (ref 65–100)
HCT VFR BLD AUTO: 34.7 % (ref 36.6–50.3)
HGB BLD-MCNC: 11.8 G/DL (ref 12.1–17)
LYMPHOCYTES # BLD: 2.3 K/UL (ref 0.8–3.5)
LYMPHOCYTES NFR BLD: 38 % (ref 12–49)
MCH RBC QN AUTO: 30.3 PG (ref 26–34)
MCHC RBC AUTO-ENTMCNC: 34 G/DL (ref 30–36.5)
MCV RBC AUTO: 89.2 FL (ref 80–99)
NEUTS SEG # BLD: 3.2 K/UL (ref 1.8–8)
NEUTS SEG NFR BLD: 52 % (ref 32–75)
PLATELET # BLD AUTO: 250 K/UL (ref 150–400)
POTASSIUM SERPL-SCNC: 3.4 MMOL/L (ref 3.5–5.1)
PROT SERPL-MCNC: 7.3 G/DL (ref 6.4–8.2)
RBC # BLD AUTO: 3.89 M/UL (ref 4.1–5.7)
SODIUM SERPL-SCNC: 139 MMOL/L (ref 136–145)
WBC # BLD AUTO: 6.1 K/UL (ref 4.1–11.1)

## 2021-04-12 PROCEDURE — 85025 COMPLETE CBC W/AUTO DIFF WBC: CPT

## 2021-04-12 PROCEDURE — 96365 THER/PROPH/DIAG IV INF INIT: CPT

## 2021-04-12 PROCEDURE — 80076 HEPATIC FUNCTION PANEL: CPT

## 2021-04-12 PROCEDURE — 74011000258 HC RX REV CODE- 258: Performed by: INTERNAL MEDICINE

## 2021-04-12 PROCEDURE — 74011250636 HC RX REV CODE- 250/636: Performed by: INTERNAL MEDICINE

## 2021-04-12 PROCEDURE — 80048 BASIC METABOLIC PNL TOTAL CA: CPT

## 2021-04-12 PROCEDURE — 36415 COLL VENOUS BLD VENIPUNCTURE: CPT

## 2021-04-12 PROCEDURE — 86140 C-REACTIVE PROTEIN: CPT

## 2021-04-12 RX ADMIN — SODIUM CHLORIDE 1 G: 900 INJECTION, SOLUTION INTRAVENOUS at 10:05

## 2021-04-12 NOTE — PROGRESS NOTES
[facility] Progress Note    Date: 2021    Name: Lori Brady    MRN: [id number]         : 1960    Mr. Hickey Arrived ambulatory and in no distress for Daily Invanz. Assessment was completed, no acute issues at this time, no new complaints voiced. TONY PICC with + blood return, dressing CDI. Labs drawn per order & sent for processing. Labs pending in CC. Mr. Jessica Cheng vitals were reviewed. Visit Vitals  /71 (BP 1 Location: Left arm, BP Patient Position: At rest)   Pulse 82   Temp 97.2 °F (36.2 °C)   Resp 18   SpO2 97%       Medications:  Medications Administered     ertapenem (INVANZ) 1 g in 0.9% sodium chloride (MBP/ADV) 50 mL     Admin Date  2021 Action  New Bag Dose  1 g Rate  100 mL/hr Route  IntraVENous Administered By  Mine Hays, KAREN                Mr. Dona Talbert tolerated treatment well and was discharged from [place] [address] in stable condition. He is aware of future appointments.     Future Appointments   Date Time Provider Susan Dyer   2021 11:00 AM SS INF7 CH2 <1H [facility]   2021  9:00 [facility]  2021

## 2021-04-13 ENCOUNTER — HOSPITAL ENCOUNTER (OUTPATIENT)
Dept: INFUSION THERAPY | Age: 61
Discharge: HOME | End: 2021-04-13

## 2021-04-13 VITALS
RESPIRATION RATE: 18 BRPM | TEMPERATURE: 96.8 F | OXYGEN SATURATION: 97 % | SYSTOLIC BLOOD PRESSURE: 133 MMHG | DIASTOLIC BLOOD PRESSURE: 71 MMHG | HEART RATE: 86 BPM

## 2021-04-13 PROCEDURE — 96365 THER/PROPH/DIAG IV INF INIT: CPT

## 2021-04-13 PROCEDURE — 74011000258 HC RX REV CODE- 258: Performed by: INTERNAL MEDICINE

## 2021-04-13 PROCEDURE — 74011250636 HC RX REV CODE- 250/636: Performed by: INTERNAL MEDICINE

## 2021-04-13 RX ADMIN — SODIUM CHLORIDE 1 G: 900 INJECTION, SOLUTION INTRAVENOUS at 11:12

## 2021-04-13 NOTE — PROGRESS NOTES
Outpatient Infusion Center Short Visit Progress Note    Patient admitted to [facility] for daily antibiotics ambulatory in stable condition. Assessment completed. No new concerns voiced. Covid Screening  1. Do you have any symptoms of COVID-19? SOB, coughing, fever, or generally not feeling well ? no  2. Have you been exposed to COVID-19 recently? no  3. Have you had any recent contact with family/friend that has a pending COVID test? no    Vital Signs:  Visit Vitals  /71 (BP 1 Location: Left arm, BP Patient Position: At rest)   Pulse 86   Temp 96.8 °F (36 °C)   Resp 18   SpO2 97%       R arm PICC with positive blood return. Medications:  Medications Administered     ertapenem (INVANZ) 1 g in 0.9% sodium chloride (MBP/ADV) 50 mL     Admin Date  04/13/2021 Action  New Bag Dose  1 g Rate  100 mL/hr Route  IntraVENous Administered By  Jarocho Hebert              Patient tolerated treatment well. Patient discharged from [place] [address] ambulatory in no distress. Patient aware of next appointment.     Colleen Drake RN    Future Appointments   Date Time Provider Susan Dyer   4/14/2021  9:00 AM SS INF7 CH3 <1H [facility]

## 2021-04-14 ENCOUNTER — HOSPITAL ENCOUNTER (OUTPATIENT)
Dept: INFUSION THERAPY | Age: 61
Discharge: HOME | End: 2021-04-14

## 2021-04-14 VITALS
SYSTOLIC BLOOD PRESSURE: 126 MMHG | RESPIRATION RATE: 18 BRPM | TEMPERATURE: 96.5 F | OXYGEN SATURATION: 98 % | DIASTOLIC BLOOD PRESSURE: 73 MMHG | HEART RATE: 62 BPM

## 2021-04-14 PROCEDURE — 74011250636 HC RX REV CODE- 250/636: Performed by: INTERNAL MEDICINE

## 2021-04-14 PROCEDURE — 96365 THER/PROPH/DIAG IV INF INIT: CPT

## 2021-04-14 PROCEDURE — 74011000258 HC RX REV CODE- 258: Performed by: INTERNAL MEDICINE

## 2021-04-14 RX ADMIN — SODIUM CHLORIDE 1 G: 900 INJECTION, SOLUTION INTRAVENOUS at 09:13

## 2021-04-14 NOTE — PROGRESS NOTES
Problem: Infection - Risk of, Central Venous Catheter-Associated Bloodstream Infection  Goal: *Absence of infection signs and symptoms  4/14/2021 0941 by Sandy Morrow RN  Outcome: Progressing Towards Goal  4/14/2021 0940 by Sandy Morrow RN  Outcome: Progressing Towards Goal

## 2021-04-14 NOTE — PROGRESS NOTES
[facility] Progress Note    Date: 2021    Name: Reuben Trujillo    MRN: [id number]         : 1960    Mr. Hickey Arrived ambulatory and in no distress forFinal day of Invanz infusion. Assessment was completed, no acute issues at this time, no new complaints voiced. PICC line accessed for infusion, positive blood return. No flowsheet data found. Mr. Gardiner April vitals were reviewed. Visit Vitals  /73   Pulse 62   Temp (!) 96.5 °F (35.8 °C)   Resp 18   SpO2 98%       Lab results were obtained and reviewed. No results found for this or any previous visit (from the past 12 hour(s)). Medications:  Medications Administered     ertapenem (INVANZ) 1 g in 0.9% sodium chloride (MBP/ADV) 50 mL     Admin Date  2021 Action  New Bag Dose  1 g Rate  100 mL/hr Route  IntraVENous Administered By  Hannah Cordero RN                Mr. Olesya Cagle tolerated treatment well and was discharged from [place] [address] in stable condition at 1040. PICC line removed, per protocol.  VS stable, dressing dry and intact 30 minutes after removal .Bárbara Powers RN  2021

## 2021-10-13 ENCOUNTER — HOSPITAL ENCOUNTER (INPATIENT)
Age: 61
LOS: 8 days | Discharge: HOME | DRG: 177 | End: 2021-10-22
Attending: EMERGENCY MEDICINE | Admitting: INTERNAL MEDICINE

## 2021-10-13 ENCOUNTER — APPOINTMENT (OUTPATIENT)
Dept: GENERAL RADIOLOGY | Age: 61
DRG: 177 | End: 2021-10-13
Attending: EMERGENCY MEDICINE

## 2021-10-13 DIAGNOSIS — R55 SYNCOPE AND COLLAPSE: ICD-10-CM

## 2021-10-13 DIAGNOSIS — U07.1 LAB TEST POSITIVE FOR DETECTION OF COVID-19 VIRUS: Primary | ICD-10-CM

## 2021-10-13 DIAGNOSIS — I95.9 HYPOTENSION, UNSPECIFIED HYPOTENSION TYPE: ICD-10-CM

## 2021-10-13 LAB
ALBUMIN SERPL-MCNC: 3.1 G/DL (ref 3.5–5)
ALBUMIN/GLOB SERPL: 0.8 {RATIO} (ref 1.1–2.2)
ALP SERPL-CCNC: 47 U/L (ref 45–117)
ALT SERPL-CCNC: 22 U/L (ref 12–78)
ANION GAP SERPL CALC-SCNC: 8 MMOL/L (ref 5–15)
AST SERPL-CCNC: 29 U/L (ref 15–37)
BASOPHILS # BLD: 0 K/UL (ref 0–0.1)
BASOPHILS NFR BLD: 0 % (ref 0–1)
BILIRUB SERPL-MCNC: 0.4 MG/DL (ref 0.2–1)
BNP SERPL-MCNC: 38 PG/ML
BUN SERPL-MCNC: 18 MG/DL (ref 6–20)
BUN/CREAT SERPL: 12 (ref 12–20)
CALCIUM SERPL-MCNC: 7.9 MG/DL (ref 8.5–10.1)
CHLORIDE SERPL-SCNC: 104 MMOL/L (ref 97–108)
CO2 SERPL-SCNC: 25 MMOL/L (ref 21–32)
CREAT SERPL-MCNC: 1.5 MG/DL (ref 0.7–1.3)
DIFFERENTIAL METHOD BLD: NORMAL
EOSINOPHIL # BLD: 0 K/UL (ref 0–0.4)
EOSINOPHIL NFR BLD: 0 % (ref 0–7)
ERYTHROCYTE [DISTWIDTH] IN BLOOD BY AUTOMATED COUNT: 13.3 % (ref 11.5–14.5)
GLOBULIN SER CALC-MCNC: 4 G/DL (ref 2–4)
GLUCOSE SERPL-MCNC: 117 MG/DL (ref 65–100)
HCT VFR BLD AUTO: 43.3 % (ref 36.6–50.3)
HGB BLD-MCNC: 14.9 G/DL (ref 12.1–17)
IMM GRANULOCYTES # BLD AUTO: 0 K/UL (ref 0–0.04)
IMM GRANULOCYTES NFR BLD AUTO: 0 % (ref 0–0.5)
LYMPHOCYTES # BLD: 1.8 K/UL (ref 0.8–3.5)
LYMPHOCYTES NFR BLD: 20 % (ref 12–49)
MCH RBC QN AUTO: 30.5 PG (ref 26–34)
MCHC RBC AUTO-ENTMCNC: 34.4 G/DL (ref 30–36.5)
MCV RBC AUTO: 88.7 FL (ref 80–99)
MONOCYTES # BLD: 0.7 K/UL (ref 0–1)
MONOCYTES NFR BLD: 7 % (ref 5–13)
NEUTS SEG # BLD: 6.4 K/UL (ref 1.8–8)
NEUTS SEG NFR BLD: 73 % (ref 32–75)
NRBC # BLD: 0 K/UL (ref 0–0.01)
NRBC BLD-RTO: 0 PER 100 WBC
PLATELET # BLD AUTO: 185 K/UL (ref 150–400)
PMV BLD AUTO: 10.4 FL (ref 8.9–12.9)
POTASSIUM SERPL-SCNC: 3.6 MMOL/L (ref 3.5–5.1)
PROT SERPL-MCNC: 7.1 G/DL (ref 6.4–8.2)
RBC # BLD AUTO: 4.88 M/UL (ref 4.1–5.7)
SODIUM SERPL-SCNC: 137 MMOL/L (ref 136–145)
WBC # BLD AUTO: 8.9 K/UL (ref 4.1–11.1)

## 2021-10-13 PROCEDURE — 85025 COMPLETE CBC W/AUTO DIFF WBC: CPT

## 2021-10-13 PROCEDURE — 83880 ASSAY OF NATRIURETIC PEPTIDE: CPT

## 2021-10-13 PROCEDURE — 85379 FIBRIN DEGRADATION QUANT: CPT

## 2021-10-13 PROCEDURE — 87635 SARS-COV-2 COVID-19 AMP PRB: CPT

## 2021-10-13 PROCEDURE — 74011250636 HC RX REV CODE- 250/636: Performed by: EMERGENCY MEDICINE

## 2021-10-13 PROCEDURE — 96360 HYDRATION IV INFUSION INIT: CPT

## 2021-10-13 PROCEDURE — 36415 COLL VENOUS BLD VENIPUNCTURE: CPT

## 2021-10-13 PROCEDURE — 80053 COMPREHEN METABOLIC PANEL: CPT

## 2021-10-13 PROCEDURE — 71045 X-RAY EXAM CHEST 1 VIEW: CPT

## 2021-10-13 PROCEDURE — 96361 HYDRATE IV INFUSION ADD-ON: CPT

## 2021-10-13 PROCEDURE — 87040 BLOOD CULTURE FOR BACTERIA: CPT

## 2021-10-13 PROCEDURE — 99285 EMERGENCY DEPT VISIT HI MDM: CPT

## 2021-10-13 RX ORDER — SODIUM CHLORIDE 0.9 % (FLUSH) 0.9 %
5-10 SYRINGE (ML) INJECTION AS NEEDED
Status: DISCONTINUED | OUTPATIENT
Start: 2021-10-13 | End: 2021-10-22 | Stop reason: HOSPADM

## 2021-10-13 RX ADMIN — SODIUM CHLORIDE 817 ML: 9 INJECTION, SOLUTION INTRAVENOUS at 23:46

## 2021-10-13 RX ADMIN — SODIUM CHLORIDE 1000 ML: 9 INJECTION, SOLUTION INTRAVENOUS at 23:46

## 2021-10-13 RX ADMIN — SODIUM CHLORIDE 1000 ML: 9 INJECTION, SOLUTION INTRAVENOUS at 22:00

## 2021-10-14 ENCOUNTER — APPOINTMENT (OUTPATIENT)
Dept: CT IMAGING | Age: 61
DRG: 177 | End: 2021-10-14
Attending: INTERNAL MEDICINE

## 2021-10-14 ENCOUNTER — APPOINTMENT (OUTPATIENT)
Dept: NON INVASIVE DIAGNOSTICS | Age: 61
DRG: 177 | End: 2021-10-14
Attending: INTERNAL MEDICINE

## 2021-10-14 PROBLEM — U07.1 COVID-19: Status: ACTIVE | Noted: 2021-10-14

## 2021-10-14 PROBLEM — R79.89 ELEVATED LACTIC ACID LEVEL: Status: RESOLVED | Noted: 2021-10-14 | Resolved: 2021-10-14

## 2021-10-14 PROBLEM — N28.9 RENAL INSUFFICIENCY: Status: ACTIVE | Noted: 2021-10-14

## 2021-10-14 PROBLEM — I95.9 HYPOTENSION: Status: ACTIVE | Noted: 2021-10-14

## 2021-10-14 PROBLEM — R79.89 ELEVATED LACTIC ACID LEVEL: Status: ACTIVE | Noted: 2021-10-14

## 2021-10-14 LAB
ALBUMIN SERPL-MCNC: 2.6 G/DL (ref 3.5–5)
ALBUMIN/GLOB SERPL: 0.7 {RATIO} (ref 1.1–2.2)
ALP SERPL-CCNC: 37 U/L (ref 45–117)
ALT SERPL-CCNC: 22 U/L (ref 12–78)
ANION GAP SERPL CALC-SCNC: 4 MMOL/L (ref 5–15)
APPEARANCE UR: CLEAR
AST SERPL-CCNC: 33 U/L (ref 15–37)
ATRIAL RATE: 91 BPM
BACTERIA URNS QL MICRO: NEGATIVE /HPF
BASOPHILS # BLD: 0 K/UL (ref 0–0.1)
BASOPHILS NFR BLD: 0 % (ref 0–1)
BILIRUB SERPL-MCNC: 0.3 MG/DL (ref 0.2–1)
BILIRUB UR QL: NEGATIVE
BUN SERPL-MCNC: 13 MG/DL (ref 6–20)
BUN/CREAT SERPL: 13 (ref 12–20)
CALCIUM SERPL-MCNC: 7 MG/DL (ref 8.5–10.1)
CALCULATED P AXIS, ECG09: 57 DEGREES
CALCULATED R AXIS, ECG10: -33 DEGREES
CALCULATED T AXIS, ECG11: 10 DEGREES
CHLORIDE SERPL-SCNC: 111 MMOL/L (ref 97–108)
CO2 SERPL-SCNC: 24 MMOL/L (ref 21–32)
COLOR UR: ABNORMAL
COVID-19 RAPID TEST, COVR: DETECTED
CREAT SERPL-MCNC: 1.04 MG/DL (ref 0.7–1.3)
CRP SERPL-MCNC: 16.5 MG/DL (ref 0–0.6)
D DIMER PPP FEU-MCNC: 0.6 MG/L FEU (ref 0–0.65)
DIAGNOSIS, 93000: NORMAL
DIFFERENTIAL METHOD BLD: ABNORMAL
EOSINOPHIL # BLD: 0 K/UL (ref 0–0.4)
EOSINOPHIL NFR BLD: 0 % (ref 0–7)
EPITH CASTS URNS QL MICRO: ABNORMAL /LPF
ERYTHROCYTE [DISTWIDTH] IN BLOOD BY AUTOMATED COUNT: 13.5 % (ref 11.5–14.5)
GLOBULIN SER CALC-MCNC: 3.5 G/DL (ref 2–4)
GLUCOSE SERPL-MCNC: 136 MG/DL (ref 65–100)
GLUCOSE UR STRIP.AUTO-MCNC: NEGATIVE MG/DL
HCT VFR BLD AUTO: 39.7 % (ref 36.6–50.3)
HGB BLD-MCNC: 13.1 G/DL (ref 12.1–17)
HGB UR QL STRIP: NEGATIVE
HYALINE CASTS URNS QL MICRO: ABNORMAL /LPF (ref 0–5)
IMM GRANULOCYTES # BLD AUTO: 0.1 K/UL (ref 0–0.04)
IMM GRANULOCYTES NFR BLD AUTO: 1 % (ref 0–0.5)
KETONES UR QL STRIP.AUTO: ABNORMAL MG/DL
LACTATE BLD-SCNC: 0.94 MMOL/L (ref 0.4–2)
LEUKOCYTE ESTERASE UR QL STRIP.AUTO: NEGATIVE
LYMPHOCYTES # BLD: 0.7 K/UL (ref 0.8–3.5)
LYMPHOCYTES NFR BLD: 9 % (ref 12–49)
MAGNESIUM SERPL-MCNC: 2.3 MG/DL (ref 1.6–2.4)
MCH RBC QN AUTO: 29.8 PG (ref 26–34)
MCHC RBC AUTO-ENTMCNC: 33 G/DL (ref 30–36.5)
MCV RBC AUTO: 90.4 FL (ref 80–99)
MONOCYTES # BLD: 0.3 K/UL (ref 0–1)
MONOCYTES NFR BLD: 4 % (ref 5–13)
NEUTS SEG # BLD: 6.2 K/UL (ref 1.8–8)
NEUTS SEG NFR BLD: 86 % (ref 32–75)
NITRITE UR QL STRIP.AUTO: NEGATIVE
NRBC # BLD: 0 K/UL (ref 0–0.01)
NRBC BLD-RTO: 0 PER 100 WBC
P-R INTERVAL, ECG05: 166 MS
PH UR STRIP: 6.5 [PH] (ref 5–8)
PLATELET # BLD AUTO: 161 K/UL (ref 150–400)
PMV BLD AUTO: 10.4 FL (ref 8.9–12.9)
POTASSIUM SERPL-SCNC: 3.7 MMOL/L (ref 3.5–5.1)
PROCALCITONIN SERPL-MCNC: 0.15 NG/ML
PROT SERPL-MCNC: 6.1 G/DL (ref 6.4–8.2)
PROT UR STRIP-MCNC: ABNORMAL MG/DL
Q-T INTERVAL, ECG07: 332 MS
QRS DURATION, ECG06: 98 MS
QTC CALCULATION (BEZET), ECG08: 408 MS
RBC # BLD AUTO: 4.39 M/UL (ref 4.1–5.7)
RBC #/AREA URNS HPF: ABNORMAL /HPF (ref 0–5)
RBC MORPH BLD: ABNORMAL
SODIUM SERPL-SCNC: 139 MMOL/L (ref 136–145)
SOURCE, COVRS: ABNORMAL
SP GR UR REFRACTOMETRY: 1.02 (ref 1–1.03)
TROPONIN-HIGH SENSITIVITY: 11 NG/L (ref 0–76)
TSH SERPL DL<=0.05 MIU/L-ACNC: 0.66 UIU/ML (ref 0.36–3.74)
UA: UC IF INDICATED,UAUC: ABNORMAL
UROBILINOGEN UR QL STRIP.AUTO: 0.2 EU/DL (ref 0.2–1)
VENTRICULAR RATE, ECG03: 91 BPM
WBC # BLD AUTO: 7.3 K/UL (ref 4.1–11.1)
WBC URNS QL MICRO: ABNORMAL /HPF (ref 0–4)

## 2021-10-14 PROCEDURE — 87449 NOS EACH ORGANISM AG IA: CPT

## 2021-10-14 PROCEDURE — 81001 URINALYSIS AUTO W/SCOPE: CPT

## 2021-10-14 PROCEDURE — 74011250636 HC RX REV CODE- 250/636: Performed by: INTERNAL MEDICINE

## 2021-10-14 PROCEDURE — 87899 AGENT NOS ASSAY W/OPTIC: CPT

## 2021-10-14 PROCEDURE — 77010033678 HC OXYGEN DAILY

## 2021-10-14 PROCEDURE — 83735 ASSAY OF MAGNESIUM: CPT

## 2021-10-14 PROCEDURE — 65270000029 HC RM PRIVATE

## 2021-10-14 PROCEDURE — 83605 ASSAY OF LACTIC ACID: CPT

## 2021-10-14 PROCEDURE — 70450 CT HEAD/BRAIN W/O DYE: CPT

## 2021-10-14 PROCEDURE — 85025 COMPLETE CBC W/AUTO DIFF WBC: CPT

## 2021-10-14 PROCEDURE — 93005 ELECTROCARDIOGRAM TRACING: CPT

## 2021-10-14 PROCEDURE — 97535 SELF CARE MNGMENT TRAINING: CPT | Performed by: OCCUPATIONAL THERAPIST

## 2021-10-14 PROCEDURE — 84484 ASSAY OF TROPONIN QUANT: CPT

## 2021-10-14 PROCEDURE — 84443 ASSAY THYROID STIM HORMONE: CPT

## 2021-10-14 PROCEDURE — 74011250637 HC RX REV CODE- 250/637: Performed by: INTERNAL MEDICINE

## 2021-10-14 PROCEDURE — XW0DXM6 INTRODUCTION OF BARICITINIB INTO MOUTH AND PHARYNX, EXTERNAL APPROACH, NEW TECHNOLOGY GROUP 6: ICD-10-PCS | Performed by: INTERNAL MEDICINE

## 2021-10-14 PROCEDURE — 97116 GAIT TRAINING THERAPY: CPT

## 2021-10-14 PROCEDURE — 97161 PT EVAL LOW COMPLEX 20 MIN: CPT

## 2021-10-14 PROCEDURE — 36415 COLL VENOUS BLD VENIPUNCTURE: CPT

## 2021-10-14 PROCEDURE — 80053 COMPREHEN METABOLIC PANEL: CPT

## 2021-10-14 PROCEDURE — 74011000250 HC RX REV CODE- 250: Performed by: INTERNAL MEDICINE

## 2021-10-14 PROCEDURE — 86140 C-REACTIVE PROTEIN: CPT

## 2021-10-14 PROCEDURE — 84145 PROCALCITONIN (PCT): CPT

## 2021-10-14 PROCEDURE — 97165 OT EVAL LOW COMPLEX 30 MIN: CPT | Performed by: OCCUPATIONAL THERAPIST

## 2021-10-14 RX ORDER — ZINC SULFATE 50(220)MG
1 CAPSULE ORAL DAILY
Status: DISCONTINUED | OUTPATIENT
Start: 2021-10-14 | End: 2021-10-22 | Stop reason: HOSPADM

## 2021-10-14 RX ORDER — ONDANSETRON 4 MG/1
4 TABLET, ORALLY DISINTEGRATING ORAL
Status: DISCONTINUED | OUTPATIENT
Start: 2021-10-14 | End: 2021-10-22 | Stop reason: HOSPADM

## 2021-10-14 RX ORDER — ONDANSETRON 2 MG/ML
4 INJECTION INTRAMUSCULAR; INTRAVENOUS
Status: DISCONTINUED | OUTPATIENT
Start: 2021-10-14 | End: 2021-10-22 | Stop reason: HOSPADM

## 2021-10-14 RX ORDER — ACETAMINOPHEN 325 MG/1
650 TABLET ORAL
Status: DISCONTINUED | OUTPATIENT
Start: 2021-10-14 | End: 2021-10-22 | Stop reason: HOSPADM

## 2021-10-14 RX ORDER — ASCORBIC ACID 500 MG
500 TABLET ORAL 2 TIMES DAILY
Status: DISCONTINUED | OUTPATIENT
Start: 2021-10-14 | End: 2021-10-22 | Stop reason: HOSPADM

## 2021-10-14 RX ORDER — ACETAMINOPHEN 650 MG/1
650 SUPPOSITORY RECTAL
Status: DISCONTINUED | OUTPATIENT
Start: 2021-10-14 | End: 2021-10-22 | Stop reason: HOSPADM

## 2021-10-14 RX ORDER — SODIUM CHLORIDE 9 MG/ML
50 INJECTION, SOLUTION INTRAVENOUS CONTINUOUS
Status: DISCONTINUED | OUTPATIENT
Start: 2021-10-14 | End: 2021-10-19

## 2021-10-14 RX ORDER — ENOXAPARIN SODIUM 100 MG/ML
40 INJECTION SUBCUTANEOUS EVERY 12 HOURS
Status: DISCONTINUED | OUTPATIENT
Start: 2021-10-14 | End: 2021-10-22 | Stop reason: HOSPADM

## 2021-10-14 RX ORDER — ENOXAPARIN SODIUM 100 MG/ML
30 INJECTION SUBCUTANEOUS EVERY 12 HOURS
Status: DISCONTINUED | OUTPATIENT
Start: 2021-10-14 | End: 2021-10-14

## 2021-10-14 RX ORDER — MELATONIN
2000 DAILY
Status: DISCONTINUED | OUTPATIENT
Start: 2021-10-14 | End: 2021-10-22 | Stop reason: HOSPADM

## 2021-10-14 RX ORDER — SODIUM CHLORIDE 0.9 % (FLUSH) 0.9 %
5-40 SYRINGE (ML) INJECTION EVERY 8 HOURS
Status: DISCONTINUED | OUTPATIENT
Start: 2021-10-14 | End: 2021-10-22 | Stop reason: HOSPADM

## 2021-10-14 RX ORDER — POLYETHYLENE GLYCOL 3350 17 G/17G
17 POWDER, FOR SOLUTION ORAL DAILY PRN
Status: DISCONTINUED | OUTPATIENT
Start: 2021-10-14 | End: 2021-10-14

## 2021-10-14 RX ORDER — DEXAMETHASONE SODIUM PHOSPHATE 4 MG/ML
6 INJECTION, SOLUTION INTRA-ARTICULAR; INTRALESIONAL; INTRAMUSCULAR; INTRAVENOUS; SOFT TISSUE EVERY 24 HOURS
Status: DISCONTINUED | OUTPATIENT
Start: 2021-10-14 | End: 2021-10-19

## 2021-10-14 RX ORDER — SODIUM CHLORIDE 0.9 % (FLUSH) 0.9 %
5-40 SYRINGE (ML) INJECTION AS NEEDED
Status: DISCONTINUED | OUTPATIENT
Start: 2021-10-14 | End: 2021-10-22 | Stop reason: HOSPADM

## 2021-10-14 RX ADMIN — ENOXAPARIN SODIUM 40 MG: 100 INJECTION SUBCUTANEOUS at 21:25

## 2021-10-14 RX ADMIN — DEXAMETHASONE SODIUM PHOSPHATE 6 MG: 4 INJECTION, SOLUTION INTRAMUSCULAR; INTRAVENOUS at 03:05

## 2021-10-14 RX ADMIN — OXYCODONE HYDROCHLORIDE AND ACETAMINOPHEN 500 MG: 500 TABLET ORAL at 16:22

## 2021-10-14 RX ADMIN — AZITHROMYCIN MONOHYDRATE 500 MG: 500 INJECTION, POWDER, LYOPHILIZED, FOR SOLUTION INTRAVENOUS at 03:05

## 2021-10-14 RX ADMIN — ZINC SULFATE 220 MG (50 MG) CAPSULE 1 CAPSULE: CAPSULE at 09:43

## 2021-10-14 RX ADMIN — Medication 10 ML: at 16:23

## 2021-10-14 RX ADMIN — SODIUM CHLORIDE 100 ML/HR: 9 INJECTION, SOLUTION INTRAVENOUS at 03:06

## 2021-10-14 RX ADMIN — BARICITINIB 4 MG: 2 TABLET, FILM COATED ORAL at 16:22

## 2021-10-14 RX ADMIN — OXYCODONE HYDROCHLORIDE AND ACETAMINOPHEN 500 MG: 500 TABLET ORAL at 09:43

## 2021-10-14 RX ADMIN — Medication 10 ML: at 21:24

## 2021-10-14 RX ADMIN — CEFTRIAXONE 1 G: 1 INJECTION, POWDER, FOR SOLUTION INTRAMUSCULAR; INTRAVENOUS at 03:05

## 2021-10-14 RX ADMIN — Medication 10 ML: at 03:06

## 2021-10-14 RX ADMIN — Medication 2000 UNITS: at 09:43

## 2021-10-14 NOTE — ED PROVIDER NOTES
The history is provided by the patient. No  was used.    Positive For Covid-19  Duration:  7 days  Timing:  Constant  Relieved by:  Nothing  Worsened by:  Nothing  Associated symptoms: cough and diarrhea    Associated symptoms: no chest pain, no chills, no confusion, no congestion, no dysuria, no ear pain, no headaches, no myalgias, no nausea, no rash, no rhinorrhea, no somnolence, no sore throat and no vomiting         Past Medical History:   Diagnosis Date    History of vascular access device 03/05/2021    [facility] VAT R Basilic 80/3 long term ABX       Past Surgical History:   Procedure Laterality Date    HX HEENT      toncills         Family History:   Problem Relation Age of Onset    Diabetes Mother     Diabetes Father        Social History     Socioeconomic History    Marital status:      Spouse name: Not on file    Number of children: Not on file    Years of education: Not on file    Highest education level: Not on file   Occupational History    Not on file   Tobacco Use    Smoking status: Never Smoker    Smokeless tobacco: Never Used   Substance and Sexual Activity    Alcohol use: No    Drug use: No    Sexual activity: Not on file   Other Topics Concern     Service Not Asked    Blood Transfusions Not Asked    Caffeine Concern Not Asked    Occupational Exposure Not Asked    Hobby Hazards Not Asked    Sleep Concern Not Asked    Stress Concern Not Asked    Weight Concern Not Asked    Special Diet Not Asked    Back Care Not Asked    Exercise Not Asked    Bike Helmet Not Asked    Seat Belt Not Asked    Self-Exams Not Asked   Social History Narrative    Not on file     Social Determinants of Health     Financial Resource Strain:     Difficulty of Paying Living Expenses:    Food Insecurity:     Worried About Running Out of Food in the Last Year:     Ran Out of Food in the Last Year:    Transportation Needs:     Lack of Transportation (Medical):    [facility] Lack of Transportation (Non-Medical):    Physical Activity:     Days of Exercise per Week:     Minutes of Exercise per Session:    Stress:     Feeling of Stress :    Social Connections:     Frequency of Communication with Friends and Family:     Frequency of Social Gatherings with Friends and Family:     Attends Faith Services:     Active Member of Clubs or Organizations:     Attends Club or Organization Meetings:     Marital Status:    Intimate Partner Violence:     Fear of Current or Ex-Partner:     Emotionally Abused:     Physically Abused:     Sexually Abused: ALLERGIES: Patient has no known allergies. Review of Systems   Constitutional: Positive for fatigue. Negative for activity change, chills and fever. HENT: Negative for congestion, ear pain, nosebleeds, rhinorrhea, sore throat, trouble swallowing and voice change. Eyes: Negative for visual disturbance. Respiratory: Positive for cough. Negative for shortness of breath. Cardiovascular: Negative for chest pain and palpitations. Gastrointestinal: Positive for diarrhea. Negative for abdominal pain, constipation, nausea and vomiting. Genitourinary: Negative for difficulty urinating, dysuria, hematuria and urgency. Musculoskeletal: Negative for back pain, myalgias, neck pain and neck stiffness. Skin: Negative for color change and rash. Allergic/Immunologic: Negative for immunocompromised state. Neurological: Negative for dizziness, seizures, syncope, weakness, light-headedness, numbness and headaches. Psychiatric/Behavioral: Negative for behavioral problems, confusion, hallucinations, self-injury and suicidal ideas. Vitals:    10/13/21 2140 10/13/21 2142   BP: (!) 82/49 (!) 60/43   Pulse: 79    Resp: 20    Temp: 99.5 °F (37.5 °C)    SpO2: 95%    Weight: 93.9 kg (207 lb)    Height: 5' 9\" (1.753 m)             Physical Exam  Vitals and nursing note reviewed.    Constitutional:       General: He is not in acute distress. Appearance: He is well-developed. He is not diaphoretic. HENT:      Head: Normocephalic and atraumatic. Eyes:      Pupils: Pupils are equal, round, and reactive to light. Cardiovascular:      Rate and Rhythm: Normal rate and regular rhythm. Heart sounds: Normal heart sounds. No murmur heard. No friction rub. No gallop. Pulmonary:      Effort: Pulmonary effort is normal. No respiratory distress. Breath sounds: Decreased breath sounds present. No wheezing. Abdominal:      General: Bowel sounds are normal. There is no distension. Palpations: Abdomen is soft. Tenderness: There is no abdominal tenderness. There is no guarding or rebound. Musculoskeletal:         General: Normal range of motion. Cervical back: Normal range of motion and neck supple. Skin:     General: Skin is warm. Findings: No rash. Neurological:      Mental Status: He is alert and oriented to person, place, and time. Psychiatric:         Behavior: Behavior normal.         Thought Content: Thought content normal.         Judgment: Judgment normal.          MDM     This is a 62-year-old male with no significant past medical history, presenting with complaints of malaise and fatigue, cough, loose bowel movements, in the setting of positive Covid diagnosis. Patient states positive test approximately 1 week ago, states \"I am just not getting any better\". He denies nausea or vomiting, states loss of taste and smell, anorexia, however states she is forcing himself to eat and drink. He denies chest pain or shortness of breath, endorses  2 loose bowel movements per day. He is noted to be hypotensive on arrival, afebrile, without tachycardia, satting well on room air. Normotensive at the time of my evaluation approximately systolics of 726. He is noted to have mildly diminished breath sounds throughout, soft nontender abdomen, regular rate and rhythm without murmurs gallops or rubs. Symptoms likely secondary to known Covid infection, with known complications including renal failure, dehydration, myocarditis. Plan to obtain CMP, CBC, EKG, chest x-ray, cardiac enzymes, D-dimer. Will initiate resuscitation, reevaluate, and make a disposition. Procedures    11:40 PM  Notified by nursing that blood pressures recorded when the patient ambulated back from triage to exam room, noted to be diaphoretic and gray, hypotensive, patient syncopized and lost control of his bowels. No reported seizure-like activity. Perfect Serve Consult for Admission  1:12 AM    ED Room Number: ER08/08  Patient Name and age:  Vern Rossi 62 y.o.  male  Working Diagnosis:   1. Lab test positive for detection of COVID-19 virus    2. Hypotension, unspecified hypotension type    3.  Syncope and collapse        COVID-19 Suspicion:  yes  Sepsis present:  no  Reassessment needed: no  Code Status:  Full Code  Readmission: no  Isolation Requirements:  yes  Recommended Level of Care:  telemetry  Department:[facility] ED - [phone]  Other:  D/w Dr. Blossom Garrido

## 2021-10-14 NOTE — ED NOTES
Report given to [facility]  Visit Vitals  /83   Pulse 82   Temp 98.8 °F (37.1 °C)   Resp 20   Ht 5' 9\" (1.753 m)   Wt 93.9 kg (207 lb)   SpO2 97%   BMI 30.57 kg/m²

## 2021-10-14 NOTE — PROGRESS NOTES
Pt Arrived to unit from ED, A/Ox4, VSS, on 3L NC. Oriented to room. No complaints per patient. Hearing aids with patient.

## 2021-10-14 NOTE — PROGRESS NOTES
Physician Progress Note      Anjum Carter  CSN #:                  [id number]  :                       1960  ADMIT DATE:       10/13/2021 9:55 PM  [address] [place] DATE:  RESPONDING  PROVIDER #:        Sandhya López MD          QUERY TEXTFermin  Afternoon    This patient admitted for Covid 19 Infection. If possible, can you also  Please document in progress notes and discharge summary if you are evaluating or treating any of the following: Thank you  Radha Banuelos,, BSN RN [address] [facility]  [phone]      The medical record reflects the following:  Risk Factors: 62 yr old male, unvaccinated, recent exposure to family member with COVID,  Clinical Indicators: Cough, & Diahhrea for last 7 days, fatigue CXR \" Minimal scattered interstitial opacities may be related to mild edema or multifocal infectious process   H&P notes, \"Possible superimposed bacterial pneumonia given hypotension. Rapid COVID  Treatment:  Options provided:  -- Acute bronchitis due to COVID-19  -- Acute lower respiratory infection due to COVID-19  -- Viral Pneumonia due to COVID-19, superimposed on bacterial pneumonia  -- Pneumonia due to COVID-19  -- Other - I will add my own diagnosis  -- Disagree - Not applicable / Not valid  -- Disagree - Clinically unable to determine / Unknown  -- Refer to Clinical Documentation Reviewer    PROVIDER RESPONSE TEXT:    This patient has pneumonia due to COVID-19.     Query created by: Kosta Ness on 10/14/2021 11:17 AM      Electronically signed by:  Sandhya López MD 10/14/2021 11:24 AM

## 2021-10-14 NOTE — PROGRESS NOTES
Problem: Mobility Impaired (Adult and Pediatric)  Goal: *Acute Goals and Plan of Care (Insert Text)  Description: FUNCTIONAL STATUS PRIOR TO ADMISSION: Patient was independent and active without use of DME.    HOME SUPPORT PRIOR TO ADMISSION: The patient lived with wife but did not require assist.    Physical Therapy Goals  Initiated 10/14/2021  1. Patient will move from supine to sit and sit to supine  in bed with independence within 7 day(s). 2.  Patient will transfer from bed to chair and chair to bed with independence using the least restrictive device within 7 day(s). 3.  Patient will perform sit to stand with independence within 7 day(s). 4.  Patient will ambulate with independence for 250 feet with the least restrictive device within 7 day(s). Outcome: Progressing Towards Goal     PHYSICAL THERAPY EVALUATION  Patient: Nia Mejía (58 y.o. male)  Date: 10/14/2021  Primary Diagnosis: COVID-19 [U07.1]        Precautions: Droplet +       ASSESSMENT  Based on the objective data described below, the patient presents with decreased activity tolerance and generalized weakness seen today in the ED after presenting for cough/syncopal episode with covid 19 infection. Pt reports that prior to admission he was independent and active and denies falls. Overall affect was flat during session and noted delayed answering to questioning but overall was A and O x 4. Today, pt required standby assist for transfers and gait on RA and SpO2 dropped to 88% at lowest point. Orthostatic vitals taken and WNL. Pt left up in bed with all needs in reach. Acute PT will continue to follow to address endurance deficits. Rec HHPT vs no follow up needs pending progress. Current Level of Function Impacting Discharge (mobility/balance): SBA    Functional Outcome Measure: The patient scored 75/100 on the Barthel Index outcome measure which is indicative of 25% functional impairment.       Other factors to consider for discharge:      Patient will benefit from skilled therapy intervention to address the above noted impairments. PLAN :  Recommendations and Planned Interventions: bed mobility training, transfer training, gait training, therapeutic exercises, patient and family training/education, and therapeutic activities      Frequency/Duration: Patient will be followed by physical therapy:  5 times a week to address goals. Recommendation for discharge: (in order for the patient to meet his/her long term goals)  No skilled physical therapy/ follow up rehabilitation needs identified at this time. This discharge recommendation:  Has been made in collaboration with the attending provider and/or case management    IF patient discharges home will need the following DME: none         SUBJECTIVE:   Patient stated I just do that. That's just me.  in response to questions re: syncopal episode    OBJECTIVE DATA SUMMARY:   HISTORY:    Past Medical History:   Diagnosis Date    History of vascular access device 03/05/2021    [facility] VAT R Basilic 51/5 long term ABX     Past Surgical History:   Procedure Laterality Date    HX HEENT      toncills       Personal factors and/or comorbidities impacting plan of care:     Home Situation  Home Environment: Private residence  # Steps to Enter: 2  Rails to Enter: Yes  One/Two Story Residence: One story  Living Alone: No  Support Systems: Spouse/Significant Other, Other Family Member(s)  Patient Expects to be Discharged to[de-identified] [place]  Current DME Used/Available at Home: juan Elmore    EXAMINATION/PRESENTATION/DECISION MAKING:   Critical Behavior:              Hearing:     Skin:  Defer to RN notes  Edema: Defer to RN notes  Range Of Motion:  AROM: Within functional limits           PROM: Within functional limits           Strength:    Strength: Generally decreased, functional                    Tone & Sensation:   Tone: Normal              Sensation: Intact Coordination:  Coordination: Within functional limits  Vision:      Functional Mobility:  Bed Mobility:  Rolling: Stand-by assistance  Supine to Sit: Stand-by assistance  Sit to Supine: Stand-by assistance  Scooting: Stand-by assistance  Transfers:  Sit to Stand: Stand-by assistance  Stand to Sit: Stand-by assistance                       Balance:   Sitting: Intact  Standing: Impaired  Standing - Static: Good  Standing - Dynamic : Fair  Ambulation/Gait Training:  Distance (ft): 50 Feet (ft)  Assistive Device: Gait belt  Ambulation - Level of Assistance: Stand-by assistance     Gait Description (WDL): Exceptions to WDL           Base of Support: Widened     Speed/Ludmila: Slow       Functional Measure:  Barthel Index:    Bathin  Bladder: 10  Bowels: 10  Groomin  Dressing: 10  Feeding: 10  Mobility: 10  Stairs: 0  Toilet Use: 10  Transfer (Bed to Chair and Back): 10  Total: 75/100       The Barthel ADL Index: Guidelines  1. The index should be used as a record of what a patient does, not as a record of what a patient could do. 2. The main aim is to establish degree of independence from any help, physical or verbal, however minor and for whatever reason. 3. The need for supervision renders the patient not independent. 4. A patient's performance should be established using the best available evidence. Asking the patient, friends/relatives and nurses are the usual sources, but direct observation and common sense are also important. However direct testing is not needed. 5. Usually the patient's performance over the preceding 24-48 hours is important, but occasionally longer periods will be relevant. 6. Middle categories imply that the patient supplies over 50 per cent of the effort. 7. Use of aids to be independent is allowed. Chava Reid., Barthel, D.W. (2823). Functional evaluation: the Barthel Index. [address] [facility] (14)2. CASEY Pena, Mariela Regan., Kathy Oconnor., Jamel, [address] (). Measuring the change indisability after inpatient rehabilitation; comparison of the responsiveness of the Barthel Index and Functional [place] Measure. Journal of Neurology, Neurosurgery, and Psychiatry, 66(4), 179-980. JEANIE Donald, MARILEE Rincon, & Sarah Gutiérrez M.A. (2004.) Assessment of post-stroke quality of life in cost-effectiveness studies: The usefulness of the Barthel Index and the EuroQoL-5D. Quality of Life Research, 15, 393-98           Physical Therapy Evaluation Charge Determination   History Examination Presentation Decision-Making   MEDIUM  Complexity : 1-2 comorbidities / personal factors will impact the outcome/ POC  LOW Complexity : 1-2 Standardized tests and measures addressing body structure, function, activity limitation and / or participation in recreation  LOW Complexity : Stable, uncomplicated  LOW Complexity : FOTO score of       Based on the above components, the patient evaluation is determined to be of the following complexity level: LOW     Pain Rating:  Denied pain    Activity Tolerance:   Fair and observed SOB with activity    After treatment patient left in no apparent distress:   Supine in bed and Call bell within reach    COMMUNICATION/EDUCATION:   The patients plan of care was discussed with: Occupational therapist and Registered nurse. Fall prevention education was provided and the patient/caregiver indicated understanding., Patient/family have participated as able in goal setting and plan of care. , and Patient/family agree to work toward stated goals and plan of care.     Thank you for this referral.  Justin Garcia PT, DPT   Time Calculation: 23 mins

## 2021-10-14 NOTE — CONSULTS
[facility]     Name: William Tidwell MRN: [id number]   : 1960 Hospital: [address]   Date: 10/14/2021        Impression Plan   1. Acute respiratory failure  2. Mild hypoxia  3. COVID 19 PNA  4. Hx of VTE               · Continue dexamethasone daily  · Patient's sats borderline for need on baricitinib. Will hold for now but with close re-evaluation. · Trend CRP  · Trend d-dimer  · enox 40 mg q12h           Radiology  ( personally reviewed) CXR reviewed: mild bilateral infiltrates   ABG No results for input(s): PHI, PO2I, PCO2I in the last 72 hours. Subjective     Cc: fatigue    61 yo with PMHx of obesity presenting with generalized fatigue from COVID 19. Fatigued for 10 days. Exposed through family member 2 weeks ago and several family members now affected. Tested positive for covid 2 days ago. Denies any underlying lung problems. CXR with mild infiltrates. Lifetime non-smoker. Unvaccinated. Currently 92% on RA. Breathing 20-26 x/min. Review of Systems:  A comprehensive review of systems was negative except for that written in the HPI.     Past Medical History:   Diagnosis Date    History of vascular access device 2021    [facility] VAT R Basilic 28/3 long term ABX      Past Surgical History:   Procedure Laterality Date    HX HEENT      toncills      Prior to Admission medications    Not on File     Current Facility-Administered Medications   Medication Dose Route Frequency    sodium chloride (NS) flush 5-40 mL  5-40 mL IntraVENous Q8H    dexamethasone (DECADRON) 4 mg/mL injection 6 mg  6 mg IntraVENous Q24H    cholecalciferol (VITAMIN D3) (1000 Units /25 mcg) tablet 2,000 Units  2,000 Units Oral DAILY    cefTRIAXone (ROCEPHIN) 1 g in sterile water (preservative free) 10 mL IV syringe  1 g IntraVENous Q24H    azithromycin (ZITHROMAX) 500 mg in 0.9% sodium chloride 250 mL (VIAL-MATE)  500 mg IntraVENous Q24H    ascorbic acid (vitamin C) (VITAMIN C) tablet 500 mg  500 mg Oral BID    zinc sulfate (ZINCATE) 50 mg zinc (220 mg) capsule 1 Capsule  1 Capsule Oral DAILY    0.9% sodium chloride infusion  50 mL/hr IntraVENous CONTINUOUS    enoxaparin (LOVENOX) injection 30 mg  30 mg SubCUTAneous Q12H     No Known Allergies   Social History     Tobacco Use    Smoking status: Never Smoker    Smokeless tobacco: Never Used   Substance Use Topics    Alcohol use: No      Family History   Problem Relation Age of Onset    Diabetes Mother     Diabetes Father           Laboratory: I have personally reviewed the critical care flowsheet and labs. Recent Labs     10/14/21  0741 10/13/21  2158   WBC 7.3 8.9   HGB 13.1 14.9   HCT 39.7 43.3    185     Recent Labs     10/14/21  0741 10/13/21  2158    137   K 3.7 3.6   * 104   CO2 24 25   * 117*   BUN 13 18   CREA 1.04 1.50*   CA 7.0* 7.9*   MG 2.3  --    ALB 2.6* 3.1*   ALT 22 22       Objective:     Mode Rate Tidal Volume Pressure FiO2 PEEP                    Vital Signs:     TMAX(24)      Intake/Output:   Last shift:         Last 3 shifts: No intake/output data recorded. RRIOLAST3    Intake/Output Summary (Last 24 hours) at 10/14/2021 0920  Last data filed at 10/14/2021 0129  Gross per 24 hour   Intake 2817 ml   Output    Net 2817 ml     EXAM:   GENERAL: well developed, alert, mild dyspnea with conversation, HEENT:  PERRL, EOMI, no alar flaring or epistaxis, oral mucosa moist without cyanosis, NECK:  no jugular vein distention, no retractions, no thyromegaly or masses, LUNGS: CTA, no w/r/r, HEART:  Regular rate and rhythm with no MGR; no edema is present, ABDOMEN:  soft with no tenderness, bowel sounds present, EXTREMITIES:  warm with no cyanosis, SKIN:  no jaundice or ecchymosis and NEUROLOGIC:  alert and oriented, grossly non-focal    Zahra MD [place]  [facility] [place]

## 2021-10-14 NOTE — ED NOTES
Bedside and Verbal shift change report given to Destinee Leo RN (oncoming nurse) by Piero Sanchez RN (offgoing nurse). Report included the following information SBAR, Kardex, ED Summary, [facility] and Recent Results.

## 2021-10-14 NOTE — PROGRESS NOTES
Admission Medication Reconciliation:    Comments/Recommendations:  -Medication history obtained via phone call to [address] [place], [place] [address] ,[facility]  -Patient had a difficult time hearing me on the phone but was able to confirm no medications. Confirmed this with Rx[facility] - no prescription fills in past 6 months. Was previously on Eliquis for PE but physician notes he took himself off of this. Chart review shows no PCP. Medications added: None  Medications removed: Zofran PRN  Medications changed: None    Information obtained from: Patient, RxQuery, chart review    Allergies:  Patient has no known allergies.     Prior to Admission Medications:   None     Thank you,  AJ Galarza

## 2021-10-14 NOTE — PROGRESS NOTES
Baricitinib Initiation Note    This patient meets criteria for initiation of baricitinib based on the following:  Confirmed COVID-19 (+) and hospitalized  Requiring oxygen support - specific O2 saturation is not a determinant for baricitinib  Elevated inflammatory markers (CRP, D-dimer, LDH, or ferritin). Concomitant therapy with dexamethasone 6-20 mg IV/PO daily (or equivalent steroid)  Do not give if patient has already received tocilizumab for COVID-19 treatment due to long half-life of tocilizumab (16 days)     Prescribers should weigh risks/benefits before initiating therapy in patients with the following:   Pregnancy  Severe hepatic impairment  Chronic/recurrent infections  Hemoglobin <8.0 g/dL   History/current thrombosis    Plan:  - Baricitinib 4 mg po daily X 14 days or until hospital discharge, whichever is sooner, has been ordered. Dose has been adjusted for renal function.  - Renal function will be monitored daily while on baricitinib. -VTE prophylaxis is recommended unless contraindicated.

## 2021-10-14 NOTE — PROGRESS NOTES
Problem: Self Care Deficits Care Plan (Adult)  Goal: *Acute Goals and Plan of Care (Insert Text)  Description:   FUNCTIONAL STATUS PRIOR TO ADMISSION: Patient was independent and active without use of DME. 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    Occupational Therapy Goals  Initiated 10/14/2021  1. Patient will perform grooming with modified independence standing at the sink and maintain oxygen sats > or = 90% within 7 day(s). 2.  Patient will perform upper body dressing and lower body dressing with modified independence and maintain oxygen sats > or = 90% within 7 day(s). 3.  Patient will perform toilet transfers and toileting with supervision/set-up and maintain oxygen sats > or = 90% within 7 day(s). 4.  Patient will perform bilateral UE AROM exercises with coordinated breathing with min cue within 7 day(s). 5.  Patient will perform pursed lip breathing with 1-2 cues during functional tasks/mobility within 7 day(s). Outcome: Not Met    OCCUPATIONAL THERAPY EVALUATION  Patient: Anupama Carrero (58 y.o. 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 When discussing \"passing out\" in ER patient stated \"that's normal for me\" unable to clarify. At time of evaluation on room air and sats in upper 80's with activity, and low 90's to upper 80's at rest. RN informed. Will continue to follow. Recommend instruction on exercises with coordinated breathing, ?  Further cognitive assessment    Current Level of Function Impacting Discharge (ADLs/self-care): SBA for functional transfers, min assist LE ADL's, set up UE, SBA toileting    Functional Outcome Measure: The patient scored 55/100 on the Barthel Index outcome measure   Other factors to consider for discharge: reports his wife also has COVID but doing better than he is     Patient will benefit from skilled therapy intervention to address the above noted impairments. PLAN :  Recommendations and Planned Interventions: self care training, functional mobility training, therapeutic exercise, balance training, therapeutic activities, cognitive retraining, endurance activities, patient education, home safety training, and family training/education    Frequency/Duration: Patient will be followed by occupational therapy 3 times a week to address goals. Recommendation for discharge: (in order for the patient to meet his/her long term goals)  To be determined: home health versus none    This discharge recommendation:  Has not yet been discussed the attending provider and/or case management    IF patient discharges home will need the following DME:        SUBJECTIVE:   Patient stated I wasn't getting better.  stated when asked what brought him to the hospital    OBJECTIVE DATA SUMMARY:   HISTORY:   Past Medical History:   Diagnosis Date    History of vascular access device 03/05/2021    [facility] VAT R Basilic 02/9 long term ABX     Past Surgical History:   Procedure Laterality Date    HX HEENT      toncills       Expanded or extensive additional review of patient history:     Home Situation  Home Environment: Private residence  # Steps to Enter: 2  Rails to Enter: Yes  One/Two Story Residence: One story  Living Alone: No  Support Systems: Spouse/Significant Other, Other Family Member(s)  Patient Expects to be Discharged to[de-identified] House  Current DME Used/Available at Home: Walker, rolling    Hand dominance:     EXAMINATION OF PERFORMANCE DEFICITS:  Cognitive/Behavioral Status:  Neurologic State: Alert (denies confusion)  Orientation Level: Oriented X4  Cognition: Decreased attention/concentration (? COVID fog, seems \"off\" )  Perception: Appears intact  Perseveration: No perseveration noted  Safety/Judgement: Decreased awareness of need for assistance;Decreased insight into deficits; Fall prevention;Home safety    Skin: intact    Edema: none    Hearing: Auditory  Auditory Impairment: Hard of hearing, bilateral, Hearing aid(s)  Hearing Aids/Status: Bilateral, Functioning, At bedside (donned when we came into room)    Vision/Perceptual:                                     Range of Motion:  AROM: Within functional limits  PROM: Within functional limits                      Strength:  Strength: Generally decreased, functional                Coordination:  Coordination: Within functional limits  Fine Motor Skills-Upper: Left Intact; Right Intact    Gross Motor Skills-Upper: Left Intact; Right Intact    Tone & Sensation:  Tone: Normal  Sensation: Intact                      Balance:  Sitting: Intact  Standing: Impaired  Standing - Static: Good  Standing - Dynamic : Fair    Functional Mobility and Transfers for ADLs:  Bed Mobility:  Rolling: Stand-by assistance  Supine to Sit: Stand-by assistance  Sit to Supine: Stand-by assistance  Scooting: Stand-by assistance    Transfers:  Sit to Stand: Stand-by assistance  Stand to Sit: Stand-by assistance  Bed to Chair: Stand-by assistance  Bathroom Mobility: Stand-by assistance (simulated in ER)  Toilet Transfer : Stand-by assistance (to bedside commode)    ADL Assessment:  Feeding: Independent    Oral Facial Hygiene/Grooming: Setup (seated)    Bathing: Minimum assistance    Upper Body Dressing: Setup    Lower Body Dressing: Minimum assistance    Toileting: Stand by assistance                ADL Intervention and task modifications:   Educated on role of OT, instructed on pursed lip breathing and incorporation during ADL mobility, instructed on fall prevention/safety in hospital. Received on room air and sats low 90's, decreased to 88% with functional transfers and increased RR to 33. Instructed on potential benefit of home pulse ox if discharge home and with increased shortness of breath, educated on various places  to purchase       Cognitive Retraining  Safety/Judgement: Decreased awareness of need for assistance;Decreased insight into deficits; Fall prevention;Home safety      Functional Measure:    Barthel Index:  Bathin  Bladder: 10  Bowels: 10  Groomin  Dressin  Feeding: 10  Mobility: 0  Stairs: 0  Toilet Use: 5  Transfer (Bed to Chair and Back): 10  Total: 55/100      The Barthel ADL Index: Guidelines  1. The index should be used as a record of what a patient does, not as a record of what a patient could do. 2. The main aim is to establish degree of independence from any help, physical or verbal, however minor and for whatever reason. 3. The need for supervision renders the patient not independent. 4. A patient's performance should be established using the best available evidence. Asking the patient, friends/relatives and nurses are the usual sources, but direct observation and common sense are also important. However direct testing is not needed. 5. Usually the patient's performance over the preceding 24-48 hours is important, but occasionally longer periods will be relevant. 6. Middle categories imply that the patient supplies over 50 per cent of the effort. 7. Use of aids to be independent is allowed. Score Interpretation (from [address] [address])    Independent   60-79 Minimally independent   40-59 Partially dependent   20-39 Very dependent   <20 Totally dependent     -Sofiya Palomares., Barthel, D.W. (1965). Functional evaluation: the Barthel Index. [address] [facility] ([address] (1997). The Barthel activities of daily living index: self-reporting versus actual performance in the old (> or = 75 years).  Journal of American Geriatric Society 45(7), [facility], DEAN, Darren Majano., Shannon Kapadia. (1999). Measuring the change in disability after inpatient rehabilitation; comparison of the responsiveness of the Barthel Index and Functional [place] Measure. Journal of Neurology, Neurosurgery, and Psychiatry, 66(4), 382-629. JEANIE Ivory, MARILEE Rincon, & Deirdre Sam M.A. (2004) Assessment of post-stroke quality of life in cost-effectiveness studies: The usefulness of the Barthel Index and the EuroQoL-5D. Quality of Life Research, 15, 224-26         Occupational Therapy Evaluation Charge Determination   History Examination Decision-Making   LOW Complexity : Brief history review  LOW Complexity : 1-3 performance deficits relating to physical, cognitive , or psychosocial skils that result in activity limitations and / or participation restrictions  MEDIUM Complexity : Patient may present with comorbidities that affect occupational performnce. Miniml to moderate modification of tasks or assistance (eg, physical or verbal ) with assesment(s) is necessary to enable patient to complete evaluation       Based on the above components, the patient evaluation is determined to be of the following complexity level: LOW   Pain Rating:  No complaint of pain    Activity Tolerance:   Fair, Poor, desaturates with exertion and requires oxygen, and requires frequent rest breaks    After treatment patient left in no apparent distress:    Supine in bed, Call bell within reach, and on stretcher, right rail up per his request    COMMUNICATION/EDUCATION:   The patients plan of care was discussed with: Physical therapist, Registered nurse, and Case management. Home safety education was provided and the patient/caregiver indicated understanding. and Patient/family have participated as able in goal setting and plan of care.     This patients plan of care is appropriate for delegation to [facility].    Thank you for this referral.  Dory Garcias, OTR/L  Time Calculation: 23 mins

## 2021-10-14 NOTE — ED NOTES
AM labs sent. Pt had dried stool on him, nurse got dirty clothes off him . Pt can stand without assist.  Bedside commode on side of bed. Pt resting now call bell within reach and monitor x3  Pt is aware of the plan for today and what he is waiting on.

## 2021-10-14 NOTE — H&P
[facility]  [address] [facility], [address] [place] [address]  [phone]    Admission History and Physical      NAME:  Lidia Martinez   :   1960   MRN:  [id number]     PCP:  None     Date/Time of service:  10/14/2021          Subjective:     CHIEF COMPLAINT: Cough    HISTORY OF PRESENT ILLNESS:     Mr. Suze Pimentel is a 62 y.o.  male with a past medical history of perforated appendicitis status post appendectomy in 2021, bilateral pulmonary embolism following surgery who is admitted with COVID and hypotension. Mr. Suze Pimentel states that about a week ago he was exposed to family member with Covid. He states the day  following exposure he began to experience cough, fatigue and fevers. He tested positive for Covid yesterday and again today in the emergency room. He endorses associated poor appetite, no chest pain, no dyspnea, no abdominal pain. He does state that he had a syncopal episode upon arriving here in the emergency room. In the emergency room he was hypotensive on arrival; however, blood pressure responded to fluids. Of note, he was hospitalized here at [address] [place] [address] in 2021 for a perforated appendix and underwent appendectomy. His postoperative course was complicated by bilateral pulmonary embolism and intra-abdominal hematoma after the start of therapeutic anticoagulation requiring operative drainage. His hematoma then became infected requiring 6 weeks of IV antibiotics on discharge. He states that he is no longer on anticoagulation; states he took himself off of this. No Known Allergies    Prior to Admission medications    Medication Sig Start Date End Date Taking? Authorizing Provider   ondansetron (ZOFRAN ODT) 4 mg disintegrating tablet Take 1 Tab by mouth every six (6) hours as needed for Nausea.  Indications: prevent nausea and vomiting after surgery 21   Sharron Viera MD       Past Medical History:   Diagnosis Date    History of vascular access device 03/05/2021    [facility] VAT R Basilic 38/7 long term ABX        Past Surgical History:   Procedure Laterality Date    HX HEENT      toncills       Social History     Tobacco Use    Smoking status: Never Smoker    Smokeless tobacco: Never Used   Substance Use Topics    Alcohol use: No        Family History   Problem Relation Age of Onset    Diabetes Mother     Diabetes Father         Review of Systems:  (bold if positive, if negative)    Gen:  fever, chills, fatigueEyes:  ENT:  CVS:  syncopePulm:  Cough,GI:  poor appetiteGU:  MS:  Skin:  Psych:  Endo:  Hem:  Renal:  Neuro:            Objective:      VITALS:    Vital signs reviewed; most recent are:    Visit Vitals  BP (!) 122/59   Pulse 79   Temp 99.5 °F (37.5 °C)   Resp 27   Ht 5' 9\" (1.753 m)   Wt 93.9 kg (207 lb)   SpO2 94%   BMI 30.57 kg/m²     SpO2 Readings from Last 6 Encounters:   10/14/21 94%   04/14/21 98%   04/13/21 97%   04/12/21 97%   04/08/21 98%   04/07/21 98%            Intake/Output Summary (Last 24 hours) at 10/14/2021 0201  Last data filed at 10/14/2021 0129  Gross per 24 hour   Intake 2817 ml   Output    Net 2817 ml        Exam:     Physical Exam:    Gen: ill-appearing, in no acute distress  HEENT:  Pink conjunctivae, PERRL, hearing intact to voice  Resp:  No accessory muscle use, clear breath sounds without wheezes rales or rhonchi  Card:  RRR, No murmurs, normal S1, S2, no peripheral edema  Abd:  Soft, non-tender, non-distended, normoactive bowel sounds are present  Musc:  No cyanosis or clubbing  Skin:  No rashes or ulcers, skin turgor is good  Neuro:  Cranial nerves 3-12 are grossly intact, follows commands appropriately  Psych:  Oriented to person, place, and time      Labs:    Recent Labs     10/13/21  2158   WBC 8.9   HGB 14.9   HCT 43.3        Recent Labs     10/13/21  2158      K 3.6      CO2 25   *   BUN 18   CREA 1.50*   CA 7.9*   ALB 3.1*   TBILI 0.4   ALT 22     Lab Results   Component Value Date/Time    Glucose (POC) 120 (H) 06/14/2014 09:32 AM     No results for input(s): PH, PCO2, PO2, HCO3, FIO2 in the last 72 hours. No results for input(s): INR, INREXT, INREXT in the last 72 hours. Radiology and EKG reviewed: Chest x-ray with evidence of interstitial opacities concerning for mild edema or multifocal infectious process. Old Records reviewed in [address] [facility]       Assessment/Plan:           COVID-19 (10/14/2021): Unvaccinated. Not hypoxic. Will give IV steroids due to profound hypotension. Give IV ceftriaxone azithromycin to cover for possible superimposed bacterial pneumonia given hypotension; low threshold to de-escalate. Monitor inflammatory markers. Hypotension (10/14/2021): Suspect due to poor oral intake. Responded to IVF boluses. D-dimer within normal limits so we will hold off on CTA chest.  Obtain echo. Check UA, procalcitonin and TSH. Follow blood cx. Give IV ceftriaxone azithromycin to cover for possible superimposed bacterial pneumonia given hypotension; low threshold to de-escalate. Continue IVF. IV steroids. Syncope and collapse (6/14/2014): May be due to poor oral intake and COVID. Obtain CT head. Doubt PE given D-dimer within normal limits; consider PE work-up D-dimer increases given history of pulmonary embolism. Obtain echo. Continue IVF. BERONICA: Suspect due to poor p.o. intake and volume depletion. Continue IVF. Avoid nephrotoxins. Monitor BMP. History of pulmonary embolism: No longer on anticoagulation; per patient he took himself off of anticoagulation. Provoked PE due to surgery in March 2021 so doubt he needs long term AC.         Risk of deterioration: high      Total time spent with patient: 48 Minutes **I personally saw and examined the patient during this time period**                 Care Plan discussed with: Patient and Nursing Staff    Discussed:  Code Status and Care Plan    Prophylaxis:  Lovenox    Probable Disposition:  Home w/Family           ___________________________________________________    Attending Physician: Valentino Contreras DO

## 2021-10-14 NOTE — ED TRIAGE NOTES
Pt reports cough, fatigue, and fevers onset about a week ago. Tested positive for covid yesterday. Not getting any better. Not vaccinated.

## 2021-10-14 NOTE — ACP (ADVANCE CARE PLANNING)
Advance Care Planning (ACP) Provider Note - Comprehensive      Date of ACP Conversation:  10/14/21    Persons included in Conversation:  patient and wife (via phone)  Length of ACP Conversation in minutes:  16 minutes     Authorized Decision Maker (if patient is incapable of making informed decisions): This person is: wife   Ahmet Juares [phone]            General ACP for ALL Patients with Decision Making Capacity:  Importance of advance care planning, including choosing a healthcare agent to communicate patient's healthcare decisions if patient lost the ability to make decisions. Review of Existing Advance Directive:  Patient and family do not have an advance directive readily available for review. Active Diagnoses:   Patient Active Problem List   Diagnosis Code    Syncope and collapse R55    Bradycardia R00.1    Nausea and vomiting R11.2    Leg pain M79.606    Hyperglycemia R73.9    Appendicitis K37    Appendicitis with perforation K35.32    GERD (gastroesophageal reflux disease) K21.9    Constipation K59.00    Acute pulmonary embolism (HCC) I26.99    COVID-19 U07.1    Hypotension I95.9    Renal insufficiency N28.9     Acute hypoxic respiratory failure 2/2 COVID-19 pneumonia      These active diagnoses are of sufficient risk that focused discussion on advance care planning is indicated in order to allow the patient to thoughtfully consider personal goals of care; and, if situations arise that prevent the ability to personally give input, to ensure appropriate representation of their personal desires for different levels and aggressiveness of care. For Serious or Chronic Illness:  Understanding of medical condition     Reviewed pt's clinical status. Luana Stein is admitted for acute hypoxic respiratory failure 2/2 COVID-19. We reviewed our treatment plan and anticipated discharge plans.  Further, we discussed pt's wishes on how he would like to proceed (aggressive/heroic resuscitation vs not intervening and allowing nature takes its course) if he were to suffer cardiopulmonary arrest.    Understanding of CPR, goals and expected outcomes, benefits and burdens discussed. Information on CPR success provided (many factors lower a patients chance of survival, including advanced age, performance status, malignancy, and presence of multiple comorbidities); Individual asked to communicate understanding of information in his/her own words. We reviewed pt's hospitalization several months ago when he was admitted for perforated appendicitis that was complicated by abscess requiring prolonged IV abx, from which he has recovered. Wife understands importance of clear establishment of goals of care and regrettably informs me that although pt was quite ill during his previous admission, goals of care were not definitely defined. She will attempt to discuss further w/ pt on this matter. FULL CODE for now.

## 2021-10-14 NOTE — PROGRESS NOTES
Bedside shift change report given to [id number] Jerilyn (oncoming nurse) by Muriel Schneider (offgoing nurse). Report included the following information SBAR, Kardex, Intake/Output, MAR, Accordion and Recent Results.

## 2021-10-15 LAB
ALBUMIN SERPL-MCNC: 2.3 G/DL (ref 3.5–5)
ALBUMIN/GLOB SERPL: 0.5 {RATIO} (ref 1.1–2.2)
ALP SERPL-CCNC: 42 U/L (ref 45–117)
ALT SERPL-CCNC: 23 U/L (ref 12–78)
ANION GAP SERPL CALC-SCNC: 10 MMOL/L (ref 5–15)
AST SERPL-CCNC: 27 U/L (ref 15–37)
BASOPHILS # BLD: 0 K/UL (ref 0–0.1)
BASOPHILS NFR BLD: 0 % (ref 0–1)
BILIRUB SERPL-MCNC: 0.3 MG/DL (ref 0.2–1)
BUN SERPL-MCNC: 15 MG/DL (ref 6–20)
BUN/CREAT SERPL: 19 (ref 12–20)
CALCIUM SERPL-MCNC: 7.6 MG/DL (ref 8.5–10.1)
CHLORIDE SERPL-SCNC: 111 MMOL/L (ref 97–108)
CO2 SERPL-SCNC: 21 MMOL/L (ref 21–32)
CREAT SERPL-MCNC: 0.8 MG/DL (ref 0.7–1.3)
CRP SERPL-MCNC: 12 MG/DL (ref 0–0.6)
D DIMER PPP FEU-MCNC: 1.08 MG/L FEU (ref 0–0.65)
DIFFERENTIAL METHOD BLD: ABNORMAL
EOSINOPHIL # BLD: 0 K/UL (ref 0–0.4)
EOSINOPHIL NFR BLD: 0 % (ref 0–7)
ERYTHROCYTE [DISTWIDTH] IN BLOOD BY AUTOMATED COUNT: 13.3 % (ref 11.5–14.5)
FERRITIN SERPL-MCNC: 545 NG/ML (ref 26–388)
GLOBULIN SER CALC-MCNC: 4.3 G/DL (ref 2–4)
GLUCOSE SERPL-MCNC: 129 MG/DL (ref 65–100)
HCT VFR BLD AUTO: 39.1 % (ref 36.6–50.3)
HGB BLD-MCNC: 13.1 G/DL (ref 12.1–17)
IMM GRANULOCYTES # BLD AUTO: 0 K/UL (ref 0–0.04)
IMM GRANULOCYTES NFR BLD AUTO: 0 % (ref 0–0.5)
LYMPHOCYTES # BLD: 1 K/UL (ref 0.8–3.5)
LYMPHOCYTES NFR BLD: 16 % (ref 12–49)
MCH RBC QN AUTO: 30 PG (ref 26–34)
MCHC RBC AUTO-ENTMCNC: 33.5 G/DL (ref 30–36.5)
MCV RBC AUTO: 89.7 FL (ref 80–99)
MONOCYTES # BLD: 0.5 K/UL (ref 0–1)
MONOCYTES NFR BLD: 7 % (ref 5–13)
NEUTS SEG # BLD: 4.7 K/UL (ref 1.8–8)
NEUTS SEG NFR BLD: 77 % (ref 32–75)
NRBC # BLD: 0 K/UL (ref 0–0.01)
NRBC BLD-RTO: 0 PER 100 WBC
PLATELET # BLD AUTO: 182 K/UL (ref 150–400)
PMV BLD AUTO: 10.1 FL (ref 8.9–12.9)
POTASSIUM SERPL-SCNC: 3.5 MMOL/L (ref 3.5–5.1)
PROCALCITONIN SERPL-MCNC: 0.15 NG/ML
PROT SERPL-MCNC: 6.6 G/DL (ref 6.4–8.2)
RBC # BLD AUTO: 4.36 M/UL (ref 4.1–5.7)
SODIUM SERPL-SCNC: 142 MMOL/L (ref 136–145)
TROPONIN-HIGH SENSITIVITY: 14 NG/L (ref 0–76)
WBC # BLD AUTO: 6.2 K/UL (ref 4.1–11.1)

## 2021-10-15 PROCEDURE — 97110 THERAPEUTIC EXERCISES: CPT

## 2021-10-15 PROCEDURE — 65270000029 HC RM PRIVATE

## 2021-10-15 PROCEDURE — 74011250636 HC RX REV CODE- 250/636: Performed by: INTERNAL MEDICINE

## 2021-10-15 PROCEDURE — 94760 N-INVAS EAR/PLS OXIMETRY 1: CPT

## 2021-10-15 PROCEDURE — 85379 FIBRIN DEGRADATION QUANT: CPT

## 2021-10-15 PROCEDURE — 80053 COMPREHEN METABOLIC PANEL: CPT

## 2021-10-15 PROCEDURE — 82728 ASSAY OF FERRITIN: CPT

## 2021-10-15 PROCEDURE — 84145 PROCALCITONIN (PCT): CPT

## 2021-10-15 PROCEDURE — 85025 COMPLETE CBC W/AUTO DIFF WBC: CPT

## 2021-10-15 PROCEDURE — 77030027138 HC INCENT SPIROMETER -A

## 2021-10-15 PROCEDURE — 97116 GAIT TRAINING THERAPY: CPT

## 2021-10-15 PROCEDURE — 86140 C-REACTIVE PROTEIN: CPT

## 2021-10-15 PROCEDURE — 74011250637 HC RX REV CODE- 250/637: Performed by: INTERNAL MEDICINE

## 2021-10-15 PROCEDURE — 74011000250 HC RX REV CODE- 250: Performed by: INTERNAL MEDICINE

## 2021-10-15 PROCEDURE — 36415 COLL VENOUS BLD VENIPUNCTURE: CPT

## 2021-10-15 PROCEDURE — 77010033678 HC OXYGEN DAILY

## 2021-10-15 RX ADMIN — BARICITINIB 4 MG: 2 TABLET, FILM COATED ORAL at 10:25

## 2021-10-15 RX ADMIN — SODIUM CHLORIDE 50 ML/HR: 9 INJECTION, SOLUTION INTRAVENOUS at 18:00

## 2021-10-15 RX ADMIN — Medication 10 ML: at 06:17

## 2021-10-15 RX ADMIN — OXYCODONE HYDROCHLORIDE AND ACETAMINOPHEN 500 MG: 500 TABLET ORAL at 17:39

## 2021-10-15 RX ADMIN — CEFTRIAXONE 1 G: 1 INJECTION, POWDER, FOR SOLUTION INTRAMUSCULAR; INTRAVENOUS at 00:55

## 2021-10-15 RX ADMIN — ENOXAPARIN SODIUM 40 MG: 100 INJECTION SUBCUTANEOUS at 10:26

## 2021-10-15 RX ADMIN — AZITHROMYCIN MONOHYDRATE 500 MG: 500 INJECTION, POWDER, LYOPHILIZED, FOR SOLUTION INTRAVENOUS at 00:55

## 2021-10-15 RX ADMIN — Medication 10 ML: at 21:53

## 2021-10-15 RX ADMIN — ZINC SULFATE 220 MG (50 MG) CAPSULE 1 CAPSULE: CAPSULE at 10:26

## 2021-10-15 RX ADMIN — Medication 10 ML: at 17:40

## 2021-10-15 RX ADMIN — DEXAMETHASONE SODIUM PHOSPHATE 6 MG: 4 INJECTION, SOLUTION INTRAMUSCULAR; INTRAVENOUS at 00:55

## 2021-10-15 RX ADMIN — Medication 2000 UNITS: at 10:25

## 2021-10-15 RX ADMIN — OXYCODONE HYDROCHLORIDE AND ACETAMINOPHEN 500 MG: 500 TABLET ORAL at 10:26

## 2021-10-15 RX ADMIN — ENOXAPARIN SODIUM 40 MG: 100 INJECTION SUBCUTANEOUS at 22:09

## 2021-10-15 NOTE — PROGRESS NOTES
Provided Pt Incentive Spirometer and educated Pt on benefits, how to use, and frequency of use. Pt verbalized and demonstrated understanding.  Witenessed three used each getting progressively better up to 1750mL

## 2021-10-15 NOTE — ACP (ADVANCE CARE PLANNING)
Advance Care Planning   Advance Care Planning Inpatient Note  [facility]    Today's Date: 10/15/2021  Unit: [facility]  MED SURG 2    Received request from In Basket. Upon review of chart and communication with care team, patient's decision making abilities are not in question. Patient was/were present in the room during visit. Goals of ACP Conversation:  Discuss Advance Care planning documents    Health Care Decision Makers:    No healthcare decision makers have been documented. Click here to complete [place] including selection of the Healthcare Decision Maker Relationship (ie \"Primary\")    Summary:   AMD consult      Advance Care Planning Documents (Patient Wishes) on file:  Living Will/ Advance Directive     Assessment:    's visit was in response to an AMD consult request through in basket. Patient was on contact precautions, his nurse was busy with another patient.  called patient in his room, he answered his phone but he seemed to struggle with hearing . He had no recollection of any conversation with saff about AMD. Patient was comfortable with 's offer to send him a copy of AMD forms through his nurse and call him later for further discussions.     Interventions:  Defered conversation     Care Preferences Communicated:  No    Outcomes/Plan:  ACP Discussion Postponed    Chaplain Kristen on 10/15/2021 at 10:09 AM Anxiety    Obesity, morbid, BMI 40.0-49.9    Sleep apnea

## 2021-10-15 NOTE — PROGRESS NOTES
10/15/2021   CARE MANAGEMENT NOTE:  CM reviewed EMR for clinical updates and handoff received from previous  Bisi Chapman). Pt was admitted with COVID. Reportedly, pt resides with his wife Gloria Tong ([phone]). RUR 12%    Transition Plan of Care:    1. Pulmonary is following for medical management  2. PT/OT evals complete; pt ambulated 50 feet on 10/14 and no further skilled rehab needs indicated  3. Plan is for pt to return home with his wife  4. Home oxygen eval prior to discharge  5. Outpt f/u  6. Wife will transport pt home     CM will continue to follow pt until discharged.   Alexis

## 2021-10-15 NOTE — PROGRESS NOTES
[facility] [place] [address]  [address] [facility], [place], [address] [place] [address] [address]  [phone]      Medical Progress Note      NAME: Kemar Griffiths   :  1960  MRM:  [id number]    Date/Time: 10/15/2021        Assessment / Plan:     63 yo M w/ hx of perforated appendicitis c/b abscess and BL PE presenting w/ cough, dyspnea, admitted for AHRF 2/2 COVID-19      Acute hypoxic respiratory failure: 2/2 COVID-19. On 3L NC. Tx as below      Pneumonia from COVID-19: Unvaccinated. Hypoxic. Markedly elevated CRP. Started on baricitinib, IV dexamethasone. Empiric abx. Monitor CRP, d-dimer. Pulmonology following      Hypotension (10/14/2021): Suspect due to poor oral intake. Responded to IVF boluses. Monitor      Syncope and collapse: likely IVVD. Unlikely PE as d-dimer was WNR. CT head negative. Echo pending       BERONICA: Suspect due to poor p.o. intake and volume depletion. Resolved. Follow BMP      History of pulmonary embolism: No longer on anticoagulation; per patient he took himself off of anticoagulation. Provoked PE due to surgery in 2021 so doubt he needs long term AC. Monitor d-dimer       Total time spent: 35 minutes  Time spent in the care of this patient including reviewing records, discussing with nursing and/or other providers on the treatment team, obtaining history and examining the patient, and discussing treatment plans. Care Plan discussed with: Patient, Nursing Staff and >50% of time spent in counseling and coordination of care    Discussed:  Care Plan    Prophylaxis:  Lovenox    Disposition:  Home w/Family         Subjective:     Chief Complaint:  Follow up COVID    Chart/notes/labs/studies reviewed, patient examined. Feels ok. Coughing. +SOB. No CP. No F/C            Objective:       Vitals:        Last 24hrs VS reviewed since prior progress note.  Most recent are:    Visit Vitals  /62 (BP 1 Location: Right upper arm, BP Patient Position: At rest)   Pulse 70 Temp 97.9 °F (36.6 °C)   Resp 16   Ht 5' 9\" (1.753 m)   Wt 93.9 kg (207 lb)   SpO2 93%   BMI 30.57 kg/m²     SpO2 Readings from Last 6 Encounters:   10/15/21 93%   04/14/21 98%   04/13/21 97%   04/12/21 97%   04/08/21 98%   04/07/21 98%    O2 Flow Rate (L/min): 3 l/min       Intake/Output Summary (Last 24 hours) at 10/15/2021 1910  Last data filed at 10/15/2021 0745  Gross per 24 hour   Intake 1240 ml   Output 0 ml   Net 1240 ml          Exam:     Physical Exam:    Gen:  Well-developed, well-nourished, in no acute distress  HEENT:  Atraumatic, normocephalic. Sclerae nonicteric, hearing intact to voice  Neck:  Supple, without apparent masses. Resp:  No accessory muscle use, CTAB without wheezes, rales, or rhonchi  Card: RRR, without m/r/g. No LE edema  Abd:  +bowel sounds, soft, NTTP, nondistended. No HSM  Neuro: Face symmetric, speech fluent, follows commands appropriately, no focal weakness or numbness  Psych:  Alert, oriented x 3.  Good insight     Medications Reviewed: (see below)    Lab Data Reviewed: (see below)    ______________________________________________________________________    Medications:     Current Facility-Administered Medications   Medication Dose Route Frequency    sodium chloride (NS) flush 5-40 mL  5-40 mL IntraVENous Q8H    sodium chloride (NS) flush 5-40 mL  5-40 mL IntraVENous PRN    acetaminophen (TYLENOL) tablet 650 mg  650 mg Oral Q6H PRN    Or    acetaminophen (TYLENOL) suppository 650 mg  650 mg Rectal Q6H PRN    ondansetron (ZOFRAN ODT) tablet 4 mg  4 mg Oral Q8H PRN    Or    ondansetron (ZOFRAN) injection 4 mg  4 mg IntraVENous Q6H PRN    dexamethasone (DECADRON) 4 mg/mL injection 6 mg  6 mg IntraVENous Q24H    cholecalciferol (VITAMIN D3) (1000 Units /25 mcg) tablet 2,000 Units  2,000 Units Oral DAILY    cefTRIAXone (ROCEPHIN) 1 g in sterile water (preservative free) 10 mL IV syringe  1 g IntraVENous Q24H    azithromycin (ZITHROMAX) 500 mg in 0.9% sodium chloride 250 mL (VIAL-MATE)  500 mg IntraVENous Q24H    ascorbic acid (vitamin C) (VITAMIN C) tablet 500 mg  500 mg Oral BID    zinc sulfate (ZINCATE) 50 mg zinc (220 mg) capsule 1 Capsule  1 Capsule Oral DAILY    0.9% sodium chloride infusion  50 mL/hr IntraVENous CONTINUOUS    enoxaparin (LOVENOX) injection 40 mg  40 mg SubCUTAneous Q12H    baricitinib (OLUMIANT) tablet 4 mg  4 mg Oral DAILY    sodium chloride (NS) flush 5-10 mL  5-10 mL IntraVENous PRN            Lab Review:     Recent Labs     10/15/21  0105 10/14/21  0741 10/13/21  2158   WBC 6.2 7.3 8.9   HGB 13.1 13.1 14.9   HCT 39.1 39.7 43.3    161 185     Recent Labs     10/15/21  0105 10/14/21  0741 10/13/21  2158    139 137   K 3.5 3.7 3.6   * 111* 104   CO2 21 24 25   * 136* 117*   BUN 15 13 18   CREA 0.80 1.04 1.50*   CA 7.6* 7.0* 7.9*   MG  --  2.3  --    ALB 2.3* 2.6* 3.1*   ALT 23 22 22     No components found for: GLPOC  No results for input(s): PH, PCO2, PO2, HCO3, FIO2 in the last 72 hours. No results for input(s): INR, INREXT in the last 72 hours. No results found for: SDES  Lab Results   Component Value Date/Time    Culture result: NO GROWTH 1 DAY 10/13/2021 11:47 PM    Culture result: MRSA NOT PRESENT 03/01/2021 05:45 PM    Culture result:  03/01/2021 05:45 PM     Screening of patient nares for MRSA is for surveillance purposes and, if positive, to facilitate isolation considerations in high risk settings.  It is not intended for automatic decolonization interventions per se as regimens are not sufficiently effective to warrant routine use.              ___________________________________________________    Attending Physician: Lobo Garcias MD

## 2021-10-15 NOTE — PROGRESS NOTES
Bedside and Verbal shift change report given to Jana Monterroso RN (oncoming nurse) by Baljinder Sampson RN (offgoing nurse). Report included the following information SBAR, Kardex, Procedure Summary, Intake/Output, MAR, Accordion, Recent Results and Med Rec Status.

## 2021-10-15 NOTE — PROGRESS NOTES
Problem: Mobility Impaired (Adult and Pediatric)  Goal: *Acute Goals and Plan of Care (Insert Text)  Description: FUNCTIONAL STATUS PRIOR TO ADMISSION: Patient was independent and active without use of DME.    HOME SUPPORT PRIOR TO ADMISSION: The patient lived with wife but did not require assist.    Physical Therapy Goals  Initiated 10/14/2021  1. Patient will move from supine to sit and sit to supine  in bed with independence within 7 day(s). 2.  Patient will transfer from bed to chair and chair to bed with independence using the least restrictive device within 7 day(s). 3.  Patient will perform sit to stand with independence within 7 day(s). 4.  Patient will ambulate with independence for 250 feet with the least restrictive device within 7 day(s). Outcome: Progressing Towards Goal   PHYSICAL THERAPY TREATMENT  Patient: Maggie Phillips (58 y.o. male)  Date: 10/15/2021  Diagnosis: COVID-19 [U07.1] <principal problem not specified>       Precautions:  droplet +  Chart, physical therapy assessment, plan of care and goals were reviewed. ASSESSMENT  Patient continues with skilled PT services and is progressing towards goals. Patient today with good participation in physical therapy. He is received on 5L NC and agreeable to ambulate with PT. He transfers & performs bed mobility at SBA. Patient donns his socks with good LE ROM independently at EOB. Patient ambulates 45ft in room with 1 seated rest break residential through ambulation, SBA. He is able to manage his own IV pole. He maintains oxygen saturation >89% with ambulation on 5L and with breaks. Patient also performs standing LE exercises to challenge his balance. He requires intermittent hand placement on the bed to maintain balance during this. Recommend HHPT vs. None upon d/c.      Current Level of Function Impacting Discharge (mobility/balance): SBA    Other factors to consider for discharge: none additional         PLAN :  Patient continues to benefit from skilled intervention to address the above impairments. Continue treatment per established plan of care. to address goals. Recommendation for discharge: (in order for the patient to meet his/her long term goals)  To be determined: HHPT vs none    This discharge recommendation:  Has not yet been discussed the attending provider and/or case management    IF patient discharges home will need the following DME: to be determined (TBD)       SUBJECTIVE:   Patient stated i'll do anything you suggest.    OBJECTIVE DATA SUMMARY:   Patient received supine in bed and was agreeable to participate in PT session. Patient was cleared by nursing to participate in PT session. Critical Behavior:  Neurologic State: Alert  Orientation Level: Oriented X4  Cognition: Follows commands  Safety/Judgement: Decreased awareness of need for assistance, Decreased insight into deficits, Fall prevention, Home safety  Functional Mobility Training:  Bed Mobility:  Rolling: Stand-by assistance  Supine to Sit: Stand-by assistance  Sit to Supine: Stand-by assistance  Scooting: Stand-by assistance        Transfers:  Sit to Stand: Stand-by assistance  Stand to Sit: Stand-by assistance        Bed to Chair: Stand-by assistance                    Balance:  Sitting: Intact; Without support  Standing: Impaired; Without support  Standing - Static: Good  Standing - Dynamic : Fair  Ambulation/Gait Training:  Distance (ft): 45 Feet (ft)  Assistive Device: Gait belt  Ambulation - Level of Assistance: Stand-by assistance                       Speed/Ludmila: Slow                  Therapeutic Exercises:   Exercises:    Patient educated regarding home exercise program for strengthening, ROM, and respiratory function; they demonstrated appropriate performance. Reviewed importance of symptom monitoring  Pt verbalized understanding via demonstration.        LE exercises         Standing march with counter support 10 1   Standing knee flexion 10 1 Standing mini squat 10 1   Sit to Stand 4 1         Pain Rating:  Patient without reports of pain during therapy      Activity Tolerance:   Fair, desaturates with exertion and requires oxygen, and requires rest breaks    After treatment patient left in no apparent distress:   Supine in bed, Call bell within reach, Bed / chair alarm activated, and Side rails x 3    COMMUNICATION/COLLABORATION:   The patients plan of care was discussed with: Occupational therapist and Registered nurse.      Ever Friends PT, DPT   Time Calculation: 27 mins

## 2021-10-15 NOTE — PROGRESS NOTES
Bedside and Verbal shift change report given to Manpreet Vuong (oncoming nurse) by Ellen Snyder (offgoing nurse). Report included the following information SBAR, Kardex, Intake/Output, MAR and Recent Results.

## 2021-10-15 NOTE — PROGRESS NOTES
Spiritual Care Assessment/Progress Note  [facility]      NAME: Shaquille Henao      MRN: [id number]  AGE: 62 y.o. SEX: male  Yarsanism Affiliation: Episcopal   Language: English     10/15/2021     Total Time (in minutes): 42     Spiritual Assessment begun in [facility]  MED SURG 2 through conversation with:         []Patient        [] Family    [] Friend(s)        Reason for Consult: Advance medical directive consult     Spiritual beliefs: (Please include comment if needed)     [] Identifies with a shannon tradition:         [] Supported by a shannon community:            [] Claims no spiritual orientation:           [] Seeking spiritual identity:                [] Adheres to an individual form of spirituality:           [x] Not able to assess:                           Identified resources for coping:      [] Prayer                               [] Music                  [] Guided Imagery     [] Family/friends                 [] Pet visits     [] Devotional reading                         [x] Unknown     [] Other:                                               Interventions offered during this visit: (See comments for more details)    Patient Interventions: Advance medical directive consult, Initial visit           Plan of Care:     [] Support spiritual and/or cultural needs    [] Support AMD and/or advance care planning process      [] Support grieving process   [] Coordinate Rites and/or Rituals    [] Coordination with community clergy   [] No spiritual needs identified at this time   [] Detailed Plan of Care below (See Comments)  [] Make referral to Music Therapy  [] Make referral to Pet Therapy     [] Make referral to Addiction services  [] Make referral to [facility]  [] Make referral to Spiritual Care Partner  [] No future visits requested        [x] Follow up upon further referrals     Comments:  's visit was on 5 Med Surg unit,  in response to an AMD consult request through in basket. Patient, Mr. Fareed Garcia, was on contact precautions, his nurse was busy with another patient. He answered his phone when  called his room, but he seem to struggle with hearing . He had no recollection of any conversation with saff about AMD. Patient was comfortable with 's offer to send him a copy of AMD forms, through his nurse, and call him later for further discussions. He thanked  for the call. Spiritual care is available for further staff referrals.  called patient when he received the forms. He indicated he was not interested in completing the forms at this time. Visited by: Daniel Daniel.    Paging Service: [phone])
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
## 2021-10-15 NOTE — PROGRESS NOTES
Amy Bertrand from lab called to make patient's nurse aware of a lab results, troponin, that wasn't called to patient's nurse on day of result 10/14. Lab is not a critical value. Lieutenant Xiomy JONES made aware.

## 2021-10-16 ENCOUNTER — APPOINTMENT (OUTPATIENT)
Dept: NON INVASIVE DIAGNOSTICS | Age: 61
DRG: 177 | End: 2021-10-16
Attending: INTERNAL MEDICINE

## 2021-10-16 LAB
ALBUMIN SERPL-MCNC: 2.2 G/DL (ref 3.5–5)
ALBUMIN/GLOB SERPL: 0.6 {RATIO} (ref 1.1–2.2)
ALP SERPL-CCNC: 40 U/L (ref 45–117)
ALT SERPL-CCNC: 21 U/L (ref 12–78)
ANION GAP SERPL CALC-SCNC: 7 MMOL/L (ref 5–15)
AST SERPL-CCNC: 25 U/L (ref 15–37)
BASOPHILS # BLD: 0 K/UL (ref 0–0.1)
BASOPHILS NFR BLD: 0 % (ref 0–1)
BILIRUB SERPL-MCNC: 0.4 MG/DL (ref 0.2–1)
BUN SERPL-MCNC: 16 MG/DL (ref 6–20)
BUN/CREAT SERPL: 17 (ref 12–20)
CALCIUM SERPL-MCNC: 8 MG/DL (ref 8.5–10.1)
CHLORIDE SERPL-SCNC: 112 MMOL/L (ref 97–108)
CO2 SERPL-SCNC: 23 MMOL/L (ref 21–32)
CREAT SERPL-MCNC: 0.95 MG/DL (ref 0.7–1.3)
CRP SERPL-MCNC: 7.11 MG/DL (ref 0–0.6)
D DIMER PPP FEU-MCNC: 0.63 MG/L FEU (ref 0–0.65)
DIFFERENTIAL METHOD BLD: ABNORMAL
ECHO AO ROOT DIAM: 3.3 CM
ECHO AR MAX VEL PISA: 324.87 CENTIMETER/SECOND
ECHO AV CUSP MM: 2.27 CM
ECHO AV MEAN GRADIENT: 5.14 MMHG
ECHO AV PEAK GRADIENT: 10.02 MMHG
ECHO AV PEAK VELOCITY: 158.31 CM/S
ECHO AV REGURGITANT PHT: 444.91 MS
ECHO AV VTI: 35.27 CM
ECHO EST RA PRESSURE: 3 MMHG
ECHO LA AREA 4C: 18.38 CM2
ECHO LA MAJOR AXIS: 3.77 CM
ECHO LA MINOR AXIS: 1.8 CM
ECHO LA VOL 2C: 51.59 ML (ref 18–58)
ECHO LA VOL 4C: 47.14 ML (ref 18–58)
ECHO LA VOL BP: 58.13 ML (ref 18–58)
ECHO LA VOL/BSA BIPLANE: 27.68 ML/M2 (ref 16–28)
ECHO LA VOLUME INDEX A2C: 24.57 ML/M2 (ref 16–28)
ECHO LA VOLUME INDEX A4C: 22.45 ML/M2 (ref 16–28)
ECHO LV E' LATERAL VELOCITY: 13.83 CENTIMETER/SECOND
ECHO LV E' SEPTAL VELOCITY: 8.51 CENTIMETER/SECOND
ECHO LV INTERNAL DIMENSION DIASTOLIC: 5.12 CM (ref 4.2–5.9)
ECHO LV INTERNAL DIMENSION SYSTOLIC: 3.93 CM
ECHO LV IVSD: 1.11 CM (ref 0.6–1)
ECHO LV MASS 2D: 217.9 G (ref 88–224)
ECHO LV MASS INDEX 2D: 103.8 G/M2 (ref 49–115)
ECHO LV POSTERIOR WALL DIASTOLIC: 1.11 CM (ref 0.6–1)
ECHO LVOT PEAK GRADIENT: 6.6 MMHG
ECHO LVOT PEAK VELOCITY: 123.36 CM/S
ECHO LVOT VTI: 26.6 CM
ECHO MV A VELOCITY: 62.46 CENTIMETER/SECOND
ECHO MV E DECELERATION TIME (DT): 169.91 MS
ECHO MV E VELOCITY: 89.33 CENTIMETER/SECOND
ECHO PV PEAK INSTANTANEOUS GRADIENT SYSTOLIC: 2.4 MMHG
ECHO RA AREA 4C: 19.01 CM2
ECHO RIGHT VENTRICULAR SYSTOLIC PRESSURE (RVSP): 22.7 MMHG
ECHO RV TAPSE: 2.27 CM (ref 1.5–2)
ECHO TV REGURGITANT MAX VELOCITY: 221.95 CM/S
ECHO TV REGURGITANT PEAK GRADIENT: 19.7 MMHG
EOSINOPHIL # BLD: 0 K/UL (ref 0–0.4)
EOSINOPHIL NFR BLD: 0 % (ref 0–7)
ERYTHROCYTE [DISTWIDTH] IN BLOOD BY AUTOMATED COUNT: 13.4 % (ref 11.5–14.5)
FERRITIN SERPL-MCNC: 557 NG/ML (ref 26–388)
GLOBULIN SER CALC-MCNC: 4 G/DL (ref 2–4)
GLUCOSE SERPL-MCNC: 128 MG/DL (ref 65–100)
HCT VFR BLD AUTO: 36.2 % (ref 36.6–50.3)
HGB BLD-MCNC: 12.2 G/DL (ref 12.1–17)
IMM GRANULOCYTES # BLD AUTO: 0.1 K/UL (ref 0–0.04)
IMM GRANULOCYTES NFR BLD AUTO: 1 % (ref 0–0.5)
LA VOL DISK BP: 50.07 ML (ref 18–58)
LVOT MG: 3.14 MMHG
LYMPHOCYTES # BLD: 0.7 K/UL (ref 0.8–3.5)
LYMPHOCYTES NFR BLD: 5 % (ref 12–49)
MAGNESIUM SERPL-MCNC: 1.8 MG/DL (ref 1.6–2.4)
MCH RBC QN AUTO: 30.2 PG (ref 26–34)
MCHC RBC AUTO-ENTMCNC: 33.7 G/DL (ref 30–36.5)
MCV RBC AUTO: 89.6 FL (ref 80–99)
MONOCYTES # BLD: 0.6 K/UL (ref 0–1)
MONOCYTES NFR BLD: 4 % (ref 5–13)
NEUTS SEG # BLD: 13.4 K/UL (ref 1.8–8)
NEUTS SEG NFR BLD: 90 % (ref 32–75)
NRBC # BLD: 0 K/UL (ref 0–0.01)
NRBC BLD-RTO: 0 PER 100 WBC
PHOSPHATE SERPL-MCNC: 3.7 MG/DL (ref 2.6–4.7)
PLATELET # BLD AUTO: 207 K/UL (ref 150–400)
PMV BLD AUTO: 10.7 FL (ref 8.9–12.9)
POTASSIUM SERPL-SCNC: 3.6 MMOL/L (ref 3.5–5.1)
PROCALCITONIN SERPL-MCNC: 0.06 NG/ML
PROT SERPL-MCNC: 6.2 G/DL (ref 6.4–8.2)
RBC # BLD AUTO: 4.04 M/UL (ref 4.1–5.7)
RBC MORPH BLD: ABNORMAL
SODIUM SERPL-SCNC: 142 MMOL/L (ref 136–145)
WBC # BLD AUTO: 14.8 K/UL (ref 4.1–11.1)

## 2021-10-16 PROCEDURE — 93306 TTE W/DOPPLER COMPLETE: CPT | Performed by: SPECIALIST

## 2021-10-16 PROCEDURE — 74011250637 HC RX REV CODE- 250/637: Performed by: INTERNAL MEDICINE

## 2021-10-16 PROCEDURE — 83735 ASSAY OF MAGNESIUM: CPT

## 2021-10-16 PROCEDURE — 84145 PROCALCITONIN (PCT): CPT

## 2021-10-16 PROCEDURE — 74011250636 HC RX REV CODE- 250/636: Performed by: INTERNAL MEDICINE

## 2021-10-16 PROCEDURE — 65270000029 HC RM PRIVATE

## 2021-10-16 PROCEDURE — 80053 COMPREHEN METABOLIC PANEL: CPT

## 2021-10-16 PROCEDURE — 94760 N-INVAS EAR/PLS OXIMETRY 1: CPT

## 2021-10-16 PROCEDURE — 82728 ASSAY OF FERRITIN: CPT

## 2021-10-16 PROCEDURE — 36415 COLL VENOUS BLD VENIPUNCTURE: CPT

## 2021-10-16 PROCEDURE — 85379 FIBRIN DEGRADATION QUANT: CPT

## 2021-10-16 PROCEDURE — 93306 TTE W/DOPPLER COMPLETE: CPT

## 2021-10-16 PROCEDURE — 84100 ASSAY OF PHOSPHORUS: CPT

## 2021-10-16 PROCEDURE — 86140 C-REACTIVE PROTEIN: CPT

## 2021-10-16 PROCEDURE — 85025 COMPLETE CBC W/AUTO DIFF WBC: CPT

## 2021-10-16 PROCEDURE — 74011000250 HC RX REV CODE- 250: Performed by: INTERNAL MEDICINE

## 2021-10-16 PROCEDURE — 77010033678 HC OXYGEN DAILY

## 2021-10-16 RX ORDER — GUAIFENESIN/DEXTROMETHORPHAN 100-10MG/5
5 SYRUP ORAL
Status: DISCONTINUED | OUTPATIENT
Start: 2021-10-16 | End: 2021-10-22 | Stop reason: HOSPADM

## 2021-10-16 RX ORDER — CALCIUM CARB/MAGNESIUM CARB 311-232MG
5 TABLET ORAL
Status: DISCONTINUED | OUTPATIENT
Start: 2021-10-16 | End: 2021-10-22 | Stop reason: HOSPADM

## 2021-10-16 RX ADMIN — OXYCODONE HYDROCHLORIDE AND ACETAMINOPHEN 500 MG: 500 TABLET ORAL at 17:49

## 2021-10-16 RX ADMIN — Medication 10 ML: at 22:00

## 2021-10-16 RX ADMIN — DEXAMETHASONE SODIUM PHOSPHATE 6 MG: 4 INJECTION, SOLUTION INTRAMUSCULAR; INTRAVENOUS at 02:01

## 2021-10-16 RX ADMIN — ZINC SULFATE 220 MG (50 MG) CAPSULE 1 CAPSULE: CAPSULE at 10:47

## 2021-10-16 RX ADMIN — GUAIFENESIN AND DEXTROMETHORPHAN 5 ML: 100; 10 SYRUP ORAL at 14:03

## 2021-10-16 RX ADMIN — CEFTRIAXONE 1 G: 1 INJECTION, POWDER, FOR SOLUTION INTRAMUSCULAR; INTRAVENOUS at 02:02

## 2021-10-16 RX ADMIN — OXYCODONE HYDROCHLORIDE AND ACETAMINOPHEN 500 MG: 500 TABLET ORAL at 10:47

## 2021-10-16 RX ADMIN — AZITHROMYCIN MONOHYDRATE 500 MG: 500 INJECTION, POWDER, LYOPHILIZED, FOR SOLUTION INTRAVENOUS at 02:01

## 2021-10-16 RX ADMIN — Medication 5 MG: at 22:00

## 2021-10-16 RX ADMIN — GUAIFENESIN AND DEXTROMETHORPHAN 5 ML: 100; 10 SYRUP ORAL at 22:08

## 2021-10-16 RX ADMIN — Medication 10 ML: at 14:04

## 2021-10-16 RX ADMIN — BARICITINIB 4 MG: 2 TABLET, FILM COATED ORAL at 10:46

## 2021-10-16 RX ADMIN — Medication 2000 UNITS: at 10:47

## 2021-10-16 RX ADMIN — ENOXAPARIN SODIUM 40 MG: 100 INJECTION SUBCUTANEOUS at 10:48

## 2021-10-16 RX ADMIN — ENOXAPARIN SODIUM 40 MG: 100 INJECTION SUBCUTANEOUS at 22:00

## 2021-10-16 RX ADMIN — SODIUM CHLORIDE 50 ML/HR: 9 INJECTION, SOLUTION INTRAVENOUS at 17:50

## 2021-10-16 NOTE — PROGRESS NOTES
Carlos Ochoa [facility]  [address] [facility], [place], [address] [place] [address] [address]  [phone]      Medical Progress Note      NAME: Sebastian Welch   :  1960  MRM:  [id number]    Date/Time: 10/16/2021        Assessment / Plan:     63 yo M w/ hx of perforated appendicitis c/b abscess and BL PE presenting w/ cough, dyspnea, admitted for AHRF 2/2 COVID-19      Acute hypoxic respiratory failure: 2/2 COVID-19. Stable n 3L NC. Treating as below      Pneumonia from COVID-19: Unvaccinated. Hypoxic. Markedly elevated CRP however improving. Cont baricitinib, IV dexamethasone. Empiric abx. Trend CRP, d-dimer. Pulmonology following      Hypotension (10/14/2021): Suspect from poor PO intake. Resolved after IVF      Syncope and collapse: likely IVVD. Unlikely PE as d-dimer was WNR. CT head negative. Echo showed preserved EF w/o WMA however mild MR and mild AR       BERONICA: 2/2 IVVD. Resolved after IVF      History of pulmonary embolism: No longer on anticoagulation; per patient he took himself off of anticoagulation. Provoked PE due to surgery in 2021 so may not need long term AC. D-dimer normal       Total time spent: 25 minutes  Time spent in the care of this patient including reviewing records, discussing with nursing and/or other providers on the treatment team, obtaining history and examining the patient, and discussing treatment plans. Care Plan discussed with: Patient, Nursing Staff and >50% of time spent in counseling and coordination of care    Discussed:  Care Plan    Prophylaxis:  Lovenox    Disposition:  Home w/Family         Subjective:     Chief Complaint:  Follow up COVID    Chart/notes/labs/studies reviewed, patient examined. Coughing worse, otherwise feels okay. No CP. No fevers       Objective:       Vitals:        Last 24hrs VS reviewed since prior progress note. Most recent are:    Visit Vitals  BP (!) 118/59 (BP 1 Location: Left upper arm, BP Patient Position:  At rest)   Pulse 70   Temp 97.8 °F (36.6 °C)   Resp 22   Ht 5' 9\" (1.753 m)   Wt 93.9 kg (207 lb)   SpO2 94%   BMI 30.57 kg/m²     SpO2 Readings from Last 6 Encounters:   10/16/21 94%   04/14/21 98%   04/13/21 97%   04/12/21 97%   04/08/21 98%   04/07/21 98%    O2 Flow Rate (L/min): 3 l/min       Intake/Output Summary (Last 24 hours) at 10/16/2021 1536  Last data filed at 10/16/2021 0800  Gross per 24 hour   Intake 662.5 ml   Output 0 ml   Net 662.5 ml          Exam:     Physical Exam:    Gen:  Well-developed, well-nourished, in no acute distress  HEENT:  Atraumatic, normocephalic. Sclerae nonicteric, hearing intact to voice  Neck:  Supple, without apparent masses. Resp:  No accessory muscle use, mildly diminished, coughing. No wheezes, rales, or rhonchi  Card: RRR, without m/r/g. No LE edema  Abd:  +bowel sounds, soft, NTTP, nondistended. Neuro: Face symmetric, speech fluent, follows commands appropriately, no focal weakness or numbness  Psych:  Alert, oriented x 3.  Fair insight     Medications Reviewed: (see below)    Lab Data Reviewed: (see below)    ______________________________________________________________________    Medications:     Current Facility-Administered Medications   Medication Dose Route Frequency    guaiFENesin-dextromethorphan (ROBITUSSIN DM) 100-10 mg/5 mL syrup 5 mL  5 mL Oral Q6H PRN    melatonin (rapid dissolve) tablet 5 mg  5 mg Oral QHS    sodium chloride (NS) flush 5-40 mL  5-40 mL IntraVENous Q8H    sodium chloride (NS) flush 5-40 mL  5-40 mL IntraVENous PRN    acetaminophen (TYLENOL) tablet 650 mg  650 mg Oral Q6H PRN    Or    acetaminophen (TYLENOL) suppository 650 mg  650 mg Rectal Q6H PRN    ondansetron (ZOFRAN ODT) tablet 4 mg  4 mg Oral Q8H PRN    Or    ondansetron (ZOFRAN) injection 4 mg  4 mg IntraVENous Q6H PRN    dexamethasone (DECADRON) 4 mg/mL injection 6 mg  6 mg IntraVENous Q24H    cholecalciferol (VITAMIN D3) (1000 Units /25 mcg) tablet 2,000 Units  2,000 Units Oral DAILY    cefTRIAXone (ROCEPHIN) 1 g in sterile water (preservative free) 10 mL IV syringe  1 g IntraVENous Q24H    azithromycin (ZITHROMAX) 500 mg in 0.9% sodium chloride 250 mL (VIAL-MATE)  500 mg IntraVENous Q24H    ascorbic acid (vitamin C) (VITAMIN C) tablet 500 mg  500 mg Oral BID    zinc sulfate (ZINCATE) 50 mg zinc (220 mg) capsule 1 Capsule  1 Capsule Oral DAILY    0.9% sodium chloride infusion  50 mL/hr IntraVENous CONTINUOUS    enoxaparin (LOVENOX) injection 40 mg  40 mg SubCUTAneous Q12H    baricitinib (OLUMIANT) tablet 4 mg  4 mg Oral DAILY    sodium chloride (NS) flush 5-10 mL  5-10 mL IntraVENous PRN            Lab Review:     Recent Labs     10/16/21  0641 10/15/21  0105 10/14/21  0741   WBC 14.8* 6.2 7.3   HGB 12.2 13.1 13.1   HCT 36.2* 39.1 39.7    182 161     Recent Labs     10/16/21  0641 10/15/21  0105 10/14/21  0741    142 139   K 3.6 3.5 3.7   * 111* 111*   CO2 23 21 24   * 129* 136*   BUN 16 15 13   CREA 0.95 0.80 1.04   CA 8.0* 7.6* 7.0*   MG 1.8  --  2.3   PHOS 3.7  --   --    ALB 2.2* 2.3* 2.6*   ALT 21 23 22     No components found for: GLPOC  No results for input(s): PH, PCO2, PO2, HCO3, FIO2 in the last 72 hours. No results for input(s): INR, INREXT, INREXT in the last 72 hours. No results found for: SDES  Lab Results   Component Value Date/Time    Culture result: NO GROWTH 2 DAYS 10/13/2021 11:47 PM    Culture result: MRSA NOT PRESENT 03/01/2021 05:45 PM    Culture result:  03/01/2021 05:45 PM     Screening of patient nares for MRSA is for surveillance purposes and, if positive, to facilitate isolation considerations in high risk settings.  It is not intended for automatic decolonization interventions per se as regimens are not sufficiently effective to warrant routine use.              ___________________________________________________    Attending Physician: Krysten Estrada MD

## 2021-10-16 NOTE — PROGRESS NOTES
Bedside and Verbal shift change report given to Brayan Temple RN (oncoming nurse) by Oralia Car RN (offgoing nurse). Report included the following information SBAR, Kardex, Procedure Summary, Intake/Output, MAR, Accordion, Recent Results and Med Rec Status.

## 2021-10-16 NOTE — PROGRESS NOTES
Bedside and Verbal shift change report given to Andrew Baumann RN (oncoming nurse) by Maryam Silva RN (offgoing nurse). Report included the following information SBAR, Kardex, Intake/Output, MAR, Accordion, Recent Results and Med Rec Status.

## 2021-10-17 LAB
ALBUMIN SERPL-MCNC: 2.2 G/DL (ref 3.5–5)
ALBUMIN/GLOB SERPL: 0.5 {RATIO} (ref 1.1–2.2)
ALP SERPL-CCNC: 44 U/L (ref 45–117)
ALT SERPL-CCNC: 27 U/L (ref 12–78)
ANION GAP SERPL CALC-SCNC: 4 MMOL/L (ref 5–15)
AST SERPL-CCNC: 25 U/L (ref 15–37)
BASOPHILS # BLD: 0 K/UL (ref 0–0.1)
BASOPHILS NFR BLD: 0 % (ref 0–1)
BILIRUB SERPL-MCNC: 0.4 MG/DL (ref 0.2–1)
BUN SERPL-MCNC: 17 MG/DL (ref 6–20)
BUN/CREAT SERPL: 24 (ref 12–20)
CALCIUM SERPL-MCNC: 7.6 MG/DL (ref 8.5–10.1)
CHLORIDE SERPL-SCNC: 113 MMOL/L (ref 97–108)
CO2 SERPL-SCNC: 24 MMOL/L (ref 21–32)
CREAT SERPL-MCNC: 0.71 MG/DL (ref 0.7–1.3)
CRP SERPL-MCNC: 8.18 MG/DL (ref 0–0.6)
D DIMER PPP FEU-MCNC: 0.9 MG/L FEU (ref 0–0.65)
DIFFERENTIAL METHOD BLD: ABNORMAL
EOSINOPHIL # BLD: 0 K/UL (ref 0–0.4)
EOSINOPHIL NFR BLD: 0 % (ref 0–7)
ERYTHROCYTE [DISTWIDTH] IN BLOOD BY AUTOMATED COUNT: 13.6 % (ref 11.5–14.5)
GLOBULIN SER CALC-MCNC: 4.4 G/DL (ref 2–4)
GLUCOSE SERPL-MCNC: 122 MG/DL (ref 65–100)
HCT VFR BLD AUTO: 36 % (ref 36.6–50.3)
HGB BLD-MCNC: 12.2 G/DL (ref 12.1–17)
IMM GRANULOCYTES # BLD AUTO: 0.1 K/UL (ref 0–0.04)
IMM GRANULOCYTES NFR BLD AUTO: 1 % (ref 0–0.5)
L PNEUMO1 AG UR QL IA: NEGATIVE
LYMPHOCYTES # BLD: 0.8 K/UL (ref 0.8–3.5)
LYMPHOCYTES NFR BLD: 6 % (ref 12–49)
MCH RBC QN AUTO: 30.6 PG (ref 26–34)
MCHC RBC AUTO-ENTMCNC: 33.9 G/DL (ref 30–36.5)
MCV RBC AUTO: 90.2 FL (ref 80–99)
MONOCYTES # BLD: 0.6 K/UL (ref 0–1)
MONOCYTES NFR BLD: 4 % (ref 5–13)
NEUTS SEG # BLD: 11.9 K/UL (ref 1.8–8)
NEUTS SEG NFR BLD: 89 % (ref 32–75)
NRBC # BLD: 0 K/UL (ref 0–0.01)
NRBC BLD-RTO: 0 PER 100 WBC
PLATELET # BLD AUTO: 219 K/UL (ref 150–400)
PMV BLD AUTO: 10.9 FL (ref 8.9–12.9)
POTASSIUM SERPL-SCNC: 3.6 MMOL/L (ref 3.5–5.1)
PROT SERPL-MCNC: 6.6 G/DL (ref 6.4–8.2)
RBC # BLD AUTO: 3.99 M/UL (ref 4.1–5.7)
SODIUM SERPL-SCNC: 141 MMOL/L (ref 136–145)
SPECIMEN SOURCE: NORMAL
WBC # BLD AUTO: 13.5 K/UL (ref 4.1–11.1)

## 2021-10-17 PROCEDURE — 74011250637 HC RX REV CODE- 250/637: Performed by: INTERNAL MEDICINE

## 2021-10-17 PROCEDURE — 85025 COMPLETE CBC W/AUTO DIFF WBC: CPT

## 2021-10-17 PROCEDURE — 85379 FIBRIN DEGRADATION QUANT: CPT

## 2021-10-17 PROCEDURE — 94760 N-INVAS EAR/PLS OXIMETRY 1: CPT

## 2021-10-17 PROCEDURE — 65270000029 HC RM PRIVATE

## 2021-10-17 PROCEDURE — 36415 COLL VENOUS BLD VENIPUNCTURE: CPT

## 2021-10-17 PROCEDURE — 74011250636 HC RX REV CODE- 250/636: Performed by: INTERNAL MEDICINE

## 2021-10-17 PROCEDURE — 80053 COMPREHEN METABOLIC PANEL: CPT

## 2021-10-17 PROCEDURE — 86140 C-REACTIVE PROTEIN: CPT

## 2021-10-17 PROCEDURE — 87205 SMEAR GRAM STAIN: CPT

## 2021-10-17 PROCEDURE — 74011000250 HC RX REV CODE- 250: Performed by: INTERNAL MEDICINE

## 2021-10-17 RX ADMIN — Medication 5 MG: at 21:33

## 2021-10-17 RX ADMIN — Medication 10 ML: at 15:46

## 2021-10-17 RX ADMIN — BARICITINIB 4 MG: 2 TABLET, FILM COATED ORAL at 08:42

## 2021-10-17 RX ADMIN — ENOXAPARIN SODIUM 40 MG: 100 INJECTION SUBCUTANEOUS at 08:43

## 2021-10-17 RX ADMIN — GUAIFENESIN AND DEXTROMETHORPHAN 5 ML: 100; 10 SYRUP ORAL at 04:45

## 2021-10-17 RX ADMIN — OXYCODONE HYDROCHLORIDE AND ACETAMINOPHEN 500 MG: 500 TABLET ORAL at 18:14

## 2021-10-17 RX ADMIN — OXYCODONE HYDROCHLORIDE AND ACETAMINOPHEN 500 MG: 500 TABLET ORAL at 08:43

## 2021-10-17 RX ADMIN — Medication 10 ML: at 21:33

## 2021-10-17 RX ADMIN — DEXAMETHASONE SODIUM PHOSPHATE 6 MG: 4 INJECTION, SOLUTION INTRAMUSCULAR; INTRAVENOUS at 02:11

## 2021-10-17 RX ADMIN — GUAIFENESIN AND DEXTROMETHORPHAN 5 ML: 100; 10 SYRUP ORAL at 09:13

## 2021-10-17 RX ADMIN — AZITHROMYCIN MONOHYDRATE 500 MG: 500 INJECTION, POWDER, LYOPHILIZED, FOR SOLUTION INTRAVENOUS at 02:11

## 2021-10-17 RX ADMIN — CEFTRIAXONE 1 G: 1 INJECTION, POWDER, FOR SOLUTION INTRAMUSCULAR; INTRAVENOUS at 02:11

## 2021-10-17 RX ADMIN — ENOXAPARIN SODIUM 40 MG: 100 INJECTION SUBCUTANEOUS at 21:32

## 2021-10-17 RX ADMIN — SODIUM CHLORIDE 50 ML/HR: 9 INJECTION, SOLUTION INTRAVENOUS at 15:47

## 2021-10-17 RX ADMIN — Medication 2000 UNITS: at 08:42

## 2021-10-17 RX ADMIN — ZINC SULFATE 220 MG (50 MG) CAPSULE 1 CAPSULE: CAPSULE at 08:43

## 2021-10-17 RX ADMIN — GUAIFENESIN AND DEXTROMETHORPHAN 5 ML: 100; 10 SYRUP ORAL at 21:33

## 2021-10-17 NOTE — PROGRESS NOTES
Bedside and Verbal shift change report given to KAREN Mendoza (oncoming nurse) by Dell Constantino. Jose Canela RN (offgoing nurse). Report included the following information SBAR, Kardex, Intake/Output, MAR and Recent Results.

## 2021-10-17 NOTE — PROGRESS NOTES
Carlos Ochoa [facility]  [address] [facility], [address] [place] [address]  [phone]      Medical Progress Note      NAME: Bc Pardo   :  1960  MRM:  [id number]    Date of service: 10/17/2021  11:30 AM       Assessment and Plan:   1. Acute hypoxic respiratory failure: 2/2 COVID-19. Continue supplement O2 to keep SAO2 > 90%. On 6LPM of O2.      2.  Pneumonia from COVID-19: Unvaccinated. Hypoxic. Markedly elevated CRP however improving. Cont baricitinib, IV dexamethasone. Empiric abx. Trend CRP, d-dimer. Pulmonology following     3.  Syncope and collapse: likely IVVD. Unlikely PE as d-dimer was WNR. CT head negative. Echo showed preserved EF w/o WMA however mild MR and mild AR     4. BERONICA: 2/2 IVVD. Resolved after IVF     5. History of pulmonary embolism: No longer on anticoagulation; per patient he took himself off of anticoagulation. Provoked PE due to surgery in 2021 so may not need long term AC. D-dimer normal          Subjective:     Chief Complaint[de-identified] Patient was seen and examined as a follow up for COVID pneumonia. Chart was reviewed. c/o more coughing     ROS:  (bold if positive, if negative)    Cough Tolerating PT  Tolerating Diet        Objective:     Last 24hrs VS reviewed since prior progress note.  Most recent are:    Visit Vitals  /75 (BP 1 Location: Left upper arm, BP Patient Position: At rest)   Pulse 69   Temp 98 °F (36.7 °C)   Resp 22   Ht 5' 9\" (1.753 m)   Wt 93.9 kg (207 lb)   SpO2 91%   BMI 30.57 kg/m²     SpO2 Readings from Last 6 Encounters:   10/17/21 91%   21 98%   21 97%   21 97%   21 98%   21 98%    O2 Flow Rate (L/min): 6 l/min       Intake/Output Summary (Last 24 hours) at 10/17/2021 1130  Last data filed at 10/17/2021 0800  Gross per 24 hour   Intake 1772.5 ml   Output 0 ml   Net 1772.5 ml        Physical Exam:    Gen:  Well-developed, well-nourished, in no acute distress  HEENT:  Pink conjunctivae, PERRL, hearing intact to voice, moist mucous membranes  Neck:  Supple, without masses, thyroid non-tender  Resp:  No accessory muscle use, clear breath sounds without wheezes rales or rhonchi  Card:  No murmurs, normal S1, S2 without thrills, bruits or peripheral edema  Abd:  Soft, non-tender, non-distended, normoactive bowel sounds are present, no palpable organomegaly and no detectable hernias  Lymph:  No cervical or inguinal adenopathy  Musc:  No cyanosis or clubbing  Skin:  No rashes or ulcers, skin turgor is good  Neuro:  Cranial nerves are grossly intact, no focal motor weakness, follows commands appropriately  Psych:  Good insight, oriented to person, place and time, alert  __________________________________________________________________  Medications Reviewed: (see below)  Medications:     Current Facility-Administered Medications   Medication Dose Route Frequency    guaiFENesin-dextromethorphan (ROBITUSSIN DM) 100-10 mg/5 mL syrup 5 mL  5 mL Oral Q6H PRN    melatonin (rapid dissolve) tablet 5 mg  5 mg Oral QHS    sodium chloride (NS) flush 5-40 mL  5-40 mL IntraVENous Q8H    sodium chloride (NS) flush 5-40 mL  5-40 mL IntraVENous PRN    acetaminophen (TYLENOL) tablet 650 mg  650 mg Oral Q6H PRN    Or    acetaminophen (TYLENOL) suppository 650 mg  650 mg Rectal Q6H PRN    ondansetron (ZOFRAN ODT) tablet 4 mg  4 mg Oral Q8H PRN    Or    ondansetron (ZOFRAN) injection 4 mg  4 mg IntraVENous Q6H PRN    dexamethasone (DECADRON) 4 mg/mL injection 6 mg  6 mg IntraVENous Q24H    cholecalciferol (VITAMIN D3) (1000 Units /25 mcg) tablet 2,000 Units  2,000 Units Oral DAILY    cefTRIAXone (ROCEPHIN) 1 g in sterile water (preservative free) 10 mL IV syringe  1 g IntraVENous Q24H    azithromycin (ZITHROMAX) 500 mg in 0.9% sodium chloride 250 mL (VIAL-MATE)  500 mg IntraVENous Q24H    ascorbic acid (vitamin C) (VITAMIN C) tablet 500 mg  500 mg Oral BID    zinc sulfate (ZINCATE) 50 mg zinc (220 mg) capsule 1 Capsule  1 Capsule Oral DAILY    0.9% sodium chloride infusion  50 mL/hr IntraVENous CONTINUOUS    enoxaparin (LOVENOX) injection 40 mg  40 mg SubCUTAneous Q12H    baricitinib (OLUMIANT) tablet 4 mg  4 mg Oral DAILY    sodium chloride (NS) flush 5-10 mL  5-10 mL IntraVENous PRN        Lab Data Reviewed: (see below)  Lab Review:     Recent Labs     10/17/21  0459 10/16/21  0641 10/15/21  0105   WBC 13.5* 14.8* 6.2   HGB 12.2 12.2 13.1   HCT 36.0* 36.2* 39.1    207 182     Recent Labs     10/17/21  0459 10/16/21  0641 10/15/21  0105    142 142   K 3.6 3.6 3.5   * 112* 111*   CO2 24 23 21   * 128* 129*   BUN 17 16 15   CREA 0.71 0.95 0.80   CA 7.6* 8.0* 7.6*   MG  --  1.8  --    PHOS  --  3.7  --    ALB 2.2* 2.2* 2.3*   TBILI 0.4 0.4 0.3   ALT 27 21 23     Lab Results   Component Value Date/Time    Glucose (POC) 120 (H) 06/14/2014 09:32 AM     No results for input(s): PH, PCO2, PO2, HCO3, FIO2 in the last 72 hours. No results for input(s): INR, INREXT in the last 72 hours. All Micro Results     Procedure Component Value Units Date/Time    CULTURE, RESPIRATORY/SPUTUM/BRONCH Haylee Lind [[id number]] Collected: 10/17/21 0848    Order Status: Completed Specimen: Sputum Updated: 10/17/21 0854    CULTURE, BLOOD [[id number]] Collected: 10/13/21 2347    Order Status: Completed Specimen: Blood Updated: 10/17/21 0518     Special Requests: NO SPECIAL REQUESTS        Culture result: NO GROWTH 3 DAYS       CULTURE, BLOOD [[id number]] Collected: 10/13/21 2347    Order Status: Completed Specimen: Blood Updated: 10/17/21 0518     Special Requests: NO SPECIAL REQUESTS        Culture result: NO GROWTH 3 DAYS       S. [place] Seats, UR/CSF [[id number]] Collected: 10/14/21 5436    Order Status: Completed Updated: 10/14/21 1406    LEGIONELLA PNEUMOPHILA AG, URINE [[id number]] Collected: 10/14/21 0937    Order Status: Completed Specimen: Urine Updated: 10/14/21 1405    COVID-19 RAPID TEST [[id number]]  (Abnormal) Collected: 10/13/21 2347    Order Status: Completed Specimen: Nasopharyngeal Updated: 10/14/21 0041     Specimen source Nasopharyngeal        COVID-19 rapid test Detected        Comment: Rapid Abbott ID Now       The specimen is POSITIVE for SARS-CoV-2, the novel coronavirus associated with COVID-19. This test has been authorized by the FDA under an Emergency Use Authorization (EUA) for use by authorized laboratories. Fact sheet for Healthcare Providers: iTendency.uy  Fact sheet for Patients: iTendency.uy       Methodology: Isothermal Nucleic Acid Amplification  CALLED TO AND READ BACK BY  LINDA JONES AT 0041 5395/KLT               I have reviewed notes of prior 24hr. Other pertinent lab: Total time spent with patient: 28 I personally reviewed chart, notes, data and current medications in the medical record. I have personally examined and treated the patient at bedside during this period.                  Care Plan discussed with: Patient, Nursing Staff and >50% of time spent in counseling and coordination of care    Discussed:  Care Plan    Prophylaxis:  Lovenox    Disposition:  Home w/Family           ___________________________________________________    Attending Physician: Kya Hernandez MD

## 2021-10-17 NOTE — PROGRESS NOTES
Bedside and Verbal shift change report given to Cassandra Guo RN (oncoming nurse) by Tiffany Garcia RN (offgoing nurse). Report included the following information SBAR, Kardex, Procedure Summary, Intake/Output, MAR, Accordion, Recent Results and Med Rec Status.

## 2021-10-18 ENCOUNTER — APPOINTMENT (OUTPATIENT)
Dept: GENERAL RADIOLOGY | Age: 61
DRG: 177 | End: 2021-10-18
Attending: PHYSICIAN ASSISTANT

## 2021-10-18 LAB
ALBUMIN SERPL-MCNC: 2.5 G/DL (ref 3.5–5)
ALBUMIN/GLOB SERPL: 0.5 {RATIO} (ref 1.1–2.2)
ALP SERPL-CCNC: 59 U/L (ref 45–117)
ALT SERPL-CCNC: 53 U/L (ref 12–78)
ANION GAP SERPL CALC-SCNC: 5 MMOL/L (ref 5–15)
AST SERPL-CCNC: 44 U/L (ref 15–37)
BASOPHILS # BLD: 0 K/UL (ref 0–0.1)
BASOPHILS NFR BLD: 0 % (ref 0–1)
BILIRUB SERPL-MCNC: 0.6 MG/DL (ref 0.2–1)
BNP SERPL-MCNC: 451 PG/ML
BUN SERPL-MCNC: 17 MG/DL (ref 6–20)
BUN/CREAT SERPL: 21 (ref 12–20)
CALCIUM SERPL-MCNC: 8 MG/DL (ref 8.5–10.1)
CHLORIDE SERPL-SCNC: 108 MMOL/L (ref 97–108)
CO2 SERPL-SCNC: 26 MMOL/L (ref 21–32)
CREAT SERPL-MCNC: 0.82 MG/DL (ref 0.7–1.3)
CRP SERPL-MCNC: 6.13 MG/DL (ref 0–0.6)
DIFFERENTIAL METHOD BLD: ABNORMAL
EOSINOPHIL # BLD: 0 K/UL (ref 0–0.4)
EOSINOPHIL NFR BLD: 0 % (ref 0–7)
ERYTHROCYTE [DISTWIDTH] IN BLOOD BY AUTOMATED COUNT: 13.4 % (ref 11.5–14.5)
FERRITIN SERPL-MCNC: 731 NG/ML (ref 26–388)
FLUID CULTURE, SPNG2: NORMAL
GLOBULIN SER CALC-MCNC: 4.8 G/DL (ref 2–4)
GLUCOSE SERPL-MCNC: 89 MG/DL (ref 65–100)
HCT VFR BLD AUTO: 39.2 % (ref 36.6–50.3)
HGB BLD-MCNC: 13.1 G/DL (ref 12.1–17)
IMM GRANULOCYTES # BLD AUTO: 0.1 K/UL (ref 0–0.04)
IMM GRANULOCYTES NFR BLD AUTO: 1 % (ref 0–0.5)
LYMPHOCYTES # BLD: 2.3 K/UL (ref 0.8–3.5)
LYMPHOCYTES NFR BLD: 25 % (ref 12–49)
MCH RBC QN AUTO: 30.7 PG (ref 26–34)
MCHC RBC AUTO-ENTMCNC: 33.4 G/DL (ref 30–36.5)
MCV RBC AUTO: 91.8 FL (ref 80–99)
MONOCYTES # BLD: 0.5 K/UL (ref 0–1)
MONOCYTES NFR BLD: 6 % (ref 5–13)
NEUTS SEG # BLD: 6.3 K/UL (ref 1.8–8)
NEUTS SEG NFR BLD: 68 % (ref 32–75)
NRBC # BLD: 0 K/UL (ref 0–0.01)
NRBC BLD-RTO: 0 PER 100 WBC
ORGANISM ID, SPNG3: NORMAL
PLATELET # BLD AUTO: 283 K/UL (ref 150–400)
PLEASE NOTE, SPNG4: NORMAL
PMV BLD AUTO: 10.8 FL (ref 8.9–12.9)
POTASSIUM SERPL-SCNC: 3.5 MMOL/L (ref 3.5–5.1)
PROT SERPL-MCNC: 7.3 G/DL (ref 6.4–8.2)
RBC # BLD AUTO: 4.27 M/UL (ref 4.1–5.7)
S PNEUM AG SPEC QL LA: NEGATIVE
SODIUM SERPL-SCNC: 139 MMOL/L (ref 136–145)
SPECIMEN SOURCE: NORMAL
SPECIMEN, SPNG1: NORMAL
WBC # BLD AUTO: 9.2 K/UL (ref 4.1–11.1)

## 2021-10-18 PROCEDURE — 82728 ASSAY OF FERRITIN: CPT

## 2021-10-18 PROCEDURE — 74011250637 HC RX REV CODE- 250/637: Performed by: INTERNAL MEDICINE

## 2021-10-18 PROCEDURE — 80053 COMPREHEN METABOLIC PANEL: CPT

## 2021-10-18 PROCEDURE — 83880 ASSAY OF NATRIURETIC PEPTIDE: CPT

## 2021-10-18 PROCEDURE — 65270000029 HC RM PRIVATE

## 2021-10-18 PROCEDURE — 94760 N-INVAS EAR/PLS OXIMETRY 1: CPT

## 2021-10-18 PROCEDURE — 74011250636 HC RX REV CODE- 250/636: Performed by: INTERNAL MEDICINE

## 2021-10-18 PROCEDURE — 97116 GAIT TRAINING THERAPY: CPT

## 2021-10-18 PROCEDURE — 97530 THERAPEUTIC ACTIVITIES: CPT

## 2021-10-18 PROCEDURE — 85025 COMPLETE CBC W/AUTO DIFF WBC: CPT

## 2021-10-18 PROCEDURE — 36415 COLL VENOUS BLD VENIPUNCTURE: CPT

## 2021-10-18 PROCEDURE — 71045 X-RAY EXAM CHEST 1 VIEW: CPT

## 2021-10-18 PROCEDURE — 97535 SELF CARE MNGMENT TRAINING: CPT

## 2021-10-18 PROCEDURE — 77010033678 HC OXYGEN DAILY

## 2021-10-18 PROCEDURE — 74011000250 HC RX REV CODE- 250: Performed by: INTERNAL MEDICINE

## 2021-10-18 PROCEDURE — 86140 C-REACTIVE PROTEIN: CPT

## 2021-10-18 RX ORDER — BENZONATATE 100 MG/1
100 CAPSULE ORAL
Status: DISCONTINUED | OUTPATIENT
Start: 2021-10-18 | End: 2021-10-22 | Stop reason: HOSPADM

## 2021-10-18 RX ADMIN — ENOXAPARIN SODIUM 40 MG: 100 INJECTION SUBCUTANEOUS at 10:23

## 2021-10-18 RX ADMIN — AZITHROMYCIN MONOHYDRATE 500 MG: 500 INJECTION, POWDER, LYOPHILIZED, FOR SOLUTION INTRAVENOUS at 02:17

## 2021-10-18 RX ADMIN — GUAIFENESIN AND DEXTROMETHORPHAN 5 ML: 100; 10 SYRUP ORAL at 13:18

## 2021-10-18 RX ADMIN — ENOXAPARIN SODIUM 40 MG: 100 INJECTION SUBCUTANEOUS at 22:11

## 2021-10-18 RX ADMIN — CEFTRIAXONE 1 G: 1 INJECTION, POWDER, FOR SOLUTION INTRAMUSCULAR; INTRAVENOUS at 02:19

## 2021-10-18 RX ADMIN — OXYCODONE HYDROCHLORIDE AND ACETAMINOPHEN 500 MG: 500 TABLET ORAL at 17:46

## 2021-10-18 RX ADMIN — Medication 10 ML: at 05:21

## 2021-10-18 RX ADMIN — DEXAMETHASONE SODIUM PHOSPHATE 6 MG: 4 INJECTION, SOLUTION INTRAMUSCULAR; INTRAVENOUS at 02:19

## 2021-10-18 RX ADMIN — OXYCODONE HYDROCHLORIDE AND ACETAMINOPHEN 500 MG: 500 TABLET ORAL at 10:23

## 2021-10-18 RX ADMIN — BARICITINIB 4 MG: 2 TABLET, FILM COATED ORAL at 10:23

## 2021-10-18 RX ADMIN — BENZONATATE 100 MG: 100 CAPSULE ORAL at 17:47

## 2021-10-18 RX ADMIN — Medication 2000 UNITS: at 10:23

## 2021-10-18 RX ADMIN — Medication 5 MG: at 22:11

## 2021-10-18 RX ADMIN — ZINC SULFATE 220 MG (50 MG) CAPSULE 1 CAPSULE: CAPSULE at 10:23

## 2021-10-18 RX ADMIN — Medication 5 ML: at 17:46

## 2021-10-18 RX ADMIN — SODIUM CHLORIDE 50 ML/HR: 9 INJECTION, SOLUTION INTRAVENOUS at 10:27

## 2021-10-18 NOTE — PROGRESS NOTES
Bedside and Verbal shift change report given to KAREN Garcia (oncoming nurse) by Andres Ely. Zenon Chapman RN (offgoing nurse). Report included the following information SBAR, Kardex, Intake/Output, MAR and Recent Results. Hip pain

## 2021-10-18 NOTE — PROGRESS NOTES
Problem: Self Care Deficits Care Plan (Adult)  Goal: *Acute Goals and Plan of Care (Insert Text)  Description:   FUNCTIONAL STATUS PRIOR TO ADMISSION: Patient was independent and active without use of DME. 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    Occupational Therapy Goals  Initiated 10/14/2021  1. Patient will perform grooming with modified independence standing at the sink and maintain oxygen sats > or = 90% within 7 day(s). 2.  Patient will perform upper body dressing and lower body dressing with modified independence and maintain oxygen sats > or = 90% within 7 day(s). 3.  Patient will perform toilet transfers and toileting with supervision/set-up and maintain oxygen sats > or = 90% within 7 day(s). 4.  Patient will perform bilateral UE AROM exercises with coordinated breathing with min cue within 7 day(s). 5.  Patient will perform pursed lip breathing with 1-2 cues during functional tasks/mobility within 7 day(s). Outcome: Progressing Towards Goal   OCCUPATIONAL THERAPY TREATMENT  Patient: Melissa Campos (58 y.o. male)  Date: 10/18/2021  Diagnosis: COVID-19 [U07.1] <principal problem not specified>       Precautions:    Chart, occupational therapy assessment, plan of care, and goals were reviewed. ASSESSMENT  Patient continues with skilled OT services and is progressing towards goals. Patient pleasant and agreeable. Patient up to EOB with SBA. He is able to stand with same level and transfer to commode in bathroom. Patient removing O2 tubing reporting not able to reach when on commode. OT detangled and patient with more than enough line. Educated on need for continued O2  at this time as patient on 6L. Patient sats reading 88% upon return to EOB with noted SOB. Patient transfers and performs toileting tasks with SBA at this time. Left in bed in NAD. Current Level of Function Impacting Discharge (ADLs):     Other factors to consider for discharge: none         PLAN :  Patient continues to benefit from skilled intervention to address the above impairments. Continue treatment per established plan of care to address goals. Recommend with staff:     Recommend next OT session: progression of goals     Recommendation for discharge: (in order for the patient to meet his/her long term goals)  To be determined: Home Health vs None    This discharge recommendation:  Has been made in collaboration with the attending provider and/or case management    IF patient discharges home will need the following DME: TBD       SUBJECTIVE:   Patient stated I do get SOB.     OBJECTIVE DATA SUMMARY:   Cognitive/Behavioral Status:  Neurologic State: Alert  Orientation Level: Oriented X4  Cognition: Follows commands; Appropriate decision making  Perception: Appears intact  Perseveration: No perseveration noted  Safety/Judgement: Awareness of environment;Decreased awareness of need for assistance    Functional Mobility and Transfers for ADLs:  Bed Mobility:  Supine to Sit: Stand-by assistance  Sit to Supine: Stand-by assistance    Transfers:  Sit to Stand: Stand-by assistance  Functional Transfers  Bathroom Mobility: Stand-by assistance  Toilet Transfer : Stand-by assistance  Cues: Verbal cues provided       Balance:  Sitting: Intact  Standing - Static: Good  Standing - Dynamic : Good;Fair    ADL Intervention:                      Upper Body [address] Gown: Contact guard assistance         Toileting  Toileting Assistance: Stand-by assistance  Clothing Management: Stand-by assistance    Cognitive Retraining  Safety/Judgement: Awareness of environment;Decreased awareness of need for assistance    Therapeutic Exercises:       Pain:  none    Activity Tolerance:   Good and observed SOB with activity    After treatment patient left in no apparent distress:   Supine in bed, Call bell within reach, and Side rails x 3    COMMUNICATION/COLLABORATION:   The patients plan of care was discussed with: Physical therapist and Registered nurse.      Ni Brennan, OTR/L  Time Calculation: 23 mins

## 2021-10-18 NOTE — PROGRESS NOTES
Problem: Mobility Impaired (Adult and Pediatric)  Goal: *Acute Goals and Plan of Care (Insert Text)  Description: FUNCTIONAL STATUS PRIOR TO ADMISSION: Patient was independent and active without use of DME.    HOME SUPPORT PRIOR TO ADMISSION: The patient lived with wife but did not require assist.    Physical Therapy Goals  Initiated 10/14/2021  1. Patient will move from supine to sit and sit to supine  in bed with independence within 7 day(s). 2.  Patient will transfer from bed to chair and chair to bed with independence using the least restrictive device within 7 day(s). 3.  Patient will perform sit to stand with independence within 7 day(s). 4.  Patient will ambulate with independence for 250 feet with the least restrictive device within 7 day(s). Outcome: Progressing Towards Goal   PHYSICAL THERAPY TREATMENT  Patient: Jen Tim (58 y.o. male)  Date: 10/18/2021  Diagnosis: COVID-19 [U07.1] <principal problem not specified>       Precautions:  covid +  Chart, physical therapy assessment, plan of care and goals were reviewed. ASSESSMENT  Patient continues with skilled PT services and is progressing towards goals. Communicated with nurse cleared for therapy. Patient supine on bed when received. Rolled on the edge of bed SBA, supine to sit SBA, sit to stand  SBA, ambulate with gait belt in the room and around the bed SBA assisted to and from the bathroom SBA. Offered to be OOB to chair as tolerated however patient just want to go back to bed supine. Reviewed purse lip breathing and energy conservation verbalized understanding. Educate and instructed patient to continue active range of motion exercise on both legs while up on chair or on bed multiple times. Recommend patient to be up on the chair at least 3 times a day every meal times as tolerated. Activated  bed alarm and notified nurse who agreed to monitor patient .      Current Level of Function Impacting Discharge (mobility/balance): SBA with transfers and ambulation    Other factors to consider for discharge: none         PLAN :  Patient continues to benefit from skilled intervention to address the above impairments. Continue treatment per established plan of care. to address goals. Recommendation for discharge: (in order for the patient to meet his/her long term goals)  Physical therapy at least 2 days/week in the home vs none    This discharge recommendation:  Has been made in collaboration with the attending provider and/or case management    IF patient discharges home will need the following DME: patient owns DME required for discharge       SUBJECTIVE:   Patient stated [address].     OBJECTIVE DATA SUMMARY:   Critical Behavior:  Neurologic State: Alert  Orientation Level: Oriented X4  Cognition: Follows commands, Appropriate decision making  Safety/Judgement: Awareness of environment, Decreased awareness of need for assistance  Functional Mobility Training:  Bed Mobility:  Rolling: Stand-by assistance  Supine to Sit: Stand-by assistance  Sit to Supine: Stand-by assistance  Scooting: Stand-by assistance        Transfers:  Sit to Stand: Stand-by assistance  Stand to Sit: Stand-by assistance  Stand Pivot Transfers: Stand-by assistance                          Balance:  Sitting: Intact  Standing: Impaired; Without support  Standing - Static: Good  Standing - Dynamic : Good;Fair  Ambulation/Gait Training:  Distance (ft): 50 Feet (ft)  Assistive Device: Gait belt  Ambulation - Level of Assistance: Stand-by assistance     Gait Description (WDL): Exceptions to WDL  Gait Abnormalities: Path deviations        Base of Support: Widened     Speed/Ludmila: Slow  Step Length: Right shortened;Left shortened                         Therapeutic Exercises:   Educate and instructed patient to continue active range of motion exercise on both legs while up on chair or on bed multiple times.  Recommend patient to be up on the chair at least 3 times a day every meal times as tolerated. Pain Ratin/10    Activity Tolerance:   Good    After treatment patient left in no apparent distress:   Supine in bed, Heels elevated for pressure relief, Call bell within reach, and Bed / chair alarm activated    COMMUNICATION/COLLABORATION:   The patients plan of care was discussed with: Occupational therapist and Registered nurse. Aamir Covington, PT,WCC.    Time Calculation: 30 mins

## 2021-10-18 NOTE — PROGRESS NOTES
[facility]     Name: Shaquille Henao MRN: [id number]   : 1960 Hospital: [address]   Date: 10/18/2021        Impression Plan   1. Acute hypoxemic respiratory failure  2. COVID 19 PNA  3. Hx of VTE               · Continue dexamethasone daily  · Baricitinib  · Trend CRP  · Trend d-dimer  · enox 40 mg q12h  · Will stop abx. PCT normal.  · Check proBNP and repeat CXR           Radiology  (personally reviewed) CXR reviewed: mild bilateral infiltrates       Subjective     Cc: fatigue    61 yo with PMHx of obesity presenting with generalized fatigue from COVID 19. Fatigued for 10 days. Exposed through family member 2 weeks ago and several family members now affected. Tested positive for covid 2 days ago. Denies any underlying lung problems. CXR with mild infiltrates. Lifetime non-smoker. Unvaccinated. Currently 92% on RA. Breathing 20-26 x/min. Interval History:  Afebrile  BP stable  Sats 93% on 6L  D-dimer 0.90  CRP 8.18 - better  Procalcitonin 0.06  Blood cultures negative x 4 days  Resp cx prelim NRF  Legionella neg  Strep pneumo in process    ROS: Feels the same. Reports \"I have to tell myself to breathe. \"  Continues to feel SOB. Denies CP. Reports nonproductive cough. Denies fever or chills. Frustrated that he is still hospitalized. States, \"But I was healthy. \"  States that even healthy unvaccinated people can get very sick from [facility]. A comprehensive review of systems was negative except for that written in the HPI.     Past Medical History:   Diagnosis Date    History of vascular access device 2021    [facility] VAT R Basilic 86/9 long term ABX      Past Surgical History:   Procedure Laterality Date    HX HEENT      toncills      Prior to Admission medications    Not on File     Current Facility-Administered Medications   Medication Dose Route Frequency    melatonin (rapid dissolve) tablet 5 mg  5 mg Oral QHS    sodium chloride (NS) flush 5-40 mL  5-40 mL IntraVENous Q8H    dexamethasone (DECADRON) 4 mg/mL injection 6 mg  6 mg IntraVENous Q24H    cholecalciferol (VITAMIN D3) (1000 Units /25 mcg) tablet 2,000 Units  2,000 Units Oral DAILY    cefTRIAXone (ROCEPHIN) 1 g in sterile water (preservative free) 10 mL IV syringe  1 g IntraVENous Q24H    azithromycin (ZITHROMAX) 500 mg in 0.9% sodium chloride 250 mL (VIAL-MATE)  500 mg IntraVENous Q24H    ascorbic acid (vitamin C) (VITAMIN C) tablet 500 mg  500 mg Oral BID    zinc sulfate (ZINCATE) 50 mg zinc (220 mg) capsule 1 Capsule  1 Capsule Oral DAILY    0.9% sodium chloride infusion  50 mL/hr IntraVENous CONTINUOUS    enoxaparin (LOVENOX) injection 40 mg  40 mg SubCUTAneous Q12H    baricitinib (OLUMIANT) tablet 4 mg  4 mg Oral DAILY     No Known Allergies   Social History     Tobacco Use    Smoking status: Never Smoker    Smokeless tobacco: Never Used   Substance Use Topics    Alcohol use: No      Family History   Problem Relation Age of Onset    Diabetes Mother     Diabetes Father           Laboratory: I have personally reviewed the flowsheet and labs.      Recent Labs     10/17/21  0459 10/16/21  0641   WBC 13.5* 14.8*   HGB 12.2 12.2   HCT 36.0* 36.2*    207     Recent Labs     10/17/21  0459 10/16/21  0641    142   K 3.6 3.6   * 112*   CO2 24 23   * 128*   BUN 17 16   CREA 0.71 0.95   CA 7.6* 8.0*   MG  --  1.8   PHOS  --  3.7   ALB 2.2* 2.2*   ALT 27 21       Objective:     Visit Vitals  /74 (BP 1 Location: Left upper arm, BP Patient Position: At rest)   Pulse 74   Temp 98.2 °F (36.8 °C)   Resp 20   Ht 5' 9\" (1.753 m)   Wt 93.9 kg (207 lb)   SpO2 93%   BMI 30.57 kg/m²         Intake/Output Summary (Last 24 hours) at 10/18/2021 1355  Last data filed at 10/18/2021 1320  Gross per 24 hour   Intake 1661.16 ml   Output 0 ml   Net 1661.16 ml     EXAM:   GENERAL: well developed, alert, no distress HEENT:  anicteric, EOMI, no alar flaring or epistaxis, oral mucosa moist without cyanosis, NECK:  no jugular vein distention, no retractions, no thyromegaly or masses, LUNGS: diminished with crackles HEART:  Regular rate and rhythm with no MGR; no edema is present, ABDOMEN:  soft with no tenderness, bowel sounds present, EXTREMITIES:  warm with no cyanosis, SKIN:  no jaundice or ecchymosis and NEUROLOGIC:  alert and oriented, grossly non-focal    WALLY Erwin  [facility]

## 2021-10-18 NOTE — PROGRESS NOTES
10/18/2021   CARE MANAGEMENT NOTE:  CM reviewed EMR for clinical updates and handoff received from previous  John Mercado). Pt was admitted with COVID. Reportedly, pt resides with his wife Tiffany Malloy ([phone]).    RUR 15%     Transition Plan of Care:    1. Pulmonary is following for medical management  2. Plan is for pt to return home with his wife  3. CM will monitor pt's progress with PT/OT  4. Home oxygen eval prior to discharge  5. Outpt f/u  6. Wife will transport pt home      CM will continue to follow pt until discharged.   Alexis

## 2021-10-18 NOTE — PROGRESS NOTES
Carlos Ochoa [facility]  [address] [facility], [id number] [place] [address]  [phone]      Medical Progress Note      NAME: Anupama Carrero   :  1960  MRM:  [id number]    Date of service: 10/18/2021  11:30 AM       Assessment and Plan:   1. Acute hypoxic respiratory failure: 2/2 COVID-19. Continue supplement O2 to keep SAO2 > 90%. On 6LPM of O2.      2.  Pneumonia from COVID-19: Unvaccinated. Hypoxic. Markedly elevated CRP however improving. Cont baricitinib, IV dexamethasone. Empiric abx. Trend CRP, d-dimer. Pulmonology following     3.  Syncope and collapse: likely IVVD. Unlikely PE as d-dimer was WNR. CT head negative. Echo showed preserved EF w/o WMA however mild MR and mild AR     4. BERONICA: 2/2 IVVD. Resolved after IVF     5. History of pulmonary embolism: No longer on anticoagulation; per patient he took himself off of anticoagulation. Provoked PE due to surgery in 2021 so may not need long term AC. D-dimer normal          Subjective:     Chief Complaint[de-identified] Patient was seen and examined as a follow up for COVID pneumonia. Chart was reviewed. c/o more coughing and SOB on exertion     ROS:  (bold if positive, if negative)    Cough Tolerating PT  Tolerating Diet        Objective:     Last 24hrs VS reviewed since prior progress note.  Most recent are:    Visit Vitals  /74 (BP 1 Location: Left upper arm, BP Patient Position: At rest)   Pulse 74   Temp 98.2 °F (36.8 °C)   Resp 20   Ht 5' 9\" (1.753 m)   Wt 93.9 kg (207 lb)   SpO2 93%   BMI 30.57 kg/m²     SpO2 Readings from Last 6 Encounters:   10/18/21 93%   21 98%   21 97%   21 97%   21 98%   21 98%    O2 Flow Rate (L/min): 6 l/min       Intake/Output Summary (Last 24 hours) at 10/18/2021 1242  Last data filed at 10/18/2021 0757  Gross per 24 hour   Intake 1181.16 ml   Output 0 ml   Net 1181.16 ml        Physical Exam:    Gen:  Well-developed, well-nourished, in no acute distress  HEENT:  Pink conjunctivae, PERRL, hearing intact to voice, moist mucous membranes  Neck:  Supple, without masses, thyroid non-tender  Resp:  No accessory muscle use, clear breath sounds without wheezes rales or rhonchi  Card:  No murmurs, normal S1, S2 without thrills, bruits or peripheral edema  Abd:  Soft, non-tender, non-distended, normoactive bowel sounds are present, no palpable organomegaly and no detectable hernias  Lymph:  No cervical or inguinal adenopathy  Musc:  No cyanosis or clubbing  Skin:  No rashes or ulcers, skin turgor is good  Neuro:  Cranial nerves are grossly intact, no focal motor weakness, follows commands appropriately  Psych:  Good insight, oriented to person, place and time, alert  __________________________________________________________________  Medications Reviewed: (see below)  Medications:     Current Facility-Administered Medications   Medication Dose Route Frequency    guaiFENesin-dextromethorphan (ROBITUSSIN DM) 100-10 mg/5 mL syrup 5 mL  5 mL Oral Q6H PRN    melatonin (rapid dissolve) tablet 5 mg  5 mg Oral QHS    sodium chloride (NS) flush 5-40 mL  5-40 mL IntraVENous Q8H    sodium chloride (NS) flush 5-40 mL  5-40 mL IntraVENous PRN    acetaminophen (TYLENOL) tablet 650 mg  650 mg Oral Q6H PRN    Or    acetaminophen (TYLENOL) suppository 650 mg  650 mg Rectal Q6H PRN    ondansetron (ZOFRAN ODT) tablet 4 mg  4 mg Oral Q8H PRN    Or    ondansetron (ZOFRAN) injection 4 mg  4 mg IntraVENous Q6H PRN    dexamethasone (DECADRON) 4 mg/mL injection 6 mg  6 mg IntraVENous Q24H    cholecalciferol (VITAMIN D3) (1000 Units /25 mcg) tablet 2,000 Units  2,000 Units Oral DAILY    cefTRIAXone (ROCEPHIN) 1 g in sterile water (preservative free) 10 mL IV syringe  1 g IntraVENous Q24H    azithromycin (ZITHROMAX) 500 mg in 0.9% sodium chloride 250 mL (VIAL-MATE)  500 mg IntraVENous Q24H    ascorbic acid (vitamin C) (VITAMIN C) tablet 500 mg  500 mg Oral BID    zinc sulfate (ZINCATE) 50 mg zinc (220 mg) capsule 1 Capsule  1 Capsule Oral DAILY    0.9% sodium chloride infusion  50 mL/hr IntraVENous CONTINUOUS    enoxaparin (LOVENOX) injection 40 mg  40 mg SubCUTAneous Q12H    baricitinib (OLUMIANT) tablet 4 mg  4 mg Oral DAILY    sodium chloride (NS) flush 5-10 mL  5-10 mL IntraVENous PRN        Lab Data Reviewed: (see below)  Lab Review:     Recent Labs     10/17/21  0459 10/16/21  0641   WBC 13.5* 14.8*   HGB 12.2 12.2   HCT 36.0* 36.2*    207     Recent Labs     10/17/21  0459 10/16/21  0641    142   K 3.6 3.6   * 112*   CO2 24 23   * 128*   BUN 17 16   CREA 0.71 0.95   CA 7.6* 8.0*   MG  --  1.8   PHOS  --  3.7   ALB 2.2* 2.2*   TBILI 0.4 0.4   ALT 27 21     Lab Results   Component Value Date/Time    Glucose (POC) 120 (H) 06/14/2014 09:32 AM     No results for input(s): PH, PCO2, PO2, HCO3, FIO2 in the last 72 hours. No results for input(s): INR, INREXT, INREXT in the last 72 hours.   All Micro Results     Procedure Component Value Units Date/Time    CULTURE, RESPIRATORY/SPUTUM/BRONCH Blondell Leghorn [[id number]] Collected: 10/17/21 0848    Order Status: Completed Specimen: Sputum Updated: 10/18/21 1223     Special Requests: NO SPECIAL REQUESTS        GRAM STAIN NO WBC'S SEEN               RARE EPITHELIAL CELLS SEEN                  OCCASIONAL GRAM POSITIVE COCCI IN CLUSTERS           Culture result:       HEAVY NORMAL RESPIRATORY LUIS E          CULTURE, BLOOD [[id number]] Collected: 10/13/21 2347    Order Status: Completed Specimen: Blood Updated: 10/18/21 0627     Special Requests: NO SPECIAL REQUESTS        Culture result: NO GROWTH 4 DAYS       CULTURE, BLOOD [[id number]] Collected: 10/13/21 2347    Order Status: Completed Specimen: Blood Updated: 10/18/21 0627     Special Requests: NO SPECIAL REQUESTS        Culture result: NO GROWTH 4 DAYS       LEGIONELLA PNEUMOPHILA AG, URINE [[id number]] Collected: 10/14/21 0937    Order Status: Completed Specimen: Urine Updated: 10/17/21 5747 Source URINE        L pneumophila S1 Ag, urine Negative        Comment: (NOTE)  Presumptive negative for L. pneumophila serogroup 1 antigen in urine,  suggesting no recent or current infection. Legionnaires' disease  cannot be ruled out since other serogroups and species may also  cause disease. Performed At: [address] [id number]  Marychuy Jones MD XA:[id number]         ELIZABETH Garay, UR/CSF [[id number]] Collected: 10/14/21 7007    Order Status: Completed Updated: 10/14/21 1406    COVID-19 RAPID TEST [[id number]]  (Abnormal) Collected: 10/13/21 2347    Order Status: Completed Specimen: Nasopharyngeal Updated: 10/14/21 0041     Specimen source Nasopharyngeal        COVID-19 rapid test Detected        Comment: Rapid Abbott ID Now       The specimen is POSITIVE for SARS-CoV-2, the novel coronavirus associated with COVID-19. This test has been authorized by the FDA under an Emergency Use Authorization (EUA) for use by authorized laboratories. Fact sheet for Healthcare Providers: ConventionUpdate.co.nz  Fact sheet for Patients: ConventionUpdate.co.nz       Methodology: Isothermal Nucleic Acid Amplification  CALLED TO AND READ BACK BY  LINDA JONES AT 0041 5395/KLT               I have reviewed notes of prior 24hr. Other pertinent lab: Total time spent with patient: 28 I personally reviewed chart, notes, data and current medications in the medical record. I have personally examined and treated the patient at bedside during this period.                  Care Plan discussed with: Patient, Nursing Staff and >50% of time spent in counseling and coordination of care    Discussed:  Care Plan    Prophylaxis:  Lovenox    Disposition:  Home w/Family           ___________________________________________________    Attending Physician: Masood Good MD

## 2021-10-19 LAB
ALBUMIN SERPL-MCNC: 2.1 G/DL (ref 3.5–5)
ALBUMIN/GLOB SERPL: 0.5 {RATIO} (ref 1.1–2.2)
ALP SERPL-CCNC: 70 U/L (ref 45–117)
ALT SERPL-CCNC: 91 U/L (ref 12–78)
ANION GAP SERPL CALC-SCNC: 7 MMOL/L (ref 5–15)
AST SERPL-CCNC: 54 U/L (ref 15–37)
BACTERIA SPEC CULT: NORMAL
BASOPHILS # BLD: 0 K/UL (ref 0–0.1)
BASOPHILS NFR BLD: 0 % (ref 0–1)
BILIRUB SERPL-MCNC: 1 MG/DL (ref 0.2–1)
BUN SERPL-MCNC: 14 MG/DL (ref 6–20)
BUN/CREAT SERPL: 15 (ref 12–20)
CALCIUM SERPL-MCNC: 7.5 MG/DL (ref 8.5–10.1)
CHLORIDE SERPL-SCNC: 108 MMOL/L (ref 97–108)
CO2 SERPL-SCNC: 25 MMOL/L (ref 21–32)
CREAT SERPL-MCNC: 0.92 MG/DL (ref 0.7–1.3)
CRP SERPL-MCNC: 13.2 MG/DL (ref 0–0.6)
DIFFERENTIAL METHOD BLD: ABNORMAL
EOSINOPHIL # BLD: 0 K/UL (ref 0–0.4)
EOSINOPHIL NFR BLD: 0 % (ref 0–7)
ERYTHROCYTE [DISTWIDTH] IN BLOOD BY AUTOMATED COUNT: 13.5 % (ref 11.5–14.5)
FERRITIN SERPL-MCNC: 654 NG/ML (ref 26–388)
GLOBULIN SER CALC-MCNC: 4.1 G/DL (ref 2–4)
GLUCOSE SERPL-MCNC: 127 MG/DL (ref 65–100)
GRAM STN SPEC: NORMAL
HCT VFR BLD AUTO: 36.4 % (ref 36.6–50.3)
HGB BLD-MCNC: 11.9 G/DL (ref 12.1–17)
IMM GRANULOCYTES # BLD AUTO: 0.1 K/UL (ref 0–0.04)
IMM GRANULOCYTES NFR BLD AUTO: 1 % (ref 0–0.5)
LYMPHOCYTES # BLD: 0.8 K/UL (ref 0.8–3.5)
LYMPHOCYTES NFR BLD: 9 % (ref 12–49)
MCH RBC QN AUTO: 29.5 PG (ref 26–34)
MCHC RBC AUTO-ENTMCNC: 32.7 G/DL (ref 30–36.5)
MCV RBC AUTO: 90.1 FL (ref 80–99)
MONOCYTES # BLD: 0.5 K/UL (ref 0–1)
MONOCYTES NFR BLD: 5 % (ref 5–13)
NEUTS SEG # BLD: 7.6 K/UL (ref 1.8–8)
NEUTS SEG NFR BLD: 85 % (ref 32–75)
NRBC # BLD: 0 K/UL (ref 0–0.01)
NRBC BLD-RTO: 0 PER 100 WBC
PLATELET # BLD AUTO: 260 K/UL (ref 150–400)
PMV BLD AUTO: 10.6 FL (ref 8.9–12.9)
POTASSIUM SERPL-SCNC: 3.7 MMOL/L (ref 3.5–5.1)
PROT SERPL-MCNC: 6.2 G/DL (ref 6.4–8.2)
RBC # BLD AUTO: 4.04 M/UL (ref 4.1–5.7)
SERVICE CMNT-IMP: NORMAL
SODIUM SERPL-SCNC: 140 MMOL/L (ref 136–145)
WBC # BLD AUTO: 8.9 K/UL (ref 4.1–11.1)

## 2021-10-19 PROCEDURE — 94760 N-INVAS EAR/PLS OXIMETRY 1: CPT

## 2021-10-19 PROCEDURE — 74011250636 HC RX REV CODE- 250/636: Performed by: INTERNAL MEDICINE

## 2021-10-19 PROCEDURE — 74011250636 HC RX REV CODE- 250/636: Performed by: NURSE PRACTITIONER

## 2021-10-19 PROCEDURE — 74011250637 HC RX REV CODE- 250/637: Performed by: INTERNAL MEDICINE

## 2021-10-19 PROCEDURE — 82728 ASSAY OF FERRITIN: CPT

## 2021-10-19 PROCEDURE — 80053 COMPREHEN METABOLIC PANEL: CPT

## 2021-10-19 PROCEDURE — 86140 C-REACTIVE PROTEIN: CPT

## 2021-10-19 PROCEDURE — 36415 COLL VENOUS BLD VENIPUNCTURE: CPT

## 2021-10-19 PROCEDURE — 97110 THERAPEUTIC EXERCISES: CPT

## 2021-10-19 PROCEDURE — 65270000029 HC RM PRIVATE

## 2021-10-19 PROCEDURE — 85025 COMPLETE CBC W/AUTO DIFF WBC: CPT

## 2021-10-19 RX ORDER — FUROSEMIDE 10 MG/ML
20 INJECTION INTRAMUSCULAR; INTRAVENOUS ONCE
Status: COMPLETED | OUTPATIENT
Start: 2021-10-19 | End: 2021-10-19

## 2021-10-19 RX ORDER — DEXAMETHASONE SODIUM PHOSPHATE 4 MG/ML
6 INJECTION, SOLUTION INTRA-ARTICULAR; INTRALESIONAL; INTRAMUSCULAR; INTRAVENOUS; SOFT TISSUE EVERY 12 HOURS
Status: DISCONTINUED | OUTPATIENT
Start: 2021-10-19 | End: 2021-10-21

## 2021-10-19 RX ADMIN — FUROSEMIDE 20 MG: 10 INJECTION, SOLUTION INTRAMUSCULAR; INTRAVENOUS at 14:45

## 2021-10-19 RX ADMIN — DEXAMETHASONE SODIUM PHOSPHATE 6 MG: 4 INJECTION, SOLUTION INTRAMUSCULAR; INTRAVENOUS at 01:39

## 2021-10-19 RX ADMIN — ZINC SULFATE 220 MG (50 MG) CAPSULE 1 CAPSULE: CAPSULE at 09:35

## 2021-10-19 RX ADMIN — BENZONATATE 100 MG: 100 CAPSULE ORAL at 13:26

## 2021-10-19 RX ADMIN — Medication 5 MG: at 22:03

## 2021-10-19 RX ADMIN — OXYCODONE HYDROCHLORIDE AND ACETAMINOPHEN 500 MG: 500 TABLET ORAL at 09:35

## 2021-10-19 RX ADMIN — SODIUM CHLORIDE 50 ML/HR: 9 INJECTION, SOLUTION INTRAVENOUS at 09:32

## 2021-10-19 RX ADMIN — Medication 10 ML: at 22:04

## 2021-10-19 RX ADMIN — OXYCODONE HYDROCHLORIDE AND ACETAMINOPHEN 500 MG: 500 TABLET ORAL at 17:27

## 2021-10-19 RX ADMIN — BARICITINIB 4 MG: 2 TABLET, FILM COATED ORAL at 09:34

## 2021-10-19 RX ADMIN — DEXAMETHASONE SODIUM PHOSPHATE 6 MG: 4 INJECTION, SOLUTION INTRAMUSCULAR; INTRAVENOUS at 22:02

## 2021-10-19 RX ADMIN — ENOXAPARIN SODIUM 40 MG: 100 INJECTION SUBCUTANEOUS at 22:02

## 2021-10-19 RX ADMIN — ENOXAPARIN SODIUM 40 MG: 100 INJECTION SUBCUTANEOUS at 09:34

## 2021-10-19 RX ADMIN — Medication 2000 UNITS: at 09:34

## 2021-10-19 RX ADMIN — BENZONATATE 100 MG: 100 CAPSULE ORAL at 22:03

## 2021-10-19 RX ADMIN — Medication 10 ML: at 14:45

## 2021-10-19 NOTE — PROGRESS NOTES
Bedside and Verbal shift change report given to Ez Bolton (oncoming nurse) by Cory Galvan RN (offgoing nurse). Report included the following information SBAR, Kardex, Intake/Output and MAR.

## 2021-10-19 NOTE — PROGRESS NOTES
Problem: Mobility Impaired (Adult and Pediatric)  Goal: *Acute Goals and Plan of Care (Insert Text)  Description: FUNCTIONAL STATUS PRIOR TO ADMISSION: Patient was independent and active without use of DME.    HOME SUPPORT PRIOR TO ADMISSION: The patient lived with wife but did not require assist.    Physical Therapy Goals  Initiated 10/14/2021  1. Patient will move from supine to sit and sit to supine  in bed with independence within 7 day(s). 2.  Patient will transfer from bed to chair and chair to bed with independence using the least restrictive device within 7 day(s). 3.  Patient will perform sit to stand with independence within 7 day(s). 4.  Patient will ambulate with independence for 250 feet with the least restrictive device within 7 day(s). Outcome: Progressing Towards Goal   PHYSICAL THERAPY TREATMENT  Patient: William Tidwell (58 y.o. male)  Date: 10/19/2021  Diagnosis: COVID-19 [U07.1] <principal problem not specified>       Precautions:    Chart, physical therapy assessment, plan of care and goals were reviewed. ASSESSMENT  Patient continues with skilled PT services and is progressing towards goals. Patient on 6L O2 upon arrival with VSS. Napping but agreeable to performing therex then back to bed, had been up to chair all morning and just back to bed. Completed coordinated seated and standing therex with breathing synchronicity, mild desat after 3-4 minutes to mid 80s, recovered within 1 minute with PLB and rest.  Continues to benefit from skilled PT, anticipate will be appropriate for  vs no PT at d/c. Current Level of Function Impacting Discharge (mobility/balance): Supervision for mobility    Other factors to consider for discharge: indep PTA         PLAN :  Patient continues to benefit from skilled intervention to address the above impairments. Continue treatment per established plan of care. to address goals.     Recommendation for discharge: (in order for the patient to meet his/her long term goals)  No skilled physical therapy/ follow up rehabilitation needs identified at this time. Vs HHPT    This discharge recommendation:  Has been made in collaboration with the attending provider and/or case management    IF patient discharges home will need the following DME: to be determined (TBD)       SUBJECTIVE:   Patient stated I'm having a pretty good day, I guess. I just wish I knew why my family got better and I didn't    OBJECTIVE DATA SUMMARY:   Critical Behavior:  Neurologic State: Alert, Appropriate for age, Eyes open spontaneously  Orientation Level: Oriented X4, Appropriate for age  Cognition: Appropriate decision making, Appropriate for age attention/concentration, Appropriate safety awareness, Follows commands  Safety/Judgement: Awareness of environment, Decreased awareness of need for assistance  Functional Mobility Training:  Bed Mobility:  Rolling: Supervision  Supine to Sit: Supervision  Sit to Supine: Supervision  Scooting: Supervision        Transfers:  Sit to Stand: Supervision  Stand to Sit: Supervision                             Balance:  Sitting: Intact  Standing: Impaired; Without support  Standing - Static: Good  Standing - Dynamic : Good;Fair             Therapeutic Exercises:   Exercises:    Patient educated regarding home exercise program for strengthening, ROM, and respiratory function; they demonstrated appropriate performance. Reviewed importance of symptom monitoring and adjusting HEP based on symptoms and respiratory status. Pt verbalized understanding via demonstration.        reps sets/day   Breathing Exercises   Seated pursed lip breathing 10 3   Shoulder flexion/extension with coordinated breathing 10 3   Horizontal shoulder abduction/ adduction with coordinated breathing 10 3   Trunk flexion and extension with coordinated pursed lip breathing 10 3       LE exercises    Standing march with counter support 10 3   Standing knee flexion 10 3   Standing hip abduction 10 3   Standing toe raises with counter support 10 3   Sit to Stand 5-10 3        Pain Rating:  None reported    Activity Tolerance:   Fair, desaturates with exertion and requires oxygen, and requires rest breaks    After treatment patient left in no apparent distress:   Supine in bed and Call bell within reach    COMMUNICATION/COLLABORATION:   The patients plan of care was discussed with: Registered nurse.      Leola Franklin, PT   Time Calculation: 19 mins

## 2021-10-19 NOTE — PROGRESS NOTES
[facility]     Name: Camila Contreras MRN: [id number]   : 1960 Hospital: [address]   Date: 10/19/2021        Impression Plan   1. Acute hypoxemic respiratory failure, currently on 6LNC o2.  2. COVID 19 PNA  3. Hx of VTE               · Increase dexamethasone to 6mg BID  · Continue Baricitinib  · Trend CRP (worse today)  · Trend d-dimer  · proph lovenox 40 mg q12h  · abx stopped. PCT normal.  · Trial diuresis  · Encouraged proning, OOB/chair as much as possible, IS use           Radiology  (personally reviewed) 10/18/21 CXR reviewed: overall worsened appearance of widespread patchy ASD. Subjective     Cc: fatigue    61 yo with PMHx of obesity presenting with generalized fatigue from COVID 19. Fatigued for 10 days. Exposed through family member 2 weeks ago and several family members now affected. Tested positive for covid 2 days ago. Denies any underlying lung problems. CXR with mild infiltrates. Lifetime non-smoker. Unvaccinated. Currently 92% on RA. Breathing 20-26 x/min. Interval History:  He reports SOB, occasional cough, no other complaints. Currently on [address] o2. A comprehensive review of systems was negative except for that written in the HPI.     Past Medical History:   Diagnosis Date    History of vascular access device 2021    [facility] VAT R Basilic  long term ABX      Past Surgical History:   Procedure Laterality Date    HX HEENT      toncills      Prior to Admission medications    Not on File     Current Facility-Administered Medications   Medication Dose Route Frequency    melatonin (rapid dissolve) tablet 5 mg  5 mg Oral QHS    sodium chloride (NS) flush 5-40 mL  5-40 mL IntraVENous Q8H    dexamethasone (DECADRON) 4 mg/mL injection 6 mg  6 mg IntraVENous Q24H    cholecalciferol (VITAMIN D3) (1000 Units /25 mcg) tablet 2,000 Units  2,000 Units Oral DAILY    ascorbic acid (vitamin C) (VITAMIN C) tablet 500 mg  500 mg Oral BID    zinc sulfate (ZINCATE) 50 mg zinc (220 mg) capsule 1 Capsule  1 Capsule Oral DAILY    enoxaparin (LOVENOX) injection 40 mg  40 mg SubCUTAneous Q12H    baricitinib (OLUMIANT) tablet 4 mg  4 mg Oral DAILY     No Known Allergies   Social History     Tobacco Use    Smoking status: Never Smoker    Smokeless tobacco: Never Used   Substance Use Topics    Alcohol use: No      Family History   Problem Relation Age of Onset    Diabetes Mother     Diabetes Father           Laboratory: I have personally reviewed the flowsheet and labs.      Recent Labs     10/19/21  0544 10/18/21  1537 10/17/21  0459   WBC 8.9 9.2 13.5*   HGB 11.9* 13.1 12.2   HCT 36.4* 39.2 36.0*    283 219     Recent Labs     10/19/21  0544 10/18/21  1537 10/17/21  0459    139 141   K 3.7 3.5 3.6    108 113*   CO2 25 26 24   * 89 122*   BUN 14 17 17   CREA 0.92 0.82 0.71   CA 7.5* 8.0* 7.6*   ALB 2.1* 2.5* 2.2*   ALT 91* 53 27       Objective:     Visit Vitals  /61 (BP 1 Location: Left upper arm, BP Patient Position: At rest;Sitting)   Pulse 67   Temp 97.7 °F (36.5 °C)   Resp 18   Ht 5' 9.02\" (1.753 m)   Wt 94.9 kg (209 lb 3.5 oz)   SpO2 95%   BMI 30.88 kg/m²         Intake/Output Summary (Last 24 hours) at 10/19/2021 1355  Last data filed at 10/19/2021 1130  Gross per 24 hour   Intake 1492.5 ml   Output    Net 1492.5 ml     EXAM:   GENERAL: well developed, alert, no distress HEENT:  anicteric, EOMI, no alar flaring or epistaxis, oral mucosa moist without cyanosis, NECK:  no jugular vein distention, no retractions, no thyromegaly or masses, LUNGS: diminished with crackles HEART:  Regular rate and rhythm with no MGR; no edema is present, ABDOMEN:  soft with no tenderness, bowel sounds present, EXTREMITIES:  warm with no cyanosis, SKIN:  no jaundice or ecchymosis and NEUROLOGIC:  alert and oriented, grossly non-focal    Jame JOSETTE Siddiqui  [facility]

## 2021-10-19 NOTE — PROGRESS NOTES
[facility]  [address] [facility], [place], [address] [place] [address]  [phone]      Medical Progress Note      NAME: Camila Contreras   :  1960  MRM:  [id number]    Date of service: 10/19/2021  11:30 AM       Assessment and Plan:   1. Acute hypoxic respiratory failure: 2/2 COVID-19. Continue supplement O2 to keep SAO2 > 90%. On 6LPM of O2.      2.  Pneumonia from COVID-19: Unvaccinated. Hypoxic. Markedly elevated CRP however improving. Cont baricitinib, IV dexamethasone. Empiric abx. Trend CRP, d-dimer. Pulmonology following     3.  Syncope and collapse: likely IVVD. Unlikely PE as d-dimer was WNR. CT head negative. Echo showed preserved EF w/o WMA however mild MR and mild AR     4. BERONICA: 2/2 IVVD. Resolved after IVF     5. History of pulmonary embolism: No longer on anticoagulation; per patient he took himself off of anticoagulation. Provoked PE due to surgery in 2021 so may not need long term AC. D-dimer normal          Subjective:     Chief Complaint[de-identified] Patient was seen and examined as a follow up for COVID pneumonia. Chart was reviewed. c/o coughing and SOB on exertion, but getting slowly better     ROS:  (bold if positive, if negative)    Cough Tolerating PT  Tolerating Diet        Objective:     Last 24hrs VS reviewed since prior progress note.  Most recent are:    Visit Vitals  /71 (BP 1 Location: Left upper arm, BP Patient Position: At rest)   Pulse 75   Temp 97.9 °F (36.6 °C)   Resp 18   Ht 5' 9\" (1.753 m)   Wt 94.9 kg (209 lb 3.5 oz)   SpO2 93%   BMI 30.90 kg/m²     SpO2 Readings from Last 6 Encounters:   10/19/21 93%   21 98%   21 97%   21 97%   21 98%   21 98%    O2 Flow Rate (L/min): 6 l/min       Intake/Output Summary (Last 24 hours) at 10/19/2021 1035  Last data filed at 10/18/2021 2007  Gross per 24 hour   Intake 963.33 ml   Output    Net 963.33 ml        Physical Exam:    Gen:  Well-developed, well-nourished, in no acute distress  HEENT:  Pink conjunctivae, PERRL, hearing intact to voice, moist mucous membranes  Neck:  Supple, without masses, thyroid non-tender  Resp:  No accessory muscle use, clear breath sounds without wheezes rales or rhonchi  Card:  No murmurs, normal S1, S2 without thrills, bruits or peripheral edema  Abd:  Soft, non-tender, non-distended, normoactive bowel sounds are present, no palpable organomegaly and no detectable hernias  Lymph:  No cervical or inguinal adenopathy  Musc:  No cyanosis or clubbing  Skin:  No rashes or ulcers, skin turgor is good  Neuro:  Cranial nerves are grossly intact, no focal motor weakness, follows commands appropriately  Psych:  Good insight, oriented to person, place and time, alert  __________________________________________________________________  Medications Reviewed: (see below)  Medications:     Current Facility-Administered Medications   Medication Dose Route Frequency    benzonatate (TESSALON) capsule 100 mg  100 mg Oral TID PRN    guaiFENesin-dextromethorphan (ROBITUSSIN DM) 100-10 mg/5 mL syrup 5 mL  5 mL Oral Q6H PRN    melatonin (rapid dissolve) tablet 5 mg  5 mg Oral QHS    sodium chloride (NS) flush 5-40 mL  5-40 mL IntraVENous Q8H    sodium chloride (NS) flush 5-40 mL  5-40 mL IntraVENous PRN    acetaminophen (TYLENOL) tablet 650 mg  650 mg Oral Q6H PRN    Or    acetaminophen (TYLENOL) suppository 650 mg  650 mg Rectal Q6H PRN    ondansetron (ZOFRAN ODT) tablet 4 mg  4 mg Oral Q8H PRN    Or    ondansetron (ZOFRAN) injection 4 mg  4 mg IntraVENous Q6H PRN    dexamethasone (DECADRON) 4 mg/mL injection 6 mg  6 mg IntraVENous Q24H    cholecalciferol (VITAMIN D3) (1000 Units /25 mcg) tablet 2,000 Units  2,000 Units Oral DAILY    ascorbic acid (vitamin C) (VITAMIN C) tablet 500 mg  500 mg Oral BID    zinc sulfate (ZINCATE) 50 mg zinc (220 mg) capsule 1 Capsule  1 Capsule Oral DAILY    0.9% sodium chloride infusion  50 mL/hr IntraVENous CONTINUOUS    enoxaparin (LOVENOX) injection 40 mg  40 mg SubCUTAneous Q12H    baricitinib (OLUMIANT) tablet 4 mg  4 mg Oral DAILY    sodium chloride (NS) flush 5-10 mL  5-10 mL IntraVENous PRN        Lab Data Reviewed: (see below)  Lab Review:     Recent Labs     10/19/21  0544 10/18/21  1537 10/17/21  0459   WBC 8.9 9.2 13.5*   HGB 11.9* 13.1 12.2   HCT 36.4* 39.2 36.0*    283 219     Recent Labs     10/19/21  0544 10/18/21  1537 10/17/21  0459    139 141   K 3.7 3.5 3.6    108 113*   CO2 25 26 24   * 89 122*   BUN 14 17 17   CREA 0.92 0.82 0.71   CA 7.5* 8.0* 7.6*   ALB 2.1* 2.5* 2.2*   TBILI 1.0 0.6 0.4   ALT 91* 53 27     Lab Results   Component Value Date/Time    Glucose (POC) 120 (H) 06/14/2014 09:32 AM     No results for input(s): PH, PCO2, PO2, HCO3, FIO2 in the last 72 hours. No results for input(s): INR, INREXT, INREXT in the last 72 hours. All Micro Results     Procedure Component Value Units Date/Time    CULTURE, RESPIRATORY/SPUTUM/BRONCH Laurel Jessica [[id number]] Collected: 10/17/21 0848    Order Status: Completed Specimen: Sputum Updated: 10/19/21 0946     Special Requests: NO SPECIAL REQUESTS        GRAM STAIN NO WBC'S SEEN               RARE EPITHELIAL CELLS SEEN                  OCCASIONAL GRAM POSITIVE COCCI IN CLUSTERS           Culture result:       HEAVY NORMAL RESPIRATORY LUIS E          CULTURE, BLOOD [[id number]] Collected: 10/13/21 2347    Order Status: Completed Specimen: Blood Updated: 10/19/21 0516     Special Requests: NO SPECIAL REQUESTS        Culture result: NO GROWTH 5 DAYS       CULTURE, BLOOD [[id number]] Collected: 10/13/21 2347    Order Status: Completed Specimen: Blood Updated: 10/19/21 0516     Special Requests: NO SPECIAL REQUESTS        Culture result: NO GROWTH 5 DAYS       S. Alexis Cohn, UR/CSF [[id number]] Collected: 10/14/21 2365    Order Status: Completed Specimen: Miscellaneous sample Updated: 10/18/21 1636     Source 2 Hour Urine        Specimen Urine Streptococcus pneumoniae Ag Negative        Fluid culture Not indicated. Organism ID Not indicated. Please note Comment        Comment: (NOTE)  College of American Pathologists standards require a culture to be  performed on CSF specimens submitted for bacterial antigen testing. (CAP [id number]) Urine specimens will not be cultured. Performed At: [address] [id number]  Silvano Eldridge MD [place]:[id number]         Roxanna Martínez [[id number]] Collected: 10/14/21 0937    Order Status: Completed Specimen: Urine Updated: 10/17/21 1735     Source URINE        L pneumophila S1 Ag, urine Negative        Comment: (NOTE)  Presumptive negative for L. pneumophila serogroup 1 antigen in urine,  suggesting no recent or current infection. Legionnaires' disease  cannot be ruled out since other serogroups and species may also  cause disease. Performed At: [address] [id number]  Silvano Eldridge MD VH:[id number]         [id number] RAPID TEST [[id number]]  (Abnormal) Collected: 10/13/21 2347    Order Status: Completed Specimen: Nasopharyngeal Updated: 10/14/21 0041     Specimen source Nasopharyngeal        COVID-19 rapid test Detected        Comment: Rapid Abbott ID Now       The specimen is POSITIVE for SARS-CoV-2, the novel coronavirus associated with COVID-19. This test has been authorized by the FDA under an Emergency Use Authorization (EUA) for use by authorized laboratories. Fact sheet for Healthcare Providers: ConventionUpdate.co.nz  Fact sheet for Patients: ConventionUpdate.co.nz       Methodology: Isothermal Nucleic Acid Amplification  CALLED TO AND READ BACK BY  LINDA JONES AT 0041 5395/KLT               I have reviewed notes of prior 24hr. Other pertinent lab:      Total time spent with patient: 28 I personally reviewed chart, notes, data and current medications in the medical record. I have personally examined and treated the patient at bedside during this period.                  Care Plan discussed with: Patient, Nursing Staff and >50% of time spent in counseling and coordination of care    Discussed:  Care Plan    Prophylaxis:  Lovenox    Disposition:  Home w/Family           ___________________________________________________    Attending Physician: Kiki Banda MD

## 2021-10-19 NOTE — PROGRESS NOTES
10/19/2021   CARE MANAGEMENT NOTE:  CM reviewed EMR for clinical updates.  Pt was admitted with COVID.    Reportedly, pt resides with his wife Jose Davenport ([phone]).       RUR 15%     Transition Plan of Care:    1.  Pulmonary is following for medical management  2. Meryle [place] is for pt to return home with his wife  3. PT/OT notes from 10/18 - pt ambulated 50 feet and HH vs none was recommended  4.  Home oxygen eval prior to discharge  5. Pt is uninsured, but Medicaid pending  6.  Outpt f/u  7.  Wife will transport pt home      CM will continue to follow pt until discharged.   Alexis

## 2021-10-19 NOTE — PROGRESS NOTES
Nutrition Assessment     Type and Reason for Visit: Initial, RD nutrition re-screen/LOS    Nutrition Recommendations/Plan:   1. Continue current diet order. 2. Initiate Ensure Compact once daily to aid in meeting kcal needs (220 kcal, 32 g carbs, 9 g protein)    Nutrition Assessment:    Pt is a 62year old male admitted with COVID-19. He has a past medical history of History of vascular access device. Currently on 6L O2. Did not answer phone when RD called room. No chewing/swallowing problems noted. NKFA. No c/o N/V/D at this time. Regular diet provides, on average, 2,313 kcal, 101 g protein. RD to provide additional supplementation to aid in meeting estimated kcal/protein needs while patient intake is averaging <75% of all meals. Patient Vitals for the past 168 hrs:   % Diet Eaten   10/18/21 1852 76 - 100%   10/18/21 1320 26 - 50%   10/18/21 1027 26 - 50%   10/14/21 1959 0%     Wt Readings from Last 10 Encounters:   10/19/21 94.9 kg (209 lb 3.5 oz)   04/06/21 93.9 kg (207 lb)   03/08/21 97.1 kg (214 lb)   03/05/21 109.6 kg (241 lb 10 oz)   03/21/17 99.8 kg (220 lb)   06/14/14 95.3 kg (210 lb)       Malnutrition Assessment:  Malnutrition Status: Insufficient data - unable to physically assess patient at this time    Estimated Daily Nutrient Needs:  Energy (kcal):  2273 (MSJ x 1.3)  Protein (g):  76-95 (0.8-1.0)       Fluid (ml/day):  6482    Nutrition Related Findings:   ABD: WNL 10/19  Last BM: 10/19  Edema: none noted 10/19    Nutr.  Labs:  Lab Results   Component Value Date/Time    GFR est AA >60 10/19/2021 05:44 AM    GFR est non-AA >60 10/19/2021 05:44 AM    Creatinine 0.92 10/19/2021 05:44 AM    BUN 14 10/19/2021 05:44 AM    Sodium 140 10/19/2021 05:44 AM    Potassium 3.7 10/19/2021 05:44 AM    Chloride 108 10/19/2021 05:44 AM    CO2 25 10/19/2021 05:44 AM     Lab Results   Component Value Date/Time    Glucose 127 (H) 10/19/2021 05:44 AM    Glucose (POC) 120 (H) 06/14/2014 09:32 AM     Lab Results Component Value Date/Time    Hemoglobin A1c 6.1 06/15/2014 04:20 AM       Nutr. Meds:  Vit C  Olumiant  Vit D3  Decadron  Lovenox  Zinc Sulfate    Current Nutrition Therapies:  ADULT DIET Regular    Anthropometric Measures:  · Height:  5' 9.02\" (175.3 cm)  · Current Body Wt:  94.9 kg (209 lb 3.5 oz)  · BMI: 30.9    Nutrition Diagnosis:   · Increased nutrient needs related to impaired respiratory function as evidenced by COVID-19    Nutrition Intervention:  Food and/or Nutrient Delivery: Continue current diet, Start oral nutrition supplement  Nutrition Education and Counseling: No recommendations at this time  Coordination of Nutrition Care: Continue to monitor while inpatient, Interdisciplinary rounds    Goals:  PO intake >/= 75% of all meals and supplements within 5 - 7 days       Nutrition Monitoring and Evaluation:   Behavioral-Environmental Outcomes: None identified  Food/Nutrient Intake Outcomes: Food and nutrient intake, Supplement intake  Physical Signs/Symptoms Outcomes: Biochemical data, Weight    Discharge Planning:     Too soon to determine     Electronically signed by Zoey Angulo RD on 11/47/3210 at 1:35 PM  Contact Number: [phone] or via [employer]

## 2021-10-19 NOTE — PROGRESS NOTES
Bedside and Verbal shift change report given to KAREN Garcia (oncoming nurse) by Vaughn Holman RN (offgoing nurse). Report included the following information SBAR, Kardex, Intake/Output, MAR, Accordion, Recent Results and Med Rec Status.

## 2021-10-19 NOTE — PROGRESS NOTES
Bedside and Verbal shift change report given to Ez Bolton (oncoming nurse) by Meme Quintana RN (offgoing nurse). Report included the following information SBAR, Kardex, Intake/Output and MAR.

## 2021-10-20 LAB
ALBUMIN SERPL-MCNC: 2.1 G/DL (ref 3.5–5)
ALBUMIN/GLOB SERPL: 0.5 {RATIO} (ref 1.1–2.2)
ALP SERPL-CCNC: 59 U/L (ref 45–117)
ALT SERPL-CCNC: 70 U/L (ref 12–78)
ANION GAP SERPL CALC-SCNC: 7 MMOL/L (ref 5–15)
AST SERPL-CCNC: 28 U/L (ref 15–37)
BACTERIA SPEC CULT: NORMAL
BACTERIA SPEC CULT: NORMAL
BASOPHILS # BLD: 0 K/UL (ref 0–0.1)
BASOPHILS NFR BLD: 0 % (ref 0–1)
BILIRUB SERPL-MCNC: 0.6 MG/DL (ref 0.2–1)
BUN SERPL-MCNC: 19 MG/DL (ref 6–20)
BUN/CREAT SERPL: 22 (ref 12–20)
CALCIUM SERPL-MCNC: 7.7 MG/DL (ref 8.5–10.1)
CHLORIDE SERPL-SCNC: 106 MMOL/L (ref 97–108)
CO2 SERPL-SCNC: 26 MMOL/L (ref 21–32)
CREAT SERPL-MCNC: 0.85 MG/DL (ref 0.7–1.3)
CRP SERPL-MCNC: 12.7 MG/DL (ref 0–0.6)
D DIMER PPP FEU-MCNC: 1.06 MG/L FEU (ref 0–0.65)
DIFFERENTIAL METHOD BLD: ABNORMAL
EOSINOPHIL # BLD: 0 K/UL (ref 0–0.4)
EOSINOPHIL NFR BLD: 0 % (ref 0–7)
ERYTHROCYTE [DISTWIDTH] IN BLOOD BY AUTOMATED COUNT: 13.1 % (ref 11.5–14.5)
FERRITIN SERPL-MCNC: 785 NG/ML (ref 26–388)
GLOBULIN SER CALC-MCNC: 4.4 G/DL (ref 2–4)
GLUCOSE SERPL-MCNC: 143 MG/DL (ref 65–100)
HCT VFR BLD AUTO: 36.9 % (ref 36.6–50.3)
HGB BLD-MCNC: 12.6 G/DL (ref 12.1–17)
IMM GRANULOCYTES # BLD AUTO: 0.1 K/UL (ref 0–0.04)
IMM GRANULOCYTES NFR BLD AUTO: 1 % (ref 0–0.5)
LYMPHOCYTES # BLD: 1.1 K/UL (ref 0.8–3.5)
LYMPHOCYTES NFR BLD: 13 % (ref 12–49)
MCH RBC QN AUTO: 30.6 PG (ref 26–34)
MCHC RBC AUTO-ENTMCNC: 34.1 G/DL (ref 30–36.5)
MCV RBC AUTO: 89.6 FL (ref 80–99)
MONOCYTES # BLD: 0.3 K/UL (ref 0–1)
MONOCYTES NFR BLD: 4 % (ref 5–13)
NEUTS SEG # BLD: 7.2 K/UL (ref 1.8–8)
NEUTS SEG NFR BLD: 82 % (ref 32–75)
NRBC # BLD: 0 K/UL (ref 0–0.01)
NRBC BLD-RTO: 0 PER 100 WBC
PLATELET # BLD AUTO: 288 K/UL (ref 150–400)
PMV BLD AUTO: 10.6 FL (ref 8.9–12.9)
POTASSIUM SERPL-SCNC: 4 MMOL/L (ref 3.5–5.1)
PROT SERPL-MCNC: 6.5 G/DL (ref 6.4–8.2)
RBC # BLD AUTO: 4.12 M/UL (ref 4.1–5.7)
SERVICE CMNT-IMP: NORMAL
SERVICE CMNT-IMP: NORMAL
SODIUM SERPL-SCNC: 139 MMOL/L (ref 136–145)
WBC # BLD AUTO: 8.7 K/UL (ref 4.1–11.1)

## 2021-10-20 PROCEDURE — 97110 THERAPEUTIC EXERCISES: CPT

## 2021-10-20 PROCEDURE — 82728 ASSAY OF FERRITIN: CPT

## 2021-10-20 PROCEDURE — 97530 THERAPEUTIC ACTIVITIES: CPT | Performed by: OCCUPATIONAL THERAPIST

## 2021-10-20 PROCEDURE — 74011250637 HC RX REV CODE- 250/637: Performed by: INTERNAL MEDICINE

## 2021-10-20 PROCEDURE — 74011250636 HC RX REV CODE- 250/636: Performed by: NURSE PRACTITIONER

## 2021-10-20 PROCEDURE — 77010033678 HC OXYGEN DAILY

## 2021-10-20 PROCEDURE — 85025 COMPLETE CBC W/AUTO DIFF WBC: CPT

## 2021-10-20 PROCEDURE — 94760 N-INVAS EAR/PLS OXIMETRY 1: CPT

## 2021-10-20 PROCEDURE — 80053 COMPREHEN METABOLIC PANEL: CPT

## 2021-10-20 PROCEDURE — 36415 COLL VENOUS BLD VENIPUNCTURE: CPT

## 2021-10-20 PROCEDURE — 74011250636 HC RX REV CODE- 250/636: Performed by: PHYSICIAN ASSISTANT

## 2021-10-20 PROCEDURE — 74011250636 HC RX REV CODE- 250/636: Performed by: INTERNAL MEDICINE

## 2021-10-20 PROCEDURE — 65270000029 HC RM PRIVATE

## 2021-10-20 PROCEDURE — 85379 FIBRIN DEGRADATION QUANT: CPT

## 2021-10-20 PROCEDURE — 97116 GAIT TRAINING THERAPY: CPT

## 2021-10-20 PROCEDURE — 86140 C-REACTIVE PROTEIN: CPT

## 2021-10-20 RX ORDER — FUROSEMIDE 10 MG/ML
20 INJECTION INTRAMUSCULAR; INTRAVENOUS ONCE
Status: COMPLETED | OUTPATIENT
Start: 2021-10-20 | End: 2021-10-20

## 2021-10-20 RX ADMIN — ZINC SULFATE 220 MG (50 MG) CAPSULE 1 CAPSULE: CAPSULE at 10:08

## 2021-10-20 RX ADMIN — BARICITINIB 4 MG: 2 TABLET, FILM COATED ORAL at 10:09

## 2021-10-20 RX ADMIN — FUROSEMIDE 20 MG: 10 INJECTION, SOLUTION INTRAMUSCULAR; INTRAVENOUS at 13:52

## 2021-10-20 RX ADMIN — ENOXAPARIN SODIUM 40 MG: 100 INJECTION SUBCUTANEOUS at 21:59

## 2021-10-20 RX ADMIN — Medication 5 MG: at 21:59

## 2021-10-20 RX ADMIN — Medication 2000 UNITS: at 10:08

## 2021-10-20 RX ADMIN — OXYCODONE HYDROCHLORIDE AND ACETAMINOPHEN 500 MG: 500 TABLET ORAL at 21:59

## 2021-10-20 RX ADMIN — DEXAMETHASONE SODIUM PHOSPHATE 6 MG: 4 INJECTION, SOLUTION INTRAMUSCULAR; INTRAVENOUS at 21:59

## 2021-10-20 RX ADMIN — Medication 10 ML: at 21:59

## 2021-10-20 RX ADMIN — OXYCODONE HYDROCHLORIDE AND ACETAMINOPHEN 500 MG: 500 TABLET ORAL at 10:09

## 2021-10-20 RX ADMIN — ENOXAPARIN SODIUM 40 MG: 100 INJECTION SUBCUTANEOUS at 09:00

## 2021-10-20 RX ADMIN — Medication 10 ML: at 13:53

## 2021-10-20 RX ADMIN — DEXAMETHASONE SODIUM PHOSPHATE 6 MG: 4 INJECTION, SOLUTION INTRAMUSCULAR; INTRAVENOUS at 10:10

## 2021-10-20 NOTE — PROGRESS NOTES
Carlos Ochoa [facility] 79  [address] [facility], [address] [place] [address]  [phone]      Medical Progress Note      NAME: Suri You   :  1960  MRM:  [id number]    Date of service: 10/20/2021         Assessment and Plan:   1. Acute hypoxic respiratory failure: 2/2 COVID-19. Continue supplement O2 to keep SAO2 > 90%. Presently on 3-4L nc.      2.  Pneumonia from COVID-19: Unvaccinated. Hypoxic. Markedly elevated CRP improved, then worsened. Dimer uptrending; hx PE   - Dex 6mg daily 10/14 -19, to BID on 10/19   - Baricitinib 10/14 -27   - LMWH 40mg q12h   - Supportive care; IS (2000)     3.  Syncope and collapse: likely IVVD. Unlikely PE as d-dimer was WNL. CT head negative. Echo showed preserved EF w/o WMA however mild MR and mild AR     4. BERONICA: 2/2 IVVD. Resolved after IVF     5. History of pulmonary embolism: No longer on anticoagulation; per patient he took himself off of anticoagulation. Provoked PE due to surgery in 2021 so may not need long term AC. Trending dimer in s/o above          Subjective:     Chief Complaint[de-identified] Patient was seen and examined as a follow up for COVID pneumonia. Chart was reviewed. Says he feels much better today, would like to go home soon. Pulling  on IS    ROS:  (bold if positive, if negative)    Cough Tolerating PT  Tolerating Diet        Objective:     Last 24hrs VS reviewed since prior progress note.  Most recent are:    Visit Vitals  /64 (BP 1 Location: Left upper arm, BP Patient Position: Semi fowlers)   Pulse 65   Temp 97.9 °F (36.6 °C)   Resp 18   Ht 5' 9.02\" (1.753 m)   Wt 94.9 kg (209 lb 3.5 oz)   SpO2 90%   BMI 30.88 kg/m²     SpO2 Readings from Last 6 Encounters:   10/20/21 90%   21 98%   21 97%   21 97%   21 98%   21 98%    O2 Flow Rate (L/min): 4 l/min       Intake/Output Summary (Last 24 hours) at 10/20/2021 1518  Last data filed at 10/19/2021 1728  Gross per 24 hour   Intake 200 ml Output    Net 200 ml        Physical Exam:    Gen:  Well-developed, well-nourished, in no acute distress  HEENT:  Pink conjunctivae, PERRL, hearing intact to voice, moist mucous membranes  Neck:  Supple, without masses, thyroid non-tender  Resp:  No accessory muscle use,   Card:  No murmurs, normal S1, S2 without thrills, bruits or peripheral edema  Abd:  Soft, non-tender, non-distended, normoactive bowel sounds are present, no palpable organomegaly and no detectable hernias  Lymph:  No cervical or inguinal adenopathy  Musc:  No cyanosis or clubbing  Skin:  No rashes or ulcers, skin turgor is good  Neuro:  Cranial nerves are grossly intact, no focal motor weakness, follows commands appropriately  Psych:  Good insight, oriented to person, place and time, alert  __________________________________________________________________  Medications Reviewed: (see below)  Medications:     Current Facility-Administered Medications   Medication Dose Route Frequency    dexamethasone (DECADRON) 4 mg/mL injection 6 mg  6 mg IntraVENous Q12H    benzonatate (TESSALON) capsule 100 mg  100 mg Oral TID PRN    guaiFENesin-dextromethorphan (ROBITUSSIN DM) 100-10 mg/5 mL syrup 5 mL  5 mL Oral Q6H PRN    melatonin (rapid dissolve) tablet 5 mg  5 mg Oral QHS    sodium chloride (NS) flush 5-40 mL  5-40 mL IntraVENous Q8H    sodium chloride (NS) flush 5-40 mL  5-40 mL IntraVENous PRN    acetaminophen (TYLENOL) tablet 650 mg  650 mg Oral Q6H PRN    Or    acetaminophen (TYLENOL) suppository 650 mg  650 mg Rectal Q6H PRN    ondansetron (ZOFRAN ODT) tablet 4 mg  4 mg Oral Q8H PRN    Or    ondansetron (ZOFRAN) injection 4 mg  4 mg IntraVENous Q6H PRN    cholecalciferol (VITAMIN D3) (1000 Units /25 mcg) tablet 2,000 Units  2,000 Units Oral DAILY    ascorbic acid (vitamin C) (VITAMIN C) tablet 500 mg  500 mg Oral BID    zinc sulfate (ZINCATE) 50 mg zinc (220 mg) capsule 1 Capsule  1 Capsule Oral DAILY    enoxaparin (LOVENOX) injection 40 mg  40 mg SubCUTAneous Q12H    baricitinib (OLUMIANT) tablet 4 mg  4 mg Oral DAILY    sodium chloride (NS) flush 5-10 mL  5-10 mL IntraVENous PRN        Lab Data Reviewed: (see below)  Lab Review:     Recent Labs     10/20/21  0550 10/19/21  0544 10/18/21  1537   WBC 8.7 8.9 9.2   HGB 12.6 11.9* 13.1   HCT 36.9 36.4* 39.2    260 283     Recent Labs     10/20/21  0550 10/19/21  0544 10/18/21  1537    140 139   K 4.0 3.7 3.5    108 108   CO2 26 25 26   * 127* 89   BUN 19 14 17   CREA 0.85 0.92 0.82   CA 7.7* 7.5* 8.0*   ALB 2.1* 2.1* 2.5*   TBILI 0.6 1.0 0.6   ALT 70 91* 53     Lab Results   Component Value Date/Time    Glucose (POC) 120 (H) 06/14/2014 09:32 AM     No results for input(s): PH, PCO2, PO2, HCO3, FIO2 in the last 72 hours. No results for input(s): INR, INREXT, INREXT in the last 72 hours. All Micro Results     Procedure Component Value Units Date/Time    CULTURE, BLOOD [[id number]] Collected: 10/13/21 2347    Order Status: Completed Specimen: Blood Updated: 10/20/21 0634     Special Requests: NO SPECIAL REQUESTS        Culture result: NO GROWTH 6 DAYS       CULTURE, BLOOD [[id number]] Collected: 10/13/21 2347    Order Status: Completed Specimen: Blood Updated: 10/20/21 0634     Special Requests: NO SPECIAL REQUESTS        Culture result: NO GROWTH 6 DAYS       CULTURE, RESPIRATORY/SPUTUM/BRONCH [place] STAIN [[id number]] Collected: 10/17/21 0848    Order Status: Completed Specimen: Sputum Updated: 10/19/21 0946     Special Requests: NO SPECIAL REQUESTS        GRAM STAIN NO WBC'S SEEN               RARE EPITHELIAL CELLS SEEN                  OCCASIONAL GRAM POSITIVE COCCI IN CLUSTERS           Culture result:       HEAVY NORMAL RESPIRATORY LUIS E          S. Daiva Scales, UR/CSF [[id number]] Collected: 10/14/21 5643    Order Status: Completed Specimen: Miscellaneous sample Updated: 10/18/21 1636     Source 2 Hour Urine        Specimen Urine     Streptococcus pneumoniae Ag Negative        Fluid culture Not indicated. Organism ID Not indicated. Please note Comment        Comment: (NOTE)  College of American Pathologists standards require a culture to be  performed on CSF specimens submitted for bacterial antigen testing. (CAP [id number]) Urine specimens will not be cultured. Performed At: [address] [id number]  Marco Ortiz MD XO:[id number]         Nick Medina [[id number]] Collected: 10/14/21 0937    Order Status: Completed Specimen: Urine Updated: 10/17/21 1735     Source URINE        L pneumophila S1 Ag, urine Negative        Comment: (NOTE)  Presumptive negative for L. pneumophila serogroup 1 antigen in urine,  suggesting no recent or current infection. Legionnaires' disease  cannot be ruled out since other serogroups and species may also  cause disease. Performed At: [address] [id number]  Marco Ortiz MD GZ:[id number]         [facility]-75 RAPID TEST [[id number]]  (Abnormal) Collected: 10/13/21 2347    Order Status: Completed Specimen: Nasopharyngeal Updated: 10/14/21 0041     Specimen source Nasopharyngeal        COVID-19 rapid test Detected        Comment: Rapid Abbott ID Now       The specimen is POSITIVE for SARS-CoV-2, the novel coronavirus associated with COVID-19. This test has been authorized by the FDA under an Emergency Use Authorization (EUA) for use by authorized laboratories. Fact sheet for Healthcare Providers: ConventionUpdate.co.nz  Fact sheet for Patients: ConventionUpdate.co.nz       Methodology: Isothermal Nucleic Acid Amplification  CALLED TO AND READ BACK BY  LINDA JONES AT 0041 5395/KLT               I have reviewed notes of prior 24hr. Other pertinent lab: Total time spent with patient: 28 I personally reviewed chart, notes, data and current medications in the medical record.   I have personally examined and treated the patient at bedside during this period.                  Care Plan discussed with: Patient, Nursing Staff and >50% of time spent in counseling and coordination of care    Discussed:  Care Plan    Prophylaxis:  Lovenox    Disposition:  Home w/Family           ___________________________________________________    Attending Physician: Jose Manuel Buckley MD

## 2021-10-20 NOTE — PROGRESS NOTES
Problem: Self Care Deficits Care Plan (Adult)  Goal: *Acute Goals and Plan of Care (Insert Text)  Description:   FUNCTIONAL STATUS PRIOR TO ADMISSION: Patient was independent and active without use of DME. 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    Occupational Therapy Goals  Initiated 10/14/2021  1. Patient will perform grooming with modified independence standing at the sink and maintain oxygen sats > or = 90% within 7 day(s). 2.  Patient will perform upper body dressing and lower body dressing with modified independence and maintain oxygen sats > or = 90% within 7 day(s). 3.  Patient will perform toilet transfers and toileting with supervision/set-up and maintain oxygen sats > or = 90% within 7 day(s). 4.  Patient will perform bilateral UE AROM exercises with coordinated breathing with min cue within 7 day(s). 5.  Patient will perform pursed lip breathing with 1-2 cues during functional tasks/mobility within 7 day(s). Outcome: Progressing Towards Goal     OCCUPATIONAL THERAPY TREATMENT  Patient: Geo Mueller (58 y.o. male)  Date: 10/20/2021  Diagnosis: COVID-19 [U07.1] <principal problem not specified>       Precautions:    Chart, occupational therapy assessment, plan of care, and goals were reviewed. ASSESSMENT  Patient continues with skilled OT services and is progressing towards goals. Patient with increased activity tolerance and maintained > or = 90% on 4 liters throughout tx this date. Patient with improved mobility and overall supervision to mod I. Will continue to follow, may only need 1-2 more visits from OT. Current Level of Function Impacting Discharge (ADLs): supervision to mod I basic ADL's and mobility    Other factors to consider for discharge:          PLAN :  Patient continues to benefit from skilled intervention to address the above impairments. Continue treatment per established plan of care to address goals. Recommend with staff: bathroom for toileting, grooming and bathing at sink    Recommend next OT session: ADL's in bathroom     Recommendation for discharge: (in order for the patient to meet his/her long term goals)  No skilled occupational therapy/ follow up rehabilitation needs identified at this time. This discharge recommendation:  Has not yet been discussed the attending provider and/or case management    IF patient discharges home will need the following DME: none       SUBJECTIVE:   Patient stated I was up earlier in the chair.     OBJECTIVE DATA SUMMARY:   Cognitive/Behavioral Status:      Alert and oriented x's 4                Functional Mobility and Transfers for ADLs:  Bed Mobility:  Rolling: Supervision  Supine to Sit: Modified independent  Sit to Supine: Modified independent  Scooting: Supervision    Transfers:  Sit to Stand: Modified independent  Functional Transfers  Bathroom Mobility: Supervision/set up  Toilet Transfer : Supervision  Bed to Chair: Supervision    Balance:  Sitting: Intact  Standing: Impaired; Without support  Standing - Static: Good  Standing - Dynamic : Fair;Good    ADL Intervention:       Grooming  Grooming Assistance: Supervision  Position Performed: Standing  Washing Face: Supervision  Washing Hands: Supervision         Toileting  Toileting Assistance: Modified independent;Supervision  Bladder Hygiene: Modified independent         Therapeutic Exercises:   Performed bilateral UE AROM shoulder flexion and horizontal abduction/adduction with coordinated breathing in standing x's 1 set 10 reps    Pain:  No complaint    Activity Tolerance:   Good and on 4 liters oxygen throughout    After treatment patient left in no apparent distress:   Sitting in chair and Call bell within reach    COMMUNICATION/COLLABORATION:   The patients plan of care was discussed with: Registered nurse.      Ang Jorge OTR/ANDRES  Time Calculation: 16 mins

## 2021-10-20 NOTE — PROGRESS NOTES
Bedside and Verbal shift change report given to Reena Cannon RN (oncoming nurse) by Meri Stanford RN (offgoing nurse). Report included the following information SBAR, Kardex, Intake/Output, MAR, Accordion, Recent Results, Med Rec Status and Cardiac Rhythm sinus teresa.

## 2021-10-20 NOTE — PROGRESS NOTES
10/20/2021   CARE MANAGEMENT NOTE:  CM reviewed EMR for clinical updates.  Pt was admitted with COVID.    Reportedly, pt resides with his wife Dottie Suárez ([phone]).       RUR 15%; LOS 6 days     Transition Plan of Care:    1.  Pulmonary is following for medical management  2. Oscar Banda is for pt to return home with his wife  3.  Home oxygen eval prior to discharge  4. Pt is uninsured, but Medicaid pending  5.  Outpt f/u  6.  Wife will transport pt home      CM will continue to follow pt until discharged.   Alexis
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
## 2021-10-20 NOTE — PROGRESS NOTES
Problem: Mobility Impaired (Adult and Pediatric)  Goal: *Acute Goals and Plan of Care (Insert Text)  Description: FUNCTIONAL STATUS PRIOR TO ADMISSION: Patient was independent and active without use of DME.    HOME SUPPORT PRIOR TO ADMISSION: The patient lived with wife but did not require assist.    Physical Therapy Goals  Initiated 10/14/2021  1. Patient will move from supine to sit and sit to supine  in bed with independence within 7 day(s). 2.  Patient will transfer from bed to chair and chair to bed with independence using the least restrictive device within 7 day(s). 3.  Patient will perform sit to stand with independence within 7 day(s). 4.  Patient will ambulate with independence for 250 feet with the least restrictive device within 7 day(s). Outcome: Progressing Towards Goal   PHYSICAL THERAPY TREATMENT  Patient: Lidia Martinez (58 y.o. male)  Date: 10/20/2021  Diagnosis: COVID-19 [U07.1] <principal problem not specified>       Precautions:  COVID-19  Chart, physical therapy assessment, plan of care and goals were reviewed. ASSESSMENT  Patient continues with skilled PT services and is progressing towards goals. Patient this morning with excellent participation in physical therapy treatment. He tolerates 60ft in room ambulation with SpO2 briefly dropping to 88% a few times on 4L NC but quickly recovering with brief, standing rest breaks of less than 10s. Patient also participates in standing therapeutic exercises to improve LE strength and balance as below. Discussed staying mobile while inpatient, getting up in chair 3x / day to change position and improve lung function. Current Level of Function Impacting Discharge (mobility/balance): supervision    Other factors to consider for discharge: highly motivated          PLAN :  Patient continues to benefit from skilled intervention to address the above impairments. Continue treatment per established plan of care.   to address goals. Recommendation for discharge: (in order for the patient to meet his/her long term goals)  No skilled physical therapy/ follow up rehabilitation needs identified at this time. This discharge recommendation:  Has not yet been discussed the attending provider and/or case management    IF patient discharges home will need the following DME: to be determined (TBD)       SUBJECTIVE:   Patient stated I still have this cough.  re: asking patient how he is doing/feeling    OBJECTIVE DATA SUMMARY:   Patient received supine in bed and was agreeable to participate in PT session. Patient was cleared by nursing to participate in PT session. Critical Behavior:  Neurologic State: Alert  Orientation Level: Oriented X4  Cognition: No command following, Follows commands  Safety/Judgement: Awareness of environment, Decreased awareness of need for assistance  Functional Mobility Training:  Bed Mobility:  Rolling: Supervision  Supine to Sit: Supervision     Scooting: Supervision        Transfers:  Sit to Stand: Supervision  Stand to Sit: Supervision        Bed to Chair: Supervision                    Balance:  Sitting: Intact  Standing: Impaired; Without support  Standing - Static: Good  Standing - Dynamic : Fair;Good  Ambulation/Gait Training:  Distance (ft): 60 Feet (ft)  Assistive Device: Gait belt  Ambulation - Level of Assistance: Stand-by assistance        Gait Abnormalities: Decreased step clearance              Speed/Ludmila: Slow  Step Length: Right shortened;Left shortened                  Therapeutic Exercises:   Exercises:    Patient educated regarding home exercise program for strengthening, ROM, and respiratory function; they demonstrated appropriate performance. Reviewed importance of symptom monitoring and adjusting HEP based on symptoms and respiratory status. Pt verbalized understanding via verbal statement and demonstration.        LE exercises    SLS balancing 10s 3 each leg   Standing marches 10 3 Standing squat 10 3         Pain Rating:  Patient without reports of pain during therapy      Activity Tolerance:   Fair, desaturates with exertion and requires oxygen, and requires rest breaks    After treatment patient left in no apparent distress:   Sitting in chair, Heels elevated for pressure relief, and Call bell within reach    COMMUNICATION/COLLABORATION:   The patients plan of care was discussed with: Occupational therapist and Registered nurse.      Nik Kimball PT, DPT   Time Calculation: (P) 25 mins

## 2021-10-21 LAB
CRP SERPL-MCNC: 5.54 MG/DL (ref 0–0.6)
D DIMER PPP FEU-MCNC: 0.98 MG/L FEU (ref 0–0.65)
FERRITIN SERPL-MCNC: 689 NG/ML (ref 26–388)

## 2021-10-21 PROCEDURE — 97116 GAIT TRAINING THERAPY: CPT

## 2021-10-21 PROCEDURE — 74011250636 HC RX REV CODE- 250/636: Performed by: NURSE PRACTITIONER

## 2021-10-21 PROCEDURE — 74011250637 HC RX REV CODE- 250/637: Performed by: INTERNAL MEDICINE

## 2021-10-21 PROCEDURE — 86140 C-REACTIVE PROTEIN: CPT

## 2021-10-21 PROCEDURE — 97530 THERAPEUTIC ACTIVITIES: CPT

## 2021-10-21 PROCEDURE — 77010033678 HC OXYGEN DAILY

## 2021-10-21 PROCEDURE — 74011250636 HC RX REV CODE- 250/636: Performed by: INTERNAL MEDICINE

## 2021-10-21 PROCEDURE — 65270000029 HC RM PRIVATE

## 2021-10-21 PROCEDURE — 85379 FIBRIN DEGRADATION QUANT: CPT

## 2021-10-21 PROCEDURE — 97535 SELF CARE MNGMENT TRAINING: CPT | Performed by: OCCUPATIONAL THERAPIST

## 2021-10-21 PROCEDURE — 82728 ASSAY OF FERRITIN: CPT

## 2021-10-21 PROCEDURE — 36415 COLL VENOUS BLD VENIPUNCTURE: CPT

## 2021-10-21 PROCEDURE — 94760 N-INVAS EAR/PLS OXIMETRY 1: CPT

## 2021-10-21 RX ORDER — DEXAMETHASONE 6 MG/1
6 TABLET ORAL DAILY
Status: DISCONTINUED | OUTPATIENT
Start: 2021-10-22 | End: 2021-10-22 | Stop reason: HOSPADM

## 2021-10-21 RX ADMIN — Medication 2000 UNITS: at 09:21

## 2021-10-21 RX ADMIN — Medication 10 ML: at 12:27

## 2021-10-21 RX ADMIN — OXYCODONE HYDROCHLORIDE AND ACETAMINOPHEN 500 MG: 500 TABLET ORAL at 17:46

## 2021-10-21 RX ADMIN — Medication 10 ML: at 21:04

## 2021-10-21 RX ADMIN — BARICITINIB 4 MG: 2 TABLET, FILM COATED ORAL at 09:21

## 2021-10-21 RX ADMIN — OXYCODONE HYDROCHLORIDE AND ACETAMINOPHEN 500 MG: 500 TABLET ORAL at 09:21

## 2021-10-21 RX ADMIN — ZINC SULFATE 220 MG (50 MG) CAPSULE 1 CAPSULE: CAPSULE at 09:21

## 2021-10-21 RX ADMIN — ENOXAPARIN SODIUM 40 MG: 100 INJECTION SUBCUTANEOUS at 09:21

## 2021-10-21 RX ADMIN — Medication 5 MG: at 21:03

## 2021-10-21 RX ADMIN — ENOXAPARIN SODIUM 40 MG: 100 INJECTION SUBCUTANEOUS at 21:03

## 2021-10-21 RX ADMIN — DEXAMETHASONE SODIUM PHOSPHATE 6 MG: 4 INJECTION, SOLUTION INTRAMUSCULAR; INTRAVENOUS at 09:21

## 2021-10-21 NOTE — PROGRESS NOTES
Bedside shift change report given to [facility] (oncoming nurse) by Amparo (offgoing nurse). Report included the following information SBAR, Kardex, MAR and Recent Results.

## 2021-10-21 NOTE — PROGRESS NOTES
10/21/2021   CARE MANAGEMENT NOTE:  CM reviewed EMR for clinical updates.  Pt was admitted with COVID.    Reportedly, pt resides with his wife Zehra Garrido ([phone]).       RUR 5%; LOS 7 days     Transition Plan of Care:    1.  Pulmonary is following for medical management  2. Carmen Palafox is for pt to return home with his wife  3. PT note on 10/20, pt ambulated 60 feet and no rehab services indicated  4.  Home oxygen eval prior to discharge  5.  Pt is uninsured, but Medicaid pending  6.  Outpt f/u  7.  Wife will transport pt home      CM will continue to follow pt until discharged.   Alexis

## 2021-10-21 NOTE — PROGRESS NOTES
[facility]  [address] [employer], [address] [place] [address]  [phone]      Medical Progress Note      NAME: Barry Perez   :  1960  MRM:  [id number]    Date of service: 10/21/2021         Assessment and Plan:   1. Acute hypoxic respiratory failure: 2/2 COVID-19. Prolonged course, but has made steady improvements and tolerated room air today. Pulling excellent volumes on IS.    - Home tmr +/- O2     2.  Pneumonia from COVID-19: Unvaccinated. Markedly elevated CRP improved, then worsened. Now better but still elevated. Would like to reduce dex to daily dosing and monitor overnight with plan for discharge tmr.    - Dex 6mg daily 10/14 -19, to BID on 10/19; daily 10/21    - Home on taper tmr   - Baricitinib 10/14 -27   - LMWH 40mg q12h   - Supportive care; IS (pulling )     3.  Syncope and collapse: likely IVVD. Unlikely PE as d-dimer was WNL. CT head negative. Echo showed preserved EF w/o WMA however mild MR and mild AR     4. BERONICA: 2/2 IVVD. Resolved after IVF     5. History of pulmonary embolism: No longer on anticoagulation; per patient he took himself off of anticoagulation. Provoked PE due to surgery in 2021 so may not need long term AC. Trending dimer in s/o above          Subjective:     Chief Complaint[de-identified] Patient was seen and examined as a follow up for COVID pneumonia. Chart was reviewed. Feels great and wants to go home. Later walks with PT and does not require oxygen. ROS:  (bold if positive, if negative)    Cough Tolerating PT  Tolerating Diet        Objective:     Last 24hrs VS reviewed since prior progress note.  Most recent are:    Visit Vitals  BP (!) 114/56 (BP 1 Location: Left upper arm, BP Patient Position: At rest)   Pulse (!) 56   Temp 97.8 °F (36.6 °C)   Resp 19   Ht 5' 9.02\" (1.753 m)   Wt 95.4 kg (210 lb 5.1 oz)   SpO2 98%   BMI 31.04 kg/m²     SpO2 Readings from Last 6 Encounters:   10/21/21 98%   21 98%   21 97%   21 97%   04/08/21 98%   04/07/21 98%    O2 Flow Rate (L/min): 4 l/min       Intake/Output Summary (Last 24 hours) at 10/21/2021 1653  Last data filed at 10/21/2021 1523  Gross per 24 hour   Intake 180 ml   Output 0 ml   Net 180 ml        Physical Exam:    Gen:  Well-developed, well-nourished, in no acute distress  HEENT:  Pink conjunctivae, PERRL, hearing intact to voice, moist mucous membranes  Neck:  Supple, without masses, thyroid non-tender  Resp:  No accessory muscle use,   Card:  No murmurs, normal S1, S2 without thrills, bruits or peripheral edema  Abd:  Soft, non-tender, non-distended, normoactive bowel sounds are present, no palpable organomegaly and no detectable hernias  Lymph:  No cervical or inguinal adenopathy  Musc:  No cyanosis or clubbing  Skin:  No rashes or ulcers, skin turgor is good  Neuro:  Cranial nerves are grossly intact, no focal motor weakness, follows commands appropriately  Psych:  Good insight, oriented to person, place and time, alert  __________________________________________________________________  Medications Reviewed: (see below)  Medications:     Current Facility-Administered Medications   Medication Dose Route Frequency    [START ON 10/22/2021] dexAMETHasone (DECADRON) tablet 6 mg  6 mg Oral DAILY    benzonatate (TESSALON) capsule 100 mg  100 mg Oral TID PRN    guaiFENesin-dextromethorphan (ROBITUSSIN DM) 100-10 mg/5 mL syrup 5 mL  5 mL Oral Q6H PRN    melatonin (rapid dissolve) tablet 5 mg  5 mg Oral QHS    sodium chloride (NS) flush 5-40 mL  5-40 mL IntraVENous Q8H    sodium chloride (NS) flush 5-40 mL  5-40 mL IntraVENous PRN    acetaminophen (TYLENOL) tablet 650 mg  650 mg Oral Q6H PRN    Or    acetaminophen (TYLENOL) suppository 650 mg  650 mg Rectal Q6H PRN    ondansetron (ZOFRAN ODT) tablet 4 mg  4 mg Oral Q8H PRN    Or    ondansetron (ZOFRAN) injection 4 mg  4 mg IntraVENous Q6H PRN    cholecalciferol (VITAMIN D3) (1000 Units /25 mcg) tablet 2,000 Units  2,000 Units Oral DAILY    ascorbic acid (vitamin C) (VITAMIN C) tablet 500 mg  500 mg Oral BID    zinc sulfate (ZINCATE) 50 mg zinc (220 mg) capsule 1 Capsule  1 Capsule Oral DAILY    enoxaparin (LOVENOX) injection 40 mg  40 mg SubCUTAneous Q12H    baricitinib (OLUMIANT) tablet 4 mg  4 mg Oral DAILY    sodium chloride (NS) flush 5-10 mL  5-10 mL IntraVENous PRN        Lab Data Reviewed: (see below)  Lab Review:     Recent Labs     10/20/21  0550 10/19/21  0544   WBC 8.7 8.9   HGB 12.6 11.9*   HCT 36.9 36.4*    260     Recent Labs     10/20/21  0550 10/19/21  0544    140   K 4.0 3.7    108   CO2 26 25   * 127*   BUN 19 14   CREA 0.85 0.92   CA 7.7* 7.5*   ALB 2.1* 2.1*   TBILI 0.6 1.0   ALT 70 91*     Lab Results   Component Value Date/Time    Glucose (POC) 120 (H) 06/14/2014 09:32 AM     No results for input(s): PH, PCO2, PO2, HCO3, FIO2 in the last 72 hours. No results for input(s): INR, INREXT, INREXT in the last 72 hours. All Micro Results     Procedure Component Value Units Date/Time    CULTURE, BLOOD [[id number]] Collected: 10/13/21 2347    Order Status: Completed Specimen: Blood Updated: 10/20/21 0634     Special Requests: NO SPECIAL REQUESTS        Culture result: NO GROWTH 6 DAYS       CULTURE, BLOOD [[id number]] Collected: 10/13/21 2347    Order Status: Completed Specimen: Blood Updated: 10/20/21 0634     Special Requests: NO SPECIAL REQUESTS        Culture result: NO GROWTH 6 DAYS       CULTURE, RESPIRATORY/SPUTUM/BRONCH Oscar Childers STAIN [[id number]] Collected: 10/17/21 0848    Order Status: Completed Specimen: Sputum Updated: 10/19/21 0946     Special Requests: NO SPECIAL REQUESTS        GRAM STAIN NO WBC'S SEEN               RARE EPITHELIAL CELLS SEEN                  OCCASIONAL GRAM POSITIVE COCCI IN CLUSTERS           Culture result:       HEAVY NORMAL RESPIRATORY LUIS E          ELIZABETH Burgess, UR/CSF [[id number]] Collected: 10/14/21 9255    Order Status: Completed Specimen: Miscellaneous sample Updated: 10/18/21 1636     Source 2 Hour Urine        Specimen Urine     Streptococcus pneumoniae Ag Negative        Fluid culture Not indicated. Organism ID Not indicated. Please note Comment        Comment: (NOTE)  College of American Pathologists standards require a culture to be  performed on CSF specimens submitted for bacterial antigen testing. (CAP [id number]) Urine specimens will not be cultured. Performed At: [address] [id number]  Georgia Baer MD OQ:[id number]         Ju Gan [[id number]] Collected: 10/14/21 0937    Order Status: Completed Specimen: Urine Updated: 10/17/21 1735     Source URINE        L pneumophila S1 Ag, urine Negative        Comment: (NOTE)  Presumptive negative for L. pneumophila serogroup 1 antigen in urine,  suggesting no recent or current infection. Legionnaires' disease  cannot be ruled out since other serogroups and species may also  cause disease. Performed At: [address] [id number]  Georgia Baer MD TE:[id number]         CQATR-23 RAPID TEST [[id number]]  (Abnormal) Collected: 10/13/21 2347    Order Status: Completed Specimen: Nasopharyngeal Updated: 10/14/21 0041     Specimen source Nasopharyngeal        COVID-19 rapid test Detected        Comment: Rapid Abbott ID Now       The specimen is POSITIVE for SARS-CoV-2, the novel coronavirus associated with COVID-19. This test has been authorized by the FDA under an Emergency Use Authorization (EUA) for use by authorized laboratories. Fact sheet for Healthcare Providers: ConventionUpdate.co.nz  Fact sheet for Patients: ConventionUpdate.co.nz       Methodology: Isothermal Nucleic Acid Amplification  CALLED TO AND READ BACK BY  LINDA JONES AT 0041 5395/KLT               I have reviewed notes of prior 24hr. Other pertinent lab:      Total time spent with patient: 28 I personally reviewed chart, notes, data and current medications in the medical record. I have personally examined and treated the patient at bedside during this period.                  Care Plan discussed with: Patient, Nursing Staff and >50% of time spent in counseling and coordination of care    Discussed:  Care Plan    Prophylaxis:  Lovenox    Disposition:  Home w/Family           ___________________________________________________    Attending Physician: Zia Vargas MD

## 2021-10-21 NOTE — PROGRESS NOTES
Problem: Mobility Impaired (Adult and Pediatric)  Goal: *Acute Goals and Plan of Care (Insert Text)  Description: FUNCTIONAL STATUS PRIOR TO ADMISSION: Patient was independent and active without use of DME.    HOME SUPPORT PRIOR TO ADMISSION: The patient lived with wife but did not require assist.    Physical Therapy Goals  Initiated 10/14/2021  1. Patient will move from supine to sit and sit to supine  in bed with independence within 7 day(s). 2.  Patient will transfer from bed to chair and chair to bed with independence using the least restrictive device within 7 day(s). 3.  Patient will perform sit to stand with independence within 7 day(s). 4.  Patient will ambulate with independence for 250 feet with the least restrictive device within 7 day(s). Outcome: Progressing Towards Goal    PHYSICAL THERAPY TREATMENT: WEEKLY REASSESSMENT AND DISCHARGE  Patient: Shaquille Henao (58 y.o. male)  Date: 10/21/2021  Primary Diagnosis: COVID-19 [U07.1]       Precautions: Droplet +         ASSESSMENT  Patient continues with skilled PT services and is progressing towards goals. Pt greeted sitting up in chair on 4L, agreeable to work with therapy and reports that he has been up ad héctor within his room and to the restroom without issue or concern. Walking oximetry test completed with results included below in objective section. Pt currently independent with all aspects of mobility and sat's remains >90% on RA at rest and with activity. Pt has no additional acute PT needs and is safe to dc home once medically stable. No follow up PT needs identified. Current Level of Function Impacting Discharge (mobility/balance): independent    Functional Outcome Measure: The patient scored 90/100 on the Barthel Index outcome measure which is indicative of 10%% functional impairment . Other factors to consider for discharge:          PLAN :  Goals have been updated based on progression since last assessment.   Patient goals met and has no additional acute PT needs. Recommendation for discharge: (in order for the patient to meet his/her long term goals)  No skilled physical therapy/ follow up rehabilitation needs identified at this time. This discharge recommendation:  Has been made in collaboration with the attending provider and/or case management    IF patient discharges home will need the following DME: none     SUBJECTIVE:   Patient stated I feel so much better.     OBJECTIVE DATA SUMMARY:   Critical Behavior:  Neurologic State: (P) Alert, Eyes open spontaneously  Orientation Level: (P) Oriented X4  Cognition: (P) Follows commands  Safety/Judgement: Awareness of environment, Decreased awareness of need for assistance  Functional Mobility Training:      Documentation for home O2:     ROOM AIR    AT REST   O2 SATS  96 HR  77   ROOM AIR WITH ACTIVITY 02 SATS  91 HR  92                                Transfers:  Sit to Stand: Independent  Stand to Sit: Independent                               Ambulation/Gait Training:  Distance (ft): 150 Feet (ft)     Ambulation - Level of Assistance: Independent    Functional Measure:  Barthel Index:    Bathin  Bladder: 10  Bowels: 10  Groomin  Dressing: 10  Feeding: 10  Mobility: 15  Stairs: 5  Toilet Use: 10  Transfer (Bed to Chair and Back): 15  Total: 90/100       The Barthel ADL Index: Guidelines  1. The index should be used as a record of what a patient does, not as a record of what a patient could do. 2. The main aim is to establish degree of independence from any help, physical or verbal, however minor and for whatever reason. 3. The need for supervision renders the patient not independent. 4. A patient's performance should be established using the best available evidence. Asking the patient, friends/relatives and nurses are the usual sources, but direct observation and common sense are also important. However direct testing is not needed.   5. Usually the patient's performance over the preceding 24-48 hours is important, but occasionally longer periods will be relevant. 6. Middle categories imply that the patient supplies over 50 per cent of the effort. 7. Use of aids to be independent is allowed. Tarun Bey., Barthel, D.W. (9457). Functional evaluation: the Barthel Index. [facility] (14)2. CASEY Marsh, Miriam Brooks, Ruby Power., Jamel, [address] [place] [address] (1999). Measuring the change indisability after inpatient rehabilitation; comparison of the responsiveness of the Barthel Index and Functional [place] Measure. Journal of Neurology, Neurosurgery, and Psychiatry, 66(4), 787-366. Dre Samaniego, N.J.A, MARILEE Rincon, & Brinda Torres, M.A. (2004.) Assessment of post-stroke quality of life in cost-effectiveness studies: The usefulness of the Barthel Index and the EuroQoL-5D. Quality of Life Research, 13, 427-43         Pain Rating:  Denied pain    Activity Tolerance:   Good and SpO2 stable on RA    After treatment patient left in no apparent distress:   Sitting in chair and Call bell within reach    COMMUNICATION/COLLABORATION:   The patients plan of care was discussed with: Occupational therapist, Registered nurse, and Physician.      Iqra Urias PT, DPT   Time Calculation: 30 mins
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
## 2021-10-21 NOTE — PROGRESS NOTES
Problem: Self Care Deficits Care Plan (Adult)  Goal: *Acute Goals and Plan of Care (Insert Text)  Description:   FUNCTIONAL STATUS PRIOR TO ADMISSION: Patient was independent and active without use of DME. 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    Occupational Therapy Goals  Initiated 10/14/2021, Weekly Re-evaluation 10/21/21- all goal met at this time  1. Patient will perform grooming with modified independence standing at the sink and maintain oxygen sats > or = 90% within 7 day(s). 2.  Patient will perform upper body dressing and lower body dressing with modified independence and maintain oxygen sats > or = 90% within 7 day(s). 3.  Patient will perform toilet transfers and toileting with supervision/set-up and maintain oxygen sats > or = 90% within 7 day(s). 4.  Patient will perform bilateral UE AROM exercises with coordinated breathing with min cue within 7 day(s). 5.  Patient will perform pursed lip breathing with 1-2 cues during functional tasks/mobility within 7 day(s). Outcome: Resolved/Met     OCCUPATIONAL THERAPY TREATMENT/DISCHARGE  Patient: Ignacio Cordova (58 y.o. male)  Date: 10/21/2021  Diagnosis: COVID-19 [U07.1] <principal problem not specified>       Precautions:    Chart, occupational therapy assessment, plan of care, and goals were reviewed. ASSESSMENT  Patient continues with skilled OT services and has progressed towards goals. Patient has met all goals at this time and is maintaining oxygen sats in upper 90's on room air. Overall mod I to independent and no further needs at this time.      Current Level of Function (ADLs/self-care): mod I to independent    Other factors to consider for discharge: reports his wife still sick from [facility] as well         PLAN :  Rationale for discharge: Goals achieved  Recommend with staff: up ad héctor, clear for discharge when medically ready  Recommendation for discharge: (in order for the patient to meet his/her long term goals)  No skilled occupational therapy/ follow up rehabilitation needs identified at this time. This discharge recommendation:  Has been made in collaboration with the attending provider and/or case management    IF patient discharges home will need the following DME:none       SUBJECTIVE:   Patient stated Tell me, what do you think about the vaccine? Marcelo Cuevas    OBJECTIVE DATA SUMMARY:   Cognitive/Behavioral Status:  Neurologic State: Alert;Eyes open spontaneously  Orientation Level: Oriented X4  Cognition: Follows commands  Perception: Appears intact  Perseveration: No perseveration noted  Safety/Judgement: Awareness of environment; Fall prevention;Home safety; Insight into deficits    Functional Mobility and Transfers for ADLs:  Bed Mobility:  Supine to Sit: Modified independent  Sit to Supine: Modified independent    Transfers:  Sit to Stand: Independent  Functional Transfers  Bathroom Mobility: Independent  Toilet Transfer : Independent  Bed to Chair: Independent    Balance:  Sitting: Intact  Standing: Intact  Standing - Static: Good  Standing - Dynamic : Good    ADL Intervention:   Increased education for energy conservation related to ADL's, home management, IADL's as he is likely to discharge tomorrow.      Patient asking about vaccine and personal thoughts, educated on benefit of vaccine in preventing death from [place], patient asking about getting at this time and informed him to ask his MD as he may need to wait due to just having COVID     Grooming  Grooming Assistance: Modified independent  Position Performed: Standing  Washing Face: Modified independent  Washing Hands: Independent     Upper Body Dressing Assistance  Dressing Assistance: Modified independent  Hospital Gown: Modified independent    Lower Body Dressing Assistance  Dressing Assistance: Modified independent  Socks: Modified independent  Leg Crossed Method Used: Yes  Position Performed: Seated edge of bed    Toileting  Toileting Assistance: Independent; Modified independent  Bladder Hygiene: Independent  Clothing Management: Independent    Cognitive Retraining  Safety/Judgement: Awareness of environment; Fall prevention;Home safety; Insight into deficits        Pain:  No complaint    Activity Tolerance:   Good    After treatment patient left in no apparent distress:   Supine in bed and Call bell within reach    COMMUNICATION/COLLABORATION:   The patients plan of care was discussed with: Physical therapist, Registered nurse, and Physician.      Vianey Casey OTR/L  Time Calculation: 34 mins

## 2021-10-21 NOTE — PROGRESS NOTES
Bedside and Verbal shift change report given to KAREN Christensen (oncoming nurse) by Jonelle Win RN (offgoing nurse). Report included the following information SBAR, Kardex, Intake/Output, MAR, Accordion, Recent Results, Med Rec Status and Cardiac Rhythm sinus teresa.

## 2021-10-22 VITALS
WEIGHT: 210.32 LBS | SYSTOLIC BLOOD PRESSURE: 107 MMHG | OXYGEN SATURATION: 90 % | TEMPERATURE: 97.8 F | DIASTOLIC BLOOD PRESSURE: 69 MMHG | HEIGHT: 69 IN | HEART RATE: 59 BPM | RESPIRATION RATE: 18 BRPM | BODY MASS INDEX: 31.15 KG/M2

## 2021-10-22 LAB
CRP SERPL-MCNC: 2.39 MG/DL (ref 0–0.6)
D DIMER PPP FEU-MCNC: 1.14 MG/L FEU (ref 0–0.65)
FERRITIN SERPL-MCNC: 503 NG/ML (ref 26–388)

## 2021-10-22 PROCEDURE — 94618 PULMONARY STRESS TESTING: CPT

## 2021-10-22 PROCEDURE — 74011250636 HC RX REV CODE- 250/636: Performed by: INTERNAL MEDICINE

## 2021-10-22 PROCEDURE — 77010033678 HC OXYGEN DAILY

## 2021-10-22 PROCEDURE — 85379 FIBRIN DEGRADATION QUANT: CPT

## 2021-10-22 PROCEDURE — 86140 C-REACTIVE PROTEIN: CPT

## 2021-10-22 PROCEDURE — 82728 ASSAY OF FERRITIN: CPT

## 2021-10-22 PROCEDURE — 74011250637 HC RX REV CODE- 250/637: Performed by: INTERNAL MEDICINE

## 2021-10-22 PROCEDURE — 94760 N-INVAS EAR/PLS OXIMETRY 1: CPT

## 2021-10-22 PROCEDURE — 36415 COLL VENOUS BLD VENIPUNCTURE: CPT

## 2021-10-22 RX ORDER — DEXAMETHASONE 2 MG/1
TABLET ORAL
Qty: 30 TABLET | Refills: 0 | Status: SHIPPED | OUTPATIENT
Start: 2021-10-22 | End: 2021-11-06

## 2021-10-22 RX ADMIN — Medication 2000 UNITS: at 08:31

## 2021-10-22 RX ADMIN — BARICITINIB 4 MG: 2 TABLET, FILM COATED ORAL at 08:31

## 2021-10-22 RX ADMIN — ENOXAPARIN SODIUM 40 MG: 100 INJECTION SUBCUTANEOUS at 08:31

## 2021-10-22 RX ADMIN — OXYCODONE HYDROCHLORIDE AND ACETAMINOPHEN 500 MG: 500 TABLET ORAL at 08:32

## 2021-10-22 RX ADMIN — DEXAMETHASONE 6 MG: 6 TABLET ORAL at 08:31

## 2021-10-22 RX ADMIN — ZINC SULFATE 220 MG (50 MG) CAPSULE 1 CAPSULE: CAPSULE at 08:31

## 2021-10-22 NOTE — DISCHARGE INSTRUCTIONS
HOSPITALIST DISCHARGE INSTRUCTIONS  NAME: Suri You   :  1960   MRN:  [id number]     Date/Time:  10/22/2021 11:02 AM    ADMIT DATE: 10/13/2021     DISCHARGE DATE: 10/22/2021     ADMITTING DIAGNOSIS:  Acute Hypoxemic Respiratory Failure due to COVID-19    DISCHARGE DIAGNOSIS:  Acute Hypoxemic Respiratory Failure due to COVID-19 - You were admitted for treatment with IV steroids, immunosuppressants, supplemental oxygen, and supportive care. You eventually improved and will be able to discharge home. You still need Oxygen, which should be worn at all times. You will need to compete a steroid taper of Dexamethasone. Please take 6mg daily for 5 days, then 4mg daily for 5 days, then 2mg daily for 5 days, then stop. Return to the ER immediately if you experience shortness of breath or chest pain. Please follow up with your PCP in 1 week to evaluate your oxygen neds    MEDICATIONS:    · It is important that you take the medication exactly as they are prescribed. · Keep your medication in the bottles provided by the pharmacist and keep a list of the medication names, dosages, and times to be taken in your wallet. · Do not take other medications without consulting your doctor. Pain Management: per above medications    If you experience any of the following symptoms then please call your primary care physician or return to the emergency room if you cannot get hold of your doctor:  Fever, chills, nausea, vomiting, diarrhea, change in mentation, falling, bleeding, shortness of breath      Information obtained by :  I understand that if any problems occur once I am at home I am to contact my physician. I understand and acknowledge receipt of the instructions indicated above.                                                                                                                                            Physician's or R.N.'s Signature Date/Time                                                                                                                                              Patient or Representative Signature                                                          Date/Time

## 2021-10-22 NOTE — PROGRESS NOTES
10/22/2021   3:20 PM  [employer] approved. Oxygen tank delivered to patient's room. CM updated patient's wife and [facility] placed on AVS.  CM Specialist to schedule new PCP appointment with [facility] [facility]. 1:50 PM  SHWETA faxed [phone] request for home oxygen to [employer]. Awaiting response. Initial referral sent to [facility]. 11:44 AM  SHWETA left v/m with patient's wife, Javan Vazquez, requesting a call back in order to [facility] application for oxygen. Wife returned call but unable to answer monthly household income question. CM requested RN obtain this information from patient when next in the room as patient is not answering room phone.     Verónica Ferrari, RN

## 2021-10-22 NOTE — PROGRESS NOTES
Bedside and Verbal shift change report given to KAREN Christensen (oncoming nurse) by Belvie Jeans, RN (offgoing nurse). Report included the following information SBAR, Kardex, Intake/Output, MAR, Accordion, Recent Results, Med Rec Status and Cardiac Rhythm sinus teresa.

## 2021-10-22 NOTE — PROGRESS NOTES
CARE MANAGEMENT NOTE         CM spoke with Pt to discuss DME needs. Pt's spouse is agreeable to using [facility]. CM sent orders and medicals via [employer] to [facility]. [facility] able to accept Pt. Portable placed outside of room. Nurse asked to deliver during next visit. Nurse voiced understanding. [facility] contact information added to AVS. CM notified Pt to call [facility] upon discharge to arrange concentrator delivery.     Delivery tickets uploaded to [facility] via [employer].      _____________________________________  ALEXIS Espinoza - Care Management  10/22/2021   4:19 PM

## 2021-10-22 NOTE — DISCHARGE SUMMARY
Physician Discharge Summary     Patient ID:  Sánchez Blanca  [id number]  6 y.o.  1960    Admit date: 10/13/2021    Discharge date and time: 10/22/2021    Admission Diagnoses: COVID-19 [U07.1]    Discharge Diagnoses: Active Problems:    Syncope and collapse (2014)      COVID-19 (10/14/2021)      Hypotension (10/14/2021)      Renal insufficiency (10/14/2021)           Hospital Course:   Mr. Donte Choudhury is a 62 y.o.  male with a past medical history of perforated appendicitis status post appendectomy in 2021, bilateral pulmonary embolism following surgery who was admitted with COVID and hypotension. Mr. Donte Choudhury states that about a week ago he was exposed to family member with Covid. He states the day  following exposure he began to experience cough, fatigue and fevers. He tested positive for Covid yesterday and again today in the emergency room. He was admitted for further evaluation and treatment of the followin. Acute hypoxic respiratory failure: 2/2 COVID-19. Prolonged course, but has made steady improvements. Pulling excellent volumes on IS. Discharged on 2L nc, though intermittently able to tolerate room air. Cm helping to coordinate hospital follow as pt has no PCP.      2.  Pneumonia from COVID-19: Unvaccinated. Required midflow oxygen and then improved. Received high dose IV dexamethasone as well as baricitinib. Will discharge on dexamethasone taper over next 15 days.      3.  Syncope and collapse: likely IVVD. Unlikely PE as d-dimer was WNL. CT head negative. Echo showed preserved EF w/o WMA however mild MR and mild AR     4.  BERONICA: 2/2 IVVD.  Resolved after IVF     5.  History of pulmonary embolism: No longer on anticoagulation; per patient he took himself off of anticoagulation. Provoked PE due to surgery in 2021 so does not need long term AC. Advised to return to ED with chest pain    PCP: None     Consults: None    Condition of patient at discharge: good and improved    Discharge Exam:    Physical Exam:    Gen: Well-developed, well-nourished, in no acute distress  HEENT:  Pink conjunctivae, PERRL, hearing intact to voice, moist mucous membranes  Neck: Supple, without masses, thyroid non-tender  Resp: No accessory muscle use, clear breath sounds without wheezes rales or rhonchi  Card: No murmurs, normal S1, S2 without thrills, bruits or peripheral edema  Abd:  Soft, non-tender, non-distended, normoactive bowel sounds are present, no palpable organomegaly and no detectable hernias  Lymph:  No cervical or inguinal adenopathy  Musc: No cyanosis or clubbing  Skin: No rashes or ulcers, skin turgor is good  Neuro:  Cranial nerves are grossly intact, no focal motor weakness, follows commands appropriately  Psych:  Good insight, oriented to person, place and time, alert          Disposition: home    Patient Instructions:   Current Discharge Medication List      START taking these medications    Details   dexAMETHasone (DECADRON) 2 mg tablet Take 3 Tablets by mouth Daily (before breakfast) for 5 days, THEN 2 Tablets Daily (before breakfast) for 5 days, THEN 1 Tablet Daily (before breakfast) for 5 days. Qty: 30 Tablet, Refills: 0           Activity: Activity as tolerated  Diet: Regular Diet  Wound Care: None needed    Approximate time spent in patient care on day of discharge: 35 min    Signed:   Mildred Castleman, MD  10/22/2021  11:05 AM

## 2021-10-22 NOTE — PROGRESS NOTES
10/22/21 1037   Resting (Room Air)   SpO2 90   HR 80   During Walk (Room Air)   SpO2 85   HR 88   Comments placed on 2L NC. During Walk (On O2)   SpO2 91   HR 90   O2 Device Nasal cannula   O2 Flow Rate (l/min) 2 l/min   After Walk   SpO2 90   HR 86   Comments walked in room on 2L NC. back to chair on room air.    Does the Patient Qualify for Home O2 Yes   Liter Flow on Exertion 2   Does the Patient Need Portable Oxygen Tanks Yes

## 2021-10-22 NOTE — PROGRESS NOTES
Discharge info reviewed with patient, verbalizes understanding. Peripheral IV's removed. Made aware of med change and med available at preferred pharmacy. Portable 02 with patient, aware to call DME asap for 02 delivery to home. Spouse to transport home.

## 2021-10-25 ENCOUNTER — PATIENT OUTREACH (OUTPATIENT)
Dept: CASE MANAGEMENT | Age: 61
End: 2021-10-25

## 2021-10-25 NOTE — PROGRESS NOTES
10/26/21  Multiple attempts made to reach patient - voice messages left. Unable to reach patient for hospital f/u. Episode resolved at this time. AR          START taking these medications     Details   dexAMETHasone (DECADRON) 2 mg tablet Take 3 Tablets by mouth Daily (before breakfast) for 5 days, THEN 2 Tablets Daily (before breakfast) for 5 days, THEN 1 Tablet Daily (before breakfast) for 5 days.   Qty: 30 Tablet, Refills: 0

## 2021-10-26 ENCOUNTER — VIRTUAL VISIT (OUTPATIENT)
Dept: FAMILY MEDICINE CLINIC | Age: 61
End: 2021-10-26

## 2021-10-26 DIAGNOSIS — Z91.199 NO-SHOW FOR APPOINTMENT: Primary | ICD-10-CM

## 2021-10-26 NOTE — PROGRESS NOTES
[facility]  62 y.o. male  1960  Carleen Ojeda \[facility]\"" [address] [id number]  [id number]    [phone] (home)      [address]:    Telephone Encounter  Melody Negron [place]       Encounter Date: 10/26/2021 at 1:00 PM    Called patient for visit today x 2 with no answer. VM box full so unable to leave a message. If calls back, please reschedule appt.

## 2024-11-03 NOTE — PROGRESS NOTES
[facility] Progress Note    Date: 2021    Name: Kathy Guy    MRN: [id number]         : 1960    Mr. Hickey Arrived ambulatory and in no distress for Daily Antibiotics. Assessment was completed, no acute issues at this time, no new complaints voiced. TONY PICC with + blood return, dressing CDI. Dressing due to be changed today, changed per protocol under sterile procedure. Mr. Mehnaz Culp vitals were reviewed. Visit Vitals  BP (!) 107/59   Pulse 82   Temp 98.2 °F (36.8 °C)   Resp 18   SpO2 98%       Medications:  Medications Administered     ertapenem (INVANZ) 1 g in 0.9% sodium chloride (MBP/ADV) 50 mL     Admin Date  2021 Action  New Bag Dose  1 g Rate  100 mL/hr Route  IntraVENous Administered By  Aniket Og RN                Mr. Elham Davis tolerated treatment well and was discharged from [place] [address] in stable condition. He is to return on 2021 for his next appointment.     Cristian Bell RN  2021
Xray Chest 1 View- PORTABLE-Urgent

## (undated) DEVICE — LAPAROSCOPIC TROCAR SLEEVE/SINGLE USE: Brand: KII® OPTICAL ACCESS SYSTEM

## (undated) DEVICE — MARYLAND JAW LAPAROSCOPIC SEALER/DIVIDER COATED: Brand: LIGASURE

## (undated) DEVICE — TROCAR RMFG Z 3RD SLEEVE 5X100 -- LAWSON OEM ITEM 262365 PK/6

## (undated) DEVICE — REM POLYHESIVE ADULT PATIENT RETURN ELECTRODE: Brand: VALLEYLAB

## (undated) DEVICE — DRAIN SURG WND 15 FR RND TIP SIL STRL LF DISP

## (undated) DEVICE — Device

## (undated) DEVICE — 3M™ TEGADERM™ TRANSPARENT FILM DRESSING FRAME STYLE, 1624W, 2-3/8 IN X 2-3/4 IN (6 CM X 7 CM), 100/CT 4CT/CASE: Brand: 3M™ TEGADERM™

## (undated) DEVICE — SYR 10ML LUER LOK 1/5ML GRAD --

## (undated) DEVICE — RESERVOIR,SUCTION,100CC,SILICONE: Brand: MEDLINE

## (undated) DEVICE — TOWEL SURG W17XL27IN STD BLU COT NONFENESTRATED PREWASHED

## (undated) DEVICE — SUTURE SZ 0 27IN 5/8 CIR UR-6  TAPER PT VIOLET ABSRB VICRYL J603H

## (undated) DEVICE — STERILE POLYISOPRENE POWDER-FREE SURGICAL GLOVES: Brand: PROTEXIS

## (undated) DEVICE — INFECTION CONTROL KIT SYS

## (undated) DEVICE — NEEDLE HYPO 22GA L1.5IN BLK S STL HUB POLYPR SHLD REG BVL

## (undated) DEVICE — STRIP,CLOSURE,WOUND,MEDI-STRIP,1/2X4: Brand: MEDLINE

## (undated) DEVICE — APPLICATOR ENDOSCP L34CM W/ S STL CANN PLAS OBT STYL FOR

## (undated) DEVICE — TROCAR: Brand: KII® SLEEVE

## (undated) DEVICE — CANISTER, RIGID, 3000CC: Brand: MEDLINE INDUSTRIES, INC.

## (undated) DEVICE — TOTAL TRAY, 16FR 10ML SIL FOLEY, URN: Brand: MEDLINE

## (undated) DEVICE — SEALANT HEMOSTAT W/THROM 8ML -- SURGIFLO MATRIX

## (undated) DEVICE — SUTURE ETHLN SZ 2-0 L18IN NONABSORBABLE BLK L19MM PS-2 PRIM 593H

## (undated) DEVICE — RELOAD STPL L45MM H1.5-3.6MM REG TISS BLU GRIPPING SURF B

## (undated) DEVICE — PAD,NON-ADHERENT,3X8,STERILE,LF,1/PK: Brand: MEDLINE

## (undated) DEVICE — DEVICE TRNSF SPIK STL 2008S] MICROTEK MEDICAL INC]

## (undated) DEVICE — SURGICAL PROCEDURE KIT GEN LAPAROSCOPY LF

## (undated) DEVICE — POWDER HEMOSTAT GEL 3.0GR -- SURGICEL

## (undated) DEVICE — STRAP,POSITIONING,KNEE/BODY,FOAM,4X60": Brand: MEDLINE

## (undated) DEVICE — DRAIN SURG 19FR 0.25IN SIL RND W/ TRCR INDIC DOT RADPQ FULL

## (undated) DEVICE — SUTURE MCRYL SZ 4-0 L27IN ABSRB UD L19MM PS-2 1/2 CIR PRIM Y426H

## (undated) DEVICE — SUT ETHLN 2-0 18IN FS BLK --

## (undated) DEVICE — BANDAGE ADH W1XL3IN UNIV PLAS NAT GEN USE STRP

## (undated) DEVICE — TISSUE RETRIEVAL SYSTEM: Brand: INZII RETRIEVAL SYSTEM

## (undated) DEVICE — STAPLER INT L340MM 45MM STD 12 FIRING B FRM PWR + GRIPPING

## (undated) DEVICE — SURGICEL ENDOSCP APPL

## (undated) DEVICE — PREP SKN CHLRAPRP APL 26ML STR --

## (undated) DEVICE — DRAPE,REIN 53X77,STERILE: Brand: MEDLINE

## (undated) DEVICE — SOL IRRIGATION INJ NACL 0.9% 500ML BTL

## (undated) DEVICE — TROCAR: Brand: KII® OPTICAL ACCESS SYSTEM

## (undated) DEVICE — CLICKLINE SCISSORS INSERT: Brand: CLICKLINE

## (undated) DEVICE — APPLIER CLP M L L11.4IN DIA10MM ENDOSCP ROT MULT FOR LIG

## (undated) DEVICE — COVER LT HNDL PLAS RIG 1 PER PK